# Patient Record
Sex: FEMALE | Race: WHITE | Employment: OTHER | RURAL
[De-identification: names, ages, dates, MRNs, and addresses within clinical notes are randomized per-mention and may not be internally consistent; named-entity substitution may affect disease eponyms.]

---

## 2017-07-19 ENCOUNTER — HOSPITAL ENCOUNTER (OUTPATIENT)
Dept: GENERAL RADIOLOGY | Age: 62
Discharge: HOME | End: 2017-07-19
Attending: NURSE PRACTITIONER | Admitting: NURSE PRACTITIONER

## 2017-07-19 DIAGNOSIS — R91.1 COIN LESION: ICD-10-CM

## 2017-07-19 DIAGNOSIS — R91.1 SOLITARY PULMONARY NODULE: ICD-10-CM

## 2017-07-19 DIAGNOSIS — Z12.31 ENCOUNTER FOR SCREENING MAMMOGRAM FOR BREAST CANCER: ICD-10-CM

## 2017-08-01 ENCOUNTER — HOSPITAL ENCOUNTER (OUTPATIENT)
Dept: GENERAL RADIOLOGY | Age: 62
Discharge: HOME | End: 2017-08-01
Attending: NURSE PRACTITIONER | Admitting: NURSE PRACTITIONER

## 2017-08-01 ENCOUNTER — HOSPITAL ENCOUNTER (OUTPATIENT)
Dept: GENERAL RADIOLOGY | Age: 62
Discharge: HOME | End: 2017-08-01
Admitting: NURSE PRACTITIONER

## 2017-08-01 DIAGNOSIS — R92.8 ABNORMAL MAMMOGRAM: ICD-10-CM

## 2017-08-01 DIAGNOSIS — R92.8 OTHER ABNORMAL AND INCONCLUSIVE FINDINGS ON DIAGNOSTIC IMAGING OF BREAST: ICD-10-CM

## 2018-08-13 ENCOUNTER — HOSPITAL ENCOUNTER (EMERGENCY)
Facility: HOSPITAL | Age: 63
Discharge: HOME | End: 2018-08-13
Attending: EMERGENCY MEDICINE | Admitting: EMERGENCY MEDICINE

## 2018-08-13 VITALS
BODY MASS INDEX: 23.37 KG/M2 | WEIGHT: 127 LBS | DIASTOLIC BLOOD PRESSURE: 77 MMHG | SYSTOLIC BLOOD PRESSURE: 154 MMHG | OXYGEN SATURATION: 94 % | HEART RATE: 94 BPM | RESPIRATION RATE: 16 BRPM | TEMPERATURE: 98.3 F | HEIGHT: 62 IN

## 2018-08-13 DIAGNOSIS — R11.2 NON-INTRACTABLE VOMITING WITH NAUSEA, UNSPECIFIED VOMITING TYPE: ICD-10-CM

## 2018-08-13 DIAGNOSIS — B02.9 HERPES ZOSTER WITHOUT COMPLICATION: Primary | ICD-10-CM

## 2018-08-13 LAB
ALBUMIN SERPL-MCNC: 4 G/DL (ref 3.5–5)
ALBUMIN/GLOB SERPL: 1.3 G/DL (ref 1–2)
ALP SERPL-CCNC: 75 U/L (ref 38–126)
ALT SERPL W P-5'-P-CCNC: 18 U/L (ref 13–69)
ANION GAP SERPL CALCULATED.3IONS-SCNC: 8.9 MMOL/L (ref 10–20)
AST SERPL-CCNC: 20 U/L (ref 15–46)
BACTERIA UR QL AUTO: ABNORMAL /HPF
BASOPHILS # BLD AUTO: 0.03 10*3/MM3 (ref 0–0.2)
BASOPHILS NFR BLD AUTO: 0.4 % (ref 0–2.5)
BILIRUB SERPL-MCNC: 0.2 MG/DL (ref 0.2–1.3)
BILIRUB UR QL STRIP: NEGATIVE
BUN BLD-MCNC: 10 MG/DL (ref 7–20)
BUN/CREAT SERPL: 14.3 (ref 7.1–23.5)
CALCIUM SPEC-SCNC: 9.2 MG/DL (ref 8.4–10.2)
CHLORIDE SERPL-SCNC: 104 MMOL/L (ref 98–107)
CLARITY UR: ABNORMAL
CO2 SERPL-SCNC: 30 MMOL/L (ref 26–30)
COLOR UR: YELLOW
CREAT BLD-MCNC: 0.7 MG/DL (ref 0.6–1.3)
DEPRECATED RDW RBC AUTO: 46.7 FL (ref 37–54)
EOSINOPHIL # BLD AUTO: 0.07 10*3/MM3 (ref 0–0.7)
EOSINOPHIL NFR BLD AUTO: 1 % (ref 0–7)
ERYTHROCYTE [DISTWIDTH] IN BLOOD BY AUTOMATED COUNT: 13.7 % (ref 11.5–14.5)
GFR SERPL CREATININE-BSD FRML MDRD: 85 ML/MIN/1.73
GLOBULIN UR ELPH-MCNC: 3 GM/DL
GLUCOSE BLD-MCNC: 102 MG/DL (ref 74–98)
GLUCOSE UR STRIP-MCNC: NEGATIVE MG/DL
HCT VFR BLD AUTO: 32.3 % (ref 37–47)
HGB BLD-MCNC: 10.6 G/DL (ref 12–16)
HGB UR QL STRIP.AUTO: ABNORMAL
HYALINE CASTS UR QL AUTO: ABNORMAL /LPF
IMM GRANULOCYTES # BLD: 0.02 10*3/MM3 (ref 0–0.06)
IMM GRANULOCYTES NFR BLD: 0.3 % (ref 0–0.6)
KETONES UR QL STRIP: NEGATIVE
LEUKOCYTE ESTERASE UR QL STRIP.AUTO: NEGATIVE
LYMPHOCYTES # BLD AUTO: 0.79 10*3/MM3 (ref 0.6–3.4)
LYMPHOCYTES NFR BLD AUTO: 11.8 % (ref 10–50)
MCH RBC QN AUTO: 30.5 PG (ref 27–31)
MCHC RBC AUTO-ENTMCNC: 32.8 G/DL (ref 30–37)
MCV RBC AUTO: 92.8 FL (ref 81–99)
MONOCYTES # BLD AUTO: 0.45 10*3/MM3 (ref 0–0.9)
MONOCYTES NFR BLD AUTO: 6.7 % (ref 0–12)
MUCOUS THREADS URNS QL MICRO: ABNORMAL /HPF
NEUTROPHILS # BLD AUTO: 5.35 10*3/MM3 (ref 2–6.9)
NEUTROPHILS NFR BLD AUTO: 79.8 % (ref 37–80)
NITRITE UR QL STRIP: NEGATIVE
NRBC BLD MANUAL-RTO: 0 /100 WBC (ref 0–0)
PH UR STRIP.AUTO: 7 [PH] (ref 5–8)
PLATELET # BLD AUTO: 178 10*3/MM3 (ref 130–400)
PMV BLD AUTO: 9.6 FL (ref 6–12)
POTASSIUM BLD-SCNC: 3.9 MMOL/L (ref 3.5–5.1)
PROT SERPL-MCNC: 7 G/DL (ref 6.3–8.2)
PROT UR QL STRIP: NEGATIVE
RBC # BLD AUTO: 3.48 10*6/MM3 (ref 4.2–5.4)
RBC # UR: ABNORMAL /HPF
REF LAB TEST METHOD: ABNORMAL
SODIUM BLD-SCNC: 139 MMOL/L (ref 137–145)
SP GR UR STRIP: 1.02 (ref 1–1.03)
SQUAMOUS #/AREA URNS HPF: ABNORMAL /HPF
UROBILINOGEN UR QL STRIP: ABNORMAL
WBC NRBC COR # BLD: 6.71 10*3/MM3 (ref 4.8–10.8)
WBC UR QL AUTO: ABNORMAL /HPF

## 2018-08-13 PROCEDURE — 81001 URINALYSIS AUTO W/SCOPE: CPT | Performed by: NURSE PRACTITIONER

## 2018-08-13 PROCEDURE — 96375 TX/PRO/DX INJ NEW DRUG ADDON: CPT

## 2018-08-13 PROCEDURE — 99283 EMERGENCY DEPT VISIT LOW MDM: CPT

## 2018-08-13 PROCEDURE — 25010000002 MORPHINE SULFATE (PF) 2 MG/ML SOLUTION: Performed by: NURSE PRACTITIONER

## 2018-08-13 PROCEDURE — 96374 THER/PROPH/DIAG INJ IV PUSH: CPT

## 2018-08-13 PROCEDURE — 85025 COMPLETE CBC W/AUTO DIFF WBC: CPT | Performed by: NURSE PRACTITIONER

## 2018-08-13 PROCEDURE — 80053 COMPREHEN METABOLIC PANEL: CPT | Performed by: NURSE PRACTITIONER

## 2018-08-13 PROCEDURE — 25010000002 ONDANSETRON PER 1 MG: Performed by: NURSE PRACTITIONER

## 2018-08-13 RX ORDER — ONDANSETRON 2 MG/ML
4 INJECTION INTRAMUSCULAR; INTRAVENOUS ONCE
Status: COMPLETED | OUTPATIENT
Start: 2018-08-13 | End: 2018-08-13

## 2018-08-13 RX ORDER — ACYCLOVIR 800 MG/1
800 TABLET ORAL 4 TIMES DAILY
Qty: 28 TABLET | Refills: 0 | Status: SHIPPED | OUTPATIENT
Start: 2018-08-13 | End: 2020-05-19

## 2018-08-13 RX ORDER — MORPHINE SULFATE 2 MG/ML
2 INJECTION, SOLUTION INTRAMUSCULAR; INTRAVENOUS ONCE
Status: COMPLETED | OUTPATIENT
Start: 2018-08-13 | End: 2018-08-13

## 2018-08-13 RX ORDER — SODIUM CHLORIDE 0.9 % (FLUSH) 0.9 %
10 SYRINGE (ML) INJECTION AS NEEDED
Status: DISCONTINUED | OUTPATIENT
Start: 2018-08-13 | End: 2018-08-13 | Stop reason: HOSPADM

## 2018-08-13 RX ORDER — HYDROCODONE BITARTRATE AND ACETAMINOPHEN 5; 325 MG/1; MG/1
1 TABLET ORAL EVERY 4 HOURS PRN
Qty: 18 TABLET | Refills: 0 | Status: SHIPPED | OUTPATIENT
Start: 2018-08-13 | End: 2020-05-19

## 2018-08-13 RX ADMIN — SODIUM CHLORIDE 1000 ML: 9 INJECTION, SOLUTION INTRAVENOUS at 17:59

## 2018-08-13 RX ADMIN — ONDANSETRON 4 MG: 2 INJECTION INTRAMUSCULAR; INTRAVENOUS at 17:59

## 2018-08-13 RX ADMIN — MORPHINE SULFATE 2 MG: 2 INJECTION, SOLUTION INTRAMUSCULAR; INTRAVENOUS at 17:59

## 2018-08-13 NOTE — ED PROVIDER NOTES
Subjective   History of Present Illness  This is a 62 year old female who comes in today complaining of right side low back pain that started about 1 week ago, then yesterday she noted a rash on her lower back and she developed nausea and vomiting. Since then the rash has gotten worse going down the front of her leg. She is also unable to hold down any fluids or eat today. She denies any fever.   Review of Systems   Constitutional: Negative.    HENT: Negative.    Eyes: Negative.    Respiratory: Negative.    Cardiovascular: Negative.    Gastrointestinal: Positive for nausea and vomiting.   Endocrine: Negative.    Genitourinary: Negative.    Musculoskeletal: Negative.    Skin: Positive for rash.   Allergic/Immunologic: Negative.    Neurological: Negative.    Hematological: Negative.    Psychiatric/Behavioral: Negative.    All other systems reviewed and are negative.      Past Medical History:   Diagnosis Date   • Arthritis        Allergies   Allergen Reactions   • Penicillins Rash       Past Surgical History:   Procedure Laterality Date   • HYSTERECTOMY         History reviewed. No pertinent family history.    Social History     Social History   • Marital status:      Social History Main Topics   • Smoking status: Current Every Day Smoker     Packs/day: 1.00     Types: Cigarettes   • Alcohol use No   • Drug use: No     Other Topics Concern   • Not on file           Objective   Physical Exam   Constitutional: She appears well-developed and well-nourished.   Nursing note and vitals reviewed.  GEN: No acute distress  Head: Normocephalic, atraumatic  Eyes: Pupils equal round reactive to light  ENT: Posterior pharynx normal in appearance, oral mucosa is moist  Chest: Nontender to palpation  Cardiovascular: Regular rate  Lungs: Clear to auscultation bilaterally  Abdomen: Soft, nontender, nondistended, no peritoneal signs  Extremities: No edema, normal appearance  Neuro: GCS 15  Psych: Mood and affect are  appropriate  Skin: vesicular rash starting on the right lower back and trails around to anterior aspect of right leg following L2 nerve pathway.     Procedures           ED Course  ED Course as of Aug 13 1900   Mon Aug 13, 2018   1832 Feeling some better, after medication.   [TW]      ED Course User Index  [TW] Margaret Black, ANNEMARIE                  MDM  Number of Diagnoses or Management Options  Diagnosis management comments: This appears to be shingles covering the L2 nerve pathway. Since she is having nausea and vomiting we will give her some IV fluids, give her pain medication and some antiemetics.        Amount and/or Complexity of Data Reviewed  Clinical lab tests: ordered and reviewed  Review and summarize past medical records: yes  Discuss the patient with other providers: yes    Risk of Complications, Morbidity, and/or Mortality  Presenting problems: moderate  Diagnostic procedures: low  Management options: moderate          Final diagnoses:   Herpes zoster without complication   Non-intractable vomiting with nausea, unspecified vomiting type            Margaret Black, ANNEMARIE  08/13/18 1900

## 2018-09-28 ENCOUNTER — HOSPITAL ENCOUNTER (OUTPATIENT)
Dept: ULTRASOUND IMAGING | Facility: HOSPITAL | Age: 63
Discharge: HOME | End: 2018-09-28
Payer: COMMERCIAL

## 2018-09-28 DIAGNOSIS — E04.9 ENLARGEMENT OF THYROID: ICD-10-CM

## 2018-09-28 PROCEDURE — 76536 US EXAM OF HEAD AND NECK: CPT

## 2019-07-03 ENCOUNTER — TRANSCRIBE ORDERS (OUTPATIENT)
Dept: ADMINISTRATIVE | Facility: HOSPITAL | Age: 64
End: 2019-07-03

## 2019-07-03 DIAGNOSIS — F17.200 NICOTINE DEPENDENCE, UNCOMPLICATED, UNSPECIFIED NICOTINE PRODUCT TYPE: Primary | ICD-10-CM

## 2019-07-03 DIAGNOSIS — R59.9 ENLARGEMENT OF LYMPH NODE: ICD-10-CM

## 2019-07-03 DIAGNOSIS — R13.10 DYSPHAGIA, UNSPECIFIED TYPE: ICD-10-CM

## 2019-07-16 ENCOUNTER — HOSPITAL ENCOUNTER (OUTPATIENT)
Dept: ULTRASOUND IMAGING | Facility: HOSPITAL | Age: 64
Discharge: HOME | End: 2019-07-16

## 2019-07-16 ENCOUNTER — HOSPITAL ENCOUNTER (OUTPATIENT)
Dept: CT IMAGING | Facility: HOSPITAL | Age: 64
Discharge: HOME | End: 2019-07-16
Admitting: NURSE PRACTITIONER

## 2019-07-16 DIAGNOSIS — F17.200 NICOTINE DEPENDENCE, UNCOMPLICATED, UNSPECIFIED NICOTINE PRODUCT TYPE: ICD-10-CM

## 2019-07-16 DIAGNOSIS — R13.10 DYSPHAGIA, UNSPECIFIED TYPE: ICD-10-CM

## 2019-07-16 DIAGNOSIS — R59.9 ENLARGEMENT OF LYMPH NODE: ICD-10-CM

## 2019-07-16 PROCEDURE — G0297 LDCT FOR LUNG CA SCREEN: HCPCS

## 2019-07-16 PROCEDURE — 76882 US LMTD JT/FCL EVL NVASC XTR: CPT

## 2019-07-16 PROCEDURE — 76536 US EXAM OF HEAD AND NECK: CPT

## 2020-01-14 ENCOUNTER — TRANSCRIBE ORDERS (OUTPATIENT)
Dept: ADMINISTRATIVE | Facility: HOSPITAL | Age: 65
End: 2020-01-14

## 2020-01-14 DIAGNOSIS — E04.1 CYST OF THYROID: Primary | ICD-10-CM

## 2020-02-06 ENCOUNTER — HOSPITAL ENCOUNTER (OUTPATIENT)
Dept: ULTRASOUND IMAGING | Facility: HOSPITAL | Age: 65
Discharge: HOME | End: 2020-02-06
Admitting: NURSE PRACTITIONER

## 2020-02-06 DIAGNOSIS — E04.1 CYST OF THYROID: ICD-10-CM

## 2020-02-06 PROCEDURE — 76536 US EXAM OF HEAD AND NECK: CPT

## 2020-03-20 ENCOUNTER — HOSPITAL ENCOUNTER (OUTPATIENT)
Facility: HOSPITAL | Age: 65
Discharge: HOME | End: 2020-03-20
Payer: COMMERCIAL

## 2020-03-20 ENCOUNTER — HOSPITAL ENCOUNTER (OUTPATIENT)
Dept: ULTRASOUND IMAGING | Facility: HOSPITAL | Age: 65
Discharge: HOME | End: 2020-03-20
Payer: COMMERCIAL

## 2020-03-20 PROCEDURE — 76700 US EXAM ABDOM COMPLETE: CPT

## 2020-03-20 PROCEDURE — 36415 COLL VENOUS BLD VENIPUNCTURE: CPT

## 2020-05-19 ENCOUNTER — RESULTS ENCOUNTER (OUTPATIENT)
Dept: GASTROENTEROLOGY | Facility: CLINIC | Age: 65
End: 2020-05-19

## 2020-05-19 ENCOUNTER — OFFICE VISIT (OUTPATIENT)
Dept: GASTROENTEROLOGY | Facility: CLINIC | Age: 65
End: 2020-05-19

## 2020-05-19 ENCOUNTER — TELEPHONE (OUTPATIENT)
Dept: GASTROENTEROLOGY | Facility: CLINIC | Age: 65
End: 2020-05-19

## 2020-05-19 VITALS
RESPIRATION RATE: 18 BRPM | WEIGHT: 138.2 LBS | HEIGHT: 62 IN | HEART RATE: 90 BPM | BODY MASS INDEX: 25.43 KG/M2 | SYSTOLIC BLOOD PRESSURE: 138 MMHG | OXYGEN SATURATION: 99 % | DIASTOLIC BLOOD PRESSURE: 82 MMHG | TEMPERATURE: 98.2 F

## 2020-05-19 DIAGNOSIS — K59.04 CHRONIC IDIOPATHIC CONSTIPATION: ICD-10-CM

## 2020-05-19 DIAGNOSIS — R22.2 NODULE OF CHEST WALL: ICD-10-CM

## 2020-05-19 DIAGNOSIS — R13.19 ESOPHAGEAL DYSPHAGIA: ICD-10-CM

## 2020-05-19 DIAGNOSIS — K21.9 GASTROESOPHAGEAL REFLUX DISEASE WITHOUT ESOPHAGITIS: Primary | ICD-10-CM

## 2020-05-19 DIAGNOSIS — Z86.010 PERSONAL HISTORY OF COLONIC POLYPS: ICD-10-CM

## 2020-05-19 DIAGNOSIS — Z80.0 FAMILY HX OF COLON CANCER: ICD-10-CM

## 2020-05-19 DIAGNOSIS — Z01.818 PREOP TESTING: ICD-10-CM

## 2020-05-19 DIAGNOSIS — Z01.818 PREOP TESTING: Primary | ICD-10-CM

## 2020-05-19 DIAGNOSIS — R10.13 EPIGASTRIC PAIN: ICD-10-CM

## 2020-05-19 PROCEDURE — 99204 OFFICE O/P NEW MOD 45 MIN: CPT | Performed by: INTERNAL MEDICINE

## 2020-05-19 RX ORDER — POLYETHYLENE GLYCOL 3350 17 G/17G
17 POWDER, FOR SOLUTION ORAL DAILY
Qty: 510 G | Refills: 2 | Status: SHIPPED | OUTPATIENT
Start: 2020-05-19 | End: 2022-12-28

## 2020-05-19 RX ORDER — ROPINIROLE 2 MG/1
2 TABLET, FILM COATED ORAL NIGHTLY
COMMUNITY
Start: 2020-05-11 | End: 2021-12-02

## 2020-05-19 RX ORDER — DOCUSATE SODIUM 100 MG/1
100 CAPSULE, LIQUID FILLED ORAL 2 TIMES DAILY
Qty: 60 CAPSULE | Refills: 2 | Status: SHIPPED | OUTPATIENT
Start: 2020-05-19 | End: 2021-06-15

## 2020-05-19 RX ORDER — SODIUM CHLORIDE 9 MG/ML
70 INJECTION, SOLUTION INTRAVENOUS CONTINUOUS PRN
Status: CANCELLED | OUTPATIENT
Start: 2020-06-01

## 2020-05-19 RX ORDER — PANTOPRAZOLE SODIUM 40 MG/1
40 TABLET, DELAYED RELEASE ORAL DAILY
Qty: 30 TABLET | Refills: 2 | Status: SHIPPED | OUTPATIENT
Start: 2020-05-19 | End: 2020-06-18

## 2020-05-19 RX ORDER — DULOXETIN HYDROCHLORIDE 60 MG/1
60 CAPSULE, DELAYED RELEASE ORAL DAILY
COMMUNITY
End: 2022-08-08

## 2020-05-19 RX ORDER — CYCLOBENZAPRINE HCL 10 MG
10 TABLET ORAL 3 TIMES DAILY PRN
COMMUNITY
End: 2021-11-23

## 2020-05-19 RX ORDER — DICLOFENAC SODIUM AND MISOPROSTOL 75; 200 MG/1; UG/1
1 TABLET, FILM COATED ORAL 2 TIMES DAILY
COMMUNITY
Start: 2020-04-14 | End: 2021-05-04

## 2020-05-19 NOTE — PROGRESS NOTES
New Patient Consult      Date: 2020   Patient Name: Stephanie Jean  MRN: [id number]  : 1955     Referring Physician: Margaret Mooney, *    Chief Complaint   Patient presents with   • Heartburn       History of Present Illness: Stephanie Jean is a 64 y.o. female who is here today to establish care with Gastroenterology for evaluation of heartburn.    History of upper abdominal pain mainly in the epigastric region since about 2-3 months. Pain started gradually, intermittent, aching pain lasting for about few minutes and occasionally longer.  She was given dexilant and sucralfate which did not help initially and later on changed to pantoprazole.  She is on pantoprazole now with reasonable control of pain and reflux symptoms with once in few days but for the last 2 weeks she did not take any PPI. Associated significant heart burn.   She was on PPI before after she was noted to have gastritis on EGD about 10 years ago. Stopped herself year ago. Occasional problem with swallowing to solid food. She also has sjogrens syndrome.   She was mostly constipated now. Bowel movements every once in 2-3 days and occasionally once in 3-5 days. No lower abdominal pain.     There is no history of  hematochezia or melena. There is no history of anemia. Prior history of EGD about 10 yrs ago. Last colonoscopy in 2020 about 4 polyps removed and advised to repeat in 3 years time. Brother had UC and uncle from mother's side had colon Ca. No history of any abdominal surgery except hysterectomy. Denies alcohol abuse or cigarette smoking.   She is on arthrotec tablets    Subjective      Past Medical History:   Past Medical History:   Diagnosis Date   • Arthritis    • Sjogren's disease (CMS/HCC)        Past Surgical History:   Past Surgical History:   Procedure Laterality Date   • APPENDECTOMY     • HYSTERECTOMY         Family History:   Family History   Problem Relation Age of Onset   • Hypertension Mother    • Aneurysm  Mother    • Heart disease Mother    • Emphysema Father        Social History:   Social History     Socioeconomic History   • Marital status:      Spouse name: Not on file   • Number of children: Not on file   • Years of education: Not on file   • Highest education level: Not on file   Tobacco Use   • Smoking status: Former Smoker     Packs/day: 1.00     Types: Cigarettes   • Smokeless tobacco: Never Used   Substance and Sexual Activity   • Alcohol use: No   • Drug use: Yes     Types: Marijuana     Comment: uses daily for pain   • Sexual activity: Defer         Current Outpatient Medications:   •  ARTHROTEC 75-0.2 MG EC tablet, Take 1 tablet by mouth 2 (Two) Times a Day., Disp: , Rfl:   •  cyclobenzaprine (FLEXERIL) 10 MG tablet, Take 10 mg by mouth 3 (Three) Times a Day As Needed for Muscle Spasms., Disp: , Rfl:   •  DULoxetine (Cymbalta) 60 MG capsule, Take 60 mg by mouth Daily., Disp: , Rfl:   •  rOPINIRole (REQUIP) 2 MG tablet, Take 2 mg by mouth Every Night., Disp: , Rfl:   •  docusate sodium (COLACE) 100 MG capsule, Take 1 capsule by mouth 2 (Two) Times a Day., Disp: 60 capsule, Rfl: 2  •  pantoprazole (Protonix) 40 MG EC tablet, Take 1 tablet by mouth Daily for 30 days., Disp: 30 tablet, Rfl: 2  •  polyethylene glycol (MiraLax) 17 GM/SCOOP powder, Take 17 g by mouth Daily., Disp: 510 g, Rfl: 2    Allergies   Allergen Reactions   • Penicillins Rash       Review of Systems:   Review of Systems   Constitutional: Positive for unexpected weight loss. Negative for appetite change, fatigue and fever.   HENT: Positive for trouble swallowing.    Respiratory: Negative for cough, shortness of breath and wheezing.    Cardiovascular: Negative for chest pain, palpitations and leg swelling.   Gastrointestinal: Positive for abdominal pain, constipation, diarrhea and nausea. Negative for abdominal distention, anal bleeding, blood in stool, rectal pain, vomiting, GERD and indigestion.   Genitourinary: Negative for  "dysuria, frequency and hematuria.   Musculoskeletal: Negative for back pain and joint swelling.   Skin: Negative for color change, rash and skin lesions.   Neurological: Negative for dizziness, syncope, speech difficulty, weakness, headache and memory problem.   Hematological: Negative for adenopathy. Does not bruise/bleed easily.   Psychiatric/Behavioral: Negative for agitation, behavioral problems, suicidal ideas and depressed mood.       The following portions of the patient's history were reviewed and updated as appropriate: allergies, current medications, past family history, past medical history, past social history, past surgical history and problem list.    Objective     Physical Exam:  Vital Signs:   Vitals:    05/19/20 1457   BP: 138/82   Pulse: 90   Resp: 18   Temp: 98.2 °F (36.8 °C)   TempSrc: Temporal   SpO2: 99%   Weight: 62.7 kg (138 lb 3.2 oz)   Height: 156.2 cm (61.5\")       Physical Exam   Constitutional: She is oriented to person, place, and time. She appears well-developed and well-nourished.   HENT:   Head: Normocephalic and atraumatic.   Right Ear: External ear normal.   Left Ear: External ear normal.   Mouth/Throat: Oropharynx is clear and moist.   Eyes: Pupils are equal, round, and reactive to light. Conjunctivae and EOM are normal.   Neck: Normal range of motion. No tracheal deviation present. No thyromegaly present.   Cardiovascular: Normal rate and regular rhythm.   No murmur heard.  Pulmonary/Chest: Effort normal and breath sounds normal. No respiratory distress.   This is soft to firm subcu nodule noted on the left upper chest just above the axillary cleft at the tail of the left breast.  This could be a lipoma versus lymph node   Abdominal: Soft. Bowel sounds are normal. She exhibits no distension and no mass. There is tenderness (Mild epigastric tenderness noted). There is no guarding. No hernia.   Musculoskeletal: Normal range of motion. She exhibits no edema.   Neurological: She is " alert and oriented to person, place, and time. No cranial nerve deficit or sensory deficit.   Skin: Skin is warm and dry.   Psychiatric: She has a normal mood and affect. Her behavior is normal. Judgment and thought content normal.   Nursing note and vitals reviewed.      Results Review:   I have reviewed the patient's new clinical and imaging results and agree with the interpretation.     No visits with results within 90 Day(s) from this visit.   Latest known visit with results is:   Admission on 08/13/2018, Discharged on 08/13/2018   Component Date Value Ref Range Status   • Glucose 08/13/2018 102* 74 - 98 mg/dL Final   • BUN 08/13/2018 10  7 - 20 mg/dL Final   • Creatinine 08/13/2018 0.70  0.60 - 1.30 mg/dL Final   • Sodium 08/13/2018 139  137 - 145 mmol/L Final   • Potassium 08/13/2018 3.9  3.5 - 5.1 mmol/L Final   • Chloride 08/13/2018 104  98 - 107 mmol/L Final   • CO2 08/13/2018 30.0  26.0 - 30.0 mmol/L Final   • Calcium 08/13/2018 9.2  8.4 - 10.2 mg/dL Final   • Total Protein 08/13/2018 7.0  6.3 - 8.2 g/dL Final   • Albumin 08/13/2018 4.00  3.50 - 5.00 g/dL Final   • ALT (SGPT) 08/13/2018 18  13 - 69 U/L Final   • AST (SGOT) 08/13/2018 20  15 - 46 U/L Final   • Alkaline Phosphatase 08/13/2018 75  38 - 126 U/L Final   • Total Bilirubin 08/13/2018 0.2  0.2 - 1.3 mg/dL Final   • eGFR Non African Amer 08/13/2018 85  >60 mL/min/1.73 Final   • Globulin 08/13/2018 3.0  gm/dL Final   • A/G Ratio 08/13/2018 1.3  1.0 - 2.0 g/dL Final   • BUN/Creatinine Ratio 08/13/2018 14.3  7.1 - 23.5 Final   • Anion Gap 08/13/2018 8.9* 10.0 - 20.0 mmol/L Final   • WBC 08/13/2018 6.71  4.80 - 10.80 10*3/mm3 Final   • RBC 08/13/2018 3.48* 4.20 - 5.40 10*6/mm3 Final   • Hemoglobin 08/13/2018 10.6* 12.0 - 16.0 g/dL Final   • Hematocrit 08/13/2018 32.3* 37.0 - 47.0 % Final   • MCV 08/13/2018 92.8  81.0 - 99.0 fL Final   • MCH 08/13/2018 30.5  27.0 - 31.0 pg Final   • MCHC 08/13/2018 32.8  30.0 - 37.0 g/dL Final   • RDW 08/13/2018 13.7   11.5 - 14.5 % Final   • RDW-SD 08/13/2018 46.7  37.0 - 54.0 fl Final   • MPV 08/13/2018 9.6  6.0 - 12.0 fL Final   • Platelets 08/13/2018 178  130 - 400 10*3/mm3 Final   • Neutrophil % 08/13/2018 79.8  37.0 - 80.0 % Final   • Lymphocyte % 08/13/2018 11.8  10.0 - 50.0 % Final   • Monocyte % 08/13/2018 6.7  0.0 - 12.0 % Final   • Eosinophil % 08/13/2018 1.0  0.0 - 7.0 % Final   • Basophil % 08/13/2018 0.4  0.0 - 2.5 % Final   • Immature Grans % 08/13/2018 0.3  0.0 - 0.6 % Final   • Neutrophils, Absolute 08/13/2018 5.35  2.00 - 6.90 10*3/mm3 Final   • Lymphocytes, Absolute 08/13/2018 0.79  0.60 - 3.40 10*3/mm3 Final   • Monocytes, Absolute 08/13/2018 0.45  0.00 - 0.90 10*3/mm3 Final   • Eosinophils, Absolute 08/13/2018 0.07  0.00 - 0.70 10*3/mm3 Final   • Basophils, Absolute 08/13/2018 0.03  0.00 - 0.20 10*3/mm3 Final   • Immature Grans, Absolute 08/13/2018 0.02  0.00 - 0.06 10*3/mm3 Final   • nRBC 08/13/2018 0.0  0.0 - 0.0 /100 WBC Final   • Color, UA 08/13/2018 Yellow  Yellow, Straw Final   • Appearance, UA 08/13/2018 Slightly Cloudy* Clear Final   • pH, UA 08/13/2018 7.0  5.0 - 8.0 Final   • Specific Gravity, UA 08/13/2018 1.020  1.005 - 1.030 Final   • Glucose, UA 08/13/2018 Negative  Negative Final   • Ketones, UA 08/13/2018 Negative  Negative Final   • Bilirubin, UA 08/13/2018 Negative  Negative Final   • Blood, UA 08/13/2018 Trace* Negative Final   • Protein, UA 08/13/2018 Negative  Negative Final   • Leuk Esterase, UA 08/13/2018 Negative  Negative Final   • Nitrite, UA 08/13/2018 Negative  Negative Final   • Urobilinogen, UA 08/13/2018 0.2 E.U./dL  0.2 - 1.0 E.U./dL Final   • RBC, UA 08/13/2018 3-5* None Seen /HPF Final   • WBC, UA 08/13/2018 0-2* None Seen /HPF Final   • Bacteria, UA 08/13/2018 Trace* None Seen /HPF Final   • Squamous Epithelial Cells, UA 08/13/2018 7-12* None Seen, 0-2 /HPF Final   • Hyaline Casts, UA 08/13/2018 None Seen  None Seen /LPF Final   • Mucus, UA 08/13/2018 Trace  None Seen, Trace  /HPF Final   • Methodology 08/13/2018 Manual Light Microscopy   Final      No radiology results for the last 90 days.    Assessment / Plan      Assessment & Plan:  1. Epigastric pain  She had a significant upper abdominal pain with the worsening reflux symptoms 2 months ago.  Not better with the PPI.  However patient was not taking the PPI for the last 2 to 3 weeks .   Again developed epigastric pain.  I suspect she likely has some gastric erosions.  She is also on Arthrotec p.o. Daily.  Her last EGD was about 10 years ago and revealed gastritis per patient.  I am going to restart her on Protonix 40 mg p.o. daily  Given her persistent symptoms we will also schedule her for an EGD    The indications, technique, alternatives and potential risk and complications were discussed with the patient including but not limited to bleeding, bowel perforations, missing lesions and anesthetic complications. The patient understands and wishes to proceed with the procedure and has given their verbal consent. Written patient education information was given to the patient.   The patient will call if they have further questions before procedure.     2. Gastroesophageal reflux disease without esophagitis  Her reflux symptoms were pretty much under control  while she was on a PPI  Symptoms got worse while she is off PPI now  Started back on a PPI  Dietary advise given    3. Esophageal dysphagia  She has a junction syndrome with a significant dryness of the oral mucosa and esophageal mucosa  I suspect most of her dysphagia is related to her sjogrens syndrome  She has been booked for EGD for further evaluation      4. Chronic idiopathic constipation  She always had some element of constipation recently got worse.   Bowel movement once in 2 to 5 days  High-fiber diet advised.  Started on stool softener Colace daily along with MiraLAX daily  We will reevaluate in 8 weeks time    5. Personal history of colonic polyps  Colonoscopy in January  2028 [facility].  Per patient for benign polyps removed less than 1 cm in size  Due for colonoscopy in 3 years time in 2023    6. Family hx of colon cancer  Uncle had a colon cancer    7. Nodule of chest wall  About 2cm size soft firm nodule palpable at the left upper to upper chest medial to the the anterior axillary cleft  This could be a lipoma, however lymph node cannot be ruled out  Advised ultrasound further evaluation      Follow Up:   Return in about 8 weeks (around 7/14/2020).    Chaparro Marrero MD  [facility] [place]  5/19/2020  16:27    Please note that portions of this note may have been completed with a voice recognition program. Efforts were made to edit the dictations, but occasionally words are mistranscribed.

## 2020-05-19 NOTE — TELEPHONE ENCOUNTER
Patient has been scheduled for procedure. Pre-op covid-19 testing has been ordered. Self quarantine instructions given.

## 2020-05-20 PROBLEM — K21.9 GASTROESOPHAGEAL REFLUX DISEASE WITHOUT ESOPHAGITIS: Status: ACTIVE | Noted: 2020-05-20

## 2020-05-20 PROBLEM — R13.19 ESOPHAGEAL DYSPHAGIA: Status: ACTIVE | Noted: 2020-05-20

## 2020-05-22 ENCOUNTER — HOSPITAL ENCOUNTER (OUTPATIENT)
Dept: ULTRASOUND IMAGING | Facility: HOSPITAL | Age: 65
Discharge: HOME | End: 2020-05-22
Admitting: INTERNAL MEDICINE

## 2020-05-22 DIAGNOSIS — K59.04 CHRONIC IDIOPATHIC CONSTIPATION: ICD-10-CM

## 2020-05-22 PROCEDURE — 76604 US EXAM CHEST: CPT

## 2020-05-28 ENCOUNTER — APPOINTMENT (OUTPATIENT)
Dept: LAB | Facility: HOSPITAL | Age: 65
End: 2020-05-28

## 2020-05-28 LAB
REF LAB TEST METHOD: NORMAL
SARS-COV-2 RNA RESP QL NAA+PROBE: NOT DETECTED

## 2020-05-28 PROCEDURE — U0002 COVID-19 LAB TEST NON-CDC: HCPCS | Performed by: INTERNAL MEDICINE

## 2020-05-28 PROCEDURE — U0004 COV-19 TEST NON-CDC HGH THRU: HCPCS | Performed by: INTERNAL MEDICINE

## 2020-06-01 ENCOUNTER — ANESTHESIA (OUTPATIENT)
Dept: GASTROENTEROLOGY | Facility: HOSPITAL | Age: 65
End: 2020-06-01

## 2020-06-01 ENCOUNTER — HOSPITAL ENCOUNTER (OUTPATIENT)
Facility: HOSPITAL | Age: 65
Setting detail: HOSPITAL OUTPATIENT SURGERY
Discharge: HOME | End: 2020-06-01
Attending: INTERNAL MEDICINE | Admitting: INTERNAL MEDICINE

## 2020-06-01 ENCOUNTER — ANESTHESIA EVENT (OUTPATIENT)
Dept: GASTROENTEROLOGY | Facility: HOSPITAL | Age: 65
End: 2020-06-01

## 2020-06-01 VITALS
OXYGEN SATURATION: 94 % | HEART RATE: 78 BPM | TEMPERATURE: 97.3 F | WEIGHT: 135 LBS | SYSTOLIC BLOOD PRESSURE: 123 MMHG | DIASTOLIC BLOOD PRESSURE: 65 MMHG | RESPIRATION RATE: 18 BRPM | BODY MASS INDEX: 23.92 KG/M2 | HEIGHT: 63 IN

## 2020-06-01 DIAGNOSIS — K21.9 GASTROESOPHAGEAL REFLUX DISEASE WITHOUT ESOPHAGITIS: ICD-10-CM

## 2020-06-01 DIAGNOSIS — R13.19 ESOPHAGEAL DYSPHAGIA: ICD-10-CM

## 2020-06-01 PROCEDURE — 25010000002 PROPOFOL 200 MG/20ML EMULSION: Performed by: NURSE ANESTHETIST, CERTIFIED REGISTERED

## 2020-06-01 PROCEDURE — 25010000002 FENTANYL CITRATE (PF) 100 MCG/2ML SOLUTION: Performed by: NURSE ANESTHETIST, CERTIFIED REGISTERED

## 2020-06-01 RX ORDER — FENTANYL CITRATE 50 UG/ML
INJECTION, SOLUTION INTRAMUSCULAR; INTRAVENOUS AS NEEDED
Status: DISCONTINUED | OUTPATIENT
Start: 2020-06-01 | End: 2020-06-01 | Stop reason: SURG

## 2020-06-01 RX ORDER — SODIUM CHLORIDE 9 MG/ML
70 INJECTION, SOLUTION INTRAVENOUS CONTINUOUS PRN
Status: DISCONTINUED | OUTPATIENT
Start: 2020-06-01 | End: 2020-06-01 | Stop reason: HOSPADM

## 2020-06-01 RX ORDER — LIDOCAINE HYDROCHLORIDE 20 MG/ML
INJECTION, SOLUTION INTRAVENOUS AS NEEDED
Status: DISCONTINUED | OUTPATIENT
Start: 2020-06-01 | End: 2020-06-01 | Stop reason: SURG

## 2020-06-01 RX ORDER — SODIUM CHLORIDE 0.9 % (FLUSH) 0.9 %
10 SYRINGE (ML) INJECTION AS NEEDED
Status: DISCONTINUED | OUTPATIENT
Start: 2020-06-01 | End: 2020-06-01 | Stop reason: HOSPADM

## 2020-06-01 RX ORDER — PROPOFOL 10 MG/ML
INJECTION, EMULSION INTRAVENOUS AS NEEDED
Status: DISCONTINUED | OUTPATIENT
Start: 2020-06-01 | End: 2020-06-01 | Stop reason: SURG

## 2020-06-01 RX ADMIN — FENTANYL CITRATE 100 MCG: 50 INJECTION, SOLUTION INTRAMUSCULAR; INTRAVENOUS at 08:04

## 2020-06-01 RX ADMIN — PROPOFOL 50 MG: 10 INJECTION, EMULSION INTRAVENOUS at 08:04

## 2020-06-01 RX ADMIN — SODIUM CHLORIDE 70 ML/HR: 9 INJECTION, SOLUTION INTRAVENOUS at 06:51

## 2020-06-01 RX ADMIN — PROPOFOL 50 MG: 10 INJECTION, EMULSION INTRAVENOUS at 08:11

## 2020-06-01 RX ADMIN — LIDOCAINE HYDROCHLORIDE 100 MG: 20 INJECTION, SOLUTION INTRAVENOUS at 08:04

## 2020-06-01 NOTE — DISCHARGE INSTRUCTIONS
Hold arthrotec for 72 hours   Continue PPI for now  F/u office 2-4 week    No pushing, pulling, tugging, heavy lifting, or strenuous activity.  No major decision making, driving, or drinking alcoholic beverages for 24 hours. (due to the medications you have received)  Always use good hand hygiene/washing techniques.  NO driving while taking pain medications.     To assist you in voiding:  Drink plenty of fluids  Listen to running water while attempting to void.    If you are unable to urinate and you have an uncomfortable urge to void or it has been   6 hours since you were discharged, return to the Emergency Room

## 2020-06-01 NOTE — ANESTHESIA POSTPROCEDURE EVALUATION
Patient: Stephanie Jean    Procedure Summary     Date:  06/01/20 Room / Location:  [facility] ENDOSCOPY 2 / [facility] ENDOSCOPY    Anesthesia Start:  0805 Anesthesia Stop:      Procedure:  ESOPHAGOGASTRODUODENOSCOPY (N/A Esophagus) Diagnosis:       Gastroesophageal reflux disease without esophagitis      Esophageal dysphagia      (Gastroesophageal reflux disease without esophagitis [K21.9])      (Esophageal dysphagia [R13.10])    Surgeon:  Chaparro Marrero MD Provider:  Moises Prado CRNA    Anesthesia Type:  MAC ASA Status:  2          Anesthesia Type: MAC    Vitals  Vitals Value Taken Time   /60 6/1/2020  8:25 AM   Temp 97.3 °F (36.3 °C) 6/1/2020  8:25 AM   Pulse 81 6/1/2020  8:25 AM   Resp 16 6/1/2020  8:25 AM   SpO2 93 % 6/1/2020  8:25 AM           Post Anesthesia Care and Evaluation    Patient location during evaluation: PHASE II  Level of consciousness: awake and alert

## 2020-06-01 NOTE — ANESTHESIA PREPROCEDURE EVALUATION
Anesthesia Evaluation     NPO Solid Status: > 8 hours  NPO Liquid Status: > 8 hours           Airway   Mallampati: II  TM distance: >3 FB  Neck ROM: full  No difficulty expected  Dental      Pulmonary - normal exam   Cardiovascular - normal exam        Neuro/Psych  GI/Hepatic/Renal/Endo    (+)  GERD well controlled,      Musculoskeletal     Abdominal  - normal exam   Substance History      OB/GYN          Other                        Anesthesia Plan    ASA 2     MAC     intravenous induction     Anesthetic plan, all risks, benefits, and alternatives have been provided, discussed and informed consent has been obtained with: patient.    Plan discussed with CRNA.

## 2020-06-04 LAB
LAB AP CASE REPORT: NORMAL
PATH REPORT.FINAL DX SPEC: NORMAL

## 2020-07-13 NOTE — PROGRESS NOTES
Follow Up Note     Date: 2020   Patient Name: Stephanie Jean  MRN: [id number]  : 1955     Referring Physician: No ref. provider found    Chief Complaint:    Chief Complaint   Patient presents with   • Follow-up   • Heartburn   • Difficulty Swallowing       Interval History:   2020  Stephanie Jean is a 64 y.o. female who is here today for follow up for her heartburn and the dysphagia.  She had an EGD done on 2020.  She she is here for follow-up of her symptoms.  And to discuss the pathology report.. She states that her reflux symptoms have significantly improved there is no more epigastric pain.  Her bowel movements also improved with the laxatives and stool softeners.    2020  Stephanie Jean is a 64 y.o. female who is here today to establish care with Gastroenterology for evaluation of heartburn.     History of upper abdominal pain mainly in the epigastric region since about 2-3 months. Pain started gradually, intermittent, aching pain lasting for about few minutes and occasionally longer.  She was given dexilant and sucralfate which did not help initially and later on changed to pantoprazole.  She is on pantoprazole now with reasonable control of pain and reflux symptoms with once in few days but for the last 2 weeks she did not take any PPI. Associated significant heart burn.   She was on PPI before after she was noted to have gastritis on EGD about 10 years ago. Stopped herself year ago. Occasional problem with swallowing to solid food. She also has sjogrens syndrome.   She was mostly constipated now. Bowel movements every once in 2-3 days and occasionally once in 3-5 days. No lower abdominal pain.      There is no history of  hematochezia or melena. There is no history of anemia. Prior history of EGD about 10 yrs ago. Last colonoscopy in 2020 about 4 polyps removed and advised to repeat in 3 years time. Brother had UC and uncle from mother's side had colon Ca. No history of any  abdominal surgery except hysterectomy. Denies alcohol abuse or cigarette smoking.   She is on arthrotec tablets    Subjective      Past Medical History:   Past Medical History:   Diagnosis Date   • Arthritis    • Dysphagia    • Family history of colon cancer    • Hoarseness    • Lupus (CMS/HCC)    • Marijuana use     Daily    • Pulmonary nodule    • Sjogren's disease (CMS/HCC)      Past Surgical History:   Past Surgical History:   Procedure Laterality Date   • APPENDECTOMY     • COLONOSCOPY     • ENDOSCOPY     • ENDOSCOPY N/A 2020    Procedure: ESOPHAGOGASTRODUODENOSCOPY;  Surgeon: Chaparro Marrero MD;  Location[facility] ENDOSCOPY;  Service: Gastroenterology;  Laterality: N/A;   • HYSTERECTOMY         Family History:   Family History   Problem Relation Age of Onset   • Hypertension Mother    • Aneurysm Mother    • Heart disease Mother    • Emphysema Father    • Ulcerative colitis Brother    • Colon cancer Maternal Uncle    • Cirrhosis Neg Hx    • Liver disease Neg Hx    • Liver cancer Neg Hx    • Crohn's disease Neg Hx        Social History:   Social History     Socioeconomic History   • Marital status:      Spouse name: Not on file   • Number of children: Not on file   • Years of education: Not on file   • Highest education level: Not on file   Tobacco Use   • Smoking status: Former Smoker     Packs/day: 1.00     Years: 40.00     Pack years: 40.00     Types: Cigarettes     Last attempt to quit: 2018     Years since quittin.5   • Smokeless tobacco: Never Used   Substance and Sexual Activity   • Alcohol use: No   • Drug use: Yes     Frequency: 7.0 times per week     Types: Marijuana     Comment: Daily    • Sexual activity: Defer       Medications:     Current Outpatient Medications:   •  amitriptyline (ELAVIL) 25 MG tablet, Take 75 mg by mouth Every Night., Disp: , Rfl:   •  cyclobenzaprine (FLEXERIL) 10 MG tablet, Take 10 mg by mouth 3 (Three) Times a Day As Needed for Muscle Spasms., Disp: ,  "Rfl:   •  docusate sodium (COLACE) 100 MG capsule, Take 1 capsule by mouth 2 (Two) Times a Day., Disp: 60 capsule, Rfl: 2  •  DULoxetine (Cymbalta) 60 MG capsule, Take 60 mg by mouth Daily., Disp: , Rfl:   •  polyethylene glycol (MiraLax) 17 GM/SCOOP powder, Take 17 g by mouth Daily., Disp: 510 g, Rfl: 2  •  rOPINIRole (REQUIP) 2 MG tablet, Take 2 mg by mouth Every Night., Disp: , Rfl:   •  ARTHROTEC 75-0.2 MG EC tablet, Take 1 tablet by mouth 2 (Two) Times a Day., Disp: , Rfl:     Allergies:   Allergies   Allergen Reactions   • Penicillins Rash       Review of Systems:   Review of Systems   Constitutional: Negative for appetite change, fatigue and unexpected weight loss.   Gastrointestinal: Negative for abdominal distention, abdominal pain, anal bleeding, blood in stool, constipation, diarrhea, nausea, rectal pain, vomiting, GERD and indigestion.       The following portions of the patient's history were reviewed and updated as appropriate: allergies, current medications, past family history, past medical history, past social history, past surgical history and problem list.    Objective     Physical Exam:  Vital Signs:   Vitals:    07/14/20 0845   BP: 114/64   Pulse: 74   Resp: 16   Temp: 98.4 °F (36.9 °C)   Weight: 62.6 kg (138 lb)   Height: 158.8 cm (62.5\")       Physical Exam   Constitutional: She is oriented to person, place, and time. She appears well-developed and well-nourished.   HENT:   Mouth/Throat: Oropharynx is clear and moist.   Eyes: Conjunctivae and EOM are normal.   Neck: Neck supple.   Cardiovascular: Normal rate and regular rhythm.   No murmur heard.  Pulmonary/Chest: Effort normal and breath sounds normal. No respiratory distress.   Abdominal: Soft. Bowel sounds are normal. She exhibits no distension and no mass. There is no tenderness. No hernia.   Lymphadenopathy:     She has no cervical adenopathy.   Neurological: She is alert and oriented to person, place, and time. No sensory deficit. "   Skin: Skin is warm and dry.   Nursing note and vitals reviewed.      Results Review:   I reviewed the patient's new clinical results.    Admission on 06/01/2020, Discharged on 06/01/2020   Component Date Value Ref Range Status   • Case Report 06/01/2020    Final                    Value:Surgical Pathology Report                         Case: [id number]                                  Authorizing Provider:  Chaparro Marrero MD  Collected:           06/01/2020 08:13 AM          Ordering Location:     [facility]    Received:            06/01/2020 08:57 AM                                 [facility]                                                                    Pathologist:           Finn Yusuf MD                                                          Specimens:   1) - Small Intestine                                                                                2) - Gastric, Antrum, for H. Pylori                                                                 3) - Esophagus, bx rule out EOE                                                           • Final Diagnosis 06/01/2020    Final                    Value:This result contains rich text formatting which cannot be displayed here.   Results Encounter on 05/19/2020   Component Date Value Ref Range Status   • Reference Lab Report 05/28/2020 See attachment   Final   • COVID19 05/28/2020 Not Detected  Not Detected - Ref. Range Final      Us Chest    Result Date: 5/23/2020  Unremarkable exam.  This report was finalized on 5/23/2020 9:24 AM by Andry Paige DO.      Assessment / Plan      1.  Reactive gastropathy  Her epigastric pain has resolved now  She had an EGD done on 6/1/2020 which revealed a mild erythematous gastric mucosa,  biopsies were suggestive for reactive gastropathy without any signs of H. pylori infection.   There was a single polyp identified in the duodenal bulb which was resected and pathology is more consistent with a  chronic peptic duodenitis with a venogram hyperplasia, without any dysplasia or metaplasia.  Continue protonix for one more month and reduc to 20mg po daily for longtem as her symptoms recurred while off from PPI in the past. Also she is been diagnosed wioth lupus recently.   Follow-up 1 year time    2. Gastroesophageal reflux disease without esophagitis  Her symptoms under control now.  Her recent EGD done did not reveal any erosive esophagitis.  We will continue low-dose PPI for long-term    3. Esophageal dysphagia  EGD done on 6/1/2020 did not reveal any esophageal narrowing.  esophageal biopsies were negative for EOE  She does seem to have some esophageal dysmotility associated with a lupus and sjogrens syndrom     4. Chronic idiopathic constipation  BM better now but still BM once in 2-3 days. But stool soft.   We will continue Colace and the MiraLAX daily  Fiber supplement advised     Prior history  5. Personal history of colonic polyps  Colonoscopy in January 2028 [facility].  Per patient for benign polyps removed less than 1 cm in size  Due for colonoscopy in 3 years time in 2023     6. Family hx of colon cancer  Uncle had a colon cancer     7. Nodule of chest wall  About 2cm size soft firm nodule palpable at the left upper to upper chest medial to the the anterior axillary cleft  This could be a lipoma, recent ultrasound did not reveal any significant abnormality       follow Up:   Return in about 1 year (around 7/14/2021).    Chaparro Marrero MD  [facility] [place]  7/14/2020  09:21     Please note that portions of this note may have been completed with a voice recognition program. Efforts were made to edit the dictations, but occasionally words are mistranscribed.

## 2020-07-14 ENCOUNTER — OFFICE VISIT (OUTPATIENT)
Dept: GASTROENTEROLOGY | Facility: CLINIC | Age: 65
End: 2020-07-14

## 2020-07-14 VITALS
TEMPERATURE: 98.4 F | DIASTOLIC BLOOD PRESSURE: 64 MMHG | BODY MASS INDEX: 24.45 KG/M2 | HEIGHT: 63 IN | HEART RATE: 74 BPM | RESPIRATION RATE: 16 BRPM | WEIGHT: 138 LBS | SYSTOLIC BLOOD PRESSURE: 114 MMHG

## 2020-07-14 DIAGNOSIS — K21.9 GASTROESOPHAGEAL REFLUX DISEASE WITHOUT ESOPHAGITIS: ICD-10-CM

## 2020-07-14 DIAGNOSIS — K31.9 GASTROPATHY: Primary | ICD-10-CM

## 2020-07-14 DIAGNOSIS — R13.19 ESOPHAGEAL DYSPHAGIA: ICD-10-CM

## 2020-07-14 PROCEDURE — 99213 OFFICE O/P EST LOW 20 MIN: CPT | Performed by: INTERNAL MEDICINE

## 2020-07-14 RX ORDER — AMITRIPTYLINE HYDROCHLORIDE 25 MG/1
75 TABLET, FILM COATED ORAL NIGHTLY
COMMUNITY
End: 2021-06-09

## 2020-08-04 ENCOUNTER — HOSPITAL ENCOUNTER (OUTPATIENT)
Dept: CT IMAGING | Facility: HOSPITAL | Age: 65
Discharge: HOME | End: 2020-08-04
Payer: COMMERCIAL

## 2020-08-04 PROCEDURE — G0297 LDCT FOR LUNG CA SCREEN: HCPCS

## 2020-11-07 ENCOUNTER — OFFICE VISIT (OUTPATIENT)
Dept: PRIMARY CARE CLINIC | Age: 65
End: 2020-11-07
Payer: COMMERCIAL

## 2020-11-07 VITALS — HEART RATE: 103 BPM | OXYGEN SATURATION: 97 % | TEMPERATURE: 98.3 F

## 2020-11-07 PROCEDURE — 99211 OFF/OP EST MAY X REQ PHY/QHP: CPT | Performed by: NURSE PRACTITIONER

## 2020-11-07 RX ORDER — ROPINIROLE 2 MG/1
2 TABLET, FILM COATED ORAL NIGHTLY
COMMUNITY
Start: 2020-05-11

## 2020-11-07 ASSESSMENT — PATIENT HEALTH QUESTIONNAIRE - PHQ9: DEPRESSION UNABLE TO ASSESS: URGENT/EMERGENT SITUATION

## 2020-11-07 NOTE — PROGRESS NOTES
2020    Will Min (:  1955) is a 63 y.o. female, here requesting COVID-19 testing    History of Present Illness  Sore throat, fatigue,  Close contact with a lab confirmed COVID-19 patient within 14 days of symptom onset     Vitals:    20 1147   Pulse: 103   Temp: 98.3 °F (36.8 °C)   TempSrc: Temporal   SpO2: 97%       ASSESSMENT  Screening for COVID-19/ Viral disease    PLAN  POCT influenza testing Not Tested  COVID-19 sample collected and submitted  Patient given detailed CDC instructions contained within After Visit Summary       An  electronic signature was used to authenticate this note.     --Ana Paula Kyle on 2020 at 11:49 AM

## 2021-01-01 ENCOUNTER — HOSPITAL ENCOUNTER (OUTPATIENT)
Facility: HOSPITAL | Age: 66
Discharge: HOME | End: 2021-01-01
Payer: COMMERCIAL

## 2021-01-01 LAB — SEDIMENTATION RATE, ERYTHROCYTE: 31 MM/HR (ref 0–30)

## 2021-01-01 PROCEDURE — 85652 RBC SED RATE AUTOMATED: CPT

## 2021-01-01 PROCEDURE — 36415 COLL VENOUS BLD VENIPUNCTURE: CPT

## 2021-01-01 PROCEDURE — 86140 C-REACTIVE PROTEIN: CPT

## 2021-01-02 LAB — C-REACTIVE PROTEIN: 1.1 MG/L (ref 0–5.1)

## 2021-01-22 ENCOUNTER — HOSPITAL ENCOUNTER (OUTPATIENT)
Dept: MRI IMAGING | Facility: HOSPITAL | Age: 66
Discharge: HOME | End: 2021-01-22
Payer: COMMERCIAL

## 2021-01-22 DIAGNOSIS — R22.2 LOCALIZED SWELLING OF CHEST WALL: ICD-10-CM

## 2021-02-04 ENCOUNTER — HOSPITAL ENCOUNTER (OUTPATIENT)
Dept: CT IMAGING | Facility: HOSPITAL | Age: 66
Discharge: HOME | End: 2021-02-04
Payer: COMMERCIAL

## 2021-02-04 DIAGNOSIS — R22.2 SWELLING IN CHEST: ICD-10-CM

## 2021-02-04 LAB
BUN BLDV-MCNC: 14 MG/DL (ref 6–20)
CREAT SERPL-MCNC: 0.8 MG/DL (ref 0.4–1.2)
GFR AFRICAN AMERICAN: >59
GFR NON-AFRICAN AMERICAN: >60

## 2021-02-04 PROCEDURE — 84520 ASSAY OF UREA NITROGEN: CPT

## 2021-02-04 PROCEDURE — 6360000004 HC RX CONTRAST MEDICATION: Performed by: NURSE PRACTITIONER

## 2021-02-04 PROCEDURE — 36415 COLL VENOUS BLD VENIPUNCTURE: CPT

## 2021-02-04 PROCEDURE — 71260 CT THORAX DX C+: CPT

## 2021-02-04 PROCEDURE — 82565 ASSAY OF CREATININE: CPT

## 2021-02-04 RX ADMIN — IOPAMIDOL 100 ML: 755 INJECTION, SOLUTION INTRAVENOUS at 15:47

## 2021-05-04 RX ORDER — FOLIC ACID 1 MG/1
1 TABLET ORAL DAILY
COMMUNITY
End: 2021-12-02

## 2021-05-05 ENCOUNTER — HOSPITAL ENCOUNTER (OUTPATIENT)
Facility: HOSPITAL | Age: 66
Discharge: HOME | End: 2021-05-05
Payer: COMMERCIAL

## 2021-05-05 ENCOUNTER — OFFICE VISIT (OUTPATIENT)
Dept: CARDIOLOGY | Facility: CLINIC | Age: 66
End: 2021-05-05

## 2021-05-05 VITALS
HEIGHT: 62 IN | BODY MASS INDEX: 26.68 KG/M2 | DIASTOLIC BLOOD PRESSURE: 72 MMHG | SYSTOLIC BLOOD PRESSURE: 112 MMHG | HEART RATE: 100 BPM | WEIGHT: 145 LBS | OXYGEN SATURATION: 98 %

## 2021-05-05 DIAGNOSIS — R94.31 ABNORMAL ECG: ICD-10-CM

## 2021-05-05 DIAGNOSIS — R07.9 CHEST PAIN, UNSPECIFIED TYPE: Primary | ICD-10-CM

## 2021-05-05 PROCEDURE — 93000 ELECTROCARDIOGRAM COMPLETE: CPT | Performed by: INTERNAL MEDICINE

## 2021-05-05 PROCEDURE — 99243 OFF/OP CNSLTJ NEW/EST LOW 30: CPT | Performed by: INTERNAL MEDICINE

## 2021-05-05 PROCEDURE — 93005 ELECTROCARDIOGRAM TRACING: CPT

## 2021-05-05 RX ORDER — PANTOPRAZOLE SODIUM 40 MG/1
TABLET, DELAYED RELEASE ORAL
COMMUNITY
Start: 2021-04-14 | End: 2021-06-15

## 2021-05-05 RX ORDER — PRAVASTATIN SODIUM 20 MG
TABLET ORAL
COMMUNITY
Start: 2021-04-14 | End: 2021-11-23

## 2021-05-05 NOTE — PROGRESS NOTES
Subjective:     Encounter Date:05/05/2021      Patient ID: Stephanie Jean is a 65 y.o. female.    Chief Complaint: Chest pain  HPI  This is a 65-year-old female patient with no prior history of coronary artery disease who reports a 6-month history of intermittent chest discomfort.  She indicates that the discomfort has gotten worse over the last few weeks.  She describes a midsternal squeezing type discomfort which radiates into her jaw bilaterally.  It is associated with shortness of breath but no nausea vomiting diaphoresis or shortness of breath.  The discomfort typically has a 6-8/10 intensity and will generally last approximately 5 minutes per episode.  It typically occurs at rest.  There is no pleuritic component.  She has had no orthopnea PND or lower extremity edema.  There is no dizziness palpitations or syncope.  The patient reports that her diastolic blood pressures are commonly elevated but she is not on antihypertensive therapy.  She is a reformed smoker having stopped smoking cigarettes 3 years ago.  She has no personal history of hypertension or dyslipidemia.  The following portions of the patient's history were reviewed and updated as appropriate: allergies, current medications, past family history, past medical history, past social history, past surgical history and problem  Review of Systems   Constitutional: Negative for chills, diaphoresis, fever, malaise/fatigue, weight gain and weight loss.   HENT: Negative for ear discharge, hearing loss, hoarse voice and nosebleeds.    Eyes: Negative for discharge, double vision, pain and photophobia.   Cardiovascular: Positive for chest pain. Negative for claudication, cyanosis, dyspnea on exertion, irregular heartbeat, leg swelling, near-syncope, orthopnea, palpitations, paroxysmal nocturnal dyspnea and syncope.   Respiratory: Negative for cough, hemoptysis, shortness of breath, sputum production and wheezing.    Endocrine: Negative for cold intolerance,  heat intolerance, polydipsia, polyphagia and polyuria.   Hematologic/Lymphatic: Negative for adenopathy and bleeding problem. Does not bruise/bleed easily.   Skin: Negative for color change, flushing, itching and rash.   Musculoskeletal: Negative for muscle cramps, muscle weakness, myalgias and stiffness.   Gastrointestinal: Negative for abdominal pain, diarrhea, hematemesis, hematochezia, nausea and vomiting.   Genitourinary: Negative for dysuria, frequency and nocturia.   Neurological: Negative for focal weakness, loss of balance, numbness, paresthesias and seizures.   Psychiatric/Behavioral: Negative for altered mental status, hallucinations and suicidal ideas.   Allergic/Immunologic: Negative for HIV exposure, hives and persistent infections.           Current Outpatient Medications:   •  amitriptyline (ELAVIL) 25 MG tablet, Take 75 mg by mouth Every Night., Disp: , Rfl:   •  Ascorbic Acid (VITAMIN C PO), Take  by mouth., Disp: , Rfl:   •  BIOTIN PO, Take  by mouth., Disp: , Rfl:   •  Calcium Citrate-Vitamin D (JENNIFER-CITRATE PLUS VITAMIN D PO), Take  by mouth., Disp: , Rfl:   •  cyclobenzaprine (FLEXERIL) 10 MG tablet, Take 10 mg by mouth 3 (Three) Times a Day As Needed for Muscle Spasms., Disp: , Rfl:   •  docusate sodium (COLACE) 100 MG capsule, Take 1 capsule by mouth 2 (Two) Times a Day., Disp: 60 capsule, Rfl: 2  •  DULoxetine (Cymbalta) 60 MG capsule, Take 60 mg by mouth Daily., Disp: , Rfl:   •  folic acid (FOLVITE) 1 MG tablet, Take 1 mg by mouth Daily., Disp: , Rfl:   •  methotrexate 2.5 MG tablet, Take  by mouth 1 (One) Time Per Week. TAKE 8 TABLETS WEEKLY, Disp: , Rfl:   •  pantoprazole (PROTONIX) 40 MG EC tablet, , Disp: , Rfl:   •  polyethylene glycol (MiraLax) 17 GM/SCOOP powder, Take 17 g by mouth Daily., Disp: 510 g, Rfl: 2  •  pravastatin (PRAVACHOL) 20 MG tablet, , Disp: , Rfl:   •  rOPINIRole (REQUIP) 2 MG tablet, Take 2 mg by mouth Every Night., Disp: , Rfl:   •  VITAMIN E PO, Take  by mouth.,  "Disp: , Rfl:     Objective:   Vitals and nursing note reviewed.   Constitutional:       Appearance: Healthy appearance. Not in distress.   Neck:      Vascular: No JVR. JVD normal.   Pulmonary:      Effort: Pulmonary effort is normal.      Breath sounds: Normal breath sounds. No wheezing. No rhonchi. No rales.   Chest:      Chest wall: Not tender to palpatation.   Cardiovascular:      PMI at left midclavicular line. Normal rate. Regular rhythm. Normal S1. Normal S2.      Murmurs: There is no murmur.      No gallop. No click. No rub.   Pulses:     Intact distal pulses.   Edema:     Peripheral edema absent.   Abdominal:      General: Bowel sounds are normal.      Palpations: Abdomen is soft.      Tenderness: There is no abdominal tenderness.   Musculoskeletal: Normal range of motion.         General: No tenderness. Skin:     General: Skin is warm and dry.   Neurological:      General: No focal deficit present.      Mental Status: Alert and oriented to person, place and time.       Blood pressure 112/72, pulse 100, height 157.5 cm (62\"), weight 65.8 kg (145 lb), SpO2 98 %.   Lab Review:     Assessment:       1. Chest pain, unspecified type  Chest pain.  Multiple risk factors for coronary artery disease.  No ischemic evaluation in the last 20 years.  - Stress Test With Myocardial Perfusion Two Day; Future  - Adult Transthoracic Echo Complete W/ Cont if Necessary Per Protocol; Future      ECG 12 Lead    Date/Time: 5/5/2021 3:50 PM  Performed by: Toribio Jacobs MD  Authorized by: Toribio Jacobs MD   Previous ECG: no previous ECG available  Rhythm: sinus rhythm  Rate: normal  QRS axis: normal  Other findings: non-specific ST-T wave changes    Clinical impression: abnormal EKG            Plan:     I have recommended a vasodilator nuclear stress test utilizing a 2-day imaging protocol.  I recommend an echocardiogram.  No changes to her medications have been made at today's visit.  Further recommendations be predicated " on the results of her outpatient testing.

## 2021-06-01 ENCOUNTER — HOSPITAL ENCOUNTER (EMERGENCY)
Facility: HOSPITAL | Age: 66
Discharge: HOME | End: 2021-06-01
Attending: EMERGENCY MEDICINE | Admitting: EMERGENCY MEDICINE

## 2021-06-01 ENCOUNTER — APPOINTMENT (OUTPATIENT)
Dept: GENERAL RADIOLOGY | Facility: HOSPITAL | Age: 66
End: 2021-06-01

## 2021-06-01 VITALS
HEIGHT: 62 IN | WEIGHT: 142.2 LBS | OXYGEN SATURATION: 96 % | RESPIRATION RATE: 24 BRPM | DIASTOLIC BLOOD PRESSURE: 71 MMHG | BODY MASS INDEX: 26.17 KG/M2 | HEART RATE: 89 BPM | SYSTOLIC BLOOD PRESSURE: 126 MMHG | TEMPERATURE: 98.1 F

## 2021-06-01 DIAGNOSIS — R07.9 CHEST PAIN, UNSPECIFIED TYPE: Primary | ICD-10-CM

## 2021-06-01 LAB
ALBUMIN SERPL-MCNC: 4.4 G/DL (ref 3.5–5.2)
ALBUMIN/GLOB SERPL: 1.5 G/DL
ALP SERPL-CCNC: 78 U/L (ref 39–117)
ALT SERPL W P-5'-P-CCNC: 35 U/L (ref 1–33)
ANION GAP SERPL CALCULATED.3IONS-SCNC: 10.2 MMOL/L (ref 5–15)
AST SERPL-CCNC: 40 U/L (ref 1–32)
BASOPHILS # BLD AUTO: 0.05 10*3/MM3 (ref 0–0.2)
BASOPHILS NFR BLD AUTO: 1 % (ref 0–1.5)
BILIRUB SERPL-MCNC: 0.2 MG/DL (ref 0–1.2)
BUN SERPL-MCNC: 16 MG/DL (ref 8–23)
BUN/CREAT SERPL: 18.2 (ref 7–25)
CALCIUM SPEC-SCNC: 9.9 MG/DL (ref 8.6–10.5)
CHLORIDE SERPL-SCNC: 103 MMOL/L (ref 98–107)
CO2 SERPL-SCNC: 27.8 MMOL/L (ref 22–29)
CREAT SERPL-MCNC: 0.88 MG/DL (ref 0.57–1)
DEPRECATED RDW RBC AUTO: 54.3 FL (ref 37–54)
EOSINOPHIL # BLD AUTO: 0.1 10*3/MM3 (ref 0–0.4)
EOSINOPHIL NFR BLD AUTO: 1.9 % (ref 0.3–6.2)
ERYTHROCYTE [DISTWIDTH] IN BLOOD BY AUTOMATED COUNT: 15.4 % (ref 12.3–15.4)
GFR SERPL CREATININE-BSD FRML MDRD: 65 ML/MIN/1.73
GLOBULIN UR ELPH-MCNC: 2.9 GM/DL
GLUCOSE SERPL-MCNC: 122 MG/DL (ref 65–99)
HCT VFR BLD AUTO: 38 % (ref 34–46.6)
HGB BLD-MCNC: 12.6 G/DL (ref 12–15.9)
HOLD SPECIMEN: NORMAL
IMM GRANULOCYTES # BLD AUTO: 0.02 10*3/MM3 (ref 0–0.05)
IMM GRANULOCYTES NFR BLD AUTO: 0.4 % (ref 0–0.5)
LYMPHOCYTES # BLD AUTO: 1.91 10*3/MM3 (ref 0.7–3.1)
LYMPHOCYTES NFR BLD AUTO: 36.3 % (ref 19.6–45.3)
MCH RBC QN AUTO: 31.7 PG (ref 26.6–33)
MCHC RBC AUTO-ENTMCNC: 33.2 G/DL (ref 31.5–35.7)
MCV RBC AUTO: 95.5 FL (ref 79–97)
MONOCYTES # BLD AUTO: 0.32 10*3/MM3 (ref 0.1–0.9)
MONOCYTES NFR BLD AUTO: 6.1 % (ref 5–12)
NEUTROPHILS NFR BLD AUTO: 2.86 10*3/MM3 (ref 1.7–7)
NEUTROPHILS NFR BLD AUTO: 54.3 % (ref 42.7–76)
NRBC BLD AUTO-RTO: 0 /100 WBC (ref 0–0.2)
NT-PROBNP SERPL-MCNC: 34.8 PG/ML (ref 0–900)
PLATELET # BLD AUTO: 228 10*3/MM3 (ref 140–450)
PMV BLD AUTO: 10 FL (ref 6–12)
POTASSIUM SERPL-SCNC: 3.8 MMOL/L (ref 3.5–5.2)
PROT SERPL-MCNC: 7.3 G/DL (ref 6–8.5)
RBC # BLD AUTO: 3.98 10*6/MM3 (ref 3.77–5.28)
SODIUM SERPL-SCNC: 141 MMOL/L (ref 136–145)
TROPONIN T SERPL-MCNC: <0.01 NG/ML (ref 0–0.03)
TROPONIN T SERPL-MCNC: <0.01 NG/ML (ref 0–0.03)
WBC # BLD AUTO: 5.26 10*3/MM3 (ref 3.4–10.8)
WHOLE BLOOD HOLD SPECIMEN: NORMAL
WHOLE BLOOD HOLD SPECIMEN: NORMAL

## 2021-06-01 PROCEDURE — 93005 ELECTROCARDIOGRAM TRACING: CPT | Performed by: EMERGENCY MEDICINE

## 2021-06-01 PROCEDURE — 83880 ASSAY OF NATRIURETIC PEPTIDE: CPT | Performed by: EMERGENCY MEDICINE

## 2021-06-01 PROCEDURE — 99283 EMERGENCY DEPT VISIT LOW MDM: CPT

## 2021-06-01 PROCEDURE — 71045 X-RAY EXAM CHEST 1 VIEW: CPT

## 2021-06-01 PROCEDURE — 80053 COMPREHEN METABOLIC PANEL: CPT | Performed by: EMERGENCY MEDICINE

## 2021-06-01 PROCEDURE — 84484 ASSAY OF TROPONIN QUANT: CPT | Performed by: EMERGENCY MEDICINE

## 2021-06-01 PROCEDURE — 85025 COMPLETE CBC W/AUTO DIFF WBC: CPT | Performed by: EMERGENCY MEDICINE

## 2021-06-01 RX ORDER — SODIUM CHLORIDE 0.9 % (FLUSH) 0.9 %
10 SYRINGE (ML) INJECTION AS NEEDED
Status: DISCONTINUED | OUTPATIENT
Start: 2021-06-01 | End: 2021-06-01 | Stop reason: HOSPADM

## 2021-06-01 NOTE — ED PROVIDER NOTES
TRIAGE CHIEF COMPLAINT:     Nursing and triage notes reviewed    Chief Complaint   Patient presents with   • Chest Pain      HPI: Stephanie Jean is a 65 y.o. female who presents to the emergency department complaining of chest pain.  Patient describes a squeezing pain in the left side of her chest that radiates into her neck.  Patient states the pain woke her up from sleep.  She tells me that it only last a few minutes and then went away on its own.  She is left with a feeling like she cannot catch her breath.  She states she has been having these symptoms intermittently over the past several months.  She saw a cardiologist approximately a month ago and plan is to obtain a stress test and further evaluation as patient does have some risk factors for coronary disease including hyperlipidemia, high blood pressure.  Patient states she currently does not have any pain.  She denies any cough, fever, chills.  She did go on vacation recently and has noticed swelling in her lower extremities but denies any asymmetric swelling or redness or pain.    REVIEW OF SYSTEMS: All other systems reviewed and are negative     PAST MEDICAL HISTORY:   Past Medical History:   Diagnosis Date   • Arthritis    • Colon polyps    • Dysphagia    • Family history of colon cancer    • Fibromyalgia    • GERD (gastroesophageal reflux disease)    • Heart murmur    • Hoarseness    • Hyperlipidemia    • Lupus (CMS/HCC)    • Marijuana use     Daily    • Pulmonary nodule    • Sjogren's disease (CMS/HCC)         FAMILY HISTORY:   Family History   Problem Relation Age of Onset   • Hypertension Mother    • Aneurysm Mother    • Heart disease Mother    • Emphysema Father    • Ulcerative colitis Brother    • Colon cancer Maternal Uncle    • Cirrhosis Neg Hx    • Liver disease Neg Hx    • Liver cancer Neg Hx    • Crohn's disease Neg Hx         SOCIAL HISTORY:   Social History     Socioeconomic History   • Marital status:      Spouse name: Not on file   •  Number of children: Not on file   • Years of education: Not on file   • Highest education level: Not on file   Tobacco Use   • Smoking status: Former Smoker     Packs/day: 1.00     Years: 40.00     Pack years: 40.00     Types: Cigarettes     Quit date: 2018     Years since quitting: 3.4   • Smokeless tobacco: Never Used   Substance and Sexual Activity   • Alcohol use: No   • Drug use: Yes     Frequency: 7.0 times per week     Types: Marijuana     Comment: Daily    • Sexual activity: Defer        SURGICAL HISTORY:   Past Surgical History:   Procedure Laterality Date   • APPENDECTOMY     • COLONOSCOPY     • ENDOSCOPY     • ENDOSCOPY N/A 6/1/2020    Procedure: ESOPHAGOGASTRODUODENOSCOPY;  Surgeon: Chaparro Marrero MD;  Location: [facility] ENDOSCOPY;  Service: Gastroenterology;  Laterality: N/A;   • HYSTERECTOMY          CURRENT MEDICATIONS:      Medication List      ASK your doctor about these medications    amitriptyline 25 MG tablet  Commonly known as: ELAVIL     BIOTIN PO     JENNIFER-CITRATE PLUS VITAMIN D PO     cyclobenzaprine 10 MG tablet  Commonly known as: FLEXERIL     Cymbalta 60 MG capsule  Generic drug: DULoxetine     docusate sodium 100 MG capsule  Commonly known as: COLACE  Take 1 capsule by mouth 2 (Two) Times a Day.     folic acid 1 MG tablet  Commonly known as: FOLVITE     methotrexate 2.5 MG tablet     pantoprazole 40 MG EC tablet  Commonly known as: PROTONIX     polyethylene glycol 17 GM/SCOOP powder  Commonly known as: MiraLax  Take 17 g by mouth Daily.     pravastatin 20 MG tablet  Commonly known as: PRAVACHOL     rOPINIRole 2 MG tablet  Commonly known as: REQUIP     VITAMIN C PO     VITAMIN E PO             ALLERGIES: Penicillins     PHYSICAL EXAM:   VITAL SIGNS:   Vitals:    06/01/21 0700   BP: 137/97   Pulse: (!) 121   Resp: 24   Temp: 98.1 °F (36.7 °C)   SpO2: 99%      CONSTITUTIONAL: Awake, oriented, appears non-toxic   HENT: Atraumatic, normocephalic, oral mucosa pink and moist, airway patent.  Nares patent without drainage. External ears normal.   EYES: Conjunctiva clear  NECK: Trachea midline, non-tender, supple   CARDIOVASCULAR: Tachycardic with a regular rhythm, No murmurs, rubs, gallops   PULMONARY/CHEST: Clear to auscultation, no rhonchi, wheezes, or rales. Symmetrical breath sounds  ABDOMINAL: Non-distended, soft, non-tender - no rebound or guarding.  NEUROLOGIC: Non-focal, moving all four extremities, no gross sensory or motor deficits.   EXTREMITIES: No clubbing, cyanosis, or edema   SKIN: Warm, Dry, No erythema, No rash     ED COURSE / MEDICAL DECISION MAKING:   Stephanie Jean is a 65 y.o. female who presents to the emergency department for evaluation of chest pain.  Patient is nondistressed on arrival in the emergency department.  Vital signs are stable aside from elevation in heart rate.  Physical exam is otherwise largely unremarkable.    EKG on arrival interpreted by me reveals sinus tachycardia with rate of 110 bpm.  There are nonspecific ST and T wave changes throughout the lateral and inferior leads, these are largely similar to her previous EKG.  This is an abnormal appearing EKG.    Chest x-ray per radiology interpretation reveals chronic findings but no acute abnormality.    Initial set of cardiac enzymes within normal limit.  Other labs largely unremarkable.    I spoke with patient and she states that her stress test and echocardiogram are scheduled for next week.    I spoke with Dr. Jacobs, the patient's cardiologist.  He indicated that if patient remained pain-free with several sets of negative cardiac enzymes she should be safe for discharged home with outpatient stress testing as planned.    Repeat cardiac enzymes within normal limit.    Repeat EKG obtained and interpreted by me.  This reveals sinus rhythm with rate of 89 bpm.  There are some nonspecific ST-T wave changes.  These appear improved with patient's normalized heart rate.    Calculated heart score of 3.  This does place  patient in the low risk category.    Patient still remains pain-free.  We will plan on discharge with outpatient stress testing and echocardiogram as scheduled.  Return precautions were discussed.    DECISION TO DISCHARGE/ADMIT: see ED care timeline     FINAL IMPRESSION:   1 --chest pain  2 --   3 --     Electronically signed by: Jess Francisco MD, 6/1/2021 07:10 EDT       Jess Francisco MD  06/01/21 0972

## 2021-06-02 ENCOUNTER — TELEPHONE (OUTPATIENT)
Dept: CARDIOLOGY | Facility: CLINIC | Age: 66
End: 2021-06-02

## 2021-06-02 NOTE — TELEPHONE ENCOUNTER
Patient called office in regards to her FMLA for her current employment. States that even though the paperwork was submitted there were no specific dates noted on the paperwork. Due to the excessive amounts of time needed off for appointments and testing because of patients symptoms of intermittent chest pain, SOA, and fatigue that makes it difficult for her to do her job with out having to rest several times, can these dates of the FMLA range from the first appointment when the symptoms began until the testing/procedures are complete?    Patients first visit was 5/5/21 and as of right now her follow up after testing is [id number].    Please advise,  Thank you!

## 2021-06-03 ENCOUNTER — TELEPHONE (OUTPATIENT)
Dept: CARDIOLOGY | Facility: CLINIC | Age: 66
End: 2021-06-03

## 2021-06-03 NOTE — TELEPHONE ENCOUNTER
Spoke with patient, information was given and understood.     Phone number for FMLA company for patients work is     [facility], [place]     [phone]

## 2021-06-03 NOTE — TELEPHONE ENCOUNTER
Spoke with a representative from [facility] at  for patients' FMLA claim to update the dates for 5/5/21-7/7/21. Also spoke with patient to let her know this has been updated.

## 2021-06-04 ENCOUNTER — TELEPHONE (OUTPATIENT)
Dept: CARDIOLOGY | Facility: CLINIC | Age: 66
End: 2021-06-04

## 2021-06-04 NOTE — TELEPHONE ENCOUNTER
Patient called and requested paperwork be updated and faxed to [facility] for FMLA. Paper work was printed from chart and updated and faxed to  and sent to ATTN [id number] per patient.

## 2021-06-07 ENCOUNTER — HOSPITAL ENCOUNTER (OUTPATIENT)
Dept: GENERAL RADIOLOGY | Facility: HOSPITAL | Age: 66
Discharge: HOME | End: 2021-06-07
Payer: COMMERCIAL

## 2021-06-07 PROCEDURE — 93306 TTE W/DOPPLER COMPLETE: CPT

## 2021-06-08 ENCOUNTER — TELEPHONE (OUTPATIENT)
Dept: CARDIOLOGY | Facility: CLINIC | Age: 66
End: 2021-06-08

## 2021-06-08 NOTE — TELEPHONE ENCOUNTER
Patient called office stating she needed supporting documentation sent to [employer] for her FMLA case. Faxed office notes and ER notes to  to ATTN [phone]

## 2021-06-09 ENCOUNTER — OFFICE VISIT (OUTPATIENT)
Dept: CARDIOLOGY | Facility: CLINIC | Age: 66
End: 2021-06-09

## 2021-06-09 VITALS
HEART RATE: 78 BPM | DIASTOLIC BLOOD PRESSURE: 68 MMHG | WEIGHT: 142 LBS | OXYGEN SATURATION: 99 % | HEIGHT: 62 IN | BODY MASS INDEX: 26.13 KG/M2 | SYSTOLIC BLOOD PRESSURE: 110 MMHG

## 2021-06-09 DIAGNOSIS — R07.9 CHEST PAIN, UNSPECIFIED TYPE: Primary | ICD-10-CM

## 2021-06-09 PROCEDURE — 99213 OFFICE O/P EST LOW 20 MIN: CPT | Performed by: INTERNAL MEDICINE

## 2021-06-09 RX ORDER — METOPROLOL SUCCINATE 25 MG/1
25 TABLET, EXTENDED RELEASE ORAL DAILY
Qty: 30 TABLET | Refills: 11 | Status: SHIPPED | OUTPATIENT
Start: 2021-06-09 | End: 2022-01-03 | Stop reason: HOSPADM

## 2021-06-09 RX ORDER — METOPROLOL SUCCINATE 25 MG/1
TABLET, EXTENDED RELEASE ORAL
COMMUNITY
Start: 2021-05-14 | End: 2021-06-09 | Stop reason: SDUPTHER

## 2021-06-09 RX ORDER — NITROGLYCERIN 0.4 MG/1
0.4 TABLET SUBLINGUAL
COMMUNITY
Start: 2021-06-02

## 2021-06-09 NOTE — PROGRESS NOTES
Subjective:     Encounter Date:06/09/2021      Patient ID: Stephanie Jean is a 65 y.o. female.    Chief Complaint: Chest pain  HPI  This is a 65-year-old female patient who was initially evaluated for chest discomfort.  The patient was ordered to have an echocardiogram and a outpatient vasodilator nuclear stress test.  The patient did not have any of her cardiac testing performed.  The patient indicates that her primary care provider gave her a prescription for sublingual nitroglycerin.  She is only had to use it on one occasion over the last 6 weeks.  This was for a resting chest discomfort which did not radiate.  The patient has a history of Sjogren's syndrome as well as known esophageal dysmotility related to that diagnosis.  She stopped smoking in 2018.  The following portions of the patient's history were reviewed and updated as appropriate: allergies, current medications, past family history, past medical history, past social history, past surgical history and problem  Review of Systems   Constitutional: Negative for chills, diaphoresis, fever, malaise/fatigue, weight gain and weight loss.   HENT: Negative for ear discharge, hearing loss, hoarse voice and nosebleeds.    Eyes: Negative for discharge, double vision, pain and photophobia.   Cardiovascular: Positive for chest pain. Negative for claudication, cyanosis, dyspnea on exertion, irregular heartbeat, leg swelling, near-syncope, orthopnea, palpitations, paroxysmal nocturnal dyspnea and syncope.   Respiratory: Negative for cough, hemoptysis, shortness of breath, sputum production and wheezing.    Endocrine: Negative for cold intolerance, heat intolerance, polydipsia, polyphagia and polyuria.   Hematologic/Lymphatic: Negative for adenopathy and bleeding problem. Does not bruise/bleed easily.   Skin: Negative for color change, flushing, itching and rash.   Musculoskeletal: Negative for muscle cramps, muscle weakness, myalgias and stiffness.    Gastrointestinal: Negative for abdominal pain, diarrhea, hematemesis, hematochezia, nausea and vomiting.   Genitourinary: Negative for dysuria, frequency and nocturia.   Neurological: Negative for focal weakness, loss of balance, numbness, paresthesias and seizures.   Psychiatric/Behavioral: Negative for altered mental status, hallucinations and suicidal ideas.   Allergic/Immunologic: Negative for HIV exposure, hives and persistent infections.           Current Outpatient Medications:   •  Ascorbic Acid (VITAMIN C PO), Take  by mouth., Disp: , Rfl:   •  BIOTIN PO, Take  by mouth., Disp: , Rfl:   •  Calcium Citrate-Vitamin D (JENNIFER-CITRATE PLUS VITAMIN D PO), Take  by mouth., Disp: , Rfl:   •  cyclobenzaprine (FLEXERIL) 10 MG tablet, Take 10 mg by mouth 3 (Three) Times a Day As Needed for Muscle Spasms., Disp: , Rfl:   •  docusate sodium (COLACE) 100 MG capsule, Take 1 capsule by mouth 2 (Two) Times a Day., Disp: 60 capsule, Rfl: 2  •  DULoxetine (Cymbalta) 60 MG capsule, Take 60 mg by mouth Daily., Disp: , Rfl:   •  folic acid (FOLVITE) 1 MG tablet, Take 1 mg by mouth Daily., Disp: , Rfl:   •  methotrexate 2.5 MG tablet, Take  by mouth 1 (One) Time Per Week. TAKE 8 TABLETS WEEKLY, Disp: , Rfl:   •  metoprolol succinate XL (TOPROL-XL) 25 MG 24 hr tablet, , Disp: , Rfl:   •  nitroglycerin (NITROSTAT) 0.4 MG SL tablet, , Disp: , Rfl:   •  pantoprazole (PROTONIX) 40 MG EC tablet, , Disp: , Rfl:   •  polyethylene glycol (MiraLax) 17 GM/SCOOP powder, Take 17 g by mouth Daily., Disp: 510 g, Rfl: 2  •  pravastatin (PRAVACHOL) 20 MG tablet, , Disp: , Rfl:   •  rOPINIRole (REQUIP) 2 MG tablet, Take 2 mg by mouth Every Night., Disp: , Rfl:   •  VITAMIN E PO, Take  by mouth., Disp: , Rfl:     Objective:   Vitals and nursing note reviewed.   Constitutional:       Appearance: Healthy appearance. Not in distress.   Neck:      Vascular: No JVR. JVD normal.   Pulmonary:      Effort: Pulmonary effort is normal.      Breath sounds:  "Normal breath sounds. No wheezing. No rhonchi. No rales.   Chest:      Chest wall: Not tender to palpatation.   Cardiovascular:      PMI at left midclavicular line. Normal rate. Regular rhythm. Normal S1. Normal S2.      Murmurs: There is no murmur.      No gallop. No click. No rub.   Pulses:     Intact distal pulses.   Edema:     Peripheral edema absent.   Abdominal:      General: Bowel sounds are normal.      Palpations: Abdomen is soft.      Tenderness: There is no abdominal tenderness.   Musculoskeletal: Normal range of motion.         General: No tenderness. Skin:     General: Skin is warm and dry.   Neurological:      General: No focal deficit present.      Mental Status: Alert and oriented to person, place and time.       Blood pressure 110/68, pulse 78, height 157.5 cm (62\"), weight 64.4 kg (142 lb), SpO2 99 %.   Lab Review:     Assessment:       1. Chest pain, unspecified type  Suspected esophageal spasm.  The patient has had resting and nocturnal chest discomfort relieved with sublingual nitroglycerin.  She has a history of Sjogren's syndrome with known esophageal dysmotility.  The patient chose not to follow through recommended outpatient cardiovascular testing.    Procedures    Plan:     The patient does not wish to reschedule outpatient cardiac testing at this time.      "

## 2021-06-10 ENCOUNTER — HOSPITAL ENCOUNTER (OUTPATIENT)
Dept: NON INVASIVE DIAGNOSTICS | Facility: HOSPITAL | Age: 66
Discharge: HOME | End: 2021-06-10
Payer: COMMERCIAL

## 2021-06-10 LAB
LV EF: 64 %
LVEF MODALITY: NORMAL

## 2021-06-10 PROCEDURE — 3430000000 HC RX DIAGNOSTIC RADIOPHARMACEUTICAL: Performed by: INTERNAL MEDICINE

## 2021-06-10 PROCEDURE — A9500 TC99M SESTAMIBI: HCPCS | Performed by: INTERNAL MEDICINE

## 2021-06-10 RX ADMIN — TETRAKIS(2-METHOXYISOBUTYLISOCYANIDE)COPPER(I) TETRAFLUOROBORATE 31.1 MILLICURIE: 1 INJECTION, POWDER, LYOPHILIZED, FOR SOLUTION INTRAVENOUS at 09:35

## 2021-06-11 ENCOUNTER — TELEPHONE (OUTPATIENT)
Dept: CARDIOLOGY | Facility: CLINIC | Age: 66
End: 2021-06-11

## 2021-06-11 NOTE — TELEPHONE ENCOUNTER
"Patient called office stating she had another \"episode\" of chest pain and SOA. States she had to take a nitroglycerin tablet and rest and this helped alleviate the symptoms.  "

## 2021-06-15 RX ORDER — DOCUSATE SODIUM 100 MG
CAPSULE ORAL
Qty: 60 CAPSULE | Refills: 2 | Status: SHIPPED | OUTPATIENT
Start: 2021-06-15 | End: 2021-11-01

## 2021-06-15 RX ORDER — PANTOPRAZOLE SODIUM 40 MG/1
TABLET, DELAYED RELEASE ORAL
Qty: 90 TABLET | Refills: 0 | Status: SHIPPED | OUTPATIENT
Start: 2021-06-15 | End: 2021-09-03

## 2021-06-16 ENCOUNTER — HOSPITAL ENCOUNTER (OUTPATIENT)
Dept: NON INVASIVE DIAGNOSTICS | Facility: HOSPITAL | Age: 66
Discharge: HOME | End: 2021-06-16
Payer: COMMERCIAL

## 2021-06-16 PROCEDURE — 6360000002 HC RX W HCPCS: Performed by: INTERNAL MEDICINE

## 2021-06-16 PROCEDURE — A9500 TC99M SESTAMIBI: HCPCS | Performed by: INTERNAL MEDICINE

## 2021-06-16 PROCEDURE — 93017 CV STRESS TEST TRACING ONLY: CPT

## 2021-06-16 PROCEDURE — 3430000000 HC RX DIAGNOSTIC RADIOPHARMACEUTICAL: Performed by: INTERNAL MEDICINE

## 2021-06-16 PROCEDURE — 96374 THER/PROPH/DIAG INJ IV PUSH: CPT

## 2021-06-16 PROCEDURE — 78452 HT MUSCLE IMAGE SPECT MULT: CPT

## 2021-06-16 RX ADMIN — REGADENOSON 0.4 MG: 0.08 INJECTION, SOLUTION INTRAVENOUS at 11:01

## 2021-06-16 RX ADMIN — TETRAKIS(2-METHOXYISOBUTYLISOCYANIDE)COPPER(I) TETRAFLUOROBORATE 32.5 MILLICURIE: 1 INJECTION, POWDER, LYOPHILIZED, FOR SOLUTION INTRAVENOUS at 10:56

## 2021-06-17 ENCOUNTER — OUTSIDE FACILITY SERVICE (OUTPATIENT)
Dept: CARDIOLOGY | Facility: CLINIC | Age: 66
End: 2021-06-17

## 2021-06-17 DIAGNOSIS — R07.9 CHEST PAIN, UNSPECIFIED TYPE: ICD-10-CM

## 2021-06-17 LAB
LV EF: 60 %
LVEF MODALITY: NORMAL

## 2021-06-17 PROCEDURE — 78452 HT MUSCLE IMAGE SPECT MULT: CPT | Performed by: INTERNAL MEDICINE

## 2021-06-17 PROCEDURE — 93018 CV STRESS TEST I&R ONLY: CPT | Performed by: INTERNAL MEDICINE

## 2021-06-17 PROCEDURE — 93308 TTE F-UP OR LMTD: CPT | Performed by: INTERNAL MEDICINE

## 2021-07-13 ENCOUNTER — OFFICE VISIT (OUTPATIENT)
Dept: GASTROENTEROLOGY | Facility: CLINIC | Age: 66
End: 2021-07-13

## 2021-07-13 VITALS
TEMPERATURE: 97.3 F | WEIGHT: 140.6 LBS | BODY MASS INDEX: 25.88 KG/M2 | SYSTOLIC BLOOD PRESSURE: 122 MMHG | HEIGHT: 62 IN | DIASTOLIC BLOOD PRESSURE: 80 MMHG

## 2021-07-13 DIAGNOSIS — R10.30 LOWER ABDOMINAL PAIN: Primary | ICD-10-CM

## 2021-07-13 DIAGNOSIS — K58.1 IRRITABLE BOWEL SYNDROME WITH CONSTIPATION: ICD-10-CM

## 2021-07-13 DIAGNOSIS — K21.9 GASTROESOPHAGEAL REFLUX DISEASE WITHOUT ESOPHAGITIS: ICD-10-CM

## 2021-07-13 DIAGNOSIS — R07.89 ATYPICAL CHEST PAIN: ICD-10-CM

## 2021-07-13 PROCEDURE — 99214 OFFICE O/P EST MOD 30 MIN: CPT | Performed by: INTERNAL MEDICINE

## 2021-07-13 RX ORDER — DICYCLOMINE HYDROCHLORIDE 10 MG/1
10 CAPSULE ORAL 3 TIMES DAILY PRN
Qty: 30 CAPSULE | Refills: 1 | Status: SHIPPED | OUTPATIENT
Start: 2021-07-13 | End: 2021-12-02

## 2021-07-13 NOTE — PATIENT INSTRUCTIONS
Low-FODMAP Eating Plan    FODMAPs (fermentable oligosaccharides, disaccharides, monosaccharides, and polyols) are sugars that are hard for some people to digest. A low-FODMAP eating plan may help some people who have bowel (intestinal) diseases to manage their symptoms.  This meal plan can be complicated to follow. Work with a diet and nutrition specialist (dietitian) to make a low-FODMAP eating plan that is right for you. A dietitian can make sure that you get enough nutrition from this diet.  What are tips for following this plan?  Reading food labels  · Check labels for hidden FODMAPs such as:  ? High-fructose syrup.  ? Honey.  ? Agave.  ? Natural fruit flavors.  ? Onion or garlic powder.  · Choose low-FODMAP foods that contain 3-4 grams of fiber per serving.  · Check food labels for serving sizes. Eat only one serving at a time to make sure FODMAP levels stay low.  Meal planning  · Follow a low-FODMAP eating plan for up to 6 weeks, or as told by your health care provider or dietitian.  · To follow the eating plan:  1. Eliminate high-FODMAP foods from your diet completely.  2. Gradually reintroduce high-FODMAP foods into your diet one at a time. Most people should wait a few days after introducing one high-FODMAP food before they introduce the next high-FODMAP food. Your dietitian can recommend how quickly you may reintroduce foods.  3. Keep a daily record of what you eat and drink, and make note of any symptoms that you have after eating.  4. Review your daily record with a dietitian regularly. Your dietitian can help you identify which foods you can eat and which foods you should avoid.  General tips  · Drink enough fluid each day to keep your urine pale yellow.  · Avoid processed foods. These often have added sugar and may be high in FODMAPs.  · Avoid most dairy products, whole grains, and sweeteners.  · Work with a dietitian to make sure you get enough fiber in your diet.  Recommended  "foods  Grains  · Gluten-free grains, such as rice, oats, buckwheat, quinoa, corn, polenta, and millet. Gluten-free pasta, bread, or cereal. Rice noodles. Corn tortillas.  Vegetables  · Eggplant, zucchini, cucumber, peppers, green beans, [place] sprouts, bean sprouts, lettuce, arugula, kale, Swiss chard, spinach, elizabeth greens, bok edelmira, summer squash, potato, and tomato. Limited amounts of corn, carrot, and sweet potato. Green parts of scallions.  Fruits  · Bananas, oranges, darinel, limes, blueberries, raspberries, strawberries, grapes, cantaloupe, honeydew melon, kiwi, papaya, passion fruit, and pineapple. Limited amounts of dried cranberries, banana chips, and shredded coconut.  Dairy  · Lactose-free milk, yogurt, and kefir. Lactose-free cottage cheese and ice cream. Non-dairy milks, such as almond, coconut, hemp, and rice milk. Yogurts made of non-dairy milks. Limited amounts of goat cheese, brie, mozzarella, parmesan, swiss, and other hard cheeses.  Meats and other protein foods  · Unseasoned beef, pork, poultry, or fish. Eggs. Allen. Tofu (firm) and tempeh. Limited amounts of nuts and seeds, such as almonds, walnuts, brazil nuts, pecans, peanuts, pumpkin seeds, regla seeds, and sunflower seeds.  Fats and oils  · Butter-free spreads. Vegetable oils, such as olive, canola, and sunflower oil.  Seasoning and other foods  · Artificial sweeteners with names that do not end in \"ol\" such as aspartame, saccharine, and stevia. Maple syrup, white table sugar, raw sugar, brown sugar, and molasses. Fresh basil, coriander, parsley, rosemary, and thyme.  Beverages  · Water and mineral water. Sugar-sweetened soft drinks. Small amounts of orange juice or cranberry juice. Black and green tea. Most dry graciela. Coffee.  This may not be a complete list of low-FODMAP foods. Talk with your dietitian for more information.  Foods to avoid  Grains  · Wheat, including kamut, durum, and semolina. Barley and bulgur. Couscous. Wheat-based " cereals. Wheat noodles, bread, crackers, and pastries.  Vegetables  · Chicory root, artichoke, asparagus, cabbage, snow peas, sugar snap peas, mushrooms, and cauliflower. Onions, garlic, leeks, and the white part of scallions.  Fruits  · Fresh, dried, and juiced forms of apple, pear, watermelon, peach, plum, cherries, apricots, blackberries, boysenberries, figs, nectarines, and tomas. Avocado.  Dairy  · Milk, yogurt, ice cream, and soft cheese. Cream and sour cream. Milk-based sauces. Custard.  Meats and other protein foods  · Fried or fatty meat. Sausage. Cashews and pistachios. Soybeans, baked beans, black beans, chickpeas, kidney beans, manolo beans, navy beans, lentils, and split peas.  Seasoning and other foods  · Any sugar-free gum or candy. Foods that contain artificial sweeteners such as sorbitol, mannitol, isomalt, or xylitol. Foods that contain honey, high-fructose corn syrup, or agave. Bouillon, vegetable stock, beef stock, and chicken stock. Garlic and onion powder. Condiments made with onion, such as hummus, chutney, pickles, relish, salad dressing, and salsa. Tomato paste.  Beverages  · Chicory-based drinks. Coffee substitutes. Chamomile tea. Fennel tea. Sweet or fortified graciela such as port or alexey. Diet soft drinks made with isomalt, mannitol, maltitol, sorbitol, or xylitol. Apple, pear, and tomas juice. Juices with high-fructose corn syrup.  This may not be a complete list of high-FODMAP foods. Talk with your dietitian to discuss what dietary choices are best for you.   Summary  · A low-FODMAP eating plan is a short-term diet that eliminates FODMAPs from your diet to help ease symptoms of certain bowel diseases.  · The eating plan usually lasts up to 6 weeks. After that, high-FODMAP foods are restarted gradually, one at a time, so you can find out which may be causing symptoms.  · A low-FODMAP eating plan can be complicated. It is best to work with a dietitian who has experience with this type of  plan.  This information is not intended to replace advice given to you by your health care provider. Make sure you discuss any questions you have with your health care provider.  Document Revised: 11/30/2018 Document Reviewed: 08/14/2018  [employer] Patient Education © 2021 [employer]

## 2021-07-13 NOTE — PROGRESS NOTES
Follow Up Note     Date: 2021   Patient Name: Stephanie Jean  MRN: [id number]  : 1955     Referring Physician: Margaret Mooney APRN    Chief Complaint:    Chief Complaint   Patient presents with   • Follow-up   • Heartburn       Interval History:   2021  Stephanie Jean is a 65 y.o. female who is here today for follow up for for her abdominal pain mostly at lower belly. Intermittent cramps with associated soft bowel movement but she had to strain a lot. This happened about  2 week sago. She took mag cirate and enema to empty her bowel. She also has lot of bloating and belching. Stool was smelly.  She is not taking fiber. She was taking colace and miralax and sen daily.  She also had some crampy pain left chest later on radiated to her mid chest. Her brother was diagnosed with NET stomach recently.     2020  Stephanie Jean is a 64 y.o. female who is here today for follow up for her heartburn and the dysphagia.  She had an EGD done on 2020.  She she is here for follow-up of her symptoms.  And to discuss the pathology report.. She states that her reflux symptoms have significantly improved there is no more epigastric pain.  Her bowel movements also improved with the laxatives and stool softeners.     2020  Stephanie Jean is a 64 y.o. female who is here today to establish care with Gastroenterology for evaluation of heartburn.     History of upper abdominal pain mainly in the epigastric region since about 2-3 months. Pain started gradually, intermittent, aching pain lasting for about few minutes and occasionally longer.  She was given dexilant and sucralfate which did not help initially and later on changed to pantoprazole.  She is on pantoprazole now with reasonable control of pain and reflux symptoms with once in few days but for the last 2 weeks she did not take any PPI. Associated significant heart burn.   She was on PPI before after she was noted to have gastritis on EGD about 10  years ago. Stopped herself year ago. Occasional problem with swallowing to solid food. She also has sjogrens syndrome.   She was mostly constipated now. Bowel movements every once in 2-3 days and occasionally once in 3-5 days. No lower abdominal pain.      There is no history of  hematochezia or melena. There is no history of anemia. Prior history of EGD about 10 yrs ago. Last colonoscopy in Jan 2020 about 4 polyps removed and advised to repeat in 3 years time. Brother had UC and uncle from mother's side had colon Ca. No history of any abdominal surgery except hysterectomy. Denies alcohol abuse or cigarette smoking.   She is on arthrotec tablets    Subjective      Past Medical History:   Past Medical History:   Diagnosis Date   • Arthritis    • Colon polyps    • Dysphagia    • Family history of colon cancer    • Fibromyalgia    • GERD (gastroesophageal reflux disease)    • Heart murmur    • Hoarseness    • Hyperlipidemia    • Lupus (CMS/HCC)    • Marijuana use     Daily    • Pulmonary nodule    • Sjogren's disease (CMS/HCC)      Past Surgical History:   Past Surgical History:   Procedure Laterality Date   • APPENDECTOMY     • COLONOSCOPY     • ENDOSCOPY     • ENDOSCOPY N/A 6/1/2020    Procedure: ESOPHAGOGASTRODUODENOSCOPY;  Surgeon: Chaparro Marrero MD;  Location: [facility];  Service: Gastroenterology;  Laterality: N/A;   • HYSTERECTOMY         Family History:   Family History   Problem Relation Age of Onset   • Hypertension Mother    • Aneurysm Mother    • Heart disease Mother    • Emphysema Father    • Ulcerative colitis Brother    • Colon cancer Maternal Uncle    • Cirrhosis Neg Hx    • Liver disease Neg Hx    • Liver cancer Neg Hx    • Crohn's disease Neg Hx        Social History:   Social History     Socioeconomic History   • Marital status:      Spouse name: Not on file   • Number of children: Not on file   • Years of education: Not on file   • Highest education level: Not on file   Tobacco  Use   • Smoking status: Former Smoker     Packs/day: 1.00     Years: 40.00     Pack years: 40.00     Types: Cigarettes     Quit date: 2018     Years since quitting: 3.5   • Smokeless tobacco: Never Used   Substance and Sexual Activity   • Alcohol use: No   • Drug use: Yes     Frequency: 7.0 times per week     Types: Marijuana     Comment: Daily    • Sexual activity: Defer       Medications:     Current Outpatient Medications:   •  Ascorbic Acid (VITAMIN C PO), Take  by mouth., Disp: , Rfl:   •  BIOTIN PO, Take  by mouth., Disp: , Rfl:   •  Calcium Citrate-Vitamin D (JENNIFER-CITRATE PLUS VITAMIN D PO), Take  by mouth., Disp: , Rfl:   •  cyclobenzaprine (FLEXERIL) 10 MG tablet, Take 10 mg by mouth 3 (Three) Times a Day As Needed for Muscle Spasms., Disp: , Rfl:   •  DULoxetine (Cymbalta) 60 MG capsule, Take 60 mg by mouth Daily., Disp: , Rfl:   •  folic acid (FOLVITE) 1 MG tablet, Take 1 mg by mouth Daily., Disp: , Rfl:   •  methotrexate 2.5 MG tablet, Take  by mouth 1 (One) Time Per Week. TAKE 8 TABLETS WEEKLY, Disp: , Rfl:   •  metoprolol succinate XL (TOPROL-XL) 25 MG 24 hr tablet, Take 1 tablet by mouth Daily., Disp: 30 tablet, Rfl: 11  •  nitroglycerin (NITROSTAT) 0.4 MG SL tablet, , Disp: , Rfl:   •  pantoprazole (PROTONIX) 40 MG EC tablet, TAKE 1 TABLET BY MOUTH EVERY DAY, Disp: 90 tablet, Rfl: 0  •  polyethylene glycol (MiraLax) 17 GM/SCOOP powder, Take 17 g by mouth Daily., Disp: 510 g, Rfl: 2  •  pravastatin (PRAVACHOL) 20 MG tablet, , Disp: , Rfl:   •  rOPINIRole (REQUIP) 2 MG tablet, Take 2 mg by mouth Every Night., Disp: , Rfl:   •  Stool Softener 100 MG capsule, TAKE 1 CAPSULE BY MOUTH TWICE A DAY, Disp: 60 capsule, Rfl: 2  •  VITAMIN E PO, Take  by mouth., Disp: , Rfl:     Allergies:   Allergies   Allergen Reactions   • Penicillins Rash       Review of Systems:   Review of Systems   Constitutional: Negative for appetite change, fatigue and unexpected weight loss.   Gastrointestinal: Positive for abdominal  "pain and constipation. Negative for abdominal distention, anal bleeding, blood in stool, diarrhea, nausea, rectal pain, vomiting, GERD and indigestion.       The following portions of the patient's history were reviewed and updated as appropriate: allergies, current medications, past family history, past medical history, past social history, past surgical history and problem list.    Objective     Physical Exam:  Vital Signs:   Vitals:    07/13/21 1308   BP: 122/80   Temp: 97.3 °F (36.3 °C)   TempSrc: Temporal   Weight: 63.8 kg (140 lb 9.6 oz)   Height: 157.5 cm (62\")       Physical Exam  Vitals and nursing note reviewed.   Constitutional:       Appearance: She is well-developed.   Eyes:      Conjunctiva/sclera: Conjunctivae normal.   Cardiovascular:      Rate and Rhythm: Normal rate and regular rhythm.      Heart sounds: No murmur heard.     Pulmonary:      Effort: Pulmonary effort is normal. No respiratory distress.      Breath sounds: Normal breath sounds.   Abdominal:      General: Bowel sounds are normal. There is no distension.      Palpations: Abdomen is soft. There is no mass.      Tenderness: There is abdominal tenderness (Mild discomfort in the lower abdomen on deep palpation).      Hernia: No hernia is present.   Musculoskeletal:      Cervical back: Normal range of motion and neck supple.   Lymphadenopathy:      Cervical: No cervical adenopathy.   Skin:     General: Skin is warm and dry.   Neurological:      General: No focal deficit present.      Mental Status: She is alert and oriented to person, place, and time.      Sensory: No sensory deficit.         Results Review:   I reviewed the patient's new clinical results.    Admission on 06/01/2021, Discharged on 06/01/2021   Component Date Value Ref Range Status   • Glucose 06/01/2021 122* 65 - 99 mg/dL Final   • BUN 06/01/2021 16  8 - 23 mg/dL Final   • Creatinine 06/01/2021 0.88  0.57 - 1.00 mg/dL Final   • Sodium 06/01/2021 141  136 - 145 mmol/L Final "   • Potassium 06/01/2021 3.8  3.5 - 5.2 mmol/L Final   • Chloride 06/01/2021 103  98 - 107 mmol/L Final   • CO2 06/01/2021 27.8  22.0 - 29.0 mmol/L Final   • Calcium 06/01/2021 9.9  8.6 - 10.5 mg/dL Final   • Total Protein 06/01/2021 7.3  6.0 - 8.5 g/dL Final   • Albumin 06/01/2021 4.40  3.50 - 5.20 g/dL Final   • ALT (SGPT) 06/01/2021 35* 1 - 33 U/L Final   • AST (SGOT) 06/01/2021 40* 1 - 32 U/L Final   • Alkaline Phosphatase 06/01/2021 78  39 - 117 U/L Final   • Total Bilirubin 06/01/2021 0.2  0.0 - 1.2 mg/dL Final   • eGFR Non  Amer 06/01/2021 65  >60 mL/min/1.73 Final   • Globulin 06/01/2021 2.9  gm/dL Final   • A/G Ratio 06/01/2021 1.5  g/dL Final   • BUN/Creatinine Ratio 06/01/2021 18.2  7.0 - 25.0 Final   • Anion Gap 06/01/2021 10.2  5.0 - 15.0 mmol/L Final   • Troponin T 06/01/2021 <0.010  0.000 - 0.030 ng/mL Final   • Extra Tube 06/01/2021 hold for add-on   Final    Auto resulted   • Extra Tube 06/01/2021 Hold for add-ons.   Final    Auto resulted.   • Extra Tube 06/01/2021 hold for add-on   Final    Auto resulted   • Extra Tube 06/01/2021 Hold for add-ons.   Final    Auto resulted.   • Extra Tube 06/01/2021 Hold for add-ons.   Final    Auto resulted.   • WBC 06/01/2021 5.26  3.40 - 10.80 10*3/mm3 Final   • RBC 06/01/2021 3.98  3.77 - 5.28 10*6/mm3 Final   • Hemoglobin 06/01/2021 12.6  12.0 - 15.9 g/dL Final   • Hematocrit 06/01/2021 38.0  34.0 - 46.6 % Final   • MCV 06/01/2021 95.5  79.0 - 97.0 fL Final   • MCH 06/01/2021 31.7  26.6 - 33.0 pg Final   • MCHC 06/01/2021 33.2  31.5 - 35.7 g/dL Final   • RDW 06/01/2021 15.4  12.3 - 15.4 % Final   • RDW-SD 06/01/2021 54.3* 37.0 - 54.0 fl Final   • MPV 06/01/2021 10.0  6.0 - 12.0 fL Final   • Platelets 06/01/2021 228  140 - 450 10*3/mm3 Final   • Neutrophil % 06/01/2021 54.3  42.7 - 76.0 % Final   • Lymphocyte % 06/01/2021 36.3  19.6 - 45.3 % Final   • Monocyte % 06/01/2021 6.1  5.0 - 12.0 % Final   • Eosinophil % 06/01/2021 1.9  0.3 - 6.2 % Final   •  Basophil % 06/01/2021 1.0  0.0 - 1.5 % Final   • Immature Grans % 06/01/2021 0.4  0.0 - 0.5 % Final   • Neutrophils, Absolute 06/01/2021 2.86  1.70 - 7.00 10*3/mm3 Final   • Lymphocytes, Absolute 06/01/2021 1.91  0.70 - 3.10 10*3/mm3 Final   • Monocytes, Absolute 06/01/2021 0.32  0.10 - 0.90 10*3/mm3 Final   • Eosinophils, Absolute 06/01/2021 0.10  0.00 - 0.40 10*3/mm3 Final   • Basophils, Absolute 06/01/2021 0.05  0.00 - 0.20 10*3/mm3 Final   • Immature Grans, Absolute 06/01/2021 0.02  0.00 - 0.05 10*3/mm3 Final   • nRBC 06/01/2021 0.0  0.0 - 0.2 /100 WBC Final   • proBNP 06/01/2021 34.8  0.0 - 900.0 pg/mL Final   • Troponin T 06/01/2021 <0.010  0.000 - 0.030 ng/mL Final      XR Chest 1 View    Result Date: 6/1/2021  No acute findings.Underlying emphysema.    This report was finalized on 6/1/2021 8:12 AM by Joey Nam MD.      Assessment / Plan        1. chronic idiopathic constipation versus IBS with constipation   Lower abdominal pain  7/13/2021  Her current history is more in favor of IBS with constipation.  I have discussed the low gas-forming diet with the patient today.  Despite on the MiraLAX, senna, Colace she still feels that she has not emptying her bowel well.  Recently developed significant lower abdominal cramps.  She started passing a smelly soft stool after multiple attempts of straining.    We will start her on a Linzess 145 mcg p.o. daily.  Advised to adjust the dose may be once in every other day if she has a significant watery diarrhea.  I have also started her on a low-dose dicyclomine 10 mg p.o. 3 times daily as needed for abdominal cramps.  This may cause some constipation discussed with the patient. Advised to hold off the senna and MiraLAX for now and can be added to the above regimen if required after few days  Metamucil fiber pills 1 p.o. twice daily    We will consider CT scan abdomen pelvis next visit if her symptoms persist.    2. Gastroesophageal reflux disease without  esophagitis  7/13/2021  Even with ppi she still gets occasional reflux symptoms.  She also had a what appears to be atypical chest pain on the left side and later on moved into her mid chest few episodes and that has improved now.  She was seen by cardiology and initial work-up were unremarkable.  There is no pain during swallowing or food intake.    We will monitor for now  Continue Protonix PPI 40 mg p.o. daily    7/14/2020  Her symptoms under control now.  Her recent EGD done did not reveal any erosive esophagitis.  Her epigastric pain has resolved now  She had an EGD done on 6/1/2020 which revealed a mild erythematous gastric mucosa,  biopsies were suggestive for reactive gastropathy without any signs of H. pylori infection. There was a single polyp identified in the duodenal bulb which was resected and pathology is more consistent with a chronic peptic duodenitis with a venogram hyperplasia, without any dysplasia or metaplasia. Continue protonix for one more month and reduc to 20mg po daily for longtem as her symptoms recurred while off from PPI in the past. Also she is been diagnosed wioth lupus recently.   Follow-up 1 year time     Prior history  3. Esophageal dysphagia  EGD done on 6/1/2020 did not reveal any esophageal narrowing.  esophageal biopsies were negative for EOE  She does seem to have some esophageal dysmotility associated with a lupus and sjogrens syndrom     4. Personal history of colonic polyps  Colonoscopy in January 2018 [facility].  Per patient for benign polyps removed less than 1 cm in size  Due for colonoscopy in 3 years time in 2023     5. Family hx of colon cancer  Uncle had a colon cancer     6. Nodule of chest wall  About 2cm size soft firm nodule palpable at the left upper to upper chest medial to the the anterior axillary cleft  This could be a lipoma, recent ultrasound did not reveal any significant abnormality        Follow Up:   No follow-ups on file.    Chaparro JUNG  MD Elida  [facility] [place]  7/13/2021  13:09 EDT     Please note that portions of this note may have been completed with a voice recognition program.

## 2021-08-03 ENCOUNTER — PATIENT MESSAGE (OUTPATIENT)
Dept: GASTROENTEROLOGY | Facility: CLINIC | Age: 66
End: 2021-08-03

## 2021-08-03 DIAGNOSIS — K58.1 IRRITABLE BOWEL SYNDROME WITH CONSTIPATION: Primary | ICD-10-CM

## 2021-08-06 ENCOUNTER — HOSPITAL ENCOUNTER (OUTPATIENT)
Dept: MRI IMAGING | Facility: HOSPITAL | Age: 66
Discharge: HOME | End: 2021-08-06
Payer: COMMERCIAL

## 2021-08-06 DIAGNOSIS — M54.40 CHRONIC BILATERAL LOW BACK PAIN WITH SCIATICA, SCIATICA LATERALITY UNSPECIFIED: ICD-10-CM

## 2021-08-06 DIAGNOSIS — G89.29 CHRONIC BILATERAL LOW BACK PAIN WITH SCIATICA, SCIATICA LATERALITY UNSPECIFIED: ICD-10-CM

## 2021-08-06 PROCEDURE — 72148 MRI LUMBAR SPINE W/O DYE: CPT

## 2021-09-03 RX ORDER — PANTOPRAZOLE SODIUM 40 MG/1
TABLET, DELAYED RELEASE ORAL
Qty: 90 TABLET | Refills: 0 | Status: SHIPPED | OUTPATIENT
Start: 2021-09-03 | End: 2021-12-02

## 2021-09-03 RX ORDER — LINACLOTIDE 145 UG/1
CAPSULE, GELATIN COATED ORAL
Qty: 30 CAPSULE | Refills: 1 | OUTPATIENT
Start: 2021-09-03

## 2021-09-13 ENCOUNTER — TRANSCRIBE ORDERS (OUTPATIENT)
Dept: ADMINISTRATIVE | Facility: HOSPITAL | Age: 66
End: 2021-09-13

## 2021-09-13 DIAGNOSIS — E04.1 THYROID CYST: Primary | ICD-10-CM

## 2021-10-06 ENCOUNTER — HOSPITAL ENCOUNTER (OUTPATIENT)
Dept: ULTRASOUND IMAGING | Facility: HOSPITAL | Age: 66
Discharge: HOME | End: 2021-10-06
Admitting: NURSE PRACTITIONER

## 2021-10-06 DIAGNOSIS — E04.1 THYROID CYST: ICD-10-CM

## 2021-10-06 PROCEDURE — 76536 US EXAM OF HEAD AND NECK: CPT

## 2021-11-01 RX ORDER — DOCUSATE SODIUM 100 MG/1
CAPSULE, LIQUID FILLED ORAL
Qty: 60 CAPSULE | Refills: 2 | Status: SHIPPED | OUTPATIENT
Start: 2021-11-01 | End: 2022-07-20

## 2021-11-22 ENCOUNTER — APPOINTMENT (OUTPATIENT)
Dept: CT IMAGING | Facility: HOSPITAL | Age: 66
End: 2021-11-22

## 2021-11-22 ENCOUNTER — APPOINTMENT (OUTPATIENT)
Dept: GENERAL RADIOLOGY | Facility: HOSPITAL | Age: 66
End: 2021-11-22

## 2021-11-22 ENCOUNTER — HOSPITAL ENCOUNTER (OUTPATIENT)
Facility: HOSPITAL | Age: 66
Setting detail: OBSERVATION
Discharge: HOME | End: 2021-11-23
Attending: EMERGENCY MEDICINE | Admitting: STUDENT IN AN ORGANIZED HEALTH CARE EDUCATION/TRAINING PROGRAM

## 2021-11-22 DIAGNOSIS — R47.81 SLURRED SPEECH: ICD-10-CM

## 2021-11-22 DIAGNOSIS — R42 DIZZINESS: Primary | ICD-10-CM

## 2021-11-22 DIAGNOSIS — R53.1 WEAKNESS: ICD-10-CM

## 2021-11-22 LAB
ALBUMIN SERPL-MCNC: 4.2 G/DL (ref 3.5–5.2)
ALBUMIN/GLOB SERPL: 1.6 G/DL
ALP SERPL-CCNC: 69 U/L (ref 39–117)
ALT SERPL W P-5'-P-CCNC: 13 U/L (ref 1–33)
ANION GAP SERPL CALCULATED.3IONS-SCNC: 8.2 MMOL/L (ref 5–15)
AST SERPL-CCNC: 11 U/L (ref 1–32)
BASOPHILS # BLD AUTO: 0.04 10*3/MM3 (ref 0–0.2)
BASOPHILS NFR BLD AUTO: 0.7 % (ref 0–1.5)
BILIRUB SERPL-MCNC: <0.2 MG/DL (ref 0–1.2)
BILIRUB UR QL STRIP: NEGATIVE
BUN SERPL-MCNC: 17 MG/DL (ref 8–23)
BUN/CREAT SERPL: 22.4 (ref 7–25)
CALCIUM SPEC-SCNC: 9.1 MG/DL (ref 8.6–10.5)
CHLORIDE SERPL-SCNC: 107 MMOL/L (ref 98–107)
CLARITY UR: CLEAR
CO2 SERPL-SCNC: 27.8 MMOL/L (ref 22–29)
COLOR UR: YELLOW
CREAT SERPL-MCNC: 0.76 MG/DL (ref 0.57–1)
DEPRECATED RDW RBC AUTO: 41.6 FL (ref 37–54)
EOSINOPHIL # BLD AUTO: 0.01 10*3/MM3 (ref 0–0.4)
EOSINOPHIL NFR BLD AUTO: 0.2 % (ref 0.3–6.2)
ERYTHROCYTE [DISTWIDTH] IN BLOOD BY AUTOMATED COUNT: 12.3 % (ref 12.3–15.4)
GFR SERPL CREATININE-BSD FRML MDRD: 77 ML/MIN/1.73
GLOBULIN UR ELPH-MCNC: 2.7 GM/DL
GLUCOSE BLDC GLUCOMTR-MCNC: 122 MG/DL (ref 70–130)
GLUCOSE SERPL-MCNC: 124 MG/DL (ref 65–99)
GLUCOSE UR STRIP-MCNC: NEGATIVE MG/DL
HCT VFR BLD AUTO: 35.7 % (ref 34–46.6)
HGB BLD-MCNC: 11.8 G/DL (ref 12–15.9)
HGB UR QL STRIP.AUTO: NEGATIVE
HOLD SPECIMEN: NORMAL
HOLD SPECIMEN: NORMAL
IMM GRANULOCYTES # BLD AUTO: 0.03 10*3/MM3 (ref 0–0.05)
IMM GRANULOCYTES NFR BLD AUTO: 0.5 % (ref 0–0.5)
KETONES UR QL STRIP: NEGATIVE
LEUKOCYTE ESTERASE UR QL STRIP.AUTO: NEGATIVE
LYMPHOCYTES # BLD AUTO: 1.86 10*3/MM3 (ref 0.7–3.1)
LYMPHOCYTES NFR BLD AUTO: 30.7 % (ref 19.6–45.3)
MAGNESIUM SERPL-MCNC: 2.3 MG/DL (ref 1.6–2.4)
MCH RBC QN AUTO: 30.7 PG (ref 26.6–33)
MCHC RBC AUTO-ENTMCNC: 33.1 G/DL (ref 31.5–35.7)
MCV RBC AUTO: 93 FL (ref 79–97)
MONOCYTES # BLD AUTO: 0.56 10*3/MM3 (ref 0.1–0.9)
MONOCYTES NFR BLD AUTO: 9.2 % (ref 5–12)
NEUTROPHILS NFR BLD AUTO: 3.56 10*3/MM3 (ref 1.7–7)
NEUTROPHILS NFR BLD AUTO: 58.7 % (ref 42.7–76)
NITRITE UR QL STRIP: NEGATIVE
NRBC BLD AUTO-RTO: 0 /100 WBC (ref 0–0.2)
PH UR STRIP.AUTO: 7 [PH] (ref 5–8)
PLATELET # BLD AUTO: 170 10*3/MM3 (ref 140–450)
PMV BLD AUTO: 10.7 FL (ref 6–12)
POTASSIUM SERPL-SCNC: 3.7 MMOL/L (ref 3.5–5.2)
PROT SERPL-MCNC: 6.9 G/DL (ref 6–8.5)
PROT UR QL STRIP: NEGATIVE
RBC # BLD AUTO: 3.84 10*6/MM3 (ref 3.77–5.28)
SODIUM SERPL-SCNC: 143 MMOL/L (ref 136–145)
SP GR UR STRIP: 1.01 (ref 1–1.03)
TROPONIN T SERPL-MCNC: <0.01 NG/ML (ref 0–0.03)
UROBILINOGEN UR QL STRIP: NORMAL
WBC NRBC COR # BLD: 6.06 10*3/MM3 (ref 3.4–10.8)
WHOLE BLOOD HOLD SPECIMEN: NORMAL
WHOLE BLOOD HOLD SPECIMEN: NORMAL

## 2021-11-22 PROCEDURE — 93005 ELECTROCARDIOGRAM TRACING: CPT | Performed by: PHYSICIAN ASSISTANT

## 2021-11-22 PROCEDURE — 85025 COMPLETE CBC W/AUTO DIFF WBC: CPT | Performed by: EMERGENCY MEDICINE

## 2021-11-22 PROCEDURE — 99284 EMERGENCY DEPT VISIT MOD MDM: CPT

## 2021-11-22 PROCEDURE — 81003 URINALYSIS AUTO W/O SCOPE: CPT | Performed by: EMERGENCY MEDICINE

## 2021-11-22 PROCEDURE — 83735 ASSAY OF MAGNESIUM: CPT | Performed by: EMERGENCY MEDICINE

## 2021-11-22 PROCEDURE — 80053 COMPREHEN METABOLIC PANEL: CPT | Performed by: EMERGENCY MEDICINE

## 2021-11-22 PROCEDURE — 25010000002 IOPAMIDOL 61 % SOLUTION: Performed by: EMERGENCY MEDICINE

## 2021-11-22 PROCEDURE — 84443 ASSAY THYROID STIM HORMONE: CPT | Performed by: EMERGENCY MEDICINE

## 2021-11-22 PROCEDURE — 70496 CT ANGIOGRAPHY HEAD: CPT

## 2021-11-22 PROCEDURE — 85651 RBC SED RATE NONAUTOMATED: CPT | Performed by: PHYSICIAN ASSISTANT

## 2021-11-22 PROCEDURE — 93005 ELECTROCARDIOGRAM TRACING: CPT | Performed by: EMERGENCY MEDICINE

## 2021-11-22 PROCEDURE — 71045 X-RAY EXAM CHEST 1 VIEW: CPT

## 2021-11-22 PROCEDURE — 70498 CT ANGIOGRAPHY NECK: CPT

## 2021-11-22 PROCEDURE — 84484 ASSAY OF TROPONIN QUANT: CPT | Performed by: EMERGENCY MEDICINE

## 2021-11-22 PROCEDURE — 80061 LIPID PANEL: CPT | Performed by: EMERGENCY MEDICINE

## 2021-11-22 PROCEDURE — 96360 HYDRATION IV INFUSION INIT: CPT

## 2021-11-22 PROCEDURE — 82962 GLUCOSE BLOOD TEST: CPT

## 2021-11-22 PROCEDURE — 83036 HEMOGLOBIN GLYCOSYLATED A1C: CPT | Performed by: EMERGENCY MEDICINE

## 2021-11-22 PROCEDURE — 70450 CT HEAD/BRAIN W/O DYE: CPT

## 2021-11-22 RX ORDER — MECLIZINE HYDROCHLORIDE 25 MG/1
25 TABLET ORAL ONCE
Status: COMPLETED | OUTPATIENT
Start: 2021-11-22 | End: 2021-11-22

## 2021-11-22 RX ORDER — SODIUM CHLORIDE 0.9 % (FLUSH) 0.9 %
10 SYRINGE (ML) INJECTION AS NEEDED
Status: DISCONTINUED | OUTPATIENT
Start: 2021-11-22 | End: 2021-11-23 | Stop reason: HOSPADM

## 2021-11-22 RX ADMIN — SODIUM CHLORIDE 1000 ML: 9 INJECTION, SOLUTION INTRAVENOUS at 22:41

## 2021-11-22 RX ADMIN — IOPAMIDOL 100 ML: 612 INJECTION, SOLUTION INTRAVENOUS at 22:57

## 2021-11-22 RX ADMIN — MECLIZINE HYDROCHLORIDE 25 MG: 25 TABLET ORAL at 22:41

## 2021-11-23 ENCOUNTER — APPOINTMENT (OUTPATIENT)
Dept: MRI IMAGING | Facility: HOSPITAL | Age: 66
End: 2021-11-23

## 2021-11-23 ENCOUNTER — APPOINTMENT (OUTPATIENT)
Dept: CARDIOLOGY | Facility: HOSPITAL | Age: 66
End: 2021-11-23

## 2021-11-23 VITALS
TEMPERATURE: 97.5 F | HEART RATE: 57 BPM | OXYGEN SATURATION: 98 % | SYSTOLIC BLOOD PRESSURE: 149 MMHG | WEIGHT: 144 LBS | DIASTOLIC BLOOD PRESSURE: 71 MMHG | RESPIRATION RATE: 16 BRPM | HEIGHT: 62 IN | BODY MASS INDEX: 26.5 KG/M2

## 2021-11-23 PROBLEM — G45.9 TRANSIENT ISCHEMIC ATTACK (TIA): Status: ACTIVE | Noted: 2021-11-23

## 2021-11-23 PROBLEM — R42 DIZZINESS: Status: ACTIVE | Noted: 2021-11-23

## 2021-11-23 LAB
BH CV ECHO MEAS - % IVS THICK: 53.5 %
BH CV ECHO MEAS - % LVPW THICK: 36 %
BH CV ECHO MEAS - AO MAX PG (FULL): 7.7 MMHG
BH CV ECHO MEAS - AO MAX PG: 10 MMHG
BH CV ECHO MEAS - AO MEAN PG (FULL): 4 MMHG
BH CV ECHO MEAS - AO MEAN PG: 5 MMHG
BH CV ECHO MEAS - AO ROOT AREA (BSA CORRECTED): 1.6
BH CV ECHO MEAS - AO ROOT AREA: 5.4 CM^2
BH CV ECHO MEAS - AO ROOT DIAM: 2.6 CM
BH CV ECHO MEAS - AO V2 MAX: 162 CM/SEC
BH CV ECHO MEAS - AO V2 MEAN: 101 CM/SEC
BH CV ECHO MEAS - AO V2 VTI: 36.4 CM
BH CV ECHO MEAS - AVA(I,A): 1.8 CM^2
BH CV ECHO MEAS - AVA(I,D): 1.8 CM^2
BH CV ECHO MEAS - AVA(V,A): 1.5 CM^2
BH CV ECHO MEAS - AVA(V,D): 1.5 CM^2
BH CV ECHO MEAS - BSA(HAYCOCK): 1.7 M^2
BH CV ECHO MEAS - BSA: 1.7 M^2
BH CV ECHO MEAS - BZI_BMI: 26.3 KILOGRAMS/M^2
BH CV ECHO MEAS - BZI_METRIC_HEIGHT: 157.5 CM
BH CV ECHO MEAS - BZI_METRIC_WEIGHT: 65.3 KG
BH CV ECHO MEAS - EDV(CUBED): 75.7 ML
BH CV ECHO MEAS - EDV(MOD-SP2): 102 ML
BH CV ECHO MEAS - EDV(MOD-SP4): 109 ML
BH CV ECHO MEAS - EDV(TEICH): 79.9 ML
BH CV ECHO MEAS - EF(CUBED): 71 %
BH CV ECHO MEAS - EF(MOD-BP): 60.7 %
BH CV ECHO MEAS - EF(MOD-SP2): 63.5 %
BH CV ECHO MEAS - EF(MOD-SP4): 58.3 %
BH CV ECHO MEAS - EF(TEICH): 63 %
BH CV ECHO MEAS - ESV(CUBED): 22 ML
BH CV ECHO MEAS - ESV(MOD-SP2): 37.2 ML
BH CV ECHO MEAS - ESV(MOD-SP4): 45.5 ML
BH CV ECHO MEAS - ESV(TEICH): 29.6 ML
BH CV ECHO MEAS - FS: 33.8 %
BH CV ECHO MEAS - IVS/LVPW: 0.99
BH CV ECHO MEAS - IVSD: 0.99 CM
BH CV ECHO MEAS - IVSS: 1.5 CM
BH CV ECHO MEAS - LA DIMENSION: 2.9 CM
BH CV ECHO MEAS - LA/AO: 1.1
BH CV ECHO MEAS - LAD MAJOR: 4.7 CM
BH CV ECHO MEAS - LAT PEAK E' VEL: 8.8 CM/SEC
BH CV ECHO MEAS - LATERAL E/E' RATIO: 10.6
BH CV ECHO MEAS - LV DIASTOLIC VOL/BSA (35-75): 65.6 ML/M^2
BH CV ECHO MEAS - LV MASS(C)D: 137.8 GRAMS
BH CV ECHO MEAS - LV MASS(C)DI: 82.9 GRAMS/M^2
BH CV ECHO MEAS - LV MASS(C)S: 134.8 GRAMS
BH CV ECHO MEAS - LV MASS(C)SI: 81.1 GRAMS/M^2
BH CV ECHO MEAS - LV MAX PG: 2.3 MMHG
BH CV ECHO MEAS - LV MEAN PG: 1 MMHG
BH CV ECHO MEAS - LV SYSTOLIC VOL/BSA (12-30): 27.4 ML/M^2
BH CV ECHO MEAS - LV V1 MAX: 75.2 CM/SEC
BH CV ECHO MEAS - LV V1 MEAN: 51.4 CM/SEC
BH CV ECHO MEAS - LV V1 VTI: 19.8 CM
BH CV ECHO MEAS - LVIDD: 4.2 CM
BH CV ECHO MEAS - LVIDS: 2.8 CM
BH CV ECHO MEAS - LVLD AP2: 7.9 CM
BH CV ECHO MEAS - LVLD AP4: 7.6 CM
BH CV ECHO MEAS - LVLS AP2: 6.1 CM
BH CV ECHO MEAS - LVLS AP4: 5.8 CM
BH CV ECHO MEAS - LVOT AREA (M): 3.3 CM^2
BH CV ECHO MEAS - LVOT AREA: 3.3 CM^2
BH CV ECHO MEAS - LVOT DIAM: 2 CM
BH CV ECHO MEAS - LVPWD: 1 CM
BH CV ECHO MEAS - LVPWS: 1.4 CM
BH CV ECHO MEAS - MED PEAK E' VEL: 8.9 CM/SEC
BH CV ECHO MEAS - MEDIAL E/E' RATIO: 10.5
BH CV ECHO MEAS - MV A MAX VEL: 101 CM/SEC
BH CV ECHO MEAS - MV DEC TIME: 0.24 SEC
BH CV ECHO MEAS - MV E MAX VEL: 93.4 CM/SEC
BH CV ECHO MEAS - MV E/A: 0.92
BH CV ECHO MEAS - MV MAX PG: 3.6 MMHG
BH CV ECHO MEAS - MV MEAN PG: 1 MMHG
BH CV ECHO MEAS - MV V2 MAX: 94.7 CM/SEC
BH CV ECHO MEAS - MV V2 MEAN: 53.7 CM/SEC
BH CV ECHO MEAS - MV V2 VTI: 37.4 CM
BH CV ECHO MEAS - MVA(VTI): 1.7 CM^2
BH CV ECHO MEAS - PA ACC TIME: 0.18 SEC
BH CV ECHO MEAS - PA MAX PG (FULL): 0.75 MMHG
BH CV ECHO MEAS - PA MAX PG: 2 MMHG
BH CV ECHO MEAS - PA MEAN PG (FULL): 0 MMHG
BH CV ECHO MEAS - PA MEAN PG: 1 MMHG
BH CV ECHO MEAS - PA PR(ACCEL): -0.2 MMHG
BH CV ECHO MEAS - PA V2 MAX: 70.3 CM/SEC
BH CV ECHO MEAS - PA V2 MEAN: 45.6 CM/SEC
BH CV ECHO MEAS - PA V2 VTI: 16.6 CM
BH CV ECHO MEAS - PULM A REVS DUR: 0.13 SEC
BH CV ECHO MEAS - PULM A REVS VEL: 29.1 CM/SEC
BH CV ECHO MEAS - PULM DIAS VEL: 30.2 CM/SEC
BH CV ECHO MEAS - PULM S/D: 1.8
BH CV ECHO MEAS - PULM SYS VEL: 54.2 CM/SEC
BH CV ECHO MEAS - RAP SYSTOLE: 3 MMHG
BH CV ECHO MEAS - RV MAX PG: 1.2 MMHG
BH CV ECHO MEAS - RV MEAN PG: 1 MMHG
BH CV ECHO MEAS - RV V1 MAX: 55.4 CM/SEC
BH CV ECHO MEAS - RV V1 MEAN: 40.3 CM/SEC
BH CV ECHO MEAS - RV V1 VTI: 16 CM
BH CV ECHO MEAS - RVSP: 14 MMHG
BH CV ECHO MEAS - SI(AO): 117.1 ML/M^2
BH CV ECHO MEAS - SI(CUBED): 32.3 ML/M^2
BH CV ECHO MEAS - SI(LVOT): 38.9 ML/M^2
BH CV ECHO MEAS - SI(MOD-SP2): 39 ML/M^2
BH CV ECHO MEAS - SI(MOD-SP4): 38.2 ML/M^2
BH CV ECHO MEAS - SI(TEICH): 30.3 ML/M^2
BH CV ECHO MEAS - SV(AO): 194.7 ML
BH CV ECHO MEAS - SV(CUBED): 53.7 ML
BH CV ECHO MEAS - SV(LVOT): 64.7 ML
BH CV ECHO MEAS - SV(MOD-SP2): 64.8 ML
BH CV ECHO MEAS - SV(MOD-SP4): 63.5 ML
BH CV ECHO MEAS - SV(TEICH): 50.4 ML
BH CV ECHO MEAS - TR MAX PG: 11 MMHG
BH CV ECHO MEAS - TR MAX VEL: 163 CM/SEC
BH CV ECHO MEASUREMENTS AVERAGE E/E' RATIO: 10.55
BH CV XLRA - RV BASE: 3.5 CM
BH CV XLRA - RV LENGTH: 5.8 CM
BH CV XLRA - RV MID: 2.7 CM
BH CV XLRA - TDI S': 10 CM/SEC
CHOLEST SERPL-MCNC: 204 MG/DL (ref 0–200)
ERYTHROCYTE [SEDIMENTATION RATE] IN BLOOD: 11 MM/HR (ref 0–20)
GLUCOSE BLDC GLUCOMTR-MCNC: 81 MG/DL (ref 70–130)
HBA1C MFR BLD: 5.2 % (ref 4.8–5.6)
HDLC SERPL-MCNC: 67 MG/DL (ref 40–60)
LDLC SERPL CALC-MCNC: 119 MG/DL (ref 0–100)
LDLC/HDLC SERPL: 1.73 {RATIO}
LEFT ATRIUM VOLUME INDEX: 33.3 ML/M^2
LEFT ATRIUM VOLUME: 55.3 ML
LV EF 2D ECHO EST: 57 %
MAXIMAL PREDICTED HEART RATE: 156 BPM
SARS-COV-2 RNA PNL SPEC NAA+PROBE: NOT DETECTED
STRESS TARGET HR: 133 BPM
TRIGL SERPL-MCNC: 104 MG/DL (ref 0–150)
TSH SERPL DL<=0.05 MIU/L-ACNC: 0.39 UIU/ML (ref 0.27–4.2)
VLDLC SERPL-MCNC: 18 MG/DL (ref 5–40)

## 2021-11-23 PROCEDURE — 93306 TTE W/DOPPLER COMPLETE: CPT

## 2021-11-23 PROCEDURE — 25010000002 ENOXAPARIN PER 10 MG: Performed by: EMERGENCY MEDICINE

## 2021-11-23 PROCEDURE — 87635 SARS-COV-2 COVID-19 AMP PRB: CPT | Performed by: EMERGENCY MEDICINE

## 2021-11-23 PROCEDURE — G0378 HOSPITAL OBSERVATION PER HR: HCPCS

## 2021-11-23 PROCEDURE — 96372 THER/PROPH/DIAG INJ SC/IM: CPT

## 2021-11-23 PROCEDURE — 82962 GLUCOSE BLOOD TEST: CPT

## 2021-11-23 PROCEDURE — 99204 OFFICE O/P NEW MOD 45 MIN: CPT | Performed by: PSYCHIATRY & NEUROLOGY

## 2021-11-23 PROCEDURE — 70551 MRI BRAIN STEM W/O DYE: CPT

## 2021-11-23 PROCEDURE — 93306 TTE W/DOPPLER COMPLETE: CPT | Performed by: INTERNAL MEDICINE

## 2021-11-23 PROCEDURE — 99235 HOSP IP/OBS SAME DATE MOD 70: CPT | Performed by: STUDENT IN AN ORGANIZED HEALTH CARE EDUCATION/TRAINING PROGRAM

## 2021-11-23 RX ORDER — GABAPENTIN 300 MG/1
600 CAPSULE ORAL 3 TIMES DAILY
COMMUNITY
End: 2022-08-17

## 2021-11-23 RX ORDER — SODIUM CHLORIDE 0.9 % (FLUSH) 0.9 %
10 SYRINGE (ML) INJECTION EVERY 12 HOURS SCHEDULED
Status: DISCONTINUED | OUTPATIENT
Start: 2021-11-23 | End: 2021-11-23 | Stop reason: HOSPADM

## 2021-11-23 RX ORDER — ASPIRIN 81 MG/1
81 TABLET ORAL DAILY
Status: DISCONTINUED | OUTPATIENT
Start: 2021-11-23 | End: 2021-11-23 | Stop reason: HOSPADM

## 2021-11-23 RX ORDER — ASPIRIN 81 MG/1
81 TABLET ORAL DAILY
Qty: 30 TABLET | Refills: 0 | Status: SHIPPED | OUTPATIENT
Start: 2021-11-24 | End: 2022-08-17

## 2021-11-23 RX ORDER — BACLOFEN 10 MG/1
10 TABLET ORAL EVERY 8 HOURS PRN
Status: DISCONTINUED | OUTPATIENT
Start: 2021-11-23 | End: 2021-11-23 | Stop reason: HOSPADM

## 2021-11-23 RX ORDER — ACETAMINOPHEN 650 MG/1
650 SUPPOSITORY RECTAL EVERY 4 HOURS PRN
Status: DISCONTINUED | OUTPATIENT
Start: 2021-11-23 | End: 2021-11-23 | Stop reason: HOSPADM

## 2021-11-23 RX ORDER — METOPROLOL SUCCINATE 25 MG/1
25 TABLET, EXTENDED RELEASE ORAL DAILY
Status: DISCONTINUED | OUTPATIENT
Start: 2021-11-23 | End: 2021-11-23

## 2021-11-23 RX ORDER — SODIUM CHLORIDE 0.9 % (FLUSH) 0.9 %
10 SYRINGE (ML) INJECTION AS NEEDED
Status: DISCONTINUED | OUTPATIENT
Start: 2021-11-23 | End: 2021-11-23 | Stop reason: HOSPADM

## 2021-11-23 RX ORDER — BACLOFEN 10 MG/1
10 TABLET ORAL 3 TIMES DAILY
COMMUNITY
End: 2022-08-17

## 2021-11-23 RX ORDER — ACETAMINOPHEN 325 MG/1
650 TABLET ORAL EVERY 4 HOURS PRN
Status: DISCONTINUED | OUTPATIENT
Start: 2021-11-23 | End: 2021-11-23 | Stop reason: HOSPADM

## 2021-11-23 RX ORDER — ATORVASTATIN CALCIUM 80 MG/1
80 TABLET, FILM COATED ORAL NIGHTLY
Qty: 30 TABLET | Refills: 0 | Status: SHIPPED | OUTPATIENT
Start: 2021-11-23 | End: 2022-08-17

## 2021-11-23 RX ORDER — ACETAMINOPHEN 160 MG/5ML
650 SOLUTION ORAL EVERY 4 HOURS PRN
Status: DISCONTINUED | OUTPATIENT
Start: 2021-11-23 | End: 2021-11-23 | Stop reason: HOSPADM

## 2021-11-23 RX ORDER — MECLIZINE HCL 12.5 MG/1
12.5 TABLET ORAL 3 TIMES DAILY PRN
Qty: 30 TABLET | Refills: 0 | Status: SHIPPED | OUTPATIENT
Start: 2021-11-23

## 2021-11-23 RX ORDER — GABAPENTIN 100 MG/1
200 CAPSULE ORAL 3 TIMES DAILY
Status: DISCONTINUED | OUTPATIENT
Start: 2021-11-23 | End: 2021-11-23 | Stop reason: HOSPADM

## 2021-11-23 RX ORDER — ONDANSETRON 2 MG/ML
4 INJECTION INTRAMUSCULAR; INTRAVENOUS EVERY 6 HOURS PRN
Status: DISCONTINUED | OUTPATIENT
Start: 2021-11-23 | End: 2021-11-23 | Stop reason: HOSPADM

## 2021-11-23 RX ORDER — TRAMADOL HYDROCHLORIDE 50 MG/1
50 TABLET ORAL EVERY 12 HOURS PRN
Status: DISCONTINUED | OUTPATIENT
Start: 2021-11-23 | End: 2021-11-23 | Stop reason: HOSPADM

## 2021-11-23 RX ORDER — LEFLUNOMIDE 10 MG/1
10 TABLET ORAL DAILY
COMMUNITY
End: 2022-01-03 | Stop reason: HOSPADM

## 2021-11-23 RX ORDER — ATORVASTATIN CALCIUM 40 MG/1
80 TABLET, FILM COATED ORAL NIGHTLY
Status: DISCONTINUED | OUTPATIENT
Start: 2021-11-23 | End: 2021-11-23 | Stop reason: HOSPADM

## 2021-11-23 RX ORDER — TRAMADOL HYDROCHLORIDE 50 MG/1
50 TABLET ORAL 2 TIMES DAILY
COMMUNITY
End: 2022-09-21

## 2021-11-23 RX ORDER — ASPIRIN 81 MG/1
324 TABLET, CHEWABLE ORAL ONCE
Status: COMPLETED | OUTPATIENT
Start: 2021-11-23 | End: 2021-11-23

## 2021-11-23 RX ADMIN — ENOXAPARIN SODIUM 40 MG: 40 INJECTION SUBCUTANEOUS at 03:03

## 2021-11-23 RX ADMIN — GABAPENTIN 200 MG: 100 CAPSULE ORAL at 10:54

## 2021-11-23 RX ADMIN — ASPIRIN 81 MG: 81 TABLET, COATED ORAL at 08:34

## 2021-11-23 RX ADMIN — Medication 10 ML: at 08:36

## 2021-11-23 RX ADMIN — SODIUM CHLORIDE, PRESERVATIVE FREE 10 ML: 5 INJECTION INTRAVENOUS at 08:36

## 2021-11-23 RX ADMIN — ASPIRIN 81 MG CHEWABLE TABLET 324 MG: 81 TABLET CHEWABLE at 03:02

## 2021-11-23 RX ADMIN — SODIUM CHLORIDE, PRESERVATIVE FREE 10 ML: 5 INJECTION INTRAVENOUS at 02:54

## 2021-11-23 RX ADMIN — ATORVASTATIN CALCIUM 80 MG: 40 TABLET, FILM COATED ORAL at 03:02

## 2021-11-23 NOTE — ED PROVIDER NOTES
Subjective   66-year-old female presents with dizziness, nausea, and shortness of breath since earlier today.  Patient states she feels lightheaded.  Sometimes are worse with any movement.      History provided by:  Patient   used: No        Review of Systems   Neurological: Positive for dizziness.   All other systems reviewed and are negative.      Past Medical History:   Diagnosis Date   • Arthritis    • Colon polyps    • Dysphagia    • Elevated cholesterol    • Family history of colon cancer    • Fibromyalgia    • GERD (gastroesophageal reflux disease)    • Heart murmur    • Hoarseness    • Hyperlipidemia    • Lupus (HCC)    • Marijuana use     Daily    • Pulmonary nodule    • Sjogren's disease (HCC)        Allergies   Allergen Reactions   • Penicillins Rash       Past Surgical History:   Procedure Laterality Date   • APPENDECTOMY     • CATARACT EXTRACTION W/ INTRAOCULAR LENS IMPLANT Left 12/20/2021    Procedure: CATARACT PHACO EXTRACTION WITH INTRAOCULAR LENS IMPLANT LEFT COMPLICATED WITH MALYUGIN RING;  Surgeon: Zachary Landers MD;  Location: [facility] OR;  Service: Ophthalmology;  Laterality: Left;   • COLONOSCOPY     • ENDOSCOPY     • ENDOSCOPY N/A 6/1/2020    Procedure: ESOPHAGOGASTRODUODENOSCOPY;  Surgeon: Chaparro Marrero MD;  Location: [facility] ENDOSCOPY;  Service: Gastroenterology;  Laterality: N/A;   • HYSTERECTOMY         Family History   Problem Relation Age of Onset   • Hypertension Mother    • Aneurysm Mother    • Heart disease Mother    • Emphysema Father    • Ulcerative colitis Brother    • Colon cancer Maternal Uncle    • Cirrhosis Neg Hx    • Liver disease Neg Hx    • Liver cancer Neg Hx    • Crohn's disease Neg Hx        Social History     Socioeconomic History   • Marital status:    Tobacco Use   • Smoking status: Former Smoker     Packs/day: 1.00     Years: 40.00     Pack years: 40.00     Types: Cigarettes     Quit date: 2018     Years since quitting: 3.9   •  Smokeless tobacco: Never Used   Substance and Sexual Activity   • Alcohol use: No   • Drug use: Yes     Frequency: 7.0 times per week     Types: Marijuana     Comment: OCCASIONALLY   • Sexual activity: Defer           Objective   Physical Exam  Vitals and nursing note reviewed.   Constitutional:       Appearance: She is well-developed.   HENT:      Head: Normocephalic and atraumatic.   Cardiovascular:      Rate and Rhythm: Normal rate and regular rhythm.   Pulmonary:      Effort: Pulmonary effort is normal.      Breath sounds: Normal breath sounds.   Musculoskeletal:         General: Normal range of motion.      Cervical back: Normal range of motion and neck supple.   Skin:     General: Skin is warm and dry.   Neurological:      Mental Status: She is alert and oriented to person, place, and time.      Deep Tendon Reflexes: Reflexes are normal and symmetric.         Procedures           ED Course  ED Course as of 12/30/21 0753   Mon Nov 22, 2021   2157 EKG interpreted by me reveals sinus rhythm with rate of 47 bpm.  There are no acute ST segment or T wave abnormalities.  This is a normal-appearing EKG. [TB]   2218 Discussed with Dr. Higgins neurology, he recommended a CT head, CTA head and neck and admit for observation [CS]   Tue Nov 23, 2021   0000 Discussed with Dr. Nassar he accepted the patient for admission [CS]      ED Course User Index  [CS] Satish Silva Jr., FCO  [TB] Jess Francisco MD                                           MDM  Number of Diagnoses or Management Options  Dizziness: new and requires workup  Slurred speech: new and requires workup  Weakness: new and requires workup     Amount and/or Complexity of Data Reviewed  Clinical lab tests: reviewed  Tests in the radiology section of CPT®: reviewed  Discuss the patient with other providers: yes    Risk of Complications, Morbidity, and/or Mortality  Presenting problems: low  Diagnostic procedures: minimal  Management options:  low    Patient Progress  Patient progress: stable      Final diagnoses:   Dizziness   Slurred speech   Weakness       ED Disposition  ED Disposition     ED Disposition Condition Comment    Discharge  Physician of Record for Attribution - Please select from Treatment Team: SAPNA LANZA [[id number]]   Review needed by CMO to determine Physician of Record: Margaret Tapia, APRN  [address] [facility] [address] [phone]    In 1 week           Medication List      New Prescriptions    aspirin 81 MG EC tablet  Take 1 tablet by mouth Daily.     atorvastatin 80 MG tablet  Commonly known as: LIPITOR  Take 1 tablet by mouth Every Night.     meclizine 12.5 MG tablet  Commonly known as: ANTIVERT  Take 1 tablet by mouth 3 (Three) Times a Day As Needed for Dizziness.        Stop    cyclobenzaprine 10 MG tablet  Commonly known as: FLEXERIL     pravastatin 20 MG tablet  Commonly known as: PRAVACHOL           Where to Get Your Medications      These medications were sent to [id number] - [place], [place] - [address] [place] [address] AT [facility] & [address] [facility] [phone]  - [phone] [address] [address]    Phone: [phone]   · aspirin 81 MG EC tablet  · atorvastatin 80 MG tablet  · meclizine 12.5 MG tablet          Satish Silva Jr., PA-C  11/23/21 1259       Satish Silva Jr., PA-C  11/30/21 1309       Satish Silva Jr., PA-C  12/07/21 6228       Satish Silva Jr., PA-C  12/30/21 9329

## 2021-11-23 NOTE — CONSULTS
Stroke Consult Note    Patient Name: Stephanie Jean   MRN: [id number]  Age: 66 y.o.  Sex: female  : 1955    Primary Care Physician: Margaret Mooney APRN  Referring Physician:  No ref. provider found        Handedness: Right  Race: White    Chief Complaint/Reason for Consultation: Dizziness    Subjective .  HPI: 66-year-old right-handed white female with known diagnosis of lupus, congenital heart disease (unsure what it is, but diagnosed last week when she was having intermittent chest pain), hyperlipidemia, who had acute onset of dizziness and nausea/vomiting, and generalized weakness.  Symptoms completely resolved.  Patient denies having any double vision, dysphagia, focal weakness/numbness/vision changes/slurring of speech.  There was some concern by the family that she had slurred speech, but patient did not appreciate that.  No history of similar symptoms.    Last Known Normal Date/Time: 5 PM EST     Review of Systems   Constitutional: Negative.    HENT: Negative.    Eyes: Negative.    Respiratory: Negative.    Gastrointestinal: Positive for nausea and vomiting.   Endocrine: Negative.    Genitourinary: Negative.    Musculoskeletal: Negative.    Skin: Negative.    Allergic/Immunologic: Negative.    Neurological: Positive for dizziness.   Psychiatric/Behavioral: Negative.       Past Medical History:   Diagnosis Date   • Arthritis    • Colon polyps    • Dysphagia    • Family history of colon cancer    • Fibromyalgia    • GERD (gastroesophageal reflux disease)    • Heart murmur    • Hoarseness    • Hyperlipidemia    • Lupus (HCC)    • Marijuana use     Daily    • Pulmonary nodule    • Sjogren's disease (HCC)      Past Surgical History:   Procedure Laterality Date   • APPENDECTOMY     • COLONOSCOPY     • ENDOSCOPY     • ENDOSCOPY N/A 2020    Procedure: ESOPHAGOGASTRODUODENOSCOPY;  Surgeon: Chaparro Marrero MD;  Location: [facility] ENDOSCOPY;  Service: Gastroenterology;  Laterality: N/A;   •  HYSTERECTOMY       Family History   Problem Relation Age of Onset   • Hypertension Mother    • Aneurysm Mother    • Heart disease Mother    • Emphysema Father    • Ulcerative colitis Brother    • Colon cancer Maternal Uncle    • Cirrhosis Neg Hx    • Liver disease Neg Hx    • Liver cancer Neg Hx    • Crohn's disease Neg Hx      Social History     Socioeconomic History   • Marital status:    Tobacco Use   • Smoking status: Former Smoker     Packs/day: 1.00     Years: 40.00     Pack years: 40.00     Types: Cigarettes     Quit date: 2018     Years since quitting: 3.8   • Smokeless tobacco: Never Used   Substance and Sexual Activity   • Alcohol use: No   • Drug use: Yes     Frequency: 7.0 times per week     Types: Marijuana     Comment: Daily    • Sexual activity: Defer     Allergies   Allergen Reactions   • Penicillins Rash     Prior to Admission medications    Medication Sig Start Date End Date Taking? Authorizing Provider   baclofen (LIORESAL) 10 MG tablet Take 10 mg by mouth 3 (Three) Times a Day.   Yes Fatmata Mas MD   docusate sodium (COLACE) 100 MG capsule TAKE 1 CAPSULE BY MOUTH TWICE DAILY 11/1/21  Yes Nicole Mirza PA-C   DULoxetine (Cymbalta) 60 MG capsule Take 60 mg by mouth Daily.   Yes Fatmata Mas MD   gabapentin (NEURONTIN) 300 MG capsule Take 300 mg by mouth 3 (Three) Times a Day.   Yes Fatmata Mas MD   leflunomide (ARAVA) 10 MG tablet Take 10 mg by mouth Daily.   Yes Fatmata Mas MD   metoprolol succinate XL (TOPROL-XL) 25 MG 24 hr tablet Take 1 tablet by mouth Daily. 6/9/21  Yes Toribio Jacobs MD   nitroglycerin (NITROSTAT) 0.4 MG SL tablet  6/2/21  Yes Fatmata Mas MD   pantoprazole (PROTONIX) 40 MG EC tablet TAKE 1 TABLET BY MOUTH EVERY DAY 9/3/21  Yes Nicole Mirza PA-C   traMADol (ULTRAM) 50 MG tablet Take 50 mg by mouth 2 (Two) Times a Day.   Yes Fatmata Mas MD   Ascorbic Acid (VITAMIN C PO) Take  by mouth.    Tg  MD Fatmata   BIOTIN PO Take  by mouth.    Fatmata Mas MD   Calcium Citrate-Vitamin D (JENNIFER-CITRATE PLUS VITAMIN D PO) Take  by mouth.    Fatmata Mas MD   cyclobenzaprine (FLEXERIL) 10 MG tablet Take 10 mg by mouth 3 (Three) Times a Day As Needed for Muscle Spasms.    Fatmata Mas MD   dicyclomine (BENTYL) 10 MG capsule Take 1 capsule by mouth 3 (Three) Times a Day As Needed (abdominal pain). 7/13/21   Chaparro Marrero MD   folic acid (FOLVITE) 1 MG tablet Take 1 mg by mouth Daily.    Fatmata Mas MD   linaclotide (Linzess) 290 MCG capsule capsule Take 1 capsule by mouth Daily. 30 minutes before meals on an empty stomach. 8/3/21   Nicole Mirza PA-C   methotrexate 2.5 MG tablet Take  by mouth 1 (One) Time Per Week. TAKE 8 TABLETS WEEKLY    Fatmata Mas MD   polyethylene glycol (MiraLax) 17 GM/SCOOP powder Take 17 g by mouth Daily. 5/19/20   Chaparro Marrero MD   pravastatin (PRAVACHOL) 20 MG tablet  4/14/21   Fatmata Mas MD   rOPINIRole (REQUIP) 2 MG tablet Take 2 mg by mouth Every Night. 5/11/20   Fatmata Mas MD   VITAMIN E PO Take  by mouth.    Fatmata Mas MD             Objective     Temp:  [97.4 °F (36.3 °C)-97.5 °F (36.4 °C)] 97.5 °F (36.4 °C)  Heart Rate:  [43-52] 45  Resp:  [16-18] 16  BP: (129-171)/(62-80) 138/63  Neurological Exam  Mental Status  Awake, alert and oriented to person, place and time. Speech is normal. Language is fluent with no aphasia. Attention and concentration are normal. Fund of knowledge is appropriate for level of education.    Cranial Nerves  CN II: Visual fields full to confrontation.  CN III, IV, VI: Extraocular movements intact bilaterally. Normal lids and orbits bilaterally. Pupils equal round and reactive to light bilaterally.  No nystagmus.  CN V: Facial sensation is normal.  CN VII: Full and symmetric facial movement.  CN VIII: Equal hearing bilaterally.  CN IX, X: Palate elevates  symmetrically  CN XI: Shoulder shrug strength is normal.  CN XII: Tongue midline without atrophy or fasciculations.    Motor  Normal muscle bulk throughout. No fasciculations present. Strength is 5/5 throughout all four extremities.    Sensory  Light touch is normal in upper and lower extremities.     Reflexes  Deep tendon reflexes are 2+ and symmetric in all four extremities with downgoing toes bilaterally.    Coordination  No dysmetria.    Gait  Normal gait      Physical Exam  Vitals and nursing note reviewed.   Constitutional:       Appearance: Normal appearance.   HENT:      Head: Normocephalic and atraumatic.      Mouth/Throat:      Mouth: Mucous membranes are moist.      Pharynx: Oropharynx is clear.   Eyes:      Conjunctiva/sclera: Conjunctivae normal.      Pupils: Pupils are equal, round, and reactive to light.   Cardiovascular:      Rate and Rhythm: Normal rate and regular rhythm.   Pulmonary:      Effort: Pulmonary effort is normal. No respiratory distress.   Musculoskeletal:      Cervical back: Normal range of motion and neck supple.   Neurological:      Mental Status: She is alert.   Psychiatric:         Mood and Affect: Mood normal.         Behavior: Behavior normal.         Thought Content: Thought content normal.         Acute Stroke Data    IV Thrombolytic (TPA/Tenecteplase) Inclusion / Exclusion Criteria -not a stroke    Time: 09:38 EST  Person Administering Scale: Anthony Hampton MD    Inclusion Criteria  [x]   18 years of age or greater   []   Onset of symptoms < 4.5 hours before beginning treatment (stroke onset = time patient was last seen well or without symptoms).   []   Diagnosis of acute ischemic stroke causing measurable disabling deficit (Complete Hemianopia, Any Aphasia, Visual or Sensory Extinction, Any weakness limiting sustained effort against gravity)   []   Any remaining deficit considered potentially disabling in view of patient and practitioner   Exclusion criteria (Do not proceed  with Alteplase if any are checked under exclusion criteria)  []   Onset unknown or GREATER than 4.5 hours   []   ICH on CT/MRI   []   CT demonstrates hypodensity representing acute or subacute infarct   []   Significant head trauma or prior stroke in the previous 3 months   []   Symptoms suggestive of subarachnoid hemorrhage   []   History of un-ruptured intracranial aneurysm GREATER than 10 mm   []   Recent intracranial or intraspinal surgery within the last 3 months   []   Arterial puncture at a non-compressible site in the previous 7 days   []   Active internal bleeding   []   Acute bleeding tendency   []   Platelet count LESS than 100,000 for known hematological diseases such as leukemia, thrombocytopenia or chronic cirrhosis   []   Current use of anticoagulant with INR GREATER than 1.7 or PT GREATER than 15 seconds, aPTT GREATER than 40 seconds   []   Heparin received within 48 hours, resulting in abnormally elevated aPTT GREATER than upper limit of normal   []   Current use of direct thrombin inhibitors or direct factor Xa inhibitors in the past 48 hours   []   Elevated blood pressure refractory to treatment (systolic GREATER than 185 mm/Hg or diastolic  GREATER than 110 mm/Hg   []   Suspected infective endocarditis and aortic arch dissection   []   Current use of therapeutic treatment dose of low-molecular-weight heparin (LMWH) within the previous 24 hours   []   Structural GI malignancy or bleed   Relative exclusion for all patients  []   Only minor non-disabling symptoms   []   Pregnancy   []   Seizure at onset with postictal residual neurological impairments   []   Major surgery or previous trauma within past 14 days   []   History of previous spontaneous ICH, intracranial neoplasm, or AV malformation   []   Postpartum (within previous 14 days)   []   Recent GI or urinary tract hemorrhage (within previous 21 days)   []   Recent acute MI (within previous 3 months)   []   History of un-ruptured intracranial  aneurysm LESS than 10 mm   []   History of ruptured intracranial aneurysm   []   Blood glucose LESS than 50 mg/dL (2.7 mmol/L)   []   Dural puncture within the last 7 days   []   Known GREATER than 10 cerebral microbleeds   Additional exclusions for patients with symptoms onset between 3 and 4.5 hours.  []   Age > 80.   []   On any anticoagulants regardless of INR  >>> Warfarin (Coumadin), Heparin, Enoxaparin (Lovenox), fondaparinux (Arixtra), bivalirudin (Angiomax), Argatroban, dabigatran (Pradaxa), rivaroxaban (Xarelto), or apixaban (Eliquis)   []   Severe stroke (NIHSS > 25).   []   History of BOTH diabetes and previous ischemic stroke.   []   The risks and benefits have been discussed with the patient or family related to the administration of IV Alteplase for stroke symptoms.   []   I have discussed and reviewed the patient's case and imaging with the attending prior to IV Thrombolytic (TPA/Tenecteplase).    Time Thrombolytic administered       Hospital Meds:  Scheduled- aspirin, 81 mg, Oral, Daily  atorvastatin, 80 mg, Oral, Nightly  enoxaparin, 40 mg, Subcutaneous, Q24H  gabapentin, 200 mg, Oral, TID  sodium chloride, 10 mL, Intravenous, Q12H  sodium chloride, 10 mL, Intravenous, Q12H      Infusions-     PRNs- •  acetaminophen **OR** acetaminophen **OR** acetaminophen  •  baclofen  •  ondansetron  •  sodium chloride  •  sodium chloride  •  sodium chloride  •  traMADol    Functional Status Prior to Current Stroke/Campbell Score: 0    NIH Stroke Scale  Time: 09:38 EST  Person Administering Scale: Anthony Hampton MD    1a  Level of consciousness: 0=alert; keenly responsive   1b. LOC questions:  0=Performs both tasks correctly   1c. LOC commands: 0=Performs both tasks correctly   2.  Best Gaze: 0=normal   3.  Visual: 0=No visual loss   4. Facial Palsy: 0=Normal symmetric movement   5a.  Motor left arm: 0=No drift, limb holds 90 (or 45) degrees for full 10 seconds   5b.  Motor right arm: 0=No drift, limb holds 90  (or 45) degrees for full 10 seconds   6a. motor left le=No drift, limb holds 90 (or 45) degrees for full 10 seconds   6b  Motor right le=No drift, limb holds 90 (or 45) degrees for full 10 seconds   7. Limb Ataxia: 0=Absent   8.  Sensory: 0=Normal; no sensory loss   9. Best Language:  0=No aphasia, normal   10. Dysarthria: 0=Normal   11. Extinction and Inattention: 0=No abnormality    Total:   0       Results Reviewed:  I have personally reviewed current lab, radiology, and data  CT head shows no acute changes, no hemorrhage  CTA of the head and neck shows no significant stenosis/occlusion.  MRI brain shows no acute changes, minimal white matter disease, no hemorrhage.  Reviewed her labs.              Assessment/Plan:      1. Vertigo.  Patient symptoms are concerning for BPPV, rather than a vascular event.  Patient CTA of the head and neck shows no significant atherosclerosis or stenosis, and MRI brain is negative for any acute finding.  No further stroke work-up at this time.  Recommend meclizine as needed for the symptoms.  2. Hyperlipidemia.  Patient can be continued on her home dose of medications.  3. Lupus.  Continue her home medications.  4. Out of bed with nursing.    Case was discussed with patient, her , nursing and will let the hospitalist know.  Patient can be discharged home with outpatient PCP follow-up.  Thank you for the consult.          Anthony Hampton MD  2021  09:38 EST    Verbal consent taken.  Patient agreeable to be seen via telemedicine.    This was an audio and video enabled telemedicine encounter.

## 2021-11-23 NOTE — ED NOTES
Patient will be assigned a bed once one is available per House Supervisor, Dayna. RN, Yanci notified.      Soledad Meza  11/23/21 0006

## 2021-11-23 NOTE — DISCHARGE SUMMARY
[facility] HOSPITALIST   DISCHARGE SUMMARY      Name:  Stephanie Jean   Age:  66 y.o.  Sex:  female  :  1955  MRN:  [id number]   Visit Number:  [id number]    Admission Date:  2021  Date of Discharge:  2021  Primary Care Physician:  Margaret Mooney APRN    Important issues to note:    - Stroke workup negative  - Continue taking Meclizine as needed for vertigo symptoms  - Continue Aspirin and Atorvastatin  - Follow up with PCP in 1 week    Discharge Diagnoses:     1.  Benign Paroxysmal Positional Vertigo  2.  Hyperlipidemia  3.  Lupus  4.  Anxiety  5.  GERD    Problem List:     Active Hospital Problems    Diagnosis  POA   • **Transient ischemic attack (TIA) [G45.9]  Yes   • Dizziness [R42]  Yes      Resolved Hospital Problems   No resolved problems to display.     Presenting Problem:    Chief Complaint   Patient presents with   • Dizziness      Consults:     Consulting Physician(s)  Chat With All Active Members    Provider Relationship Specialty    Elaina Nassar DO  Consulting Physician Hospitalist    Anthony Hampton MD  Consulting Physician Neurology          History of presenting illness/Hospital Course:    66-year-old female with past medical history of lupus, congenital heart disease, and hyperlipidemia presented to the hospital with complaints of acute onset dizziness, nausea, and vomiting, as well as generalized weakness. There was initial concern for TIA as symptoms had completely resolved at time of arrival to ED. CTA head and neck, as well as MRI brain were all negative for acute intracranial pathology. Neurology was consulted and evaluated the patient. Patient was diagnosed with BPPV and started on Meclizine. She is being discharged with high intensity statin as her lipid panel is elevated despite having been on Pravastatin. Will need to follow up with her PCP in 1 week.    Vital Signs:    Temp:  [97.4 °F (36.3 °C)-97.5 °F (36.4 °C)] 97.5 °F (36.4 °C)  Heart  Rate:  [43-52] 45  Resp:  [16-18] 16  BP: (129-171)/(62-80) 138/63    Physical Exam:    General Appearance:  Alert and cooperative. In NAD.   Head:  Atraumatic and normocephalic.   Eyes: Conjunctivae and sclerae normal, no icterus. No pallor.   Ears:  Ears with no abnormalities noted.   Throat: No oral lesions, no thrush, oral mucosa moist.   Neck: Supple, trachea midline, no thyromegaly.   Back:   No kyphoscoliosis present. No tenderness to palpation.   Lungs:   Breath sounds heard bilaterally equally.  No crackles or wheezing. No Pleural rub or bronchial breathing.   Heart:  Normal S1 and S2, no murmur, no gallop, no rub. No JVD.   Abdomen:   Normal bowel sounds, no masses, no organomegaly. Soft, nontender, nondistended, no rebound tenderness.   Extremities: Supple, no edema, no cyanosis, no clubbing.   Pulses: Pulses palpable bilaterally.   Skin: No bleeding or rash.   Neurologic: Alert and oriented x 3. No facial asymmetry. Moves all four limbs. No tremors.     Pertinent Lab Results:     Results from last 7 days   Lab Units 11/22/21 2157   SODIUM mmol/L 143   POTASSIUM mmol/L 3.7   CHLORIDE mmol/L 107   CO2 mmol/L 27.8   BUN mg/dL 17   CREATININE mg/dL 0.76   CALCIUM mg/dL 9.1   BILIRUBIN mg/dL <0.2   ALK PHOS U/L 69   ALT (SGPT) U/L 13   AST (SGOT) U/L 11   GLUCOSE mg/dL 124*     Results from last 7 days   Lab Units 11/22/21 2157   WBC 10*3/mm3 6.06   HEMOGLOBIN g/dL 11.8*   HEMATOCRIT % 35.7   PLATELETS 10*3/mm3 170         Results from last 7 days   Lab Units 11/22/21 2157   TROPONIN T ng/mL <0.010                           Pertinent Radiology Results:    Imaging Results (All)     Procedure Component Value Units Date/Time    XR Chest 1 View [[id number]] Collected: 11/23/21 0954     Updated: 11/23/21 0954    Narrative:      PROCEDURE: XR CHEST 1 VW-        HISTORY: Weak/Dizzy/AMS triage protocol     COMPARISON: June 1, 2021.     FINDINGS: The heart is normal in size. The mediastinum is unremarkable.  The  lungs are clear. There is no pneumothorax. There are no acute  osseous abnormalities.       Impression:      No acute cardiopulmonary process.        Images were reviewed, interpreted, and dictated by Dr. Evie Oneill M.D.  Transcribed by Marilu Ponce PA-C.    MRI Brain Without Contrast [[id number]] Collected: 11/23/21 0731     Updated: 11/23/21 0734    Narrative:      PROCEDURE: MRI BRAIN WO CONTRAST-     HISTORY: n/v dizziness, tia; R42-Dizziness and giddiness; R47.81-Slurred  speech; R53.1-Weakness     PROCEDURE: Multiplanar multisequence imaging of the brain was performed  without the use of intravenous contrast.     COMPARISON: None.     FINDINGS: There are a few scattered FLAIR hyperintensities in the white  matter of both cerebral hemispheres consistent with chronic small vessel  ischemic change. There is no mass, mass effect or midline shift. There  is no hydrocephalus. There are no areas of restricted diffusion.     The midbrain, paola, cerebellum and craniocervical junction are  unremarkable. The sella and pituitary gland are within normal limits.  The major intracranial vasculature demonstrates the expected flow  related signal. The paranasal sinuses are clear.       Impression:      No acute intracranial abnormality.           This report was finalized on 11/23/2021 7:32 AM by Evie Oneill M.D..    CT Angiogram Neck [[id number]] Collected: 11/22/21 2332     Updated: 11/22/21 2333    Narrative:      FINAL REPORT    TECHNIQUE:  Axial images through the neck were obtained following IV  contrast administration.    CLINICAL HISTORY:  dizzness    FINDINGS:  The aortic arch and the carotid arteries are normal as are the  bilateral and vertebral arteries.      Impression:      Unremarkable.    Authenticated by Jean Oliva M.D. on 11/22/2021 11:32:27 PM    CT Head Without Contrast [[id number]] Collected: 11/22/21 2332     Updated: 11/22/21 2333    Narrative:      FINAL REPORT    TECHNIQUE:  Routine  axial images through the head were obtained without  contrast.    CLINICAL HISTORY:  dizziness    FINDINGS:  The ventricles are normal.  There is no mass or other abnormal  hypodensity.  There is no shift of midline structures.  There is  no intracranial hemorrhage.  No acute sinus or osseous  abnormality is seen.      Impression:      Unremarkable.    Authenticated by Jean Oliva M.D. on 11/22/2021 11:32:22 PM    CT Angiogram Head [[id number]] Collected: 11/22/21 2332     Updated: 11/22/21 2333    Narrative:      FINAL REPORT    CLINICAL HISTORY:  dizziness    FINDINGS:  The earlier noncontrast CT of the head is unremarkable. The  intracranial portions of the internal carotid arteries are  unremarkable. The anterior and middle cerebral arteries are  unremarkable. The vertebral and basilar arteries are within  normal limits.      Impression:      No arterial abnormality.    Authenticated by Jean Oliva M.D. on 11/22/2021 11:32:27 PM          Echo:    Results for orders placed during the hospital encounter of 11/22/21    Adult Transthoracic Echo Complete W/ Cont if Necessary Per Protocol    Interpretation Summary  · Estimated left ventricular EF = 57% Left ventricular ejection fraction appears to be 56 - 60%. Left ventricular systolic function is normal.  · Left ventricular diastolic function was normal.  · Estimated right ventricular systolic pressure from tricuspid regurgitation is normal (<35 mmHg).    Condition on Discharge:      Stable.    Code status during the hospital stay:    Code Status and Medical Interventions:   Ordered at: 11/23/21 0051     Code Status (Patient has no pulse and is not breathing):    CPR (Attempt to Resuscitate)     Medical Interventions (Patient has pulse or is breathing):    Full Support     Discharge Disposition:    Home or Self Care    Discharge Medications:       Discharge Medications      New Medications      Instructions Start Date   aspirin 81 MG EC tablet   81 mg, Oral, Daily   Start  Date: November 24, 2021     atorvastatin 80 MG tablet  Commonly known as: LIPITOR   80 mg, Oral, Nightly      meclizine 12.5 MG tablet  Commonly known as: ANTIVERT   12.5 mg, Oral, 3 Times Daily PRN         Continue These Medications      Instructions Start Date   baclofen 10 MG tablet  Commonly known as: LIORESAL   10 mg, Oral, 3 Times Daily      BIOTIN PO   Oral      JENNIFER-CITRATE PLUS VITAMIN D PO   Oral      Cymbalta 60 MG capsule  Generic drug: DULoxetine   60 mg, Oral, Daily      dicyclomine 10 MG capsule  Commonly known as: BENTYL   10 mg, Oral, 3 Times Daily PRN      docusate sodium 100 MG capsule  Commonly known as: COLACE   TAKE 1 CAPSULE BY MOUTH TWICE DAILY      folic acid 1 MG tablet  Commonly known as: FOLVITE   1 mg, Oral, Daily      gabapentin 300 MG capsule  Commonly known as: NEURONTIN   300 mg, Oral, 3 Times Daily      leflunomide 10 MG tablet  Commonly known as: ARAVA   10 mg, Oral, Daily      linaclotide 290 MCG capsule capsule  Commonly known as: Linzess   290 mcg, Oral, Daily, 30 minutes before meals on an empty stomach.      methotrexate 2.5 MG tablet   Oral, Weekly, TAKE 8 TABLETS WEEKLY      metoprolol succinate XL 25 MG 24 hr tablet  Commonly known as: TOPROL-XL   25 mg, Oral, Daily      nitroglycerin 0.4 MG SL tablet  Commonly known as: NITROSTAT   No dose, route, or frequency recorded.      pantoprazole 40 MG EC tablet  Commonly known as: PROTONIX   TAKE 1 TABLET BY MOUTH EVERY DAY      polyethylene glycol 17 GM/SCOOP powder  Commonly known as: MiraLax   17 g, Oral, Daily      rOPINIRole 2 MG tablet  Commonly known as: REQUIP   2 mg, Oral, Nightly      traMADol 50 MG tablet  Commonly known as: ULTRAM   50 mg, Oral, 2 Times Daily      VITAMIN C PO   Oral      VITAMIN E PO   Oral         Stop These Medications    cyclobenzaprine 10 MG tablet  Commonly known as: FLEXERIL     pravastatin 20 MG tablet  Commonly known as: PRAVACHOL          Discharge Diet:     As tolerated    Activity at  Discharge:     As tolerated     Follow-up Appointments:     Follow-up Information     Margaret Mooney, APRN .    Specialty: Family Medicine  Contact information:  [address]  [facility] [address] [phone]                       No future appointments.  Test Results Pending at Discharge:  None         Jess Martinez DO  11/23/21  12:52 EST    Time: I spent 35 minutes on this discharge activity which included: face-to-face encounter with the patient, reviewing the data in the system, coordination of the care with the nursing staff as well as consultants, documentation, and entering orders.     Dictated utilizing Dragon dictation.

## 2021-11-23 NOTE — PLAN OF CARE
Goal Outcome Evaluation:         Pt remains restful in bed without complaints. Pt states she has not noticed any S&S of acute stroke/change in LOC overnight.  No S&S of CVA/TIA noted by rn since assuming care of pt this shift.  Pt up and ambulated earlier to restroom per request without difficulty.

## 2021-11-23 NOTE — ED NOTES
Called Dr. Hampton with [facility] per [facility]. Call transferred.      Soledad Meza  11/22/21 [id number]

## 2021-11-23 NOTE — H&P
[facility]   HISTORY AND PHYSICAL      Name:  Stephanie Jean   Age:  66 y.o.  Sex:  female  :  1955  MRN:  [id number]   Visit Number:  [id number]  Admission Date:  2021  Date Of Service:  21  Primary Care Physician:  Margaret Mooney APRN    Chief Complaint: Nausea/vomiting/dizziness    History Of Presenting Illness:  66 F h/o lupus presenting to the ED with nausea/vomiting, dizziness onset about 5 PM yesterday.  Symptoms lasted about 20 minutes with 4 spontaneously resolving.  In the ED, neurology was consulted, CT head CTA head and neck were obtained which revealed no acute pathology.  Observation was recommended.  Patient was given meclizine.  She denies fever, CP, dyspnea, weakness, or other acute issues such as vision loss. She is being evaluated for cataracts.    Review Of Systems: All systems were reviewed and negative except as mentioned in history of presenting illness, assessment and plan.    Past Medical History: Patient  has a past medical history of Arthritis, Colon polyps, Dysphagia, Family history of colon cancer, Fibromyalgia, GERD (gastroesophageal reflux disease), Heart murmur, Hoarseness, Hyperlipidemia, Lupus (HCC), Marijuana use, Pulmonary nodule, and Sjogren's disease (HCC).    Past Surgical History: Patient  has a past surgical history that includes Hysterectomy; Appendectomy; Colonoscopy; Esophagogastroduodenoscopy; and Esophagogastroduodenoscopy (N/A, 2020).    Social History: Patient  reports that she quit smoking about 3 years ago. Her smoking use included cigarettes. She has a 40.00 pack-year smoking history. She has never used smokeless tobacco. She reports current drug use. Frequency: 7.00 times per week. Drug: Marijuana. She reports that she does not drink alcohol.    Family History: Patient's family history includes Aneurysm in her mother; Colon cancer in her maternal uncle; Emphysema in her father; Heart disease in her mother;  Hypertension in her mother; Ulcerative colitis in her brother.    Allergies:  Penicillins    Home Medications:  Prior to Admission Medications     Prescriptions Last Dose Informant Patient Reported? Taking?    Ascorbic Acid (VITAMIN C PO)   Yes No    Take  by mouth.    BIOTIN PO   Yes No    Take  by mouth.    Calcium Citrate-Vitamin D (JENNIFER-CITRATE PLUS VITAMIN D PO)   Yes No    Take  by mouth.    cyclobenzaprine (FLEXERIL) 10 MG tablet   Yes No    Take 10 mg by mouth 3 (Three) Times a Day As Needed for Muscle Spasms.    dicyclomine (BENTYL) 10 MG capsule   No No    Take 1 capsule by mouth 3 (Three) Times a Day As Needed (abdominal pain).    docusate sodium (COLACE) 100 MG capsule   No No    TAKE 1 CAPSULE BY MOUTH TWICE DAILY    DULoxetine (Cymbalta) 60 MG capsule   Yes No    Take 60 mg by mouth Daily.    folic acid (FOLVITE) 1 MG tablet   Yes No    Take 1 mg by mouth Daily.    linaclotide (Linzess) 290 MCG capsule capsule   No No    Take 1 capsule by mouth Daily. 30 minutes before meals on an empty stomach.    methotrexate 2.5 MG tablet   Yes No    Take  by mouth 1 (One) Time Per Week. TAKE 8 TABLETS WEEKLY    metoprolol succinate XL (TOPROL-XL) 25 MG 24 hr tablet   No No    Take 1 tablet by mouth Daily.    nitroglycerin (NITROSTAT) 0.4 MG SL tablet   Yes No    pantoprazole (PROTONIX) 40 MG EC tablet   No No    TAKE 1 TABLET BY MOUTH EVERY DAY    polyethylene glycol (MiraLax) 17 GM/SCOOP powder   No No    Take 17 g by mouth Daily.    pravastatin (PRAVACHOL) 20 MG tablet   Yes No    rOPINIRole (REQUIP) 2 MG tablet   Yes No    Take 2 mg by mouth Every Night.    VITAMIN E PO   Yes No    Take  by mouth.        ED Medications:  Medications   sodium chloride 0.9 % flush 10 mL (has no administration in time range)   sodium chloride 0.9 % bolus 1,000 mL (1,000 mL Intravenous New Bag 11/22/21 2241)   meclizine (ANTIVERT) tablet 25 mg (25 mg Oral Given 11/22/21 2241)   iopamidol (ISOVUE-300) 61 % injection 100 mL (100 mL  Intravenous Given 11/22/21 2257)     Vital Signs:  Temp:  [97.4 °F (36.3 °C)] 97.4 °F (36.3 °C)  Heart Rate:  [43-52] 49  Resp:  [16] 16  BP: (155-171)/(77-80) 155/77        11/22/21 2120   Weight: 65.3 kg (144 lb)     Body mass index is 26.34 kg/m².    Physical Exam:   General: NAD, resting in bed  HEENT: EOMI, NC/AT  Heart: regular  Lungs: nonlabored  Abdomen: Soft, nondistended, nontender  Extremities: No edema  Neurological: A&O x3, moves all extremities, strength and sensation intact, cranial nerves grossly intact  Psychological: Mood and affect appropriate, speech is coherent  Skin: warm, dry    Laboratory data: I have reviewed the labs done in the emergency room.    Results from last 7 days   Lab Units 11/22/21  2157   SODIUM mmol/L 143   POTASSIUM mmol/L 3.7   CHLORIDE mmol/L 107   CO2 mmol/L 27.8   BUN mg/dL 17   CREATININE mg/dL 0.76   CALCIUM mg/dL 9.1   BILIRUBIN mg/dL <0.2   ALK PHOS U/L 69   ALT (SGPT) U/L 13   AST (SGOT) U/L 11   GLUCOSE mg/dL 124*     Results from last 7 days   Lab Units 11/22/21 2157   WBC 10*3/mm3 6.06   HEMOGLOBIN g/dL 11.8*   HEMATOCRIT % 35.7   PLATELETS 10*3/mm3 170         Results from last 7 days   Lab Units 11/22/21 2157   TROPONIN T ng/mL <0.010                     Results from last 7 days   Lab Units 11/22/21  2253   COLOR UA  Yellow   GLUCOSE UA  Negative   KETONES UA  Negative   LEUKOCYTES UA  Negative   PH, URINE  7.0   BILIRUBIN UA  Negative   UROBILINOGEN UA  0.2 E.U./dL     EKG:  Reveals sinus bradycardia    Radiology:CT Head Without Contrast    Result Date: 11/22/2021  FINAL REPORT TECHNIQUE: Routine axial images through the head were obtained without contrast. CLINICAL HISTORY: dizziness FINDINGS: The ventricles are normal.  There is no mass or other abnormal hypodensity.  There is no shift of midline structures.  There is no intracranial hemorrhage.  No acute sinus or osseous abnormality is seen.     Unremarkable. Authenticated by Jean Oliva M.D. on 11/22/2021  11:32:22 PM    CT Angiogram Neck    Result Date: 11/22/2021  FINAL REPORT TECHNIQUE: Axial images through the neck were obtained following IV contrast administration. CLINICAL HISTORY: dizzness FINDINGS: The aortic arch and the carotid arteries are normal as are the bilateral and vertebral arteries.     Unremarkable. Authenticated by Jean Oliva M.D. on 11/22/2021 11:32:27 PM    CT Angiogram Head    Result Date: 11/22/2021  FINAL REPORT CLINICAL HISTORY: dizziness FINDINGS: The earlier noncontrast CT of the head is unremarkable. The intracranial portions of the internal carotid arteries are unremarkable. The anterior and middle cerebral arteries are unremarkable. The vertebral and basilar arteries are within normal limits.     No arterial abnormality. Authenticated by Jean Oliva M.D. on 11/22/2021 11:32:27 PM      Assessment:  CVA   Chronic/HX: Lupus, anxiety, GERD    Plan:  -ASA and statin for secondary stroke prevention.  Suspect she had a TIA, obtain an echo, brain MRI, neurology evaluation  -home med rec pending    Elaina Nassar DO  11/23/21  00:43 EST    Dictated utilizing Dragon dictation.

## 2021-11-23 NOTE — ED NOTES
PT received discharge instructions and verbalized understanding; Breathing even and non labored with no signs of distress; AOx4; GCS 15; Pt ambulated off unit with steady gait with family/ride.      Jaye Lynn, RN  11/23/21 [id number]

## 2021-11-24 ENCOUNTER — READMISSION MANAGEMENT (OUTPATIENT)
Dept: CALL CENTER | Facility: HOSPITAL | Age: 66
End: 2021-11-24

## 2021-11-24 NOTE — OUTREACH NOTE
Prep Survey      Responses   [facility] facility patient discharged from? [facility]   Is LACE score < 7 ? Yes   Emergency Room discharge w/ pulse ox? No   Eligibility Readm Mgmt   Discharge diagnosis Transient ischemic attack    Does the patient have one of the following disease processes/diagnoses(primary or secondary)? Stroke (TIA)   Does the patient have Home health ordered? No   Is there a DME ordered? No   Prep survey completed? Yes          Krystina Dodd RN

## 2021-12-02 ENCOUNTER — OFFICE VISIT (OUTPATIENT)
Dept: GASTROENTEROLOGY | Facility: CLINIC | Age: 66
End: 2021-12-02

## 2021-12-02 VITALS
HEIGHT: 62 IN | SYSTOLIC BLOOD PRESSURE: 120 MMHG | BODY MASS INDEX: 25.95 KG/M2 | WEIGHT: 141 LBS | DIASTOLIC BLOOD PRESSURE: 78 MMHG | TEMPERATURE: 98 F

## 2021-12-02 DIAGNOSIS — D64.9 NORMOCYTIC ANEMIA: ICD-10-CM

## 2021-12-02 DIAGNOSIS — K21.9 GASTROESOPHAGEAL REFLUX DISEASE WITHOUT ESOPHAGITIS: ICD-10-CM

## 2021-12-02 DIAGNOSIS — R11.0 NAUSEA: ICD-10-CM

## 2021-12-02 DIAGNOSIS — R79.89 ELEVATED LFTS: Primary | ICD-10-CM

## 2021-12-02 DIAGNOSIS — K59.03 DRUG-INDUCED CONSTIPATION: ICD-10-CM

## 2021-12-02 PROCEDURE — 99214 OFFICE O/P EST MOD 30 MIN: CPT | Performed by: INTERNAL MEDICINE

## 2021-12-02 RX ORDER — PANTOPRAZOLE SODIUM 40 MG/1
TABLET, DELAYED RELEASE ORAL
Qty: 90 TABLET | Refills: 0 | Status: SHIPPED | OUTPATIENT
Start: 2021-12-02 | End: 2021-12-02

## 2021-12-02 RX ORDER — PANTOPRAZOLE SODIUM 20 MG/1
20 TABLET, DELAYED RELEASE ORAL DAILY
Qty: 30 TABLET | Refills: 2 | Status: SHIPPED | OUTPATIENT
Start: 2021-12-02 | End: 2022-02-28

## 2021-12-02 RX ORDER — NALOXEGOL OXALATE 25 MG/1
25 TABLET, FILM COATED ORAL EVERY MORNING
Qty: 30 TABLET | Refills: 2 | Status: SHIPPED | OUTPATIENT
Start: 2021-12-02 | End: 2022-09-21

## 2021-12-02 RX ORDER — PROMETHAZINE HYDROCHLORIDE 12.5 MG/1
12.5 TABLET ORAL EVERY 8 HOURS PRN
Qty: 30 TABLET | Refills: 1 | Status: SHIPPED | OUTPATIENT
Start: 2021-12-02 | End: 2023-03-29 | Stop reason: SDUPTHER

## 2021-12-02 NOTE — PROGRESS NOTES
Follow Up Note     Date: 2021   Patient Name: Stephanie Jean  MRN: [id number]  : 1955     Referring Physician: Margaret Mooney APRN    Chief Complaint:    Chief Complaint   Patient presents with   • Follow-up   • Abdominal Pain       Interval History:   2021  Stephanie Jean is a 66 y.o. female who is here today for follow up for her ongoing constipation and abdominal pain.  She states that after she stopped methotrexate her constipation improved for a while however for the past couple of weeks she developed again constipation now having a bowel movement once in 4 to 5 days.  Denies any significant abdominal pain now.    2021  Stephanie Jean is a 65 y.o. female who is here today for follow up for for her abdominal pain mostly at lower belly. Intermittent cramps with associated soft bowel movement but she had to strain a lot. This happened about  2 week sago. She took mag cirate and enema to empty her bowel. She also has lot of bloating and belching. Stool was smelly.  She is not taking fiber. She was taking colace and miralax and sen daily.  She also had some crampy pain left chest later on radiated to her mid chest. Her brother was diagnosed with NET stomach recently.      2020  Stephanie Jean is a 64 y.o. female who is here today for follow up for her heartburn and the dysphagia.  She had an EGD done on 2020.  She she is here for follow-up of her symptoms.  And to discuss the pathology report.. She states that her reflux symptoms have significantly improved there is no more epigastric pain.  Her bowel movements also improved with the laxatives and stool softeners.     2020  Stephanie Jean is a 64 y.o. female who is here today to establish care with Gastroenterology for evaluation of heartburn.     History of upper abdominal pain mainly in the epigastric region since about 2-3 months. Pain started gradually, intermittent, aching pain lasting for about few minutes and  occasionally longer.  She was given dexilant and sucralfate which did not help initially and later on changed to pantoprazole.  She is on pantoprazole now with reasonable control of pain and reflux symptoms with once in few days but for the last 2 weeks she did not take any PPI. Associated significant heart burn.   She was on PPI before after she was noted to have gastritis on EGD about 10 years ago. Stopped herself year ago. Occasional problem with swallowing to solid food. She also has sjogrens syndrome.   She was mostly constipated now. Bowel movements every once in 2-3 days and occasionally once in 3-5 days. No lower abdominal pain.      There is no history of  hematochezia or melena. There is no history of anemia. Prior history of EGD about 10 yrs ago. Last colonoscopy in Jan 2020 about 4 polyps removed and advised to repeat in 3 years time. Brother had UC and uncle from mother's side had colon Ca. No history of any abdominal surgery except hysterectomy. Denies alcohol abuse or cigarette smoking.   She is on arthrotec tablets  Subjective      Past Medical History:   Past Medical History:   Diagnosis Date   • Arthritis    • Colon polyps    • Dysphagia    • Family history of colon cancer    • Fibromyalgia    • GERD (gastroesophageal reflux disease)    • Heart murmur    • Hoarseness    • Hyperlipidemia    • Lupus (HCC)    • Marijuana use     Daily    • Pulmonary nodule    • Sjogren's disease (HCC)      Past Surgical History:   Past Surgical History:   Procedure Laterality Date   • APPENDECTOMY     • COLONOSCOPY     • ENDOSCOPY     • ENDOSCOPY N/A 6/1/2020    Procedure: ESOPHAGOGASTRODUODENOSCOPY;  Surgeon: Chaparro Marrero MD;  Location: [facility] ENDOSCOPY;  Service: Gastroenterology;  Laterality: N/A;   • HYSTERECTOMY         Family History:   Family History   Problem Relation Age of Onset   • Hypertension Mother    • Aneurysm Mother    • Heart disease Mother    • Emphysema Father    • Ulcerative colitis  Brother    • Colon cancer Maternal Uncle    • Cirrhosis Neg Hx    • Liver disease Neg Hx    • Liver cancer Neg Hx    • Crohn's disease Neg Hx        Social History:   Social History     Socioeconomic History   • Marital status:    Tobacco Use   • Smoking status: Former Smoker     Packs/day: 1.00     Years: 40.00     Pack years: 40.00     Types: Cigarettes     Quit date: 2018     Years since quitting: 3.9   • Smokeless tobacco: Never Used   Substance and Sexual Activity   • Alcohol use: No   • Drug use: Yes     Frequency: 7.0 times per week     Types: Marijuana     Comment: Daily    • Sexual activity: Defer       Medications:     Current Outpatient Medications:   •  Ascorbic Acid (VITAMIN C PO), Take  by mouth., Disp: , Rfl:   •  aspirin 81 MG EC tablet, Take 1 tablet by mouth Daily., Disp: 30 tablet, Rfl: 0  •  atorvastatin (LIPITOR) 80 MG tablet, Take 1 tablet by mouth Every Night., Disp: 30 tablet, Rfl: 0  •  baclofen (LIORESAL) 10 MG tablet, Take 10 mg by mouth 3 (Three) Times a Day., Disp: , Rfl:   •  BIOTIN PO, Take  by mouth., Disp: , Rfl:   •  Calcium Citrate-Vitamin D (JENNIFER-CITRATE PLUS VITAMIN D PO), Take  by mouth., Disp: , Rfl:   •  docusate sodium (COLACE) 100 MG capsule, TAKE 1 CAPSULE BY MOUTH TWICE DAILY, Disp: 60 capsule, Rfl: 2  •  DULoxetine (Cymbalta) 60 MG capsule, Take 60 mg by mouth Daily., Disp: , Rfl:   •  gabapentin (NEURONTIN) 300 MG capsule, Take 300 mg by mouth 3 (Three) Times a Day., Disp: , Rfl:   •  leflunomide (ARAVA) 10 MG tablet, Take 10 mg by mouth Daily., Disp: , Rfl:   •  meclizine (ANTIVERT) 12.5 MG tablet, Take 1 tablet by mouth 3 (Three) Times a Day As Needed for Dizziness., Disp: 30 tablet, Rfl: 0  •  metoprolol succinate XL (TOPROL-XL) 25 MG 24 hr tablet, Take 1 tablet by mouth Daily., Disp: 30 tablet, Rfl: 11  •  nitroglycerin (NITROSTAT) 0.4 MG SL tablet, , Disp: , Rfl:   •  pantoprazole (PROTONIX) 40 MG EC tablet, TAKE 1 TABLET BY MOUTH EVERY DAY, Disp: 90 tablet,  "Rfl: 0  •  polyethylene glycol (MiraLax) 17 GM/SCOOP powder, Take 17 g by mouth Daily., Disp: 510 g, Rfl: 2  •  traMADol (ULTRAM) 50 MG tablet, Take 50 mg by mouth 2 (Two) Times a Day., Disp: , Rfl:   •  VITAMIN E PO, Take  by mouth., Disp: , Rfl:     Allergies:   Allergies   Allergen Reactions   • Penicillins Rash       Review of Systems:   Review of Systems   Constitutional: Negative for appetite change, fatigue, fever and unexpected weight loss.   HENT: Negative for trouble swallowing.    Gastrointestinal: Positive for constipation. Negative for abdominal distention, abdominal pain, anal bleeding, blood in stool, diarrhea, nausea, rectal pain, vomiting, GERD and indigestion.       The following portions of the patient's history were reviewed and updated as appropriate: allergies, current medications, past family history, past medical history, past social history, past surgical history and problem list.    Objective     Physical Exam:  Vital Signs:   Vitals:    12/02/21 1325   BP: 120/78   Temp: 98 °F (36.7 °C)   TempSrc: Infrared   Weight: 64 kg (141 lb)   Height: 157.5 cm (62\")       Physical Exam  Constitutional:       Appearance: Normal appearance.   HENT:      Head: Normocephalic and atraumatic.   Eyes:      Conjunctiva/sclera: Conjunctivae normal.   Abdominal:      General: Abdomen is flat. There is no distension.      Palpations: There is no mass.      Tenderness: There is no abdominal tenderness. There is no guarding or rebound.      Hernia: No hernia is present.   Musculoskeletal:      Cervical back: Normal range of motion and neck supple.   Neurological:      Mental Status: She is alert.         Results Review:   I reviewed the patient's new clinical results.    Admission on 11/22/2021, Discharged on 11/23/2021   Component Date Value Ref Range Status   • Glucose 11/22/2021 124* 65 - 99 mg/dL Final   • BUN 11/22/2021 17  8 - 23 mg/dL Final   • Creatinine 11/22/2021 0.76  0.57 - 1.00 mg/dL Final   • Sodium " 11/22/2021 143  136 - 145 mmol/L Final   • Potassium 11/22/2021 3.7  3.5 - 5.2 mmol/L Final   • Chloride 11/22/2021 107  98 - 107 mmol/L Final   • CO2 11/22/2021 27.8  22.0 - 29.0 mmol/L Final   • Calcium 11/22/2021 9.1  8.6 - 10.5 mg/dL Final   • Total Protein 11/22/2021 6.9  6.0 - 8.5 g/dL Final   • Albumin 11/22/2021 4.20  3.50 - 5.20 g/dL Final   • ALT (SGPT) 11/22/2021 13  1 - 33 U/L Final   • AST (SGOT) 11/22/2021 11  1 - 32 U/L Final   • Alkaline Phosphatase 11/22/2021 69  39 - 117 U/L Final   • Total Bilirubin 11/22/2021 <0.2  0.0 - 1.2 mg/dL Final   • eGFR Non  Amer 11/22/2021 77  >60 mL/min/1.73 Final   • Globulin 11/22/2021 2.7  gm/dL Final   • A/G Ratio 11/22/2021 1.6  g/dL Final   • BUN/Creatinine Ratio 11/22/2021 22.4  7.0 - 25.0 Final   • Anion Gap 11/22/2021 8.2  5.0 - 15.0 mmol/L Final   • Troponin T 11/22/2021 <0.010  0.000 - 0.030 ng/mL Final   • Magnesium 11/22/2021 2.3  1.6 - 2.4 mg/dL Final   • Color, UA 11/22/2021 Yellow  Yellow, Straw Final   • Appearance, UA 11/22/2021 Clear  Clear Final   • pH, UA 11/22/2021 7.0  5.0 - 8.0 Final   • Specific Gravity, UA 11/22/2021 1.007  1.005 - 1.030 Final   • Glucose, UA 11/22/2021 Negative  Negative Final   • Ketones, UA 11/22/2021 Negative  Negative Final   • Bilirubin, UA 11/22/2021 Negative  Negative Final   • Blood, UA 11/22/2021 Negative  Negative Final   • Protein, UA 11/22/2021 Negative  Negative Final   • Leuk Esterase, UA 11/22/2021 Negative  Negative Final   • Nitrite, UA 11/22/2021 Negative  Negative Final   • Urobilinogen, UA 11/22/2021 0.2 E.U./dL  0.2 - 1.0 E.U./dL Final   • Extra Tube 11/22/2021 Hold for add-ons.   Final    Auto resulted.   • Extra Tube 11/22/2021 hold for add-on   Final    Auto resulted   • Extra Tube 11/22/2021 Hold for add-ons.   Final    Auto resulted.   • Extra Tube 11/22/2021 hold for add-on   Final    Auto resulted   • WBC 11/22/2021 6.06  3.40 - 10.80 10*3/mm3 Final   • RBC 11/22/2021 3.84  3.77 - 5.28  10*6/mm3 Final   • Hemoglobin 11/22/2021 11.8* 12.0 - 15.9 g/dL Final   • Hematocrit 11/22/2021 35.7  34.0 - 46.6 % Final   • MCV 11/22/2021 93.0  79.0 - 97.0 fL Final   • MCH 11/22/2021 30.7  26.6 - 33.0 pg Final   • MCHC 11/22/2021 33.1  31.5 - 35.7 g/dL Final   • RDW 11/22/2021 12.3  12.3 - 15.4 % Final   • RDW-SD 11/22/2021 41.6  37.0 - 54.0 fl Final   • MPV 11/22/2021 10.7  6.0 - 12.0 fL Final   • Platelets 11/22/2021 170  140 - 450 10*3/mm3 Final   • Neutrophil % 11/22/2021 58.7  42.7 - 76.0 % Final   • Lymphocyte % 11/22/2021 30.7  19.6 - 45.3 % Final   • Monocyte % 11/22/2021 9.2  5.0 - 12.0 % Final   • Eosinophil % 11/22/2021 0.2* 0.3 - 6.2 % Final   • Basophil % 11/22/2021 0.7  0.0 - 1.5 % Final   • Immature Grans % 11/22/2021 0.5  0.0 - 0.5 % Final   • Neutrophils, Absolute 11/22/2021 3.56  1.70 - 7.00 10*3/mm3 Final   • Lymphocytes, Absolute 11/22/2021 1.86  0.70 - 3.10 10*3/mm3 Final   • Monocytes, Absolute 11/22/2021 0.56  0.10 - 0.90 10*3/mm3 Final   • Eosinophils, Absolute 11/22/2021 0.01  0.00 - 0.40 10*3/mm3 Final   • Basophils, Absolute 11/22/2021 0.04  0.00 - 0.20 10*3/mm3 Final   • Immature Grans, Absolute 11/22/2021 0.03  0.00 - 0.05 10*3/mm3 Final   • nRBC 11/22/2021 0.0  0.0 - 0.2 /100 WBC Final   • Glucose 11/22/2021 122  70 - 130 mg/dL Final    Serial Number: [id number]:  [id number]   • Sed Rate 11/22/2021 11  0 - 20 mm/hr Final   • Hemoglobin A1C 11/22/2021 5.20  4.80 - 5.60 % Final   • TSH 11/22/2021 0.393  0.270 - 4.200 uIU/mL Final   • BSA 11/23/2021 1.7  m^2 Final   • IVSd 11/23/2021 0.99  cm Final   • IVSs 11/23/2021 1.5  cm Final   • LVIDd 11/23/2021 4.2  cm Final   • LVIDs 11/23/2021 2.8  cm Final   • LVPWd 11/23/2021 1.0  cm Final   • BH CV ECHO NURYS - LVPWS 11/23/2021 1.4  cm Final   • IVS/LVPW 11/23/2021 0.99   Final   • FS 11/23/2021 33.8  % Final   • EDV(Teich) 11/23/2021 79.9  ml Final   • ESV(Teich) 11/23/2021 29.6  ml Final   • EF(Teich) 11/23/2021 63.0  % Final   •  EDV(cubed) 11/23/2021 75.7  ml Final   • ESV(cubed) 11/23/2021 22.0  ml Final   • EF(cubed) 11/23/2021 71.0  % Final   • % IVS thick 11/23/2021 53.5  % Final   • % LVPW thick 11/23/2021 36.0  % Final   • LV mass(C)d 11/23/2021 137.8  grams Final   • LV mass(C)dI 11/23/2021 82.9  grams/m^2 Final   • LV mass(C)s 11/23/2021 134.8  grams Final   • LV mass(C)sI 11/23/2021 81.1  grams/m^2 Final   • SV(Teich) 11/23/2021 50.4  ml Final   • SI(Teich) 11/23/2021 30.3  ml/m^2 Final   • SV(cubed) 11/23/2021 53.7  ml Final   • SI(cubed) 11/23/2021 32.3  ml/m^2 Final   • Ao root diam 11/23/2021 2.6  cm Final   • Ao root area 11/23/2021 5.4  cm^2 Final   • LA dimension 11/23/2021 2.9  cm Final   • LA/Ao 11/23/2021 1.1   Final   • LVOT diam 11/23/2021 2.0  cm Final   • LVOT area 11/23/2021 3.3  cm^2 Final   • LVOT area(traced) 11/23/2021 3.3  cm^2 Final   • LAd major 11/23/2021 4.7  cm Final   • LVLd ap4 11/23/2021 7.6  cm Final   • EDV(MOD-sp4) 11/23/2021 109.0  ml Final   • LVLs ap4 11/23/2021 5.8  cm Final   • ESV(MOD-sp4) 11/23/2021 45.5  ml Final   • EF(MOD-sp4) 11/23/2021 58.3  % Final   • LVLd ap2 11/23/2021 7.9  cm Final   • EDV(MOD-sp2) 11/23/2021 102.0  ml Final   • LVLs ap2 11/23/2021 6.1  cm Final   • ESV(MOD-sp2) 11/23/2021 37.2  ml Final   • EF(MOD-sp2) 11/23/2021 63.5  % Final   • LA volume 11/23/2021 55.3  ml Final   • EF(MOD-bp) 11/23/2021 60.7  % Final   • SV(MOD-sp4) 11/23/2021 63.5  ml Final   • SI(MOD-sp4) 11/23/2021 38.2  ml/m^2 Final   • SV(MOD-sp2) 11/23/2021 64.8  ml Final   • SI(MOD-sp2) 11/23/2021 39.0  ml/m^2 Final   • Ao root area (BSA corrected) 11/23/2021 1.6   Final   • LV Rodriguez Vol (BSA corrected) 11/23/2021 65.6  ml/m^2 Final   • LV Sys Vol (BSA corrected) 11/23/2021 27.4  ml/m^2 Final   • LA Volume Index 11/23/2021 33.3  ml/m^2 Final   • MV E max emily 11/23/2021 93.4  cm/sec Final   • MV A max emily 11/23/2021 101.0  cm/sec Final   • MV E/A 11/23/2021 0.92   Final   • MV V2 max 11/23/2021 94.7  cm/sec  Final   • MV max PG 11/23/2021 3.6  mmHg Final   • MV V2 mean 11/23/2021 53.7  cm/sec Final   • MV mean PG 11/23/2021 1.0  mmHg Final   • MV V2 VTI 11/23/2021 37.4  cm Final   • MVA(VTI) 11/23/2021 1.7  cm^2 Final   • MV dec time 11/23/2021 0.24  sec Final   • Ao pk ubmerto 11/23/2021 162.0  cm/sec Final   • Ao max PG 11/23/2021 10.0  mmHg Final   • Ao max PG (full) 11/23/2021 7.7  mmHg Final   • Ao V2 mean 11/23/2021 101.0  cm/sec Final   • Ao mean PG 11/23/2021 5.0  mmHg Final   • Ao mean PG (full) 11/23/2021 4.0  mmHg Final   • Ao V2 VTI 11/23/2021 36.4  cm Final   • MARCELLUS(I,A) 11/23/2021 1.8  cm^2 Final   • MARCELLUS(I,D) 11/23/2021 1.8  cm^2 Final   • MARCELLUS(V,A) 11/23/2021 1.5  cm^2 Final   • MARCELLUS(V,D) 11/23/2021 1.5  cm^2 Final   • LV V1 max PG 11/23/2021 2.3  mmHg Final   • LV V1 mean PG 11/23/2021 1.0  mmHg Final   • LV V1 max 11/23/2021 75.2  cm/sec Final   • LV V1 mean 11/23/2021 51.4  cm/sec Final   • LV V1 VTI 11/23/2021 19.8  cm Final   • SV(Ao) 11/23/2021 194.7  ml Final   • SI(Ao) 11/23/2021 117.1  ml/m^2 Final   • SV(LVOT) 11/23/2021 64.7  ml Final   • SI(LVOT) 11/23/2021 38.9  ml/m^2 Final   • PA V2 max 11/23/2021 70.3  cm/sec Final   • PA max PG 11/23/2021 2.0  mmHg Final   • PA max PG (full) 11/23/2021 0.75  mmHg Final   • PA V2 mean 11/23/2021 45.6  cm/sec Final   • PA mean PG 11/23/2021 1.0  mmHg Final   • PA mean PG (full) 11/23/2021 0  mmHg Final   • PA V2 VTI 11/23/2021 16.6  cm Final   • PA acc time 11/23/2021 0.18  sec Final   • RV V1 max PG 11/23/2021 1.2  mmHg Final   • RV V1 mean PG 11/23/2021 1.0  mmHg Final   • RV V1 max 11/23/2021 55.4  cm/sec Final   • RV V1 mean 11/23/2021 40.3  cm/sec Final   • RV V1 VTI 11/23/2021 16.0  cm Final   • TR max umberto 11/23/2021 163.0  cm/sec Final   • TR max PG 11/23/2021 11.0  mmHg Final   • RVSP(TR) 11/23/2021 14.0  mmHg Final   • RAP systole 11/23/2021 3.0  mmHg Final   • PA pr(Accel) 11/23/2021 -0.2  mmHg Final   • Pulm Sys Umberto 11/23/2021 54.2  cm/sec Final   •  Pulm Rodriguez Umberto 11/23/2021 30.2  cm/sec Final   • Pulm S/D 11/23/2021 1.8   Final   • Pulm A Revs Dur 11/23/2021 0.13  sec Final   • Pulm A Revs Umberto 11/23/2021 29.1  cm/sec Final   • RV Base 11/23/2021 3.5  cm Final   • RV Length 11/23/2021 5.8  cm Final   • RV Mid 11/23/2021 2.7  cm Final   • RV S' 11/23/2021 10.0  cm/sec Final   • Lat E/e'  11/23/2021 10.6   Final   • Med E/e' 11/23/2021 10.5   Final   • Lat Peak E' Umberto 11/23/2021 8.8  cm/sec Final   • Med Peak E' Umberto 11/23/2021 8.9  cm/sec Final   • BH CV ECHO NURYS - BZI_BMI 11/23/2021 26.3  kilograms/m^2 Final   • BH CV ECHO NURYS - BSA([place]) 11/23/2021 1.7  m^2 Final   • BH CV ECHO NURYS - BZI_METRIC_WEIGHT 11/23/2021 65.3  kg Final   • BH CV ECHO NURYS - BZI_METRIC_HEIGHT 11/23/2021 157.5  cm Final   • Avg E/e' ratio 11/23/2021 10.55   Final   • Target HR (85%) 11/23/2021 133  bpm Final   • Max. Pred. HR (100%) 11/23/2021 156  bpm Final   • Echo EF Estimated 11/23/2021 57  % Final   • Total Cholesterol 11/22/2021 204* 0 - 200 mg/dL Final   • Triglycerides 11/22/2021 104  0 - 150 mg/dL Final   • HDL Cholesterol 11/22/2021 67* 40 - 60 mg/dL Final   • LDL Cholesterol  11/22/2021 119* 0 - 100 mg/dL Final   • VLDL Cholesterol 11/22/2021 18  5 - 40 mg/dL Final   • LDL/HDL Ratio 11/22/2021 1.73   Final   • COVID19 11/23/2021 Not Detected  Not Detected - Ref. Range Final   • Glucose 11/23/2021 81  70 - 130 mg/dL Final    Serial Number: [id number]  [id number]      CT Head Without Contrast    Result Date: 11/22/2021  Unremarkable. Authenticated by Jean Oliva M.D. on 11/22/2021 11:32:22 PM    CT Angiogram Neck    Result Date: 11/22/2021  Unremarkable. Authenticated by Jean Oliva M.D. on 11/22/2021 11:32:27 PM    MRI Brain Without Contrast    Result Date: 11/23/2021  No acute intracranial abnormality.    This report was finalized on 11/23/2021 7:32 AM by Evie Oneill M.D..    US Head Neck Soft Tissue    Result Date: 10/6/2021  7 mm nodule in the left lobe of the  thyroid gland unchanged compared to the prior exam.   This report was finalized on 10/6/2021 11:35 AM by Evie Oneill M.D..    XR Chest 1 View    Result Date: 11/23/2021  No acute cardiopulmonary process.   Images were reviewed, interpreted, and dictated by Dr. Evie Oneill M.D. Transcribed by Marilu Ponce PA-C.  This report was finalized on 11/23/2021 12:54 PM by Evie Oneill M.D..    CT Angiogram Head    Result Date: 11/22/2021  No arterial abnormality. Authenticated by Jean Oliva M.D. on 11/22/2021 11:32:27 PM      Assessment / Plan        1.   Chronic constipation   DDx; chronic idiopathic versus drug-induced versus possible IBS with constipation  12/2/2021  Her constipation temporarily improved after she stopped her methotrexate however she developed significant constipation again now possibly after starting on tramadol or at least her tramadol is contributing.  Linzess did not improve her constipation as per patient and she stopped it.  We will start her on a Movantik 25 mg p.o. daily and advised to take MiraLAX 1 scoop p.o. daily if Movantik alone does not improve her symptoms.    7/13/2021  Her current history is more in favor of IBS with constipation.  I have discussed the low gas-forming diet with the patient today.  Despite on the MiraLAX, senna, Colace she still feels that she has not emptying her bowel well.  Recently developed significant lower abdominal cramps.  She started passing a smelly soft stool after multiple attempts of straining.     We will start her on a Linzess 145 mcg p.o. daily.  Advised to adjust the dose may be once in every other day if she has a significant watery diarrhea.  I have also started her on a low-dose dicyclomine 10 mg p.o. 3 times daily as needed for abdominal cramps.  This may cause some constipation discussed with the patient. Advised to hold off the senna and MiraLAX for now and can be added to the above regimen if required after few  days  Metamucil fiber pills 1 p.o. twice daily     2. Gastroesophageal reflux disease without esophagitis  3.  Nausea  12/2/2021  No significant reflux symptoms now however she still has intermittent nausea.  No obvious abdominal pain now.  We will reduce her Protonix dose to 20 m p.o. daily and will get an ultrasound abdomen to rule out gallstones.  No further episodes of chest pain.  7/13/2021  Even with ppi she still gets occasional reflux symptoms.  She also had a what appears to be atypical chest pain on the left side and later on moved into her mid chest few episodes and that has improved now.  She was seen by cardiology and initial work-up were unremarkable.  There is no pain during swallowing or food intake.    She had an EGD done on 6/1/2020 which revealed a mild erythematous gastric mucosa,  biopsies were suggestive for reactive gastropathy without any signs of H. pylori infection. There was a single polyp identified in the duodenal bulb which was resected and pathology is more consistent with a chronic peptic duodenitis with a venogram hyperplasia, without any dysplasia or metaplasia. Continue protonix for one more month and reduc to 20mg po daily for longtem as her symptoms recurred while off from PPI in the past. Also she is been diagnosed wioth lupus recently.   Follow-up 1 year time     4.  Normocytic anemia  5.  Thrombocytopenia  This is most likely associated with rheumatoid arthritis and methotrexate use .  Recently methotrexate was discontinued and she is on Arava now . Recent lab work reviewed which reveals a platelets of 128,000, iron studies done shows iron of 40 iron sat 16 percent ferritin 110 in July 2021.  She has a chronic anemia since last few years last hemoglobin was 11.8 g/dL in nov 2021.  Folic acid and vitamin B12 level were normal in July 2020 no signs of any GI bleed.  She also had a borderline elevated liver enzymes once earlier this year, will get an ultrasound abdomen for  further evaluation.    We will get a CT abdomen pelvis if her anemia persists.    Prior history  EGD done on 6/1/2020 did not reveal any esophageal narrowing.  esophageal biopsies were negative for EOE  She does seem to have some esophageal dysmotility associated with a lupus and sjogrens syndrom     6. Personal history of colonic polyps  7. Family hx of colon cancer  Colonoscopy in January 2018 [facility].  Per patient for benign polyps removed less than 1 cm in size  Due for colonoscopy in 3 years time in 2023  Uncle had a colon cancer     8. Nodule of chest wall  About 2cm size soft firm nodule palpable at the left upper to upper chest medial to the the anterior axillary cleft  This could be a lipoma, recent ultrasound did not reveal any significant abnormality          Follow Up:   No follow-ups on file.    Chaparro Marrero MD  [facility] [place]  12/2/2021  13:29 EST     Please note that portions of this note may have been completed with a voice recognition program.

## 2021-12-08 ENCOUNTER — READMISSION MANAGEMENT (OUTPATIENT)
Dept: CALL CENTER | Facility: HOSPITAL | Age: 66
End: 2021-12-08

## 2021-12-08 ENCOUNTER — PREP FOR SURGERY (OUTPATIENT)
Dept: OTHER | Facility: HOSPITAL | Age: 66
End: 2021-12-08

## 2021-12-08 DIAGNOSIS — H25.12 NUCLEAR SCLEROTIC CATARACT OF LEFT EYE: Primary | ICD-10-CM

## 2021-12-08 RX ORDER — CYCLOPENT/TROPIC/PHEN/KETR/WAT 1%-1%-2.5%
1 DROPS (EA) OPHTHALMIC (EYE)
Status: CANCELLED | OUTPATIENT
Start: 2021-12-08 | End: 2021-12-08

## 2021-12-08 RX ORDER — SODIUM CHLORIDE 0.9 % (FLUSH) 0.9 %
1-10 SYRINGE (ML) INJECTION AS NEEDED
Status: CANCELLED | OUTPATIENT
Start: 2021-12-08

## 2021-12-08 RX ORDER — SODIUM CHLORIDE 0.9 % (FLUSH) 0.9 %
10 SYRINGE (ML) INJECTION EVERY 12 HOURS SCHEDULED
Status: CANCELLED | OUTPATIENT
Start: 2021-12-08

## 2021-12-08 RX ORDER — PREDNISOLONE ACETATE 10 MG/ML
1 SUSPENSION/ DROPS OPHTHALMIC SEE ADMIN INSTRUCTIONS
Status: CANCELLED | OUTPATIENT
Start: 2021-12-08

## 2021-12-08 RX ORDER — TETRACAINE HYDROCHLORIDE 5 MG/ML
1 SOLUTION OPHTHALMIC SEE ADMIN INSTRUCTIONS
Status: CANCELLED | OUTPATIENT
Start: 2021-12-08

## 2021-12-08 NOTE — OUTREACH NOTE
Stroke Week 3 Survey      Responses   [facility] patient discharged from? [facility]   Does the patient have one of the following disease processes/diagnoses(primary or secondary)? Stroke (TIA)   Week 3 attempt successful? No   Unsuccessful attempts Attempt 1          Lorrie Aaron RN

## 2021-12-10 PROBLEM — H25.12 NUCLEAR SCLEROTIC CATARACT OF LEFT EYE: Status: ACTIVE | Noted: 2021-12-10

## 2021-12-13 ENCOUNTER — READMISSION MANAGEMENT (OUTPATIENT)
Dept: CALL CENTER | Facility: HOSPITAL | Age: 66
End: 2021-12-13

## 2021-12-13 NOTE — OUTREACH NOTE
Stroke Week 3 Survey      Responses   [facility] patient discharged from? [facility]   Does the patient have one of the following disease processes/diagnoses(primary or secondary)? Stroke (TIA)   Week 3 attempt successful? No   Unsuccessful attempts Attempt 2  [called both numbers]          Gail Lopes RN

## 2021-12-14 ENCOUNTER — PREP FOR SURGERY (OUTPATIENT)
Dept: OTHER | Facility: HOSPITAL | Age: 66
End: 2021-12-14

## 2021-12-14 DIAGNOSIS — H25.11 NUCLEAR SCLEROTIC CATARACT OF RIGHT EYE: Primary | ICD-10-CM

## 2021-12-14 RX ORDER — PREDNISOLONE ACETATE 10 MG/ML
1 SUSPENSION/ DROPS OPHTHALMIC SEE ADMIN INSTRUCTIONS
Status: CANCELLED | OUTPATIENT
Start: 2021-12-14

## 2021-12-14 RX ORDER — TETRACAINE HYDROCHLORIDE 5 MG/ML
1 SOLUTION OPHTHALMIC SEE ADMIN INSTRUCTIONS
Status: CANCELLED | OUTPATIENT
Start: 2021-12-14

## 2021-12-14 RX ORDER — SODIUM CHLORIDE 0.9 % (FLUSH) 0.9 %
1-10 SYRINGE (ML) INJECTION AS NEEDED
Status: CANCELLED | OUTPATIENT
Start: 2021-12-14

## 2021-12-14 RX ORDER — CYCLOPENT/TROPIC/PHEN/KETR/WAT 1%-1%-2.5%
1 DROPS (EA) OPHTHALMIC (EYE)
Status: CANCELLED | OUTPATIENT
Start: 2021-12-14 | End: 2021-12-14

## 2021-12-14 RX ORDER — SODIUM CHLORIDE 0.9 % (FLUSH) 0.9 %
10 SYRINGE (ML) INJECTION EVERY 12 HOURS SCHEDULED
Status: CANCELLED | OUTPATIENT
Start: 2021-12-14

## 2021-12-20 ENCOUNTER — HOSPITAL ENCOUNTER (OUTPATIENT)
Facility: HOSPITAL | Age: 66
Setting detail: HOSPITAL OUTPATIENT SURGERY
Discharge: HOME | End: 2021-12-20
Attending: OPHTHALMOLOGY | Admitting: OPHTHALMOLOGY

## 2021-12-20 ENCOUNTER — ANESTHESIA (OUTPATIENT)
Dept: PERIOP | Facility: HOSPITAL | Age: 66
End: 2021-12-20

## 2021-12-20 ENCOUNTER — ANESTHESIA EVENT (OUTPATIENT)
Dept: PERIOP | Facility: HOSPITAL | Age: 66
End: 2021-12-20

## 2021-12-20 VITALS
DIASTOLIC BLOOD PRESSURE: 66 MMHG | OXYGEN SATURATION: 99 % | TEMPERATURE: 98 F | SYSTOLIC BLOOD PRESSURE: 141 MMHG | HEART RATE: 73 BPM | RESPIRATION RATE: 16 BRPM

## 2021-12-20 DIAGNOSIS — H25.12 NUCLEAR SCLEROTIC CATARACT OF LEFT EYE: ICD-10-CM

## 2021-12-20 PROCEDURE — 25010000002 PROPOFOL 10 MG/ML EMULSION: Performed by: NURSE ANESTHETIST, CERTIFIED REGISTERED

## 2021-12-20 PROCEDURE — V2632 POST CHMBR INTRAOCULAR LENS: HCPCS | Performed by: OPHTHALMOLOGY

## 2021-12-20 DEVICE — LENS ACRYSOF IQ SA60WF W/ULTRASERT 6X13MM ACU0T0 19.5: Type: IMPLANTABLE DEVICE | Site: POSTERIOR CHAMBER | Status: FUNCTIONAL

## 2021-12-20 RX ORDER — BALANCED SALT SOLUTION 6.4; .75; .48; .3; 3.9; 1.7 MG/ML; MG/ML; MG/ML; MG/ML; MG/ML; MG/ML
SOLUTION OPHTHALMIC AS NEEDED
Status: DISCONTINUED | OUTPATIENT
Start: 2021-12-20 | End: 2021-12-20 | Stop reason: HOSPADM

## 2021-12-20 RX ORDER — KETAMINE HYDROCHLORIDE 50 MG/ML
INJECTION, SOLUTION, CONCENTRATE INTRAMUSCULAR; INTRAVENOUS AS NEEDED
Status: DISCONTINUED | OUTPATIENT
Start: 2021-12-20 | End: 2021-12-20 | Stop reason: SURG

## 2021-12-20 RX ORDER — LIDOCAINE HYDROCHLORIDE 20 MG/ML
INJECTION, SOLUTION INTRAVENOUS AS NEEDED
Status: DISCONTINUED | OUTPATIENT
Start: 2021-12-20 | End: 2021-12-20 | Stop reason: SURG

## 2021-12-20 RX ORDER — SODIUM CHLORIDE 0.9 % (FLUSH) 0.9 %
1-10 SYRINGE (ML) INJECTION AS NEEDED
Status: DISCONTINUED | OUTPATIENT
Start: 2021-12-20 | End: 2021-12-20 | Stop reason: HOSPADM

## 2021-12-20 RX ORDER — CYCLOPENT/TROPIC/PHEN/KETR/WAT 1%-1%-2.5%
1 DROPS (EA) OPHTHALMIC (EYE)
Status: COMPLETED | OUTPATIENT
Start: 2021-12-20 | End: 2021-12-20

## 2021-12-20 RX ORDER — SODIUM CHLORIDE, SODIUM LACTATE, POTASSIUM CHLORIDE, CALCIUM CHLORIDE 600; 310; 30; 20 MG/100ML; MG/100ML; MG/100ML; MG/100ML
1000 INJECTION, SOLUTION INTRAVENOUS CONTINUOUS
Status: DISCONTINUED | OUTPATIENT
Start: 2021-12-20 | End: 2021-12-20 | Stop reason: HOSPADM

## 2021-12-20 RX ORDER — PREDNISOLONE ACETATE 10 MG/ML
1 SUSPENSION/ DROPS OPHTHALMIC SEE ADMIN INSTRUCTIONS
Status: DISCONTINUED | OUTPATIENT
Start: 2021-12-20 | End: 2021-12-20 | Stop reason: HOSPADM

## 2021-12-20 RX ORDER — SODIUM CHLORIDE 0.9 % (FLUSH) 0.9 %
10 SYRINGE (ML) INJECTION EVERY 12 HOURS SCHEDULED
Status: DISCONTINUED | OUTPATIENT
Start: 2021-12-20 | End: 2021-12-20 | Stop reason: HOSPADM

## 2021-12-20 RX ORDER — NEOMYCIN SULFATE, POLYMYXIN B SULFATE, AND DEXAMETHASONE 3.5; 10000; 1 MG/G; [USP'U]/G; MG/G
OINTMENT OPHTHALMIC AS NEEDED
Status: DISCONTINUED | OUTPATIENT
Start: 2021-12-20 | End: 2021-12-20 | Stop reason: HOSPADM

## 2021-12-20 RX ORDER — TETRACAINE HYDROCHLORIDE 5 MG/ML
SOLUTION OPHTHALMIC AS NEEDED
Status: DISCONTINUED | OUTPATIENT
Start: 2021-12-20 | End: 2021-12-20 | Stop reason: HOSPADM

## 2021-12-20 RX ORDER — ACETAZOLAMIDE 500 MG/1
500 CAPSULE, EXTENDED RELEASE ORAL ONCE
Status: COMPLETED | OUTPATIENT
Start: 2021-12-20 | End: 2021-12-20

## 2021-12-20 RX ORDER — PROPOFOL 10 MG/ML
VIAL (ML) INTRAVENOUS AS NEEDED
Status: DISCONTINUED | OUTPATIENT
Start: 2021-12-20 | End: 2021-12-20 | Stop reason: SURG

## 2021-12-20 RX ORDER — PREDNISOLONE ACETATE 10 MG/ML
SUSPENSION/ DROPS OPHTHALMIC AS NEEDED
Status: DISCONTINUED | OUTPATIENT
Start: 2021-12-20 | End: 2021-12-20 | Stop reason: HOSPADM

## 2021-12-20 RX ORDER — LIDOCAINE HYDROCHLORIDE 40 MG/ML
INJECTION, SOLUTION RETROBULBAR; TOPICAL AS NEEDED
Status: DISCONTINUED | OUTPATIENT
Start: 2021-12-20 | End: 2021-12-20 | Stop reason: HOSPADM

## 2021-12-20 RX ORDER — TETRACAINE HYDROCHLORIDE 5 MG/ML
1 SOLUTION OPHTHALMIC SEE ADMIN INSTRUCTIONS
Status: COMPLETED | OUTPATIENT
Start: 2021-12-20 | End: 2021-12-20

## 2021-12-20 RX ADMIN — TETRACAINE HYDROCHLORIDE 1 DROP: 5 SOLUTION OPHTHALMIC at 10:43

## 2021-12-20 RX ADMIN — ACETAZOLAMIDE 500 MG: 500 CAPSULE, EXTENDED RELEASE ORAL at 12:14

## 2021-12-20 RX ADMIN — Medication 1 DROP: at 10:50

## 2021-12-20 RX ADMIN — TETRACAINE HYDROCHLORIDE 1 DROP: 5 SOLUTION OPHTHALMIC at 10:44

## 2021-12-20 RX ADMIN — LIDOCAINE HYDROCHLORIDE 60 MG: 20 INJECTION, SOLUTION INTRAVENOUS at 11:40

## 2021-12-20 RX ADMIN — KETAMINE HYDROCHLORIDE 15 MG: 50 INJECTION, SOLUTION INTRAMUSCULAR; INTRAVENOUS at 11:51

## 2021-12-20 RX ADMIN — PROPOFOL 100 MG: 10 INJECTION, EMULSION INTRAVENOUS at 11:40

## 2021-12-20 RX ADMIN — KETAMINE HYDROCHLORIDE 10 MG: 50 INJECTION, SOLUTION INTRAMUSCULAR; INTRAVENOUS at 11:40

## 2021-12-20 RX ADMIN — Medication 1 DROP: at 10:55

## 2021-12-20 RX ADMIN — PROPOFOL 100 MG: 10 INJECTION, EMULSION INTRAVENOUS at 11:47

## 2021-12-20 RX ADMIN — Medication 1 DROP: at 10:45

## 2021-12-20 RX ADMIN — SODIUM CHLORIDE, POTASSIUM CHLORIDE, SODIUM LACTATE AND CALCIUM CHLORIDE 1000 ML: 600; 310; 30; 20 INJECTION, SOLUTION INTRAVENOUS at 10:55

## 2021-12-20 NOTE — H&P
[facility]         History and Physical    Patient Name: Stephanie Jean  MRN: [id number]  : 1955  Gender: female     HPI: Patient complaint of PPLOV Left eye diagnosed with cataract. Patient requests PHACO PCIOL for Increase of VA/ADL.    History:    Past Medical History:   Diagnosis Date   • Arthritis    • Colon polyps    • Dysphagia    • Family history of colon cancer    • Fibromyalgia    • GERD (gastroesophageal reflux disease)    • Heart murmur    • Hoarseness    • Hyperlipidemia    • Lupus (HCC)    • Marijuana use     Daily    • Pulmonary nodule    • Sjogren's disease (HCC)        Past Surgical History:   Procedure Laterality Date   • APPENDECTOMY     • COLONOSCOPY     • ENDOSCOPY     • ENDOSCOPY N/A 2020    Procedure: ESOPHAGOGASTRODUODENOSCOPY;  Surgeon: Chaparro Marrero MD;  Location: [facility] ENDOSCOPY;  Service: Gastroenterology;  Laterality: N/A;   • HYSTERECTOMY         Social History     Socioeconomic History   • Marital status:    Tobacco Use   • Smoking status: Former Smoker     Packs/day: 1.00     Years: 40.00     Pack years: 40.00     Types: Cigarettes     Quit date:      Years since quitting: 3.9   • Smokeless tobacco: Never Used   Substance and Sexual Activity   • Alcohol use: No   • Drug use: Yes     Frequency: 7.0 times per week     Types: Marijuana     Comment: Daily    • Sexual activity: Defer       Family History   Problem Relation Age of Onset   • Hypertension Mother    • Aneurysm Mother    • Heart disease Mother    • Emphysema Father    • Ulcerative colitis Brother    • Colon cancer Maternal Uncle    • Cirrhosis Neg Hx    • Liver disease Neg Hx    • Liver cancer Neg Hx    • Crohn's disease Neg Hx        Prior to Admission Medications:  Medications Prior to Admission   Medication Sig Dispense Refill Last Dose   • Ascorbic Acid (VITAMIN C PO) Take  by mouth.   Past Month at Unknown time   • aspirin 81 MG EC tablet Take 1 tablet by mouth  Daily. 30 tablet 0 12/13/2021 at 0800   • atorvastatin (LIPITOR) 80 MG tablet Take 1 tablet by mouth Every Night. 30 tablet 0 12/19/2021 at 2200   • baclofen (LIORESAL) 10 MG tablet Take 10 mg by mouth 3 (Three) Times a Day.   Past Week at Unknown time   • BIOTIN PO Take  by mouth.   Past Month at Unknown time   • Calcium Citrate-Vitamin D (JENNIFER-CITRATE PLUS VITAMIN D PO) Take  by mouth.   12/19/2021 at 2200   • docusate sodium (COLACE) 100 MG capsule TAKE 1 CAPSULE BY MOUTH TWICE DAILY 60 capsule 2 Past Month at Unknown time   • gabapentin (NEURONTIN) 300 MG capsule Take 300 mg by mouth 3 (Three) Times a Day.   12/19/2021 at 0800   • leflunomide (ARAVA) 10 MG tablet Take 10 mg by mouth Daily.   12/19/2021 at 0800   • meclizine (ANTIVERT) 12.5 MG tablet Take 1 tablet by mouth 3 (Three) Times a Day As Needed for Dizziness. 30 tablet 0 Past Month at Unknown time   • metoprolol succinate XL (TOPROL-XL) 25 MG 24 hr tablet Take 1 tablet by mouth Daily. 30 tablet 11 Past Week at Unknown time   • Naloxegol Oxalate (Movantik) 25 MG tablet Take 1 tablet by mouth Every Morning. 30 tablet 2 12/19/2021 at 0800   • pantoprazole (Protonix) 20 MG EC tablet Take 1 tablet by mouth Daily for 30 days. 30 tablet 2 12/19/2021 at 0800   • polyethylene glycol (MiraLax) 17 GM/SCOOP powder Take 17 g by mouth Daily. 510 g 2 Past Month at Unknown time   • promethazine (PHENERGAN) 12.5 MG tablet Take 1 tablet by mouth Every 8 (Eight) Hours As Needed for Nausea or Vomiting. 30 tablet 1 12/19/2021 at 0800   • traMADol (ULTRAM) 50 MG tablet Take 50 mg by mouth 2 (Two) Times a Day.   12/19/2021 at 0800   • VITAMIN E PO Take  by mouth.   Past Month at Unknown time   • DULoxetine (Cymbalta) 60 MG capsule Take 60 mg by mouth Daily.      • nitroglycerin (NITROSTAT) 0.4 MG SL tablet    Unknown at Unknown time       Allergies:  Allergies   Allergen Reactions   • Penicillins Rash        Vitals: Temp:  [97.3 °F (36.3 °C)] 97.3 °F (36.3 °C)  Heart Rate:   [99] 99  Resp:  [16] 16  BP: (140)/(77) 140/77    Review of Systems:   Within Normal Limits Abnormal   HEENT [x]    []     Cardiovascular [x]   []     Gastrointestinal [x]   []     Genitourinary [x]   []     Neurologic [x]   []     Pulmonary [x]   []       Physical Exam:   Within Normal Limits Abnormal   HEENT [x]    []     Heart [x]   []     Lungs [x]   []     Abdomen [x]   []     Extremities [x]   []       Impression: Left nuclear sclerotic cataract.     Plan: CATARACT PHACO EXTRACTION WITH INTRAOCULAR LENS IMPLANT LEFT (Left)     Zachary Landers MD  12/20/2021

## 2021-12-20 NOTE — ANESTHESIA PREPROCEDURE EVALUATION
Anesthesia Evaluation     Patient summary reviewed and Nursing notes reviewed   no history of anesthetic complications:  NPO Solid Status: > 8 hours  NPO Liquid Status: > 8 hours           Airway   Mallampati: II  TM distance: >3 FB  Neck ROM: full  No difficulty expected  Dental      Pulmonary - normal exam   Cardiovascular - normal exam    PT is on anticoagulation therapy    (+) valvular problems/murmurs murmur, angina, hyperlipidemia,       Neuro/Psych  (+) TIA, dizziness/light headedness,     GI/Hepatic/Renal/Endo    (+) obesity,  GERD well controlled,      Musculoskeletal     (+) arthralgias, back pain, chronic pain, myalgias,   Abdominal   (+) obese,    Substance History      OB/GYN          Other   arthritis,                        Anesthesia Plan    ASA 3     MAC   (Risks and benefits discussed including risk of aspiration, recall and dental damage. All patient questions answered.    Will continue with plan of care.)  intravenous induction     Anesthetic plan, all risks, benefits, and alternatives have been provided, discussed and informed consent has been obtained with: patient.    Plan discussed with CRNA.

## 2021-12-20 NOTE — OP NOTE
OPERATIVE NOTE    Date of Procedure: 12/20/2021  Patient Name: Stephanie Jean  Patient MRN: [id number]  YOB: 1955     Preoperative Diagnosis: Left nuclear sclerotic cataract.     Postoperative Diagnosis: Left nuclear sclerotic cataract.     Procedure Performed: Phacoemulsification with implantation of a  foldable posterior chamber intraocular lens, Left eye.     Surgeon: Zachary Landers MD     Anesthesia:  Monitored Anesthesia Care (MAC)      Brief History and Indication: The patient presents with a history of past progressive loss of vision.  Patient was diagnosed with cataract and requests removal for increased ability to read and see.     Operation Description: The patient was taken to the OR and prepped and draped in the usual sterile ophthalmic fashion. A lid speculum was placed in the eye.  A #75 blade was then used to make a stab incision two o’clock hours from the intended temporal clear cornea groove. The anterior chamber was then inflated with a Viscoelastic. A metal microkeratome blade was then used to enter the anterior chamber at the temporal clear cornea site. A three level tunnel incision was made. A curvilinear capsulorrhexis was then performed with a bent cystotome needle and capsulorrhexis forceps.  BSS on a 30 gauge bent cannula was used to hydro-dissect, and hydro-delineate the lens. Good fluid waves and hydro-delineation were noted. Phacoemulsification was then used to remove nuclear material without complications. The residual cortical and lenticular material was then removed with irrigation and aspiration. Viscoelastics were then used to inflate the bag in a soft shell technique. A PCIOL was injected into the bag. Post-implantation, there were no rents or tears in the bag and the lens was noted to be stable and centered. The residual Viscoelastic was then removed with irrigation and aspiration.  The wound was checked and found to be without leaks. Therefore a Polydex ointment  and one drop of a Prednisilone eye drop was placed in the eye.     Implant Information:   Implant Name Type Inv. Item Serial No.  Lot No. LRB No. Used Action   LENS ACRYSOF IQ SA60WF W/ULTRASERT 6X13MM ACU0T0 19.5 - [id number] Implant LENS ACRYSOF IQ SA60WF W/ULTRASERT 6X13MM ACU0T0 19.5  PRANAY [id number] Left 1 Implanted       Complications: None     [x]   Changed to complex procedure due to: []  hypermature, white cataract. Blue dye was used. [x]   small iris. A Malyugin Ring was used.    Discharge and Condition  The patient was transported to same day surgery in excellent condition and scheduled for follow-up tomorrow morning. The patient was given instructions on use of eye drops for the operative eye and was specifically instructed to call Dr. Landers at his office or home for any nausea, vomiting, headache, decreased visual acuity, or pain, or if the patient had any general concerns.    Zachary Landers MD  12/20/2021

## 2021-12-20 NOTE — ANESTHESIA POSTPROCEDURE EVALUATION
Patient: Stephanie Jean    Procedure Summary     Date: 12/20/21 Room / Location: [facility] OR 1 /  [facility] OR    Anesthesia Start: 1140 Anesthesia Stop:     Procedure: CATARACT PHACO EXTRACTION WITH INTRAOCULAR LENS IMPLANT LEFT COMPLICATED WITH MALYUGIN RING (Left Eye) Diagnosis:       Nuclear sclerotic cataract of left eye      (Nuclear sclerotic cataract of left eye [H25.12])    Surgeons: Zachary Landers MD Provider: Tevin Carrizales CRNA    Anesthesia Type: MAC ASA Status: 3          Anesthesia Type: MAC    Vitals  Vitals Value Taken Time   /66 12/20/21 1229   Temp 98 °F (36.7 °C) 12/20/21 1208   Pulse 73 12/20/21 1229   Resp 16 12/20/21 1229   SpO2 99 % 12/20/21 1229           Post Anesthesia Care and Evaluation    Patient location during evaluation: bedside  Patient participation: complete - patient participated  Level of consciousness: awake  Pain score: 0  Pain management: adequate  Airway patency: patent  Anesthetic complications: No anesthetic complications  PONV Status: controlled  Cardiovascular status: acceptable and stable  Respiratory status: acceptable and room air  Hydration status: acceptable

## 2021-12-20 NOTE — DISCHARGE INSTRUCTIONS
Post Operative Cataract Instructions     Left Eye  POST OPERATIVE INSTRUCTIONS  • You have received anesthesia today. DO NOT drive, drink alcohol, sign legal documents.   • After surgery, your eye will not hurt. It may feel scratchy, sticky or uncomfortable. Your eye will be sensitive to light.  • Most people see better 1-3 days after the procedure, but it could take 3 weeks to get the full benefits and reach your visual potential. If your vision is blurry for a few days it is normal, and means you may have swelling outside or inside the eye. For some patients, a bubble is placed and there will be blurriness.   • You should receive a post-op kit with tape and an eye shield. Wear the shield for the first 3 nights after surgery to keep you from rubbing the eye.  • You may wear glasses or sunglasses during the day.  You must keep eye protected at all times.  • Most people are able to return to their normal routine 1-3 days after surgery, however, due to the brain adjusting to your new vision you may have trouble judging distances and want to be careful when driving and going up and downstairs.   • You can shower and wash your hair the day after surgery. Keep water, shampoo, hair spray and shaving lotion out of the eye, especially for the first week.   • If eyes become stuck together after surgery you may soak with warm cloth.  • During the first week, you should AVOID:   1. Rubbing or putting pressure on your eye.  2. Eye make-up, face cream or lotion, hair coloring or perms  3. Strenuous activities, such as running or lifting weights, as to avoid sweat from getting in the eye. Avoid swimming, hot tubs, fumes or pollo conditions.   4. Sleeping on operative side.  5. Excessive sneezing or coughing.  6. Hanging the head down for periods of time. Keep your head above your heart.    Some discomfort and blurred vision after surgery are normal, but if you have any unusual pain, swelling, bleeding or sudden decrease in  vision, contact our office immediately.     Emergency assistance is available at any time by calling:    Dr.Mark Leesa Landers  [phone]    If unable to reach call [facility] @ [phone]    You have been prescribed an eye drop to use after surgery, please follow these instructions and bring eye drops and instructions with you to post op appointment:    Prednisolone Acetate: Shake well before use    USE 1 DROP 4 (FOUR) TIMES A DAY FOR 1 WEEK THEN STARTING WEEK 2, ONLY USE 2 (TWO) TIMES A DAY UNTIL YOU RUN OUT OF DROPS.     PLACE A BLANCA IN THE DAY COLUMN EACH TIME YOU USE TO KEEP ON SCHEDULE    WEEK 1-USE 4  (FOUR) TIMES A DAY DAY 1  []   []    []   []     DAY 2  []   []    []   []  DAY 3  []   []    []   []  DAY 4  []   []    []   []  DAY 5  []   []    []   []  DAY 6  []   []    []   []  DAY 7  []   []    []   []      WEEK 2-USE 2 (TWO)  TIMES A DAY DAY 1  []   []  DAY 2  []   []  DAY 3  []   []  DAY 4  []   []  DAY 5  []   []  DAY 6  []   []  DAY 7  []   []      WEEK 3-USE 2 (TWO) TIMES A DAY DAY 1  []   []  DAY 2  []   []  DAY 3  []   []  DAY 4  []   []  DAY 5  []   []  DAY 6  []   []  DAY 7  []   []      WEEK 4-USE 2 (TWO)TIMES A DAY DAY 1  []   []  DAY 2  []   []  DAY 3  []   []  DAY 4  []   []  DAY 5  []   []  DAY 6  []   []  DAY 7  []   []        Please follow all post op instructions and follow up appointment time from your physician's office included in your discharge packet.  .   No pushing, pulling, tugging,  heavy lifting, or strenuous activity.  No major decision making, driving, or drinking alcoholic beverages for 24 hours. ( due to the medications you have  received)  Always use good hand hygiene/washing techniques.  NO driving while taking pain medications.    * if you have an incision:  Check your incision area every day for signs of infection.   Check for:  * more redness, swelling, or pain  *more fluid or  blood  *warmth  *pus or bad smell  To assist you in voiding:  Drink plenty of fluids  Listen to running water while attempting to void.    If you are unable to urinate and you have an uncomfortable urge to void or it has been   6 hours since you were discharged, return to the Emergency Room

## 2021-12-22 NOTE — ANESTHESIA POSTPROCEDURE EVALUATION
Patient: Stephanie Jean    Procedure Summary     Date: 12/20/21 Room / Location: [facility] OR 1 /  [facility] OR    Anesthesia Start: 1140 Anesthesia Stop: 1205    Procedure: CATARACT PHACO EXTRACTION WITH INTRAOCULAR LENS IMPLANT LEFT COMPLICATED WITH MALYUGIN RING (Left Eye) Diagnosis:       Nuclear sclerotic cataract of left eye      (Nuclear sclerotic cataract of left eye [H25.12])    Surgeons: Zachary Landers MD Provider: Tevin Carrizales CRNA    Anesthesia Type: MAC ASA Status: 3          Anesthesia Type: MAC    Vitals  Vitals Value Taken Time   /66 12/20/21 1229   Temp 98 °F (36.7 °C) 12/20/21 1208   Pulse 73 12/20/21 1229   Resp 16 12/20/21 1229   SpO2 99 % 12/20/21 1229           Post Anesthesia Care and Evaluation    Patient location during evaluation: bedside  Patient participation: complete - patient participated  Level of consciousness: awake  Pain score: 0  Pain management: adequate  Airway patency: patent  Anesthetic complications: No anesthetic complications  PONV Status: controlled  Cardiovascular status: acceptable and stable  Respiratory status: acceptable and room air  Hydration status: acceptable

## 2021-12-29 ENCOUNTER — APPOINTMENT (OUTPATIENT)
Dept: ULTRASOUND IMAGING | Facility: HOSPITAL | Age: 66
End: 2021-12-29

## 2022-01-03 ENCOUNTER — HOSPITAL ENCOUNTER (OUTPATIENT)
Facility: HOSPITAL | Age: 67
Setting detail: HOSPITAL OUTPATIENT SURGERY
Discharge: HOME | End: 2022-01-03
Attending: OPHTHALMOLOGY | Admitting: OPHTHALMOLOGY

## 2022-01-03 ENCOUNTER — ANESTHESIA EVENT (OUTPATIENT)
Dept: PERIOP | Facility: HOSPITAL | Age: 67
End: 2022-01-03

## 2022-01-03 ENCOUNTER — ANESTHESIA (OUTPATIENT)
Dept: PERIOP | Facility: HOSPITAL | Age: 67
End: 2022-01-03

## 2022-01-03 VITALS
DIASTOLIC BLOOD PRESSURE: 64 MMHG | SYSTOLIC BLOOD PRESSURE: 126 MMHG | RESPIRATION RATE: 18 BRPM | HEIGHT: 62 IN | BODY MASS INDEX: 25.76 KG/M2 | TEMPERATURE: 97.6 F | OXYGEN SATURATION: 97 % | WEIGHT: 140 LBS | HEART RATE: 64 BPM

## 2022-01-03 DIAGNOSIS — H25.11 NUCLEAR SCLEROTIC CATARACT OF RIGHT EYE: ICD-10-CM

## 2022-01-03 PROCEDURE — 25010000002 PROPOFOL 10 MG/ML EMULSION: Performed by: NURSE ANESTHETIST, CERTIFIED REGISTERED

## 2022-01-03 PROCEDURE — V2632 POST CHMBR INTRAOCULAR LENS: HCPCS | Performed by: OPHTHALMOLOGY

## 2022-01-03 DEVICE — LENS ACRYSOF IQ SA60WF W/ULTRASERT 6X13MM ACU0T0 20.5: Type: IMPLANTABLE DEVICE | Site: POSTERIOR CHAMBER | Status: FUNCTIONAL

## 2022-01-03 RX ORDER — PROPOFOL 10 MG/ML
VIAL (ML) INTRAVENOUS AS NEEDED
Status: DISCONTINUED | OUTPATIENT
Start: 2022-01-03 | End: 2022-01-03 | Stop reason: SURG

## 2022-01-03 RX ORDER — TETRACAINE HYDROCHLORIDE 5 MG/ML
1 SOLUTION OPHTHALMIC SEE ADMIN INSTRUCTIONS
Status: COMPLETED | OUTPATIENT
Start: 2022-01-03 | End: 2022-01-03

## 2022-01-03 RX ORDER — TETRACAINE HYDROCHLORIDE 5 MG/ML
SOLUTION OPHTHALMIC AS NEEDED
Status: DISCONTINUED | OUTPATIENT
Start: 2022-01-03 | End: 2022-01-03 | Stop reason: HOSPADM

## 2022-01-03 RX ORDER — ACETAZOLAMIDE 500 MG/1
500 CAPSULE, EXTENDED RELEASE ORAL ONCE
Status: COMPLETED | OUTPATIENT
Start: 2022-01-03 | End: 2022-01-03

## 2022-01-03 RX ORDER — PREDNISOLONE ACETATE 10 MG/ML
SUSPENSION/ DROPS OPHTHALMIC AS NEEDED
Status: DISCONTINUED | OUTPATIENT
Start: 2022-01-03 | End: 2022-01-03 | Stop reason: HOSPADM

## 2022-01-03 RX ORDER — BALANCED SALT SOLUTION 6.4; .75; .48; .3; 3.9; 1.7 MG/ML; MG/ML; MG/ML; MG/ML; MG/ML; MG/ML
SOLUTION OPHTHALMIC AS NEEDED
Status: DISCONTINUED | OUTPATIENT
Start: 2022-01-03 | End: 2022-01-03 | Stop reason: HOSPADM

## 2022-01-03 RX ORDER — SODIUM CHLORIDE, SODIUM LACTATE, POTASSIUM CHLORIDE, CALCIUM CHLORIDE 600; 310; 30; 20 MG/100ML; MG/100ML; MG/100ML; MG/100ML
1000 INJECTION, SOLUTION INTRAVENOUS CONTINUOUS
Status: DISCONTINUED | OUTPATIENT
Start: 2022-01-03 | End: 2022-01-03 | Stop reason: HOSPADM

## 2022-01-03 RX ORDER — PREDNISOLONE ACETATE 10 MG/ML
SUSPENSION/ DROPS OPHTHALMIC
Qty: 2 ML | Refills: 0
Start: 2022-01-03 | End: 2022-09-21

## 2022-01-03 RX ORDER — LIDOCAINE HYDROCHLORIDE 20 MG/ML
INJECTION, SOLUTION INTRAVENOUS AS NEEDED
Status: DISCONTINUED | OUTPATIENT
Start: 2022-01-03 | End: 2022-01-03 | Stop reason: SURG

## 2022-01-03 RX ORDER — LIDOCAINE HYDROCHLORIDE 40 MG/ML
INJECTION, SOLUTION RETROBULBAR; TOPICAL AS NEEDED
Status: DISCONTINUED | OUTPATIENT
Start: 2022-01-03 | End: 2022-01-03 | Stop reason: HOSPADM

## 2022-01-03 RX ORDER — KETAMINE HYDROCHLORIDE 50 MG/ML
INJECTION, SOLUTION, CONCENTRATE INTRAMUSCULAR; INTRAVENOUS AS NEEDED
Status: DISCONTINUED | OUTPATIENT
Start: 2022-01-03 | End: 2022-01-03 | Stop reason: SURG

## 2022-01-03 RX ORDER — SODIUM CHLORIDE 0.9 % (FLUSH) 0.9 %
10 SYRINGE (ML) INJECTION EVERY 12 HOURS SCHEDULED
Status: DISCONTINUED | OUTPATIENT
Start: 2022-01-03 | End: 2022-01-03 | Stop reason: HOSPADM

## 2022-01-03 RX ORDER — SODIUM CHLORIDE 0.9 % (FLUSH) 0.9 %
1-10 SYRINGE (ML) INJECTION AS NEEDED
Status: DISCONTINUED | OUTPATIENT
Start: 2022-01-03 | End: 2022-01-03 | Stop reason: HOSPADM

## 2022-01-03 RX ORDER — NEOMYCIN SULFATE, POLYMYXIN B SULFATE, AND DEXAMETHASONE 3.5; 10000; 1 MG/G; [USP'U]/G; MG/G
OINTMENT OPHTHALMIC AS NEEDED
Status: DISCONTINUED | OUTPATIENT
Start: 2022-01-03 | End: 2022-01-03 | Stop reason: HOSPADM

## 2022-01-03 RX ORDER — PREDNISOLONE ACETATE 10 MG/ML
1 SUSPENSION/ DROPS OPHTHALMIC SEE ADMIN INSTRUCTIONS
Status: DISCONTINUED | OUTPATIENT
Start: 2022-01-03 | End: 2022-01-03 | Stop reason: HOSPADM

## 2022-01-03 RX ORDER — CYCLOPENT/TROPIC/PHEN/KETR/WAT 1%-1%-2.5%
1 DROPS (EA) OPHTHALMIC (EYE)
Status: COMPLETED | OUTPATIENT
Start: 2022-01-03 | End: 2022-01-03

## 2022-01-03 RX ADMIN — Medication 1 DROP: at 11:27

## 2022-01-03 RX ADMIN — LIDOCAINE HYDROCHLORIDE 50 MG: 20 INJECTION, SOLUTION INTRAVENOUS at 12:04

## 2022-01-03 RX ADMIN — PROPOFOL 50 MG: 10 INJECTION, EMULSION INTRAVENOUS at 12:08

## 2022-01-03 RX ADMIN — PROPOFOL 50 MG: 10 INJECTION, EMULSION INTRAVENOUS at 12:04

## 2022-01-03 RX ADMIN — TETRACAINE HYDROCHLORIDE 1 DROP: 5 SOLUTION OPHTHALMIC at 11:21

## 2022-01-03 RX ADMIN — PROPOFOL 200 MG: 10 INJECTION, EMULSION INTRAVENOUS at 12:18

## 2022-01-03 RX ADMIN — SODIUM CHLORIDE, POTASSIUM CHLORIDE, SODIUM LACTATE AND CALCIUM CHLORIDE 1000 ML: 600; 310; 30; 20 INJECTION, SOLUTION INTRAVENOUS at 11:31

## 2022-01-03 RX ADMIN — Medication 1 DROP: at 11:32

## 2022-01-03 RX ADMIN — ACETAZOLAMIDE 500 MG: 500 CAPSULE, EXTENDED RELEASE ORAL at 12:28

## 2022-01-03 RX ADMIN — Medication 1 DROP: at 11:22

## 2022-01-03 RX ADMIN — TETRACAINE HYDROCHLORIDE 1 DROP: 5 SOLUTION OPHTHALMIC at 11:20

## 2022-01-03 RX ADMIN — KETAMINE HYDROCHLORIDE 20 MG: 50 INJECTION, SOLUTION INTRAMUSCULAR; INTRAVENOUS at 12:04

## 2022-01-03 NOTE — OP NOTE
OPERATIVE NOTE    Date of Procedure: 1/3/2022  Patient Name: Stephanie Jean  Patient MRN: [id number]  YOB: 1955     Preoperative Diagnosis: Right nuclear sclerotic cataract.     Postoperative Diagnosis: Right nuclear sclerotic cataract.     Procedure Performed: Phacoemulsification with implantation of a  foldable posterior chamber intraocular lens, Right eye.     Surgeon: Zachary Landers MD     Anesthesia:  Monitored Anesthesia Care (MAC)      Brief History and Indication: The patient presents with a history of past progressive loss of vision.  Patient was diagnosed with cataract and requests removal for increased ability to read and see.     Operation Description: The patient was taken to the OR and prepped and draped in the usual sterile ophthalmic fashion. A lid speculum was placed in the eye.  A #75 blade was then used to make a stab incision two o’clock hours from the intended temporal clear cornea groove. The anterior chamber was then inflated with a Viscoelastic. A metal microkeratome blade was then used to enter the anterior chamber at the temporal clear cornea site. A three level tunnel incision was made. A curvilinear capsulorrhexis was then performed with a bent cystotome needle and capsulorrhexis forceps.  BSS on a 30 gauge bent cannula was used to hydro-dissect, and hydro-delineate the lens. Good fluid waves and hydro-delineation were noted. Phacoemulsification was then used to remove nuclear material without complications. The residual cortical and lenticular material was then removed with irrigation and aspiration. Viscoelastics were then used to inflate the bag in a soft shell technique. A PCIOL was injected into the bag. Post-implantation, there were no rents or tears in the bag and the lens was noted to be stable and centered. The residual Viscoelastic was then removed with irrigation and aspiration.  The wound was checked and found to be without leaks. Therefore a Polydex ointment  and one drop of a Prednisilone eye drop was placed in the eye.     Implant Information:   Implant Name Type Inv. Item Serial No.  Lot No. LRB No. Used Action   LENS ACRYSOF IQ SA60WF W/ULTRASERT 6X13MM ACU0T0 20.5 - [id number] - [id number] Implant LENS ACRYSOF IQ SA60WF W/ULTRASERT 6X13MM ACU0T0 20.5 [id number] PRANAY  Right 1 Implanted       Complications: None     []   Changed to complex procedure due to: []  hypermature, white cataract. Blue dye was used. []   small iris. A Malyugin Ring was used.    Discharge and Condition  The patient was transported to same day surgery in excellent condition and scheduled for follow-up tomorrow morning. The patient was given instructions on use of eye drops for the operative eye and was specifically instructed to call Dr. Landers at his office or home for any nausea, vomiting, headache, decreased visual acuity, or pain, or if the patient had any general concerns.    Zachary Landers MD  1/3/2022

## 2022-01-03 NOTE — H&P
[facility]         History and Physical    Patient Name: Stephanie Jean  MRN: [id number]  : 1955  Gender: female     HPI: Patient complaint of PPLOV Right eye diagnosed with cataract. Patient requests PHACO PCIOL for Increase of VA/ADL.    History:    Past Medical History:   Diagnosis Date   • Arthritis    • Colon polyps    • Dysphagia    • Elevated cholesterol    • Family history of colon cancer    • Fibromyalgia    • GERD (gastroesophageal reflux disease)    • Heart murmur    • Hoarseness    • Hyperlipidemia    • Lupus (HCC)    • Marijuana use     Daily    • Pulmonary nodule    • Sjogren's disease (HCC)        Past Surgical History:   Procedure Laterality Date   • APPENDECTOMY     • CATARACT EXTRACTION W/ INTRAOCULAR LENS IMPLANT Left 2021    Procedure: CATARACT PHACO EXTRACTION WITH INTRAOCULAR LENS IMPLANT LEFT COMPLICATED WITH MALYUGIN RING;  Surgeon: Zachary Landers MD;  Location: [facility] OR;  Service: Ophthalmology;  Laterality: Left;   • COLONOSCOPY     • ENDOSCOPY     • ENDOSCOPY N/A 2020    Procedure: ESOPHAGOGASTRODUODENOSCOPY;  Surgeon: Chaparro Marrero MD;  Location: [facility] ENDOSCOPY;  Service: Gastroenterology;  Laterality: N/A;   • HYSTERECTOMY         Social History     Socioeconomic History   • Marital status:    Tobacco Use   • Smoking status: Former Smoker     Packs/day: 1.00     Years: 40.00     Pack years: 40.00     Types: Cigarettes     Quit date:      Years since quittin.0   • Smokeless tobacco: Never Used   Substance and Sexual Activity   • Alcohol use: No   • Drug use: Yes     Frequency: 7.0 times per week     Types: Marijuana     Comment: OCCASIONALLY   • Sexual activity: Defer       Family History   Problem Relation Age of Onset   • Hypertension Mother    • Aneurysm Mother    • Heart disease Mother    • Emphysema Father    • Ulcerative colitis Brother    • Colon cancer Maternal Uncle    • Cirrhosis Neg Hx    • Liver  disease Neg Hx    • Liver cancer Neg Hx    • Crohn's disease Neg Hx        Prior to Admission Medications:  Medications Prior to Admission   Medication Sig Dispense Refill Last Dose   • Ascorbic Acid (VITAMIN C PO) Take  by mouth.   Past Week at Unknown time   • aspirin 81 MG EC tablet Take 1 tablet by mouth Daily. 30 tablet 0 1/2/2022 at 0800   • atorvastatin (LIPITOR) 80 MG tablet Take 1 tablet by mouth Every Night. 30 tablet 0 1/2/2022 at 2100   • Calcium Citrate-Vitamin D (JENNIFER-CITRATE PLUS VITAMIN D PO) Take  by mouth.   1/3/2022 at 0800   • Naloxegol Oxalate (Movantik) 25 MG tablet Take 1 tablet by mouth Every Morning. 30 tablet 2 1/3/2022 at 0800   • polyethylene glycol (MiraLax) 17 GM/SCOOP powder Take 17 g by mouth Daily. 510 g 2 Past Month at Unknown time   • traMADol (ULTRAM) 50 MG tablet Take 50 mg by mouth 2 (Two) Times a Day.   Past Week at Unknown time   • baclofen (LIORESAL) 10 MG tablet Take 10 mg by mouth 3 (Three) Times a Day.   More than a month at Unknown time   • BIOTIN PO Take  by mouth.   More than a month at Unknown time   • docusate sodium (COLACE) 100 MG capsule TAKE 1 CAPSULE BY MOUTH TWICE DAILY 60 capsule 2 More than a month at Unknown time   • DULoxetine (Cymbalta) 60 MG capsule Take 60 mg by mouth Daily.      • gabapentin (NEURONTIN) 300 MG capsule Take 300 mg by mouth 3 (Three) Times a Day.   More than a month at Unknown time   • leflunomide (ARAVA) 10 MG tablet Take 10 mg by mouth Daily.      • meclizine (ANTIVERT) 12.5 MG tablet Take 1 tablet by mouth 3 (Three) Times a Day As Needed for Dizziness. 30 tablet 0 Unknown at Unknown time   • metoprolol succinate XL (TOPROL-XL) 25 MG 24 hr tablet Take 1 tablet by mouth Daily. 30 tablet 11    • nitroglycerin (NITROSTAT) 0.4 MG SL tablet    Unknown at Unknown time   • promethazine (PHENERGAN) 12.5 MG tablet Take 1 tablet by mouth Every 8 (Eight) Hours As Needed for Nausea or Vomiting. 30 tablet 1 More than a month at Unknown time   •  VITAMIN E PO Take  by mouth.   More than a month at Unknown time       Allergies:  Allergies   Allergen Reactions   • Penicillins Rash        Vitals: Temp:  [97.6 °F (36.4 °C)] 97.6 °F (36.4 °C)  Heart Rate:  [76] 76  Resp:  [18] 18  BP: (121)/(71) 121/71    Review of Systems:   Within Normal Limits Abnormal   HEENT [x]    []     Cardiovascular [x]   []     Gastrointestinal [x]   []     Genitourinary [x]   []     Neurologic [x]   []     Pulmonary [x]   []       Physical Exam:   Within Normal Limits Abnormal   HEENT [x]    []     Heart [x]   []     Lungs [x]   []     Abdomen [x]   []     Extremities [x]   []       Impression: Right nuclear sclerotic cataract.     Plan: CATARACT PHACO EXTRACTION WITH INTRAOCULAR LENS IMPLANT RIGHT (Right)     Zacahry Landers MD  1/3/2022

## 2022-01-03 NOTE — DISCHARGE INSTRUCTIONS
Post Operative Cataract Instructions     Right Eye  POST OPERATIVE INSTRUCTIONS  • You have received anesthesia today. DO NOT drive, drink alcohol, sign legal documents.   • After surgery, your eye will not hurt. It may feel scratchy, sticky or uncomfortable. Your eye will be sensitive to light.  • Most people see better 1-3 days after the procedure, but it could take 3 weeks to get the full benefits and reach your visual potential. If your vision is blurry for a few days it is normal, and means you may have swelling outside or inside the eye. For some patients, a bubble is placed and there will be blurriness.   • You should receive a post-op kit with tape and an eye shield. Wear the shield for the first 3 nights after surgery to keep you from rubbing the eye.  • You may wear glasses or sunglasses during the day.  You must keep eye protected at all times.  • Most people are able to return to their normal routine 1-3 days after surgery, however, due to the brain adjusting to your new vision you may have trouble judging distances and want to be careful when driving and going up and downstairs.   • You can shower and wash your hair the day after surgery. Keep water, shampoo, hair spray and shaving lotion out of the eye, especially for the first week.   • If eyes become stuck together after surgery you may soak with warm cloth.  • During the first week, you should AVOID:   1. Rubbing or putting pressure on your eye.  2. Eye make-up, face cream or lotion, hair coloring or perms  3. Strenuous activities, such as running or lifting weights, as to avoid sweat from getting in the eye. Avoid swimming, hot tubs, fumes or pollo conditions.   4. Sleeping on operative side.  5. Excessive sneezing or coughing.  6. Hanging the head down for periods of time. Keep your head above your heart.    Some discomfort and blurred vision after surgery are normal, but if you have any unusual pain, swelling, bleeding or sudden decrease in  vision, contact our office immediately.     Emergency assistance is available at any time by calling:    Dr.Mark Leesa Landers  [phone]    If unable to reach call [facility] @ [phone]    You have been prescribed an eye drop to use after surgery, please follow these instructions and bring eye drops and instructions with you to post op appointment:    Prednisolone Acetate: Shake well before use    USE 1 DROP 4 (FOUR) TIMES A DAY FOR 1 WEEK THEN STARTING WEEK 2, ONLY USE 2 (TWO) TIMES A DAY UNTIL YOU RUN OUT OF DROPS.     PLACE A BLANCA IN THE DAY COLUMN EACH TIME YOU USE TO KEEP ON SCHEDULE    WEEK 1-USE 4  (FOUR) TIMES A DAY DAY 1  []   []    []   []     DAY 2  []   []    []   []  DAY 3  []   []    []   []  DAY 4  []   []    []   []  DAY 5  []   []    []   []  DAY 6  []   []    []   []  DAY 7  []   []    []   []      WEEK 2-USE 2 (TWO)  TIMES A DAY DAY 1  []   []  DAY 2  []   []  DAY 3  []   []  DAY 4  []   []  DAY 5  []   []  DAY 6  []   []  DAY 7  []   []      WEEK 3-USE 2 (TWO) TIMES A DAY DAY 1  []   []  DAY 2  []   []  DAY 3  []   []  DAY 4  []   []  DAY 5  []   []  DAY 6  []   []  DAY 7  []   []      WEEK 4-USE 2 (TWO)TIMES A DAY DAY 1  []   []  DAY 2  []   []  DAY 3  []   []  DAY 4  []   []  DAY 5  []   []  DAY 6  []   []  DAY 7  []   []      Please follow all post op instructions and follow up appointment time from your physician's office included in your discharge packet.  .   No pushing, pulling, tugging,  heavy lifting, or strenuous activity.  No major decision making, driving, or drinking alcoholic beverages for 24 hours. ( due to the medications you have  received)  Always use good hand hygiene/washing techniques.  NO driving while taking pain medications.    * if you have an incision:  Check your incision area every day for signs of infection.   Check for:  * more redness, swelling, or pain  *more fluid or  blood  *warmth  *pus or bad smell  To assist you in voiding:  Drink plenty of fluids  Listen to running water while attempting to void.    If you are unable to urinate and you have an uncomfortable urge to void or it has been   6 hours since you were discharged, return to the Emergency Room

## 2022-01-03 NOTE — ANESTHESIA PREPROCEDURE EVALUATION
Anesthesia Evaluation     Patient summary reviewed and Nursing notes reviewed   no history of anesthetic complications:  NPO Solid Status: > 8 hours  NPO Liquid Status: > 8 hours           Airway   Mallampati: II  TM distance: >3 FB  Neck ROM: full  No difficulty expected  Dental - normal exam     Pulmonary - normal exam   (+) a smoker Former,   Cardiovascular - normal exam    PT is on anticoagulation therapy    (+) valvular problems/murmurs murmur, angina, hyperlipidemia,       Neuro/Psych  (+) TIA, dizziness/light headedness,     GI/Hepatic/Renal/Endo    (+) obesity,  GERD well controlled,      Musculoskeletal     (+) arthralgias, back pain, chronic pain, myalgias,   Abdominal   (+) obese,    Substance History   (+) drug use (marijuana use daily )     OB/GYN          Other   arthritis, autoimmune disease lupus and Sjogren syndrome,                        Anesthesia Plan    ASA 3     MAC   (Risks and benefits discussed including risk of aspiration, recall and dental damage. All patient questions answered.    Will continue with plan of care.)  intravenous induction     Anesthetic plan, all risks, benefits, and alternatives have been provided, discussed and informed consent has been obtained with: patient.    Plan discussed with CRNA.

## 2022-01-03 NOTE — ANESTHESIA POSTPROCEDURE EVALUATION
Patient: Stephanie Jean    Procedure Summary     Date: 01/03/22 Room / Location: [facility] OR 1 /  [facility]    Anesthesia Start: 1202 Anesthesia Stop: 1220    Procedure: CATARACT PHACO EXTRACTION WITH INTRAOCULAR LENS IMPLANT RIGHT (Right Eye) Diagnosis:       Nuclear sclerotic cataract of right eye      (Nuclear sclerotic cataract of right eye [H25.11])    Surgeons: Zachary Landers MD Provider: Judith Ward CRNA    Anesthesia Type: MAC ASA Status: 3          Anesthesia Type: MAC    Vitals  Vitals Value Taken Time   /64 01/03/22 1242   Temp     Pulse 64 01/03/22 1242   Resp 18 01/03/22 1242   SpO2 97 % 01/03/22 1242           Post Anesthesia Care and Evaluation    Patient location during evaluation: PHASE II  Patient participation: complete - patient participated  Level of consciousness: awake and alert  Pain score: 0  Pain management: satisfactory to patient  Airway patency: patent  Anesthetic complications: No anesthetic complications  PONV Status: none  Cardiovascular status: acceptable and stable  Respiratory status: acceptable  Hydration status: acceptable

## 2022-02-28 RX ORDER — PANTOPRAZOLE SODIUM 20 MG/1
TABLET, DELAYED RELEASE ORAL
Qty: 30 TABLET | Refills: 2 | Status: SHIPPED | OUTPATIENT
Start: 2022-02-28 | End: 2022-08-17

## 2022-07-20 RX ORDER — DOCUSATE SODIUM 100 MG/1
CAPSULE, LIQUID FILLED ORAL
Qty: 60 CAPSULE | Refills: 2 | Status: SHIPPED | OUTPATIENT
Start: 2022-07-20 | End: 2022-10-27 | Stop reason: SDUPTHER

## 2022-08-07 ENCOUNTER — HOSPITAL ENCOUNTER (EMERGENCY)
Facility: HOSPITAL | Age: 67
Discharge: HOME | End: 2022-08-07
Attending: HOSPITALIST
Payer: COMMERCIAL

## 2022-08-07 VITALS
TEMPERATURE: 97.6 F | WEIGHT: 140 LBS | OXYGEN SATURATION: 96 % | HEIGHT: 66 IN | HEART RATE: 88 BPM | RESPIRATION RATE: 10 BRPM | SYSTOLIC BLOOD PRESSURE: 155 MMHG | BODY MASS INDEX: 22.5 KG/M2 | DIASTOLIC BLOOD PRESSURE: 87 MMHG

## 2022-08-07 DIAGNOSIS — T67.9XXA HEAT EXPOSURE, INITIAL ENCOUNTER: Primary | ICD-10-CM

## 2022-08-07 DIAGNOSIS — R79.89 ELEVATED SERUM CREATININE: ICD-10-CM

## 2022-08-07 DIAGNOSIS — E87.6 HYPOKALEMIA: ICD-10-CM

## 2022-08-07 LAB
A/G RATIO: 1.5 (ref 0.8–2)
ALBUMIN SERPL-MCNC: 4.9 G/DL (ref 3.4–4.8)
ALP BLD-CCNC: 199 U/L (ref 25–100)
ALT SERPL-CCNC: 19 U/L (ref 4–36)
ANION GAP SERPL CALCULATED.3IONS-SCNC: 21 MMOL/L (ref 3–16)
AST SERPL-CCNC: 30 U/L (ref 8–33)
BACTERIA: ABNORMAL /HPF
BASOPHILS ABSOLUTE: 0.1 K/UL (ref 0–0.1)
BASOPHILS RELATIVE PERCENT: 0.6 %
BILIRUB SERPL-MCNC: 0.6 MG/DL (ref 0.3–1.2)
BILIRUBIN URINE: ABNORMAL
BLOOD, URINE: NEGATIVE
BUN BLDV-MCNC: 20 MG/DL (ref 6–20)
CALCIUM SERPL-MCNC: 11 MG/DL (ref 8.5–10.5)
CHLORIDE BLD-SCNC: 99 MMOL/L (ref 98–107)
CLARITY: ABNORMAL
CO2: 22 MMOL/L (ref 20–30)
COLOR: ABNORMAL
CREAT SERPL-MCNC: 1.6 MG/DL (ref 0.4–1.2)
EOSINOPHILS ABSOLUTE: 0.1 K/UL (ref 0–0.4)
EOSINOPHILS RELATIVE PERCENT: 1 %
EPITHELIAL CELLS, UA: ABNORMAL /HPF (ref 0–5)
GFR AFRICAN AMERICAN: 39
GFR NON-AFRICAN AMERICAN: 32
GLOBULIN: 3.3 G/DL
GLUCOSE BLD-MCNC: 165 MG/DL (ref 74–106)
GLUCOSE URINE: NEGATIVE MG/DL
GRANULAR CASTS: ABNORMAL /LPF
HCT VFR BLD CALC: 42.1 % (ref 37–47)
HEMOGLOBIN: 13.9 G/DL (ref 11.5–16.5)
HYALINE CASTS: ABNORMAL /LPF
IMMATURE GRANULOCYTES #: 0 K/UL
IMMATURE GRANULOCYTES %: 0.4 % (ref 0–5)
KETONES, URINE: 15 MG/DL
LEUKOCYTE ESTERASE, URINE: NEGATIVE
LYMPHOCYTES ABSOLUTE: 4.2 K/UL (ref 1.5–4)
LYMPHOCYTES RELATIVE PERCENT: 41.3 %
MAGNESIUM: 2.2 MG/DL (ref 1.7–2.4)
MCH RBC QN AUTO: 30.2 PG (ref 27–32)
MCHC RBC AUTO-ENTMCNC: 33 G/DL (ref 31–35)
MCV RBC AUTO: 91.5 FL (ref 80–100)
MICROSCOPIC EXAMINATION: YES
MONOCYTES ABSOLUTE: 0.4 K/UL (ref 0.2–0.8)
MONOCYTES RELATIVE PERCENT: 3.6 %
MUCUS: ABNORMAL /LPF
NEUTROPHILS ABSOLUTE: 5.5 K/UL (ref 2–7.5)
NEUTROPHILS RELATIVE PERCENT: 53.1 %
NITRITE, URINE: NEGATIVE
PDW BLD-RTO: 13 % (ref 11–16)
PH UA: 6 (ref 5–8)
PLATELET # BLD: 259 K/UL (ref 150–400)
PMV BLD AUTO: 11 FL (ref 6–10)
POTASSIUM REFLEX MAGNESIUM: 3.2 MMOL/L (ref 3.4–5.1)
PROTEIN UA: 100 MG/DL
RBC # BLD: 4.6 M/UL (ref 3.8–5.8)
RBC UA: ABNORMAL /HPF (ref 0–4)
SARS-COV-2, NAAT: NOT DETECTED
SODIUM BLD-SCNC: 142 MMOL/L (ref 136–145)
SPECIFIC GRAVITY UA: 1.02 (ref 1–1.03)
TOTAL CK: 209 U/L (ref 26–174)
TOTAL PROTEIN: 8.2 G/DL (ref 6.4–8.3)
TROPONIN: <0.3 NG/ML
URINE REFLEX TO CULTURE: ABNORMAL
URINE TYPE: ABNORMAL
UROBILINOGEN, URINE: 0.2 E.U./DL
WBC # BLD: 10.3 K/UL (ref 4–11)
WBC UA: ABNORMAL /HPF (ref 0–5)

## 2022-08-07 PROCEDURE — 84484 ASSAY OF TROPONIN QUANT: CPT

## 2022-08-07 PROCEDURE — 82550 ASSAY OF CK (CPK): CPT

## 2022-08-07 PROCEDURE — 93005 ELECTROCARDIOGRAM TRACING: CPT

## 2022-08-07 PROCEDURE — 6360000002 HC RX W HCPCS

## 2022-08-07 PROCEDURE — 36415 COLL VENOUS BLD VENIPUNCTURE: CPT

## 2022-08-07 PROCEDURE — 85025 COMPLETE CBC W/AUTO DIFF WBC: CPT

## 2022-08-07 PROCEDURE — 87635 SARS-COV-2 COVID-19 AMP PRB: CPT

## 2022-08-07 PROCEDURE — 99284 EMERGENCY DEPT VISIT MOD MDM: CPT

## 2022-08-07 PROCEDURE — 83735 ASSAY OF MAGNESIUM: CPT

## 2022-08-07 PROCEDURE — 81001 URINALYSIS AUTO W/SCOPE: CPT

## 2022-08-07 PROCEDURE — 80053 COMPREHEN METABOLIC PANEL: CPT

## 2022-08-07 PROCEDURE — 96374 THER/PROPH/DIAG INJ IV PUSH: CPT

## 2022-08-07 PROCEDURE — 2580000003 HC RX 258: Performed by: HOSPITALIST

## 2022-08-07 RX ORDER — ONDANSETRON 2 MG/ML
4 INJECTION INTRAMUSCULAR; INTRAVENOUS ONCE
Status: COMPLETED | OUTPATIENT
Start: 2022-08-07 | End: 2022-08-07

## 2022-08-07 RX ORDER — ONDANSETRON 4 MG/1
4 TABLET, ORALLY DISINTEGRATING ORAL EVERY 4 HOURS PRN
Qty: 10 TABLET | Refills: 0 | Status: SHIPPED | OUTPATIENT
Start: 2022-08-07

## 2022-08-07 RX ORDER — ONDANSETRON 2 MG/ML
INJECTION INTRAMUSCULAR; INTRAVENOUS
Status: COMPLETED
Start: 2022-08-07 | End: 2022-08-07

## 2022-08-07 RX ORDER — 0.9 % SODIUM CHLORIDE 0.9 %
1000 INTRAVENOUS SOLUTION INTRAVENOUS ONCE
Status: COMPLETED | OUTPATIENT
Start: 2022-08-07 | End: 2022-08-07

## 2022-08-07 RX ORDER — POTASSIUM CHLORIDE 20 MEQ/1
20 TABLET, EXTENDED RELEASE ORAL DAILY
Qty: 3 TABLET | Refills: 0 | Status: ON HOLD | OUTPATIENT
Start: 2022-08-07 | End: 2022-10-17

## 2022-08-07 RX ADMIN — ONDANSETRON HYDROCHLORIDE 4 MG: 2 INJECTION, SOLUTION INTRAMUSCULAR; INTRAVENOUS at 11:53

## 2022-08-07 RX ADMIN — ONDANSETRON 4 MG: 2 INJECTION INTRAMUSCULAR; INTRAVENOUS at 11:53

## 2022-08-07 RX ADMIN — SODIUM CHLORIDE 1000 ML: 9 INJECTION, SOLUTION INTRAVENOUS at 11:50

## 2022-08-07 RX ADMIN — SODIUM CHLORIDE 1000 ML: 9 INJECTION, SOLUTION INTRAVENOUS at 13:23

## 2022-08-07 ASSESSMENT — PAIN DESCRIPTION - PAIN TYPE: TYPE: ACUTE PAIN

## 2022-08-07 ASSESSMENT — PAIN SCALES - GENERAL: PAINLEVEL_OUTOF10: 6

## 2022-08-07 ASSESSMENT — PAIN DESCRIPTION - LOCATION: LOCATION: ABDOMEN

## 2022-08-07 ASSESSMENT — PAIN DESCRIPTION - DESCRIPTORS: DESCRIPTORS: CRAMPING

## 2022-08-07 ASSESSMENT — PAIN DESCRIPTION - FREQUENCY: FREQUENCY: CONTINUOUS

## 2022-08-07 NOTE — ED PROVIDER NOTES
[facility] ENCOUNTER      Pt Name: Lucas Reed  MRN: [id number]  [place]: 1955  Date of evaluation: 8/7/2022  Provider: Cyndee Patel, [address] [place] [address]       Chief Complaint   Patient presents with    Nausea    Diarrhea    Abdominal Cramping         HISTORY OF PRESENT ILLNESS  (Location/Symptom, Timing/Onset, Context/Setting, Quality, Duration, Modifying Factors, Severity.)   Lucas Reed is a 61 y.o. female who presents to the emergency department for nausea vomiting diarrhea and some mild abdominal cramping. 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 She states this happened approximate around 1030 or an hour and a half ago. Patient states that she got sick to her stomach felt like she was nauseated and felt like she needed to have a bowel movement. Patient states that she went into the house sit on the toilet she was straining trying to force herself to have a bowel movement when she got lightheaded and dizzy on the toilet. She states she also felt like she had some numbness and tingling around her lips and then also at her fingertips and toes during this time. She denies any actual loss of consciousness but she states she felt like she was about to pass out but never actually did. Patient states that she does not actually have abdominal pain she states he just feels like it is cramping at the lower half like she needs to have a bowel movement. Patient states she does have a history of hypertension she takes lisinopril for this she states she also takes meloxicam for joint pain/arthritis. Nursing notes were reviewed.     REVIEW OFSYSTEMS    (2-9 systems for level 4, 10 or more for level 5)   ROS:  General:  No fevers, no chills, + weakness  Cardiovascular:  No chest pain, no palpitations  Respiratory:  No shortness of breath, no cough, no wheezing  Gastrointestinal:  No pain, +nausea, + vomiting, + diarrhea  Musculoskeletal:  No muscle pain, no joint pain  Skin:  No rash, no easy bruising  Neurologic:  No speech problems, no headache, no extremity weakness  Psychiatric:  No anxiety  Genitourinary:  No dysuria, no hematuria    Except as noted above the remainder of the review of systems was reviewed and negative. PAST MEDICAL HISTORY     Past Medical History:   Diagnosis Date    Sjogren's disease ([facility] [address]          SURGICAL HISTORY       Past Surgical History:   Procedure Laterality Date    HYSTERECTOMY, TOTAL ABDOMINAL           CURRENT MEDICATIONS       Previous Medications    ASPIRIN 81 MG TABLET    Take 81 mg by mouth daily    CYCLOBENZAPRINE (FLEXERIL) 10 MG TABLET    Take 10 mg by mouth 3 times daily    DULOXETINE (CYMBALTA) 60 MG EXTENDED RELEASE CAPSULE    Take 60 mg by mouth daily    PRAVASTATIN (PRAVACHOL) 20 MG TABLET    Take 20 mg by mouth daily    ROPINIROLE (REQUIP) 2 MG TABLET    Take 2 mg by mouth nightly       ALLERGIES     Pcn [penicillins]    FAMILY HISTORY     No family history on file. SOCIAL HISTORY       Social History     Socioeconomic History    Marital status:    Tobacco Use    Smoking status: Every Day     Packs/day: 1.00     Years: 30.00     Pack years: 30.00     Types: Cigarettes   Substance and Sexual Activity    Alcohol use: No    Drug use: No         PHYSICAL EXAM    (up to 7 for level 4, 8 or more for level 5)     ED Triage Vitals   BP Temp Temp src Heart Rate Resp SpO2 Height Weight   08/07/22 1141 -- -- 08/07/22 1141 08/07/22 1141 08/07/22 1141 08/07/22 1140 08/07/22 1140   96/73   92 24 100 % 5' 6\" (1.676 m) 140 lb (63.5 kg)       Physical Exam  General :Patient is awake, alert, oriented, in no acute distress, diaphoretic  HEENT: Pupils are equally round and reactive to light, EOMI, conjunctivae clear. Oral mucosa is moist, no exudate.  Uvula is midline  Cardiac: Heart regular rate, rhythm, no murmurs, rubs, or gallops  Lungs: Lungs are clear to auscultation, there is no wheezing, rhonchi, or rales. There is no use of accessory muscles. Abdomen: Abdomen is soft, nontender, nondistended. There is no firm or pulsatile masses, no rebound rigidity or guarding. Musculoskeletal: No focal muscle deficits are appreciated  Neuro: Motor intact, sensory intact, level of consciousness is normal  Dermatology: Skin is warm and dry  Psych: Mentation is grossly normal, cognition is grossly normal. Affect is appropriate. DIAGNOSTIC RESULTS     EKG: All EKG's are interpreted by the Emergency Department Physician who either signs or Co-signs this chart in the [address] a cardiologist.    The EKG interpreted by me shows sinus rhythm. Borderline right axis deviation. Heart rate is 94 bpm, CT interval is 153, QRS duration is 90, QT is 379 and QTc is 475 ms. No acute T wave inversions concerning for acute myocardial ischemia. No ST elevations concerning for acute myocardial infarction.     RADIOLOGY:   Non-plain film images such as CT, Ultrasound and MRI are read by the radiologist. Plain radiographic images are visualized and preliminarily interpreted by the emergency physician with the below findings:      [] Radiologist's Report Reviewed:  No orders to display         ED BEDSIDE ULTRASOUND:   Performed by ED Physician - none    LABS:    I have reviewed and interpreted all of the currently available lab results from this visit (ifapplicable):  Results for orders placed or performed during the hospital encounter of 08/07/22   COVID-19, Rapid    Specimen: Nasopharyngeal Swab   Result Value Ref Range    SARS-CoV-2, NAAT Not Detected Not Detected   CBC with Auto Differential   Result Value Ref Range    WBC 10.3 4.0 - 11.0 K/uL    RBC 4.60 3.80 - 5.80 M/uL    Hemoglobin 13.9 11.5 - 16.5 g/dL    Hematocrit 42.1 37.0 - 47.0 %    MCV 91.5 80.0 - 100.0 fL    MCH 30.2 27.0 - 32.0 pg    MCHC 33.0 31.0 - 35.0 g/dL    RDW 13.0 11.0 - 16.0 %    Platelets 680 813 - 238 K/uL    MPV 11.0 (H) 6.0 - 10.0 fL    Neutrophils % 53.1 %    Immature Granulocytes % 0.4 0.0 - 5.0 %    Lymphocytes % 41.3 %    Monocytes % 3.6 %    Eosinophils % 1.0 %    Basophils % 0.6 %    Neutrophils Absolute 5.5 2.0 - 7.5 K/uL    Immature Granulocytes # 0.0 K/uL    Lymphocytes Absolute 4.2 (H) 1.5 - 4.0 K/uL    Monocytes Absolute 0.4 0.2 - 0.8 K/uL    Eosinophils Absolute 0.1 0.0 - 0.4 K/uL    Basophils Absolute 0.1 0.0 - 0.1 K/uL   Comprehensive Metabolic Panel w/ Reflex to MG   Result Value Ref Range    Sodium 142 136 - 145 mmol/L    Potassium reflex Magnesium 3.2 (L) 3.4 - 5.1 mmol/L    Chloride 99 98 - 107 mmol/L    CO2 22 20 - 30 mmol/L    Anion Gap 21 (H) 3 - 16    Glucose 165 (H) 74 - 106 mg/dL    BUN 20 6 - 20 mg/dL    Creatinine 1.6 (H) 0.4 - 1.2 mg/dL    GFR Non- 32 (L) >59    GFR  39 (L) >59    Calcium 11.0 (H) 8.5 - 10.5 mg/dL    Total Protein 8.2 6.4 - 8.3 g/dL    Albumin 4.9 (H) 3.4 - 4.8 g/dL    Albumin/Globulin Ratio 1.5 0.8 - 2.0    Total Bilirubin 0.6 0.3 - 1.2 mg/dL    Alkaline Phosphatase 199 (H) 25 - 100 U/L    ALT 19 4 - 36 U/L    AST 30 8 - 33 U/L    Globulin 3.3 Not Established g/dL   Troponin   Result Value Ref Range    Troponin <0.30 <0.30 ng/mL   Urinalysis with Reflex to Culture    Specimen: Urine   Result Value Ref Range    Color, UA Dark yellow Straw/Yellow    Clarity, UA SLCLOUDY Clear    Glucose, Ur Negative Negative mg/dL    Bilirubin Urine MODERATE (A) Negative    Ketones, Urine 15 (A) Negative mg/dL    Specific Gravity, UA 1.025 1.005 - 1.030    Blood, Urine Negative Negative    pH, UA 6.0 5.0 - 8.0    Protein,  (A) Negative mg/dL    Urobilinogen, Urine 0.2 <2.0 E.U./dL    Nitrite, Urine Negative Negative    Leukocyte Esterase, Urine Negative Negative    Microscopic Examination YES     Urine Type Catheter     Urine Reflex to Culture Not Indicated    CK   Result Value Ref Range    Total  (H) 26 - 174 U/L   Magnesium   Result Value Ref Range Magnesium 2.2 1.7 - 2.4 mg/dL   Microscopic Urinalysis   Result Value Ref Range    Mucus, UA 1+ (A) None Seen /LPF    WBC, UA 0-2 0 - 5 /HPF    RBC, UA None seen 0 - 4 /HPF    Epithelial Cells, UA 0-1 0 - 5 /HPF    Bacteria, UA 1+ (A) None Seen /HPF    Hyaline Casts, UA seen (A) None Seen /LPF    Granular Casts, UA seen (A) None Seen /LPF        All other labs were within normal range or not returned as of this dictation. EMERGENCY DEPARTMENT COURSE and DIFFERENTIAL DIAGNOSIS/MDM:   Vitals:    Vitals:    08/07/22 1140 08/07/22 1141   BP:  96/73   Pulse:  92   Resp:  24   SpO2:  100%   Weight: 140 lb (63.5 kg)    Height: 5' 6\" (1.676 m)        MEDICATIONS ADMINISTERED IN ED:  Medications   0.9 % sodium chloride bolus (1,000 mLs IntraVENous New Bag 8/7/22 1323)   ondansetron (ZOFRAN) injection 4 mg (4 mg IntraVENous Given 8/7/22 1153)   0.9 % sodium chloride bolus (0 mLs IntraVENous Stopped 8/7/22 1322)       After initial evaluation examination I did have conversation with the patient about the upcoming plan, treatment possible disposition. Patient advised that we will go ahead check her for COVID since she does have nausea vomiting diarrhea however symptoms do seem to be more vasovagal type presentation. 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 Patient had some nausea vomiting and diarrhea prior to arrival here. Patient even states when asked what brought her in she states that she got too hot. Patient will have CBC, CMP, troponin, UA and a CK performed. We will give her fluid bolus normal saline 1 L if not more. Patient on examination does not actually have any abdominal discomfort to palpation but she does complain some lower cramping with the sensation like she needs to have a bowel movement.   Final disposition will determine once her diagnostic studies been performed reviewed. Rapid COVID screen was negative    Blood work showed white count 10,300, hemoglobin is 13.9, hematocrit 42.1, platelet counts 783. Comprehensive metabolic panel was benign except for potassium 3.2 which was slightly low, glucose of 165, creatinine 1.6 and a BUN of 20. Alkaline phosphatase mildly elevated 199. Troponin was nondetectable less than 0.30. Total CK was mildly elevated at 209 with upper limit being 174. Magnesium normal at 2.2. UA was moderate bilirubin. No culture was indicated. Microscopy showed +1 mucus, 0-2 white cells, no red cells, 0-1 epithelial, +1 bacteria. Patient's diagnostic studies were discussed with her she does state her understanding. Advised of her findings here really the only thing that was truly abnormal was her potassium being slightly low at 3.2 and her creatinine being 1.6 previous creatinine was right at 1. So she does have some mild renal insufficiency. She was given a liter of fluids we will give her a second liter of fluids here. It was discussed with the patient she states her understanding. Advised that we would write her for some nausea medicine and also for some potassium 20 mill equivalents a day for a couple of days. Otherwise patient also discussed with fluid resuscitation with Pedialyte versus Gatorade or Powerade. She states her understanding. Patient states she feels much better at this time.  is present in room during this conversation. Otherwise patient be discharged home in stable condition after her second bolus of fluids has run in. The patient advised that she does need to follow-up with a regular family physician within the next 1 to 2 days for evaluation. She was also given instructions if her symptoms worsens or new symptoms arise she should return back to emergency department for further evaluation work-up.     Is this patient to be included in the SEP-1 Core Measure due to

## 2022-08-07 NOTE — ED NOTES
Pt informed of d/c instructions. Pt verbalizes understanding.       Zander Contreras RN  08/07/22 [id number]

## 2022-08-07 NOTE — ED TRIAGE NOTES
Pt arrives to ED via wheelchair with c/o of nausea, vomiting, and diarrhea. Pt says symptoms began this morning Pt denies sick contact.

## 2022-08-08 ENCOUNTER — APPOINTMENT (OUTPATIENT)
Dept: CT IMAGING | Facility: HOSPITAL | Age: 67
End: 2022-08-08

## 2022-08-08 ENCOUNTER — HOSPITAL ENCOUNTER (INPATIENT)
Facility: HOSPITAL | Age: 67
LOS: 2 days | Discharge: HOME | End: 2022-08-10
Attending: EMERGENCY MEDICINE | Admitting: INTERNAL MEDICINE

## 2022-08-08 DIAGNOSIS — R71.0 DROP IN HEMATOCRIT: ICD-10-CM

## 2022-08-08 DIAGNOSIS — K52.9 COLITIS: ICD-10-CM

## 2022-08-08 DIAGNOSIS — K92.1 BLOODY STOOL: Primary | ICD-10-CM

## 2022-08-08 PROBLEM — D62 ACUTE BLOOD LOSS ANEMIA: Status: ACTIVE | Noted: 2022-08-08

## 2022-08-08 PROBLEM — A09 INFECTIVE COLITIS: Status: ACTIVE | Noted: 2022-08-08

## 2022-08-08 LAB
ABO GROUP BLD: NORMAL
ABO GROUP BLD: NORMAL
ALBUMIN SERPL-MCNC: 3.5 G/DL (ref 3.5–5.2)
ALBUMIN/GLOB SERPL: 1.4 G/DL
ALP SERPL-CCNC: 124 U/L (ref 39–117)
ALT SERPL W P-5'-P-CCNC: 16 U/L (ref 1–33)
ANION GAP SERPL CALCULATED.3IONS-SCNC: 8.6 MMOL/L (ref 5–15)
AST SERPL-CCNC: 20 U/L (ref 1–32)
BASOPHILS # BLD AUTO: 0.03 10*3/MM3 (ref 0–0.2)
BASOPHILS NFR BLD AUTO: 0.3 % (ref 0–1.5)
BILIRUB SERPL-MCNC: 0.5 MG/DL (ref 0–1.2)
BILIRUB UR QL STRIP: NEGATIVE
BLD GP AB SCN SERPL QL: NEGATIVE
BUN SERPL-MCNC: 13 MG/DL (ref 8–23)
BUN/CREAT SERPL: 15.5 (ref 7–25)
CALCIUM SPEC-SCNC: 8.1 MG/DL (ref 8.6–10.5)
CHLORIDE SERPL-SCNC: 105 MMOL/L (ref 98–107)
CLARITY UR: CLEAR
CO2 SERPL-SCNC: 23.4 MMOL/L (ref 22–29)
COLOR UR: YELLOW
CREAT SERPL-MCNC: 0.84 MG/DL (ref 0.57–1)
D-LACTATE SERPL-SCNC: 0.7 MMOL/L (ref 0.5–2)
DEPRECATED RDW RBC AUTO: 43.9 FL (ref 37–54)
EGFRCR SERPLBLD CKD-EPI 2021: 77.7 ML/MIN/1.73
EOSINOPHIL # BLD AUTO: 0.01 10*3/MM3 (ref 0–0.4)
EOSINOPHIL NFR BLD AUTO: 0.1 % (ref 0.3–6.2)
ERYTHROCYTE [DISTWIDTH] IN BLOOD BY AUTOMATED COUNT: 13.2 % (ref 12.3–15.4)
GLOBULIN UR ELPH-MCNC: 2.5 GM/DL
GLUCOSE SERPL-MCNC: 106 MG/DL (ref 65–99)
GLUCOSE UR STRIP-MCNC: NEGATIVE MG/DL
HCT VFR BLD AUTO: 28 % (ref 34–46.6)
HCT VFR BLD AUTO: 31.9 % (ref 34–46.6)
HGB BLD-MCNC: 10.9 G/DL (ref 12–15.9)
HGB BLD-MCNC: 9.6 G/DL (ref 12–15.9)
HGB UR QL STRIP.AUTO: NEGATIVE
IMM GRANULOCYTES # BLD AUTO: 0.02 10*3/MM3 (ref 0–0.05)
IMM GRANULOCYTES NFR BLD AUTO: 0.2 % (ref 0–0.5)
KETONES UR QL STRIP: NEGATIVE
LEUKOCYTE ESTERASE UR QL STRIP.AUTO: NEGATIVE
LIPASE SERPL-CCNC: 24 U/L (ref 13–60)
LYMPHOCYTES # BLD AUTO: 1.48 10*3/MM3 (ref 0.7–3.1)
LYMPHOCYTES NFR BLD AUTO: 15.1 % (ref 19.6–45.3)
MCH RBC QN AUTO: 31.1 PG (ref 26.6–33)
MCHC RBC AUTO-ENTMCNC: 34.2 G/DL (ref 31.5–35.7)
MCV RBC AUTO: 90.9 FL (ref 79–97)
MONOCYTES # BLD AUTO: 1.02 10*3/MM3 (ref 0.1–0.9)
MONOCYTES NFR BLD AUTO: 10.4 % (ref 5–12)
NEUTROPHILS NFR BLD AUTO: 7.25 10*3/MM3 (ref 1.7–7)
NEUTROPHILS NFR BLD AUTO: 73.9 % (ref 42.7–76)
NITRITE UR QL STRIP: NEGATIVE
NRBC BLD AUTO-RTO: 0 /100 WBC (ref 0–0.2)
PH UR STRIP.AUTO: 6 [PH] (ref 5–8)
PLATELET # BLD AUTO: 172 10*3/MM3 (ref 140–450)
PMV BLD AUTO: 10.3 FL (ref 6–12)
POTASSIUM SERPL-SCNC: 3.5 MMOL/L (ref 3.5–5.2)
PROT SERPL-MCNC: 6 G/DL (ref 6–8.5)
PROT UR QL STRIP: NEGATIVE
RBC # BLD AUTO: 3.51 10*6/MM3 (ref 3.77–5.28)
RH BLD: POSITIVE
RH BLD: POSITIVE
SODIUM SERPL-SCNC: 137 MMOL/L (ref 136–145)
SP GR UR STRIP: 1.03 (ref 1–1.03)
T&S EXPIRATION DATE: NORMAL
UROBILINOGEN UR QL STRIP: NORMAL
WBC NRBC COR # BLD: 9.81 10*3/MM3 (ref 3.4–10.8)

## 2022-08-08 PROCEDURE — 25010000002 LEVOFLOXACIN PER 250 MG: Performed by: PHYSICIAN ASSISTANT

## 2022-08-08 PROCEDURE — 80053 COMPREHEN METABOLIC PANEL: CPT | Performed by: EMERGENCY MEDICINE

## 2022-08-08 PROCEDURE — 81003 URINALYSIS AUTO W/O SCOPE: CPT | Performed by: EMERGENCY MEDICINE

## 2022-08-08 PROCEDURE — 94761 N-INVAS EAR/PLS OXIMETRY MLT: CPT

## 2022-08-08 PROCEDURE — 86901 BLOOD TYPING SEROLOGIC RH(D): CPT | Performed by: PHYSICIAN ASSISTANT

## 2022-08-08 PROCEDURE — 93005 ELECTROCARDIOGRAM TRACING: CPT | Performed by: PHYSICIAN ASSISTANT

## 2022-08-08 PROCEDURE — 85014 HEMATOCRIT: CPT | Performed by: PHYSICIAN ASSISTANT

## 2022-08-08 PROCEDURE — 85018 HEMOGLOBIN: CPT | Performed by: PHYSICIAN ASSISTANT

## 2022-08-08 PROCEDURE — 99284 EMERGENCY DEPT VISIT MOD MDM: CPT

## 2022-08-08 PROCEDURE — 74177 CT ABD & PELVIS W/CONTRAST: CPT

## 2022-08-08 PROCEDURE — 83690 ASSAY OF LIPASE: CPT | Performed by: EMERGENCY MEDICINE

## 2022-08-08 PROCEDURE — 86901 BLOOD TYPING SEROLOGIC RH(D): CPT

## 2022-08-08 PROCEDURE — 25010000002 IOPAMIDOL 61 % SOLUTION: Performed by: EMERGENCY MEDICINE

## 2022-08-08 PROCEDURE — 25010000002 MORPHINE PER 10 MG: Performed by: EMERGENCY MEDICINE

## 2022-08-08 PROCEDURE — 85025 COMPLETE CBC W/AUTO DIFF WBC: CPT | Performed by: EMERGENCY MEDICINE

## 2022-08-08 PROCEDURE — 86900 BLOOD TYPING SEROLOGIC ABO: CPT

## 2022-08-08 PROCEDURE — 83605 ASSAY OF LACTIC ACID: CPT | Performed by: PHYSICIAN ASSISTANT

## 2022-08-08 PROCEDURE — 99223 1ST HOSP IP/OBS HIGH 75: CPT | Performed by: INTERNAL MEDICINE

## 2022-08-08 PROCEDURE — 86850 RBC ANTIBODY SCREEN: CPT | Performed by: PHYSICIAN ASSISTANT

## 2022-08-08 PROCEDURE — 25010000002 ONDANSETRON PER 1 MG: Performed by: EMERGENCY MEDICINE

## 2022-08-08 PROCEDURE — 86900 BLOOD TYPING SEROLOGIC ABO: CPT | Performed by: PHYSICIAN ASSISTANT

## 2022-08-08 RX ORDER — ACETAMINOPHEN 160 MG/5ML
650 SOLUTION ORAL EVERY 4 HOURS PRN
Status: DISCONTINUED | OUTPATIENT
Start: 2022-08-08 | End: 2022-08-10 | Stop reason: HOSPADM

## 2022-08-08 RX ORDER — MORPHINE SULFATE 4 MG/ML
3 INJECTION, SOLUTION INTRAMUSCULAR; INTRAVENOUS EVERY 4 HOURS PRN
Status: DISCONTINUED | OUTPATIENT
Start: 2022-08-08 | End: 2022-08-10 | Stop reason: HOSPADM

## 2022-08-08 RX ORDER — MECLIZINE HYDROCHLORIDE 25 MG/1
12.5 TABLET ORAL 3 TIMES DAILY PRN
Status: DISCONTINUED | OUTPATIENT
Start: 2022-08-08 | End: 2022-08-10 | Stop reason: HOSPADM

## 2022-08-08 RX ORDER — TRAMADOL HYDROCHLORIDE 50 MG/1
50 TABLET ORAL 2 TIMES DAILY
Status: DISCONTINUED | OUTPATIENT
Start: 2022-08-08 | End: 2022-08-09

## 2022-08-08 RX ORDER — LEVOFLOXACIN 5 MG/ML
750 INJECTION, SOLUTION INTRAVENOUS ONCE
Status: COMPLETED | OUTPATIENT
Start: 2022-08-08 | End: 2022-08-08

## 2022-08-08 RX ORDER — ACETAMINOPHEN 325 MG/1
650 TABLET ORAL EVERY 4 HOURS PRN
Status: DISCONTINUED | OUTPATIENT
Start: 2022-08-08 | End: 2022-08-10 | Stop reason: HOSPADM

## 2022-08-08 RX ORDER — MORPHINE SULFATE 2 MG/ML
2 INJECTION, SOLUTION INTRAMUSCULAR; INTRAVENOUS ONCE
Status: COMPLETED | OUTPATIENT
Start: 2022-08-08 | End: 2022-08-08

## 2022-08-08 RX ORDER — SODIUM CHLORIDE 9 MG/ML
100 INJECTION, SOLUTION INTRAVENOUS CONTINUOUS
Status: DISCONTINUED | OUTPATIENT
Start: 2022-08-08 | End: 2022-08-10 | Stop reason: HOSPADM

## 2022-08-08 RX ORDER — HYDROCODONE BITARTRATE AND ACETAMINOPHEN 10; 325 MG/1; MG/1
1 TABLET ORAL EVERY 8 HOURS PRN
Status: DISCONTINUED | OUTPATIENT
Start: 2022-08-08 | End: 2022-08-10 | Stop reason: HOSPADM

## 2022-08-08 RX ORDER — L.ACID,PARA/B.BIFIDUM/S.THERM 8B CELL
1 CAPSULE ORAL 2 TIMES DAILY
Status: DISCONTINUED | OUTPATIENT
Start: 2022-08-08 | End: 2022-08-10 | Stop reason: HOSPADM

## 2022-08-08 RX ORDER — METRONIDAZOLE 500 MG/100ML
500 INJECTION, SOLUTION INTRAVENOUS EVERY 8 HOURS
Status: DISCONTINUED | OUTPATIENT
Start: 2022-08-09 | End: 2022-08-10 | Stop reason: HOSPADM

## 2022-08-08 RX ORDER — MORPHINE SULFATE 4 MG/ML
4 INJECTION, SOLUTION INTRAMUSCULAR; INTRAVENOUS ONCE
Status: COMPLETED | OUTPATIENT
Start: 2022-08-08 | End: 2022-08-08

## 2022-08-08 RX ORDER — SODIUM CHLORIDE 0.9 % (FLUSH) 0.9 %
3-10 SYRINGE (ML) INJECTION AS NEEDED
Status: DISCONTINUED | OUTPATIENT
Start: 2022-08-08 | End: 2022-08-10 | Stop reason: HOSPADM

## 2022-08-08 RX ORDER — ONDANSETRON 2 MG/ML
4 INJECTION INTRAMUSCULAR; INTRAVENOUS ONCE
Status: COMPLETED | OUTPATIENT
Start: 2022-08-08 | End: 2022-08-08

## 2022-08-08 RX ORDER — ONDANSETRON 2 MG/ML
4 INJECTION INTRAMUSCULAR; INTRAVENOUS EVERY 6 HOURS PRN
Status: DISCONTINUED | OUTPATIENT
Start: 2022-08-08 | End: 2022-08-10 | Stop reason: HOSPADM

## 2022-08-08 RX ORDER — GABAPENTIN 300 MG/1
300 CAPSULE ORAL 3 TIMES DAILY
Status: DISCONTINUED | OUTPATIENT
Start: 2022-08-08 | End: 2022-08-09

## 2022-08-08 RX ORDER — SODIUM CHLORIDE 0.9 % (FLUSH) 0.9 %
3 SYRINGE (ML) INJECTION EVERY 12 HOURS SCHEDULED
Status: DISCONTINUED | OUTPATIENT
Start: 2022-08-08 | End: 2022-08-10 | Stop reason: HOSPADM

## 2022-08-08 RX ORDER — LEVOFLOXACIN 5 MG/ML
750 INJECTION, SOLUTION INTRAVENOUS EVERY 24 HOURS
Status: DISCONTINUED | OUTPATIENT
Start: 2022-08-09 | End: 2022-08-10 | Stop reason: HOSPADM

## 2022-08-08 RX ORDER — ACETAMINOPHEN 650 MG/1
650 SUPPOSITORY RECTAL EVERY 4 HOURS PRN
Status: DISCONTINUED | OUTPATIENT
Start: 2022-08-08 | End: 2022-08-10 | Stop reason: HOSPADM

## 2022-08-08 RX ORDER — PANTOPRAZOLE SODIUM 40 MG/1
40 TABLET, DELAYED RELEASE ORAL DAILY
Status: DISCONTINUED | OUTPATIENT
Start: 2022-08-09 | End: 2022-08-10 | Stop reason: HOSPADM

## 2022-08-08 RX ORDER — PROMETHAZINE HYDROCHLORIDE 12.5 MG/1
12.5 TABLET ORAL EVERY 8 HOURS PRN
Status: DISCONTINUED | OUTPATIENT
Start: 2022-08-08 | End: 2022-08-10 | Stop reason: HOSPADM

## 2022-08-08 RX ORDER — MORPHINE SULFATE 2 MG/ML
2 INJECTION, SOLUTION INTRAMUSCULAR; INTRAVENOUS EVERY 4 HOURS PRN
Status: DISCONTINUED | OUTPATIENT
Start: 2022-08-08 | End: 2022-08-08

## 2022-08-08 RX ORDER — ATORVASTATIN CALCIUM 80 MG/1
80 TABLET, FILM COATED ORAL NIGHTLY
Status: DISCONTINUED | OUTPATIENT
Start: 2022-08-08 | End: 2022-08-09

## 2022-08-08 RX ORDER — BACLOFEN 10 MG/1
10 TABLET ORAL 3 TIMES DAILY
Status: DISCONTINUED | OUTPATIENT
Start: 2022-08-08 | End: 2022-08-09

## 2022-08-08 RX ORDER — METRONIDAZOLE 500 MG/100ML
500 INJECTION, SOLUTION INTRAVENOUS ONCE
Status: COMPLETED | OUTPATIENT
Start: 2022-08-08 | End: 2022-08-08

## 2022-08-08 RX ORDER — SODIUM CHLORIDE 0.9 % (FLUSH) 0.9 %
10 SYRINGE (ML) INJECTION AS NEEDED
Status: DISCONTINUED | OUTPATIENT
Start: 2022-08-08 | End: 2022-08-10 | Stop reason: HOSPADM

## 2022-08-08 RX ADMIN — GABAPENTIN 300 MG: 300 CAPSULE ORAL at 20:20

## 2022-08-08 RX ADMIN — TRAMADOL HYDROCHLORIDE 50 MG: 50 TABLET ORAL at 20:20

## 2022-08-08 RX ADMIN — SODIUM CHLORIDE 100 ML/HR: 9 INJECTION, SOLUTION INTRAVENOUS at 21:18

## 2022-08-08 RX ADMIN — SODIUM CHLORIDE 1000 ML: 9 INJECTION, SOLUTION INTRAVENOUS at 15:08

## 2022-08-08 RX ADMIN — METRONIDAZOLE 500 MG: 500 INJECTION, SOLUTION INTRAVENOUS at 20:20

## 2022-08-08 RX ADMIN — IOPAMIDOL 100 ML: 612 INJECTION, SOLUTION INTRAVENOUS at 16:00

## 2022-08-08 RX ADMIN — Medication 3 ML: at 20:20

## 2022-08-08 RX ADMIN — SODIUM CHLORIDE 1000 ML: 9 INJECTION, SOLUTION INTRAVENOUS at 19:00

## 2022-08-08 RX ADMIN — HYDROCODONE BITARTRATE AND ACETAMINOPHEN 1 TABLET: 10; 325 TABLET ORAL at 21:44

## 2022-08-08 RX ADMIN — Medication 1 CAPSULE: at 20:20

## 2022-08-08 RX ADMIN — LEVOFLOXACIN 750 MG: 5 INJECTION, SOLUTION INTRAVENOUS at 18:06

## 2022-08-08 RX ADMIN — MORPHINE SULFATE 4 MG: 4 INJECTION, SOLUTION INTRAMUSCULAR; INTRAVENOUS at 17:13

## 2022-08-08 RX ADMIN — MORPHINE SULFATE 2 MG: 2 INJECTION, SOLUTION INTRAMUSCULAR; INTRAVENOUS at 15:07

## 2022-08-08 RX ADMIN — ONDANSETRON 4 MG: 2 INJECTION INTRAMUSCULAR; INTRAVENOUS at 15:08

## 2022-08-08 NOTE — H&P
[facility]   HISTORY AND PHYSICAL      Name:  Stephanie Jean   Age:  66 y.o.  Sex:  female  :  1955  MRN:  [id number]   Visit Number:  [id number]  Admission Date:  2022  Date Of Service:  22  Primary Care Physician:  Margaret Mooney APRN    Chief Complaint:     Abdominal pain and bright red blood per rectum.    History Of Presenting Illness:      Ms. 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  Patient in fact went to [facility] emergency room yesterday with nausea and vomiting as well as some dizziness.  He was diagnosed with heat exposure, hypokalemia and was subsequently discharged home.  Her COVID-19 test done yesterday at the emergency room was negative.  Her creatinine yesterday was 1.6 and potassium was 3.2.  She denies any outside food exposure.  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  She denies any fevers.  She was noted to have streaking blood clot in her stools today.  She was subsequently brought to the emergency room by her daughter.  Her  does not have any similar symptoms.  No recent history of antibiotics use.    In the emergency room, she was afebrile with initial heart rate of 107 and blood pressure 115/68.  Pulse oxygen saturation was 97% on room air.  Blood work done in the emergency room including CMP and CBC was unremarkable except for hemoglobin of 10.9.  Lipase was within normal limits.  Urine analysis was within normal limits.  Repeat hemoglobin after a few hours was 9.6.  CT of the abdomen and pelvis done in the emergency room however showed diffuse colonic wall thickening, submucosal edema, mucosal hyperenhancement, and pericolonic inflammation involving the splenic  flexure, descending colon, and proximal sigmoid colon. Small volume ascites. Findings are concerning for most likely infectious colitis or inflammatory colitis and less likely ischemic colitis.  Patient was given 2 L of normal saline bolus in the emergency room and was started on IV antibiotics therapy with Levaquin and metronidazole and is currently being admitted to the medical floor with telemetry.  Dr. Morel was called from the emergency room for consultation.    Review Of Systems:    All systems were reviewed and negative except as mentioned in history of presenting illness, assessment and plan.    Past Medical History: Patient  has a past medical history of Arthritis, Colon polyps, Dysphagia, Elevated cholesterol, Family history of colon cancer, Fibromyalgia, GERD (gastroesophageal reflux disease), Heart murmur, Hoarseness, Hyperlipidemia, Lupus (HCC), Marijuana use, Pulmonary nodule, and Sjogren's disease (HCC).    Past Surgical History: Patient  has a past surgical history that includes Hysterectomy; Appendectomy; Colonoscopy; Esophagogastroduodenoscopy; Esophagogastroduodenoscopy (N/A, 6/1/2020); Cataract extraction w/ intraocular lens implant (Left, 12/20/2021); and Cataract extraction w/ intraocular lens implant (Right, 1/3/2022).    Social History: Patient  reports that she quit smoking about 4 years ago. Her smoking use included cigarettes. She has a 40.00 pack-year smoking history. She has never used smokeless tobacco. She reports current drug use. Frequency: 7.00 times per week. Drug: Marijuana. She reports that she does not drink alcohol.    Family History: Patient's family history includes Aneurysm in her mother; Colon cancer in her maternal uncle; Emphysema in her father; Heart disease in her mother; Hypertension in her mother; Ulcerative colitis in her brother.    Allergies:      Penicillins    Home Medications:    Prior to Admission Medications     Prescriptions Last Dose Informant Patient  Reported? Taking?    Ascorbic Acid (VITAMIN C PO)   Yes No    Take  by mouth.    aspirin 81 MG EC tablet   No No    Take 1 tablet by mouth Daily.    atorvastatin (LIPITOR) 80 MG tablet   No No    Take 1 tablet by mouth Every Night.    baclofen (LIORESAL) 10 MG tablet  Self Yes No    Take 10 mg by mouth 3 (Three) Times a Day.    BIOTIN PO   Yes No    Take  by mouth.    Calcium Citrate-Vitamin D (JENNIFER-CITRATE PLUS VITAMIN D PO)   Yes No    Take  by mouth.    docusate sodium (COLACE) 100 MG capsule   No No    TAKE 1 CAPSULE BY MOUTH TWICE DAILY    DULoxetine (Cymbalta) 60 MG capsule   Yes No    Take 60 mg by mouth Daily.    gabapentin (NEURONTIN) 300 MG capsule   Yes No    Take 300 mg by mouth 3 (Three) Times a Day.    meclizine (ANTIVERT) 12.5 MG tablet   No No    Take 1 tablet by mouth 3 (Three) Times a Day As Needed for Dizziness.    Naloxegol Oxalate (Movantik) 25 MG tablet   No No    Take 1 tablet by mouth Every Morning.    nitroglycerin (NITROSTAT) 0.4 MG SL tablet   Yes No    pantoprazole (PROTONIX) 20 MG EC tablet   No No    TAKE 1 TABLET BY MOUTH DAILY    polyethylene glycol (MiraLax) 17 GM/SCOOP powder   No No    Take 17 g by mouth Daily.    prednisoLONE acetate (Pred Forte) 1 % ophthalmic suspension   No No    Follow instructions on the After Visit Summary.    promethazine (PHENERGAN) 12.5 MG tablet   No No    Take 1 tablet by mouth Every 8 (Eight) Hours As Needed for Nausea or Vomiting.    traMADol (ULTRAM) 50 MG tablet   Yes No    Take 50 mg by mouth 2 (Two) Times a Day.    VITAMIN E PO   Yes No    Take  by mouth.        ED Medications:    Medications   sodium chloride 0.9 % flush 10 mL (has no administration in time range)   metroNIDAZOLE (FLAGYL) IVPB 500 mg (has no administration in time range)   sodium chloride 0.9 % bolus 1,000 mL (has no administration in time range)   sodium chloride 0.9 % bolus 1,000 mL (0 mL Intravenous Stopped 8/8/22 4681)   ondansetron (ZOFRAN) injection 4 mg (4 mg Intravenous  "Given 8/8/22 1508)   Morphine sulfate (PF) injection 2 mg (2 mg Intravenous Given 8/8/22 1507)   iopamidol (ISOVUE-300) 61 % injection 100 mL (100 mL Intravenous Given 8/8/22 1600)   Morphine sulfate (PF) injection 4 mg (4 mg Intravenous Given 8/8/22 1713)   levoFLOXacin (LEVAQUIN) 750 mg/150 mL D5W (premix) (LEVAQUIN) 750 mg (750 mg Intravenous New Bag 8/8/22 1806)     Vital Signs:  Temp:  [98 °F (36.7 °C)] 98 °F (36.7 °C)  Heart Rate:  [105-107] 105  Resp:  [16-20] 16  BP: (109-115)/(68-74) 109/74        08/08/22  1427   Weight: 63 kg (139 lb)     Body mass index is 25.42 kg/m².    Physical Exam:     Most recent vital Signs: /74   Pulse 105   Temp 98 °F (36.7 °C)   Resp 16   Ht 157.5 cm (62\")   Wt 63 kg (139 lb)   LMP  (LMP Unknown)   SpO2 95%   BMI 25.42 kg/m²     Physical Exam  Constitutional:       General: She is not in acute distress.     Appearance: Normal appearance. She is normal weight. She is not ill-appearing.   HENT:      Head: Normocephalic and atraumatic.      Right Ear: External ear normal.      Left Ear: External ear normal.      Nose: Nose normal.      Mouth/Throat:      Mouth: Mucous membranes are moist.   Eyes:      Extraocular Movements: Extraocular movements intact.      Conjunctiva/sclera: Conjunctivae normal.   Cardiovascular:      Rate and Rhythm: Tachycardia present.      Pulses: Normal pulses.      Heart sounds: Normal heart sounds. No murmur heard.  Pulmonary:      Effort: Pulmonary effort is normal.      Breath sounds: Normal breath sounds. No wheezing or rales.   Abdominal:      Palpations: Abdomen is soft.      Tenderness: There is no guarding or rebound.      Comments: Hyperactive bowel sounds noted.  Diffuse minimal tenderness noted.   Musculoskeletal:         General: Normal range of motion.      Cervical back: Neck supple.      Right lower leg: No edema.      Left lower leg: No edema.   Skin:     General: Skin is warm.      Findings: No erythema or rash. "   Neurological:      General: No focal deficit present.      Mental Status: She is alert and oriented to person, place, and time. Mental status is at baseline.   Psychiatric:         Mood and Affect: Mood normal.         Behavior: Behavior normal.       Laboratory data:    I have reviewed the labs done in the emergency room.    Results from last 7 days   Lab Units 08/08/22  1502   SODIUM mmol/L 137   POTASSIUM mmol/L 3.5   CHLORIDE mmol/L 105   CO2 mmol/L 23.4   BUN mg/dL 13   CREATININE mg/dL 0.84   CALCIUM mg/dL 8.1*   BILIRUBIN mg/dL 0.5   ALK PHOS U/L 124*   ALT (SGPT) U/L 16   AST (SGOT) U/L 20   GLUCOSE mg/dL 106*     Results from last 7 days   Lab Units 08/08/22  1754 08/08/22  1502 08/07/22  1140   WBC 10*3/mm3  --  9.81 10.3   HEMOGLOBIN g/dL 9.6* 10.9* 13.9   HEMATOCRIT % 28.0* 31.9* 42.1   PLATELETS 10*3/mm3  --  172 259         Results from last 7 days   Lab Units 08/07/22  1140   CK TOTAL U/L 209*   TROPONIN I ng/mL <0.30             Results from last 7 days   Lab Units 08/08/22  1502   LIPASE U/L 24         Results from last 7 days   Lab Units 08/08/22  1637   COLOR UA  Yellow   GLUCOSE UA  Negative   KETONES UA  Negative   LEUKOCYTES UA  Negative   PH, URINE  6.0   BILIRUBIN UA  Negative   UROBILINOGEN UA  0.2 E.U./dL     EKG:      EKG done in the emergency room was reviewed by me.  It shows sinus tachycardia at 102 bpm.  Normal axis.  No significant ST-T changes were noted.    Radiology:    CT Abdomen Pelvis With Contrast    Result Date: 8/8/2022  CT SCAN OF THE ABDOMEN AND PELVIS WITH CONTRAST    8/8/2022 3:54 PM  HISTORY: abdominal pain, n/v/d, blood in stoolAbdominal pain.  PROCEDURE: Axial CT images were obtained from the lung bases to the pubic symphysis following IV contrast administration. Coronal and sagittal reformatted images were generated from the axial data set and provided for interpretation. This study was performed with techniques to keep radiation doses as low as reasonably  achievable, (ALARA). Individualized dose reduction techniques using automated exposure control or adjustment of mA and/or kV according to the patient size were employed.  COMPARISON: None.  FINDINGS: LOWER CHEST: The heart is normal in size. Lung bases are clear. Calcified granuloma in the left lower lobe. Coronary artery atherosclerotic disease.  ABDOMEN/PELVIS: Liver, gallbladder and bile ducts: The liver enhances homogenously without suspicious focal hepatic lesion. Hepatic steatosis. Unremarkable gallbladder. No significant biliary ductal dilatation.  Adrenal glands: No right adrenal nodule. There is 2.0 cm left adrenal nodule, indeterminate, likely an adenoma.  Kidneys, ureters and urinary bladder: No suspicious renal lesions. No hydronephrosis. Unremarkable urinary bladder.  Spleen: The spleen is normal in size.  Pancreas: The pancreas is unremarkable.  Gastrointestinal system and mesentery: There is no evidence of bowel obstruction. The appendix is not well-visualized but there is no significant inflammation in the right lower quadrant. There is a diffuse circumferential wall thickening of the splenic flexure, descending colon, and the proximal sigmoid colon, with mural thickening, submucosal edema, and mucosal hyperenhancement, and associated pericolonic inflammatory fat stranding.  Lymph nodes: No pathologically enlarged abdominal or pelvic lymph nodes are present.  Vessels: The abdominal aorta is normal in caliber. Mild to moderate atherosclerotic disease. The celiac trunk, superior mesenteric artery, inferior mesenteric artery and their branch vessels appear grossly patent. The superior mesenteric vein, splenic vein and main portal veins are patent. The inferior vena cava and hepatic veins are unremarkable.  Peritoneum: Small volume ascites. No evidence of pneumatosis, mesenteric venous gas, or portal venous gas.  Pelvic viscera: No acute findings.  Body wall: No acute findings. No significant body wall  hernias.  Bones: No acute fracture.      Diffuse colonic wall thickening, submucosal edema, mucosal hyperenhancement, and pericolonic inflammation involving the splenic flexure, descending colon, and proximal sigmoid colon. Small volume ascites. Findings are concerning for most likely infectious colitis or inflammatory colitis and less likely ischemic colitis. Consider post treatment colonoscopic evaluation to rule out occult pathology.    Images personally reviewed, interpreted and dictated by KALPANA Posadas.      This study was performed with techniques to keep radiation doses as low as reasonably achievable (ALARA). Individualized dose reduction techniques using automated exposure control or adjustment of mA and/or kV according to the patient size were employed.      This report was signed and finalized on 8/8/2022 4:20 PM by KALPANA Posadas.    Assessment:    1. Acute infective colitis with bloody diarrhea, POA.  2. Anemia of uncertain etiology.  3. Gastroesophageal reflux disease.  4. Dyslipidemia.  5. Fibromyalgia.    Plan:    Ms. Jean is currently being admitted to the medical floor with telemetry.  I anticipate 2 days of hospital stay due to her significant colitis and hemorrhagic diarrheal illness.    Acute infective diarrhea/bloody diarrhea  - She will be continued on IV antibiotics therapy with levofloxacin and metronidazole (patient is allergic to penicillins).  - When she has the next bowel movement, we will send GI panel as well as stool C. difficile toxin assay.  - She will be placed on lactobacillus supplements.  - We will keep her on clear liquid diet for now.  - She will be placed on IV fluids.    Anemia.  - Likely secondary to bloody diarrhea.  - We will continue to monitor her H&H and transfuse if necessary.    I have discussed advanced directives with her and she wants to be full code.  I have discussed the patient's condition with her daughter (who is a nursing student) who is at  the bedside.      Risk Assessment: Moderate  DVT Prophylaxis: SCDs  Code Status: Full  Diet: Clear liquids    Advance Care Planning      ACP discussion was held with the patient during this visit. Patient does not have an advance directive, information provided.         Donta Roblero MD  08/08/22  18:11 EDT    Dictated utilizing Dragon dictation.

## 2022-08-08 NOTE — ED PROVIDER NOTES
"Subjective   Chief Complaint: Abdominal pain, bloody stool  History of Present Illness: 66-year-old female comes in for evaluation of above complaints.  She states yesterday she had some nausea and vomiting went to another ER has basic labs drawn was told she was likely dehydrated and sent home.  She states today she had a diarrheal bowel movement with \"specks of blood in it\" followed by bright red blood per rectum without stool just prior to arrival.  She complains of diffuse abdominal pain worse lower.  No urinary symptoms although it was dark earlier.  She has nausea.  She had a colonoscopy in the past and only found polyps.  No history of diverticulitis or colitis.  No rectal pain.  She was tested negative for COVID yesterday and does not want a repeat test.  She does work around sheep.    Onset: Yesterday  Timing: Intermittent  Exacerbating / Alleviating factors: None  Associated symptoms: None      Nurses Notes reviewed and agree, including vitals, allergies, social history and prior medical history.          Review of Systems   Constitutional: Negative.    HENT: Negative.    Eyes: Negative.    Respiratory: Negative.    Cardiovascular: Negative.    Gastrointestinal: Positive for abdominal pain, blood in stool and diarrhea.   Genitourinary: Negative.    Musculoskeletal: Negative.    Skin: Negative.    Neurological: Negative.    Psychiatric/Behavioral: Negative.        Past Medical History:   Diagnosis Date   • Arthritis    • Colon polyps    • Dysphagia    • Elevated cholesterol    • Family history of colon cancer    • Fibromyalgia    • GERD (gastroesophageal reflux disease)    • Heart murmur    • Hoarseness    • Hyperlipidemia    • Lupus (HCC)    • Marijuana use     Daily    • Pulmonary nodule    • Sjogren's disease (HCC)        Allergies   Allergen Reactions   • Penicillins Rash       Past Surgical History:   Procedure Laterality Date   • APPENDECTOMY     • CATARACT EXTRACTION W/ INTRAOCULAR LENS IMPLANT Left " 2021    Procedure: CATARACT PHACO EXTRACTION WITH INTRAOCULAR LENS IMPLANT LEFT COMPLICATED WITH MALYUGIN RING;  Surgeon: Zachary Landers MD;  Location: [facility] OR;  Service: Ophthalmology;  Laterality: Left;   • CATARACT EXTRACTION W/ INTRAOCULAR LENS IMPLANT Right 1/3/2022    Procedure: CATARACT PHACO EXTRACTION WITH INTRAOCULAR LENS IMPLANT RIGHT;  Surgeon: Zachary Landers MD;  Location: [facility] OR;  Service: Ophthalmology;  Laterality: Right;   • COLONOSCOPY     • ENDOSCOPY     • ENDOSCOPY N/A 2020    Procedure: ESOPHAGOGASTRODUODENOSCOPY;  Surgeon: Chaparro Marrero MD;  Location: [facility] ENDOSCOPY;  Service: Gastroenterology;  Laterality: N/A;   • HYSTERECTOMY         Family History   Problem Relation Age of Onset   • Hypertension Mother    • Aneurysm Mother    • Heart disease Mother    • Emphysema Father    • Ulcerative colitis Brother    • Colon cancer Maternal Uncle    • Cirrhosis Neg Hx    • Liver disease Neg Hx    • Liver cancer Neg Hx    • Crohn's disease Neg Hx        Social History     Socioeconomic History   • Marital status:    Tobacco Use   • Smoking status: Former Smoker     Packs/day: 1.00     Years: 40.00     Pack years: 40.00     Types: Cigarettes     Quit date:      Years since quittin.6   • Smokeless tobacco: Never Used   Substance and Sexual Activity   • Alcohol use: No   • Drug use: Yes     Frequency: 7.0 times per week     Types: Marijuana     Comment: OCCASIONALLY   • Sexual activity: Defer           Objective   Physical Exam  Vitals and nursing note reviewed. Exam conducted with a chaperone present.   Constitutional:       General: She is not in acute distress.     Appearance: Normal appearance. She is normal weight. She is not ill-appearing, toxic-appearing or diaphoretic.   HENT:      Head: Normocephalic and atraumatic.      Nose: Nose normal.   Eyes:      Extraocular Movements: Extraocular movements intact.   Cardiovascular:      Rate and Rhythm:  Normal rate and regular rhythm.   Pulmonary:      Effort: Pulmonary effort is normal.   Abdominal:      General: Abdomen is flat. Bowel sounds are normal.      Palpations: Abdomen is soft.      Tenderness: There is abdominal tenderness.       Genitourinary:     Rectum: Normal. No mass, tenderness, anal fissure, external hemorrhoid or internal hemorrhoid. Normal anal tone.   Musculoskeletal:         General: Normal range of motion.      Cervical back: Normal range of motion.   Skin:     General: Skin is warm and dry.   Neurological:      General: No focal deficit present.      Mental Status: She is alert. Mental status is at baseline.   Psychiatric:         Mood and Affect: Mood normal.         Procedures           ED Course  ED Course as of 08/08/22 1807   Mon Aug 08, 2022   1719 EKG interpreted by me.  Sinus rhythm.  Tachycardic.  Rate of 102.  No obvious ST or T wave changes.  Abnormal EKG. [CG]      ED Course User Index  [CG] Angelito Macdonald, DO                                           MDM  Patient continues to have borderline low blood pressure, 1 reading 84/63, subsequently 109/74 still mildly tachycardic at 106.  Discussed with Dr. Morel, recommends GI panel and no indication for emergent colonoscopy but is happy to consult if requested by the hospitalist.  Spoke with Dr. Caicedo, hospitalist, graciously excepts inpatient telemetry.  Patient is comfortable with the plan.  Final diagnoses:   Bloody stool   Drop in hematocrit   Colitis       ED Disposition  ED Disposition     ED Disposition   Decision to Admit    Condition   --    Comment   Level of Care: Telemetry [5]   Diagnosis: Bloody stool [193739]   Admitting Physician: NICOLE CAICEDO [[id number]]   Attending Physician: NICOLE CAICEDO [[id number]]   Isolate for COVID?: No [0]   Certification: I Certify That Inpatient Hospital Services Are Medically Necessary For Greater Than 2 Midnights               No follow-up provider specified.       Medication List      No changes  were made to your prescriptions during this visit.          Henry Sanches PA-C  08/08/22 1805       Henry Sanches PA-C  08/08/22 1805       Henry Sanches PA-C  08/08/22 1800

## 2022-08-09 LAB
ADV 40+41 DNA STL QL NAA+NON-PROBE: NOT DETECTED
ALBUMIN SERPL-MCNC: 3 G/DL (ref 3.5–5.2)
ALBUMIN/GLOB SERPL: 1.4 G/DL
ALP SERPL-CCNC: 96 U/L (ref 39–117)
ALT SERPL W P-5'-P-CCNC: 13 U/L (ref 1–33)
ANION GAP SERPL CALCULATED.3IONS-SCNC: 8.2 MMOL/L (ref 5–15)
AST SERPL-CCNC: 17 U/L (ref 1–32)
ASTRO TYP 1-8 RNA STL QL NAA+NON-PROBE: NOT DETECTED
BASOPHILS # BLD AUTO: 0.04 10*3/MM3 (ref 0–0.2)
BASOPHILS NFR BLD AUTO: 0.5 % (ref 0–1.5)
BILIRUB SERPL-MCNC: 0.3 MG/DL (ref 0–1.2)
BUN SERPL-MCNC: 9 MG/DL (ref 8–23)
BUN/CREAT SERPL: 14.8 (ref 7–25)
C CAYETANENSIS DNA STL QL NAA+NON-PROBE: NOT DETECTED
C COLI+JEJ+UPSA DNA STL QL NAA+NON-PROBE: NOT DETECTED
C DIFF GDH STL QL: NEGATIVE
CALCIUM SPEC-SCNC: 7.7 MG/DL (ref 8.6–10.5)
CHLORIDE SERPL-SCNC: 109 MMOL/L (ref 98–107)
CO2 SERPL-SCNC: 21.8 MMOL/L (ref 22–29)
CREAT SERPL-MCNC: 0.61 MG/DL (ref 0.57–1)
CRYPTOSP DNA STL QL NAA+NON-PROBE: NOT DETECTED
DEPRECATED RDW RBC AUTO: 43.9 FL (ref 37–54)
E HISTOLYT DNA STL QL NAA+NON-PROBE: NOT DETECTED
EAEC PAA PLAS AGGR+AATA ST NAA+NON-PRB: NOT DETECTED
EC STX1+STX2 GENES STL QL NAA+NON-PROBE: NOT DETECTED
EGFRCR SERPLBLD CKD-EPI 2021: 100 ML/MIN/1.73
EOSINOPHIL # BLD AUTO: 0.02 10*3/MM3 (ref 0–0.4)
EOSINOPHIL NFR BLD AUTO: 0.2 % (ref 0.3–6.2)
EPEC EAE GENE STL QL NAA+NON-PROBE: NOT DETECTED
ERYTHROCYTE [DISTWIDTH] IN BLOOD BY AUTOMATED COUNT: 13 % (ref 12.3–15.4)
ETEC LTA+ST1A+ST1B TOX ST NAA+NON-PROBE: NOT DETECTED
G LAMBLIA DNA STL QL NAA+NON-PROBE: NOT DETECTED
GLOBULIN UR ELPH-MCNC: 2.1 GM/DL
GLUCOSE SERPL-MCNC: 69 MG/DL (ref 65–99)
HCT VFR BLD AUTO: 28.5 % (ref 34–46.6)
HGB BLD-MCNC: 9.5 G/DL (ref 12–15.9)
IMM GRANULOCYTES # BLD AUTO: 0.06 10*3/MM3 (ref 0–0.05)
IMM GRANULOCYTES NFR BLD AUTO: 0.7 % (ref 0–0.5)
INR PPP: 1.16 (ref 0.9–1.1)
LYMPHOCYTES # BLD AUTO: 1.17 10*3/MM3 (ref 0.7–3.1)
LYMPHOCYTES NFR BLD AUTO: 13.4 % (ref 19.6–45.3)
MAGNESIUM SERPL-MCNC: 1.8 MG/DL (ref 1.6–2.4)
MCH RBC QN AUTO: 30.7 PG (ref 26.6–33)
MCHC RBC AUTO-ENTMCNC: 33.3 G/DL (ref 31.5–35.7)
MCV RBC AUTO: 92.2 FL (ref 79–97)
MONOCYTES # BLD AUTO: 0.75 10*3/MM3 (ref 0.1–0.9)
MONOCYTES NFR BLD AUTO: 8.6 % (ref 5–12)
NEUTROPHILS NFR BLD AUTO: 6.72 10*3/MM3 (ref 1.7–7)
NEUTROPHILS NFR BLD AUTO: 76.6 % (ref 42.7–76)
NOROVIRUS GI+II RNA STL QL NAA+NON-PROBE: NOT DETECTED
NRBC BLD AUTO-RTO: 0 /100 WBC (ref 0–0.2)
P SHIGELLOIDES DNA STL QL NAA+NON-PROBE: NOT DETECTED
PLATELET # BLD AUTO: 137 10*3/MM3 (ref 140–450)
PMV BLD AUTO: 10.2 FL (ref 6–12)
POTASSIUM SERPL-SCNC: 3.6 MMOL/L (ref 3.5–5.2)
PROT SERPL-MCNC: 5.1 G/DL (ref 6–8.5)
PROTHROMBIN TIME: 15.2 SECONDS (ref 12.5–14.5)
RBC # BLD AUTO: 3.09 10*6/MM3 (ref 3.77–5.28)
RVA RNA STL QL NAA+NON-PROBE: NOT DETECTED
S ENT+BONG DNA STL QL NAA+NON-PROBE: NOT DETECTED
SAPO I+II+IV+V RNA STL QL NAA+NON-PROBE: NOT DETECTED
SHIGELLA SP+EIEC IPAH ST NAA+NON-PROBE: NOT DETECTED
SODIUM SERPL-SCNC: 139 MMOL/L (ref 136–145)
V CHOL+PARA+VUL DNA STL QL NAA+NON-PROBE: NOT DETECTED
V CHOLERAE DNA STL QL NAA+NON-PROBE: NOT DETECTED
WBC NRBC COR # BLD: 8.76 10*3/MM3 (ref 3.4–10.8)
Y ENTEROCOL DNA STL QL NAA+NON-PROBE: NOT DETECTED

## 2022-08-09 PROCEDURE — 85025 COMPLETE CBC W/AUTO DIFF WBC: CPT | Performed by: INTERNAL MEDICINE

## 2022-08-09 PROCEDURE — 83735 ASSAY OF MAGNESIUM: CPT | Performed by: INTERNAL MEDICINE

## 2022-08-09 PROCEDURE — 80053 COMPREHEN METABOLIC PANEL: CPT | Performed by: INTERNAL MEDICINE

## 2022-08-09 PROCEDURE — 25010000002 MORPHINE PER 10 MG: Performed by: FAMILY MEDICINE

## 2022-08-09 PROCEDURE — 87324 CLOSTRIDIUM AG IA: CPT | Performed by: PHYSICIAN ASSISTANT

## 2022-08-09 PROCEDURE — 25010000002 PROMETHAZINE PER 50 MG: Performed by: STUDENT IN AN ORGANIZED HEALTH CARE EDUCATION/TRAINING PROGRAM

## 2022-08-09 PROCEDURE — 25010000002 ONDANSETRON PER 1 MG: Performed by: INTERNAL MEDICINE

## 2022-08-09 PROCEDURE — 87507 IADNA-DNA/RNA PROBE TQ 12-25: CPT | Performed by: INTERNAL MEDICINE

## 2022-08-09 PROCEDURE — 87449 NOS EACH ORGANISM AG IA: CPT | Performed by: PHYSICIAN ASSISTANT

## 2022-08-09 PROCEDURE — 85610 PROTHROMBIN TIME: CPT | Performed by: INTERNAL MEDICINE

## 2022-08-09 PROCEDURE — 99254 IP/OBS CNSLTJ NEW/EST MOD 60: CPT | Performed by: SURGERY

## 2022-08-09 PROCEDURE — 99232 SBSQ HOSP IP/OBS MODERATE 35: CPT | Performed by: STUDENT IN AN ORGANIZED HEALTH CARE EDUCATION/TRAINING PROGRAM

## 2022-08-09 PROCEDURE — 25010000002 LEVOFLOXACIN PER 250 MG: Performed by: INTERNAL MEDICINE

## 2022-08-09 RX ORDER — LEFLUNOMIDE 10 MG/1
10 TABLET ORAL DAILY
COMMUNITY
End: 2022-08-17

## 2022-08-09 RX ORDER — LISINOPRIL 10 MG/1
10 TABLET ORAL NIGHTLY
COMMUNITY

## 2022-08-09 RX ORDER — HYDROXYCHLOROQUINE SULFATE 200 MG/1
200 TABLET, FILM COATED ORAL 2 TIMES DAILY
Status: DISCONTINUED | OUTPATIENT
Start: 2022-08-09 | End: 2022-08-10 | Stop reason: HOSPADM

## 2022-08-09 RX ORDER — LEFLUNOMIDE 10 MG/1
10 TABLET ORAL DAILY
Status: DISCONTINUED | OUTPATIENT
Start: 2022-08-09 | End: 2022-08-10 | Stop reason: HOSPADM

## 2022-08-09 RX ORDER — HYDROXYCHLOROQUINE SULFATE 200 MG/1
200 TABLET, FILM COATED ORAL 2 TIMES DAILY
COMMUNITY
End: 2022-10-25 | Stop reason: SDUPTHER

## 2022-08-09 RX ORDER — GABAPENTIN 300 MG/1
600 CAPSULE ORAL 3 TIMES DAILY
Status: DISCONTINUED | OUTPATIENT
Start: 2022-08-09 | End: 2022-08-10 | Stop reason: HOSPADM

## 2022-08-09 RX ORDER — LISINOPRIL 10 MG/1
10 TABLET ORAL DAILY
Status: DISCONTINUED | OUTPATIENT
Start: 2022-08-09 | End: 2022-08-10 | Stop reason: HOSPADM

## 2022-08-09 RX ADMIN — METRONIDAZOLE 500 MG: 500 INJECTION, SOLUTION INTRAVENOUS at 03:07

## 2022-08-09 RX ADMIN — SODIUM CHLORIDE 100 ML/HR: 9 INJECTION, SOLUTION INTRAVENOUS at 06:29

## 2022-08-09 RX ADMIN — PROMETHAZINE HYDROCHLORIDE 12.5 MG: 25 INJECTION INTRAMUSCULAR; INTRAVENOUS at 20:08

## 2022-08-09 RX ADMIN — SODIUM CHLORIDE 100 ML/HR: 9 INJECTION, SOLUTION INTRAVENOUS at 18:09

## 2022-08-09 RX ADMIN — ONDANSETRON 4 MG: 2 INJECTION INTRAMUSCULAR; INTRAVENOUS at 07:27

## 2022-08-09 RX ADMIN — MECLIZINE HYDROCHLORIDE 12.5 MG: 25 TABLET ORAL at 10:15

## 2022-08-09 RX ADMIN — METRONIDAZOLE 500 MG: 500 INJECTION, SOLUTION INTRAVENOUS at 20:32

## 2022-08-09 RX ADMIN — ONDANSETRON 4 MG: 2 INJECTION INTRAMUSCULAR; INTRAVENOUS at 01:30

## 2022-08-09 RX ADMIN — HYDROCODONE BITARTRATE AND ACETAMINOPHEN 1 TABLET: 10; 325 TABLET ORAL at 10:15

## 2022-08-09 RX ADMIN — PANTOPRAZOLE SODIUM 40 MG: 40 TABLET, DELAYED RELEASE ORAL at 10:15

## 2022-08-09 RX ADMIN — PROMETHAZINE HYDROCHLORIDE 12.5 MG: 25 INJECTION INTRAMUSCULAR; INTRAVENOUS at 13:29

## 2022-08-09 RX ADMIN — LEVOFLOXACIN 750 MG: 5 INJECTION, SOLUTION INTRAVENOUS at 18:08

## 2022-08-09 RX ADMIN — MORPHINE SULFATE 3 MG: 4 INJECTION, SOLUTION INTRAMUSCULAR; INTRAVENOUS at 16:49

## 2022-08-09 RX ADMIN — METRONIDAZOLE 500 MG: 500 INJECTION, SOLUTION INTRAVENOUS at 12:49

## 2022-08-09 RX ADMIN — Medication 3 ML: at 20:11

## 2022-08-09 NOTE — PROGRESS NOTES
[facility]    Name:  Stephanie Jean   Age:  66 y.o.  Sex:  female  :  1955  MRN:  [id number]   Visit Number:  [id number]  Admission Date:  2022  Date Of Service:  22  Primary Care Physician:  Margaret Mooney APRN     LOS: 1 day :    Chief Complaint:      Abdominal pain and bright red blood per rectum upon presentation.    Subjective:    Still feeling nauseated, abdominal pain, still having some blood with diarrhea.  Able to drink liquids.    Hospital Course:    Ms. 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  Patient in fact went to [facility] emergency room yesterday with nausea and vomiting as well as some dizziness.  He was diagnosed with heat exposure, hypokalemia and was subsequently discharged home.  Her COVID-19 test done  at the emergency room was negative.  Her creatinine yesterday was 1.6 and potassium was 3.2.  She denies any outside food exposure.  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  She denied any fevers.  She was noted to have streaking blood clot in her stools today.  She was subsequently brought to the emergency room by her daughter.  Her  does not have any similar symptoms.  No recent history of antibiotics use.     In the emergency room, she was afebrile with initial heart rate of 107 and blood pressure 115/68.  Pulse oxygen saturation was 97% on room air.  Blood work done in the emergency room including CMP and CBC was unremarkable except for hemoglobin of 10.9.  Lipase was within normal limits.  Urine analysis was within normal limits.  Repeat hemoglobin after a few hours was 9.6.  CT of the abdomen and pelvis done in the emergency room however showed  diffuse colonic wall thickening, submucosal edema, mucosal hyperenhancement, and pericolonic inflammation involving the splenic flexure, descending colon, and proximal sigmoid colon. Small volume ascites. Findings are concerning for most likely infectious colitis or inflammatory colitis and less likely ischemic colitis.  Patient was given 2 L of normal saline bolus in the emergency room and was started on IV antibiotics therapy with Levaquin and metronidazole and admitted to the medical floor with telemetry.  Dr. Morel was called from the emergency room for consultation, no concern for ischemia, no surgical indication.    Review of Systems:     All systems were reviewed and negative except as mentioned in subjective, assessment and plan.    Vital Signs:    Temp:  [98.4 °F (36.9 °C)-99.2 °F (37.3 °C)] 98.6 °F (37 °C)  Heart Rate:  [101-107] 107  Resp:  [16-20] 20  BP: (109-147)/(52-76) 118/52    Intake and output:    I/O last 3 completed shifts:  In: 1000 [IV Piggyback:1000]  Out: 300 [Urine:300]  I/O this shift:  In: 480 [P.O.:480]  Out: -     Physical Examination:    General Appearance:  Alert and cooperative.    Head:  Atraumatic and normocephalic.   Eyes: Conjunctivae and sclerae normal, no icterus. No pallor.   Throat: No oral lesions, no thrush, oral mucosa moist.   Neck: Supple, trachea midline, no thyromegaly.   Lungs:   Breath sounds heard bilaterally equally.  No wheezing or crackles. No Pleural rub or bronchial breathing.   Heart:  Normal S1 and S2, no murmur, no gallop, no rub. No JVD.   Abdomen:   Normal bowel sounds, no masses, no organomegaly. Soft, diffuse mild tenderness, nondistended, no rebound tenderness.   Extremities: Supple, no edema, no cyanosis, no clubbing.   Skin: No bleeding or rash.   Neurologic: Alert and oriented x 3. No facial asymmetry. Moves all four limbs. No tremors.      Laboratory results:    Results from last 7 days   Lab Units 08/09/22  0646 08/08/22  1502   SODIUM mmol/L 139  137   POTASSIUM mmol/L 3.6 3.5   CHLORIDE mmol/L 109* 105   CO2 mmol/L 21.8* 23.4   BUN mg/dL 9 13   CREATININE mg/dL 0.61 0.84   CALCIUM mg/dL 7.7* 8.1*   BILIRUBIN mg/dL 0.3 0.5   ALK PHOS U/L 96 124*   ALT (SGPT) U/L 13 16   AST (SGOT) U/L 17 20   GLUCOSE mg/dL 69 106*     Results from last 7 days   Lab Units 08/09/22  0646 08/08/22  1754 08/08/22  1502 08/07/22  1140   WBC 10*3/mm3 8.76  --  9.81 10.3   HEMOGLOBIN g/dL 9.5* 9.6* 10.9* 13.9   HEMATOCRIT % 28.5* 28.0* 31.9* 42.1   PLATELETS 10*3/mm3 137*  --  172 259     Results from last 7 days   Lab Units 08/09/22  0646   INR  1.16*     Results from last 7 days   Lab Units 08/07/22  1140   CK TOTAL U/L 209*   TROPONIN I ng/mL <0.30             I have reviewed the patient's laboratory results.    Radiology results:    CT Abdomen Pelvis With Contrast    Result Date: 8/8/2022  CT SCAN OF THE ABDOMEN AND PELVIS WITH CONTRAST    8/8/2022 3:54 PM  HISTORY: abdominal pain, n/v/d, blood in stoolAbdominal pain.  PROCEDURE: Axial CT images were obtained from the lung bases to the pubic symphysis following IV contrast administration. Coronal and sagittal reformatted images were generated from the axial data set and provided for interpretation. This study was performed with techniques to keep radiation doses as low as reasonably achievable, (ALARA). Individualized dose reduction techniques using automated exposure control or adjustment of mA and/or kV according to the patient size were employed.  COMPARISON: None.  FINDINGS: LOWER CHEST: The heart is normal in size. Lung bases are clear. Calcified granuloma in the left lower lobe. Coronary artery atherosclerotic disease.  ABDOMEN/PELVIS: Liver, gallbladder and bile ducts: The liver enhances homogenously without suspicious focal hepatic lesion. Hepatic steatosis. Unremarkable gallbladder. No significant biliary ductal dilatation.  Adrenal glands: No right adrenal nodule. There is 2.0 cm left adrenal nodule, indeterminate,  likely an adenoma.  Kidneys, ureters and urinary bladder: No suspicious renal lesions. No hydronephrosis. Unremarkable urinary bladder.  Spleen: The spleen is normal in size.  Pancreas: The pancreas is unremarkable.  Gastrointestinal system and mesentery: There is no evidence of bowel obstruction. The appendix is not well-visualized but there is no significant inflammation in the right lower quadrant. There is a diffuse circumferential wall thickening of the splenic flexure, descending colon, and the proximal sigmoid colon, with mural thickening, submucosal edema, and mucosal hyperenhancement, and associated pericolonic inflammatory fat stranding.  Lymph nodes: No pathologically enlarged abdominal or pelvic lymph nodes are present.  Vessels: The abdominal aorta is normal in caliber. Mild to moderate atherosclerotic disease. The celiac trunk, superior mesenteric artery, inferior mesenteric artery and their branch vessels appear grossly patent. The superior mesenteric vein, splenic vein and main portal veins are patent. The inferior vena cava and hepatic veins are unremarkable.  Peritoneum: Small volume ascites. No evidence of pneumatosis, mesenteric venous gas, or portal venous gas.  Pelvic viscera: No acute findings.  Body wall: No acute findings. No significant body wall hernias.  Bones: No acute fracture.      Impression: Diffuse colonic wall thickening, submucosal edema, mucosal hyperenhancement, and pericolonic inflammation involving the splenic flexure, descending colon, and proximal sigmoid colon. Small volume ascites. Findings are concerning for most likely infectious colitis or inflammatory colitis and less likely ischemic colitis. Consider post treatment colonoscopic evaluation to rule out occult pathology.    Images personally reviewed, interpreted and dictated by KALPANA Posadas.      This study was performed with techniques to keep radiation doses as low as reasonably achievable (ALARA).  Individualized dose reduction techniques using automated exposure control or adjustment of mA and/or kV according to the patient size were employed.      This report was signed and finalized on 8/8/2022 4:20 PM by KALPANA Posadas.    I have reviewed the patient's radiology reports.    Medication Review:     I have reviewed the patient's active and prn medications.     Problem List:      Infective colitis    Gastroesophageal reflux disease without esophagitis    Bloody stool    Acute blood loss anemia      Assessment:    Infectious colitis  Acute blood loss anemia      Plan:    Infectious colitis  Continue Levaquin and metronidazole, which was started on 8/8.  Blood cultures pending.  C. difficile negative 8/9.    Acute blood loss anemia  Monitoring.  Likely secondary to bloody stool with infectious colitis.    DVT Prophylaxis: Held due to blood loss anemia   code Status: Full code  Diet: Liquids, advance as tolerated  Discharge Plan: Home, 1 to 2 days    Brian Joseph Kerley, DO  08/09/22  15:58 EDT    Dictated utilizing Dragon dictation.

## 2022-08-09 NOTE — CONSULTS
Stephanie Nowaky    1955    Primary Care Provider: Margaret Mooney APRN    Chief Complaint   Patient presents with   • Black or Bloody Stool          SUBJECTIVE:    History of present illness: I was asked to see this patient today in consultation for evaluation and treatment of a history significant for gradually increasing abdominal discomfort over the past 48 hours.  Apparently she was felt to have crampy lower quadrant abdominal discomfort on Sunday and then subsequently went to the emergency room in [facility] yesterday and sent home.  She represented to the [facility] emergency room with crampy sharp lower quadrant abdominal discomfort, associated with bloody diarrhea, associated with bloody diarrhea, nonradiating, movement makes it worse, nothing seems to make it better.    Nobody else is sick at home, she is never felt like this in the past.  She does not have a personal history of inflammatory bowel disease.    Review of Systems:  Constitutional:  Negative for chills, fever, and unexpected weight change.  HENT: Negative for trouble swallowing and voice change.  Eyes:  Negative for visual disturbance.  Respiratory:  Negative for apnea, cough, chest tightness, shortness of breath, and wheezing.  Cardiovascular:  Negative for chest pain, palpitations, and leg swelling.  Gastrointestinal: Positive history of diarrhea and blood per rectum   musculoskeletal:  Negative for back pain, gait problem, and joint swelling.  Skin:  Negative for color change, rash, and wound  Neurological:  Negative for dizziness, syncope, speech difficulty, weakness, numbness, and headaches.  Hematological:  Negative for adenopathy.  Does not bruise/bleed easily.  Psychiatric/Behavioral:  Negative for confusion.  The patient is not nervous/anxious.        History:    Past Medical History:   Diagnosis Date   • Arthritis    • Colon polyps    • Dysphagia    • Elevated cholesterol    • Family history of colon cancer     • Fibromyalgia    • GERD (gastroesophageal reflux disease)    • Heart murmur    • Hoarseness    • Hyperlipidemia    • Lupus (HCC)    • Marijuana use     Daily    • Pulmonary nodule    • Sjogren's disease (HCC)        Past Surgical History:   Procedure Laterality Date   • APPENDECTOMY     • CATARACT EXTRACTION W/ INTRAOCULAR LENS IMPLANT Left 2021    Procedure: CATARACT PHACO EXTRACTION WITH INTRAOCULAR LENS IMPLANT LEFT COMPLICATED WITH MALYUGIN RING;  Surgeon: Zachary Landers MD;  Location: [facility] OR;  Service: Ophthalmology;  Laterality: Left;   • CATARACT EXTRACTION W/ INTRAOCULAR LENS IMPLANT Right 1/3/2022    Procedure: CATARACT PHACO EXTRACTION WITH INTRAOCULAR LENS IMPLANT RIGHT;  Surgeon: Zachary Landers MD;  Location: [facility] OR;  Service: Ophthalmology;  Laterality: Right;   • COLONOSCOPY     • ENDOSCOPY     • ENDOSCOPY N/A 2020    Procedure: ESOPHAGOGASTRODUODENOSCOPY;  Surgeon: Chaparro Marrero MD;  Location: [facility] ENDOSCOPY;  Service: Gastroenterology;  Laterality: N/A;   • HYSTERECTOMY         Family History   Problem Relation Age of Onset   • Hypertension Mother    • Aneurysm Mother    • Heart disease Mother    • Emphysema Father    • Ulcerative colitis Brother    • Colon cancer Maternal Uncle    • Cirrhosis Neg Hx    • Liver disease Neg Hx    • Liver cancer Neg Hx    • Crohn's disease Neg Hx        Social History     Socioeconomic History   • Marital status:    Tobacco Use   • Smoking status: Former Smoker     Packs/day: 1.00     Years: 40.00     Pack years: 40.00     Types: Cigarettes     Quit date:      Years since quittin.6   • Smokeless tobacco: Never Used   Substance and Sexual Activity   • Alcohol use: No   • Drug use: Yes     Frequency: 7.0 times per week     Types: Marijuana     Comment: OCCASIONALLY   • Sexual activity: Defer       Allergies:  Allergies   Allergen Reactions   • Penicillins Rash       Medications:    Current Facility-Administered  Medications:   •  acetaminophen (TYLENOL) tablet 650 mg, 650 mg, Oral, Q4H PRN **OR** acetaminophen (TYLENOL) 160 MG/5ML solution 650 mg, 650 mg, Oral, Q4H PRN **OR** acetaminophen (TYLENOL) suppository 650 mg, 650 mg, Rectal, Q4H PRN, Donta Roblero MD  •  atorvastatin (LIPITOR) tablet 80 mg, 80 mg, Oral, Nightly, Donta Roblero MD  •  baclofen (LIORESAL) tablet 10 mg, 10 mg, Oral, TID, Donta Roblero MD  •  gabapentin (NEURONTIN) capsule 300 mg, 300 mg, Oral, TID, Donta Roblero MD, 300 mg at 08/08/22 2020  •  HYDROcodone-acetaminophen (NORCO)  MG per tablet 1 tablet, 1 tablet, Oral, Q8H PRN, Anisha Marcos DO, 1 tablet at 08/08/22 2144  •  lactobacillus acidophilus (RISAQUAD) capsule 1 capsule, 1 capsule, Oral, BID, Donta Roblero MD, 1 capsule at 08/08/22 2020  •  levoFLOXacin (LEVAQUIN) 750 mg/150 mL D5W (premix) (LEVAQUIN) 750 mg, 750 mg, Intravenous, Q24H, Donta Roblero MD  •  meclizine (ANTIVERT) tablet 12.5 mg, 12.5 mg, Oral, TID PRN, Donta Roblero MD  •  metroNIDAZOLE (FLAGYL) IVPB 500 mg, 500 mg, Intravenous, Q8H, Donta Roblero MD, 500 mg at 08/09/22 0307  •  Morphine sulfate (PF) injection 3 mg, 3 mg, Intravenous, Q4H PRN, Anisha Marcos DO  •  ondansetron (ZOFRAN) injection 4 mg, 4 mg, Intravenous, Q6H PRN, Donta Roblero MD, 4 mg at 08/09/22 0727  •  pantoprazole (PROTONIX) EC tablet 40 mg, 40 mg, Oral, Daily, Donta Roblero MD  •  promethazine (PHENERGAN) tablet 12.5 mg, 12.5 mg, Oral, Q8H PRN, Donta Roblero MD  •  [COMPLETED] Insert peripheral IV, , , Once **AND** sodium chloride 0.9 % flush 10 mL, 10 mL, Intravenous, PRN, Donta Roblero MD  •  sodium chloride 0.9 % flush 3 mL, 3 mL, Intravenous, Q12H, Donta Roblero MD, 3 mL at 08/08/22 2020  •  sodium chloride 0.9 % flush 3-10 mL, 3-10 mL, Intravenous, PRN, Donta Roblero MD  •  sodium chloride 0.9 % infusion, 100 mL/hr, Intravenous, Continuous, Donta Roblero MD, Last Rate: 100 mL/hr at 08/09/22 0629, 100 mL/hr at 08/09/22 0629  •   "traMADol (ULTRAM) tablet 50 mg, 50 mg, Oral, BID, Donta Roblero MD, 50 mg at 08/08/22 2020    OBJECTIVE:    Vital Signs:   Vitals:    08/08/22 1956 08/09/22 0008 08/09/22 0432 08/09/22 0700   BP: 118/72 123/59 123/59 147/76   BP Location: Left arm Left arm Left arm Left arm   Patient Position: Lying Lying Lying Lying   Pulse: 104 102 101 105   Resp: 18 18 18 18   Temp: 98.5 °F (36.9 °C) 99.2 °F (37.3 °C) 98.5 °F (36.9 °C) 98.6 °F (37 °C)   TempSrc: Oral Oral Oral Oral   SpO2: 96% 97% 97% 98%   Weight: 65.7 kg (144 lb 13.5 oz)      Height: 157.5 cm (62\")          Physical Exam:   General Appearance:    Alert, cooperative, in no acute distress   Head:    Normocephalic, without obvious abnormality, atraumatic   Eyes:            Lids and lashes normal, conjunctivae and sclerae normal, no   icterus, no pallor, corneas clear, PERRLA   Throat:   No oral lesions, no thrush, oral mucosa moist   Neck:   No adenopathy, supple, trachea midline, no thyromegaly, no   carotid bruit, no JVD   Lungs:     Clear to auscultation,respirations regular, even and                  unlabored    Heart:    Regular rhythm and normal rate, normal S1 and S2, no            murmur, no gallop, no rub, no click   Chest Wall:    No abnormalities observed   Abdomen:     Normal bowel sounds, no masses, no organomegaly, soft        non-tender, non-distended, no guarding, no rebound                tenderness, crampy type lower quadrant abdominal discomfort but certainly no evidence of peritoneal signs   Extremities:   Moves all extremities well, no edema, no cyanosis, no             redness   Pulses:   Pulses palpable and equal bilaterally   Skin:   No bleeding, bruising or rash   Lymph nodes:   No palpable adenopathy   Neurologic:   Cranial nerves 2 - 12 grossly intact, sensation intact, DTR       present and equal bilaterally   Results Review:    Lab Results (last 24 hours)     Procedure Component Value Units Date/Time    Gastrointestinal Panel, PCR - " Stool, Per Rectum [[id number]] Collected: 08/09/22 0746    Specimen: Stool from Per Rectum Updated: 08/09/22 0813    Clostridioides difficile Toxin - Stool, Per Rectum [[id number]] Collected: 08/09/22 0746    Specimen: Stool from Per Rectum Updated: 08/09/22 0813    Narrative:      The following orders were created for panel order Clostridioides difficile Toxin - Stool, Per Rectum.  Procedure                               Abnormality         Status                     ---------                               -----------         ------                     Clostridioides difficile...[[id number]]                      In process                   Please view results for these tests on the individual orders.    Clostridioides difficile EIA - Stool, Per Rectum [[id number]] Collected: 08/09/22 0746    Specimen: Stool from Per Rectum Updated: 08/09/22 0813    Comprehensive Metabolic Panel [[id number]]  (Abnormal) Collected: 08/09/22 0646    Specimen: Blood Updated: 08/09/22 0723     Glucose 69 mg/dL      BUN 9 mg/dL      Creatinine 0.61 mg/dL      Sodium 139 mmol/L      Potassium 3.6 mmol/L      Chloride 109 mmol/L      CO2 21.8 mmol/L      Calcium 7.7 mg/dL      Total Protein 5.1 g/dL      Albumin 3.00 g/dL      ALT (SGPT) 13 U/L      AST (SGOT) 17 U/L      Alkaline Phosphatase 96 U/L      Total Bilirubin 0.3 mg/dL      Globulin 2.1 gm/dL      A/G Ratio 1.4 g/dL      BUN/Creatinine Ratio 14.8     Anion Gap 8.2 mmol/L      eGFR 100.0 mL/min/1.73      Comment: National Kidney Foundation and American Society of Nephrology (ASN) Task Force recommended calculation based on the Chronic Kidney Disease Epidemiology Collaboration (CKD-EPI) equation refit without adjustment for race.       Narrative:      GFR Normal >60  Chronic Kidney Disease <60  Kidney Failure <15      Magnesium [[id number]]  (Normal) Collected: 08/09/22 0646    Specimen: Blood Updated: 08/09/22 0723     Magnesium 1.8 mg/dL     Protime-INR [[id number]]  (Abnormal)  Collected: 08/09/22 0646    Specimen: Blood Updated: 08/09/22 0717     Protime 15.2 Seconds      INR 1.16    Narrative:      Suggested INR therapeutic range for stable oral anticoagulant therapy:    Low Intensity therapy:   1.5-2.0  Moderate Intensity therapy:   2.0-3.0  High Intensity therapy:   2.5-4.0    CBC Auto Differential [[id number]]  (Abnormal) Collected: 08/09/22 0646    Specimen: Blood Updated: 08/09/22 0706     WBC 8.76 10*3/mm3      RBC 3.09 10*6/mm3      Hemoglobin 9.5 g/dL      Hematocrit 28.5 %      MCV 92.2 fL      MCH 30.7 pg      MCHC 33.3 g/dL      RDW 13.0 %      RDW-SD 43.9 fl      MPV 10.2 fL      Platelets 137 10*3/mm3      Neutrophil % 76.6 %      Lymphocyte % 13.4 %      Monocyte % 8.6 %      Eosinophil % 0.2 %      Basophil % 0.5 %      Immature Grans % 0.7 %      Neutrophils, Absolute 6.72 10*3/mm3      Lymphocytes, Absolute 1.17 10*3/mm3      Monocytes, Absolute 0.75 10*3/mm3      Eosinophils, Absolute 0.02 10*3/mm3      Basophils, Absolute 0.04 10*3/mm3      Immature Grans, Absolute 0.06 10*3/mm3      nRBC 0.0 /100 WBC     Hemoglobin & Hematocrit, Blood [[id number]]  (Abnormal) Collected: 08/08/22 1754    Specimen: Blood Updated: 08/08/22 1759     Hemoglobin 9.6 g/dL      Hematocrit 28.0 %     Lactic Acid, Plasma [[id number]]  (Normal) Collected: 08/08/22 1711    Specimen: Blood Updated: 08/08/22 1741     Lactate 0.7 mmol/L     Urinalysis With Microscopic If Indicated (No Culture) - Urine, Clean Catch [[id number]]  (Normal) Collected: 08/08/22 1637    Specimen: Urine, Clean Catch Updated: 08/08/22 1643     Color, UA Yellow     Appearance, UA Clear     pH, UA 6.0     Specific Gravity, UA 1.029     Glucose, UA Negative     Ketones, UA Negative     Bilirubin, UA Negative     Blood, UA Negative     Protein, UA Negative     Leuk Esterase, UA Negative     Nitrite, UA Negative     Urobilinogen, UA 0.2 E.U./dL    Narrative:      Urine microscopic not indicated.    Comprehensive Metabolic Panel  [[id number]]  (Abnormal) Collected: 08/08/22 1502    Specimen: Blood Updated: 08/08/22 1538     Glucose 106 mg/dL      BUN 13 mg/dL      Creatinine 0.84 mg/dL      Sodium 137 mmol/L      Potassium 3.5 mmol/L      Chloride 105 mmol/L      CO2 23.4 mmol/L      Calcium 8.1 mg/dL      Total Protein 6.0 g/dL      Albumin 3.50 g/dL      ALT (SGPT) 16 U/L      AST (SGOT) 20 U/L      Alkaline Phosphatase 124 U/L      Total Bilirubin 0.5 mg/dL      Globulin 2.5 gm/dL      A/G Ratio 1.4 g/dL      BUN/Creatinine Ratio 15.5     Anion Gap 8.6 mmol/L      eGFR 77.7 mL/min/1.73      Comment: National Kidney Foundation and American Society of Nephrology (ASN) Task Force recommended calculation based on the Chronic Kidney Disease Epidemiology Collaboration (CKD-EPI) equation refit without adjustment for race.       Narrative:      GFR Normal >60  Chronic Kidney Disease <60  Kidney Failure <15      Lipase [[id number]]  (Normal) Collected: 08/08/22 1502    Specimen: Blood Updated: 08/08/22 1538     Lipase 24 U/L     CBC & Differential [[id number]]  (Abnormal) Collected: 08/08/22 1502    Specimen: Blood Updated: 08/08/22 1522    Narrative:      The following orders were created for panel order CBC & Differential.  Procedure                               Abnormality         Status                     ---------                               -----------         ------                     CBC Auto Differential[[id number]]        Abnormal            Final result                 Please view results for these tests on the individual orders.    CBC Auto Differential [[id number]]  (Abnormal) Collected: 08/08/22 1502    Specimen: Blood Updated: 08/08/22 1522     WBC 9.81 10*3/mm3      RBC 3.51 10*6/mm3      Hemoglobin 10.9 g/dL      Hematocrit 31.9 %      MCV 90.9 fL      MCH 31.1 pg      MCHC 34.2 g/dL      RDW 13.2 %      RDW-SD 43.9 fl      MPV 10.3 fL      Platelets 172 10*3/mm3      Neutrophil % 73.9 %      Lymphocyte % 15.1 %      Monocyte %  10.4 %      Eosinophil % 0.1 %      Basophil % 0.3 %      Immature Grans % 0.2 %      Neutrophils, Absolute 7.25 10*3/mm3      Lymphocytes, Absolute 1.48 10*3/mm3      Monocytes, Absolute 1.02 10*3/mm3      Eosinophils, Absolute 0.01 10*3/mm3      Basophils, Absolute 0.03 10*3/mm3      Immature Grans, Absolute 0.02 10*3/mm3      nRBC 0.0 /100 WBC             I reviewed the patient's new clinical results.  I reviewed the patient's new imaging results and agree with the interpretation.  I reviewed the patient's other test results and agree with the interpretation     I did review all the patient's old records, I reviewed her ER visit, I reviewed her CT scan that showed pancolonic thickening from the splenic flexure distally    ASSESSMENT PLAN:    Patient Active Problem List   Diagnosis   • Gastroesophageal reflux disease without esophagitis   • Esophageal dysphagia   • Chest pain   • Abnormal ECG   • Dizziness   • Transient ischemic attack (TIA)   • Nuclear sclerotic cataract of left eye   • Bloody stool   • Infective colitis   • Acute blood loss anemia       Stable 66-year-old female does have colitis of unknown origin.  Cultures are certainly pending, I do not think this is ischemic in nature but rather inflammatory.  From a surgical standpoint there is nothing to do at this time but to continue with IV fluid rehydration, IV antibiotics, and I will start her on a liquid diet.    I discussed the patients findings and my recommendations with patient and consulting provider    Mj Morel MD  08/09/22  09:29 EDT

## 2022-08-09 NOTE — PLAN OF CARE
Goal Outcome Evaluation:  Plan of Care Reviewed With: patient        Progress: improving   Pt new admit from ED to room 432. Pt has rested on and off throughout the night. C/O pain and Nausea throughout the shift. PRN meds given per MAR. VSS. IV antibiotics given throughout the night. NSR on monitor. Room air.

## 2022-08-09 NOTE — CASE MANAGEMENT/SOCIAL WORK
Discharge Planning Assessment  [facility]     Patient Name: Stephanie Jean  MRN: [id number]  Today's Date: 8/9/2022    Admit Date: 8/8/2022     Discharge Needs Assessment     Row Name 08/09/22 1200       Living Environment    People in Home spouse    Name(s) of People in Home Jair Jean  spouse    Current Living Arrangements home    Primary Care Provided by self    Provides Primary Care For no one, unable/limited ability to care for self    Quality of Family Relationships involved;helpful    Able to Return to Prior Arrangements yes       Resource/Environmental Concerns    Resource/Environmental Concerns none       Transition Planning    Patient/Family Anticipates Transition to home with family    Patient/Family Anticipated Services at Transition none    Transportation Anticipated family or friend will provide       Discharge Needs Assessment    Readmission Within the Last 30 Days no previous admission in last 30 days    Equipment Currently Used at Home scales;bp cuff    Concerns to be Addressed no discharge needs identified               Discharge Plan     Row Name 08/09/22 1202       Plan    Plan Met with pt ( light sleep) and spouse at bedside. Demographic information verified. Pt has no LW or POA. Has not been an inpatient in last 30 days. DME is bp cuff and scales. Primary is Margaret Mooney and pharmacy is [facility]. Pt and spouse have been at current resident 18 yrs. They are both independent and both drive. Denies financial concerns. D/C plan is  transporting pt home at time of d/c. No needs identified.              Continued Care and Services - Admitted Since 8/8/2022    Coordination has not been started for this encounter.       Expected Discharge Date and Time     Expected Discharge Date Expected Discharge Time    Aug 10, 2022          Demographic Summary     Row Name 08/09/22 1158       General Information    Admission Type inpatient    Referral Source admission list    Reason for Consult  discharge planning    Preferred Language English       Contact Information    Permission Granted to Share Info With family/designee               Functional Status     Row Name 08/09/22 1200       Functional Status    Usual Activity Tolerance good    Current Activity Tolerance fair       Functional Status, IADL    Medications independent    Meal Preparation independent    Housekeeping independent    Laundry independent    Shopping independent       Mental Status    General Appearance WDL WDL       Mental Status Summary    Recent Changes in Mental Status/Cognitive Functioning no changes       Employment/    Employment Status retired               Psychosocial    No documentation.                Abuse/Neglect    No documentation.                Legal    No documentation.                Substance Abuse    No documentation.                Patient Forms    No documentation.                   Melodie Macdonald RN

## 2022-08-09 NOTE — PLAN OF CARE
Goal Outcome Evaluation:         VSS. Nausea unrelieved wit prn zofran, pt unable to take PO medications throughout the day. IV phenergan administered per order with relief of nausea. IV abx administered per orders. No acute events.

## 2022-08-09 NOTE — NURSING NOTE
Pt refused covid swab per our protocol. She stated that she had a covid swab done at a different facility yesterday that was negative and did not want to have another one done.

## 2022-08-10 VITALS
TEMPERATURE: 98.4 F | DIASTOLIC BLOOD PRESSURE: 67 MMHG | WEIGHT: 144.84 LBS | HEIGHT: 62 IN | SYSTOLIC BLOOD PRESSURE: 131 MMHG | HEART RATE: 99 BPM | OXYGEN SATURATION: 98 % | RESPIRATION RATE: 18 BRPM | BODY MASS INDEX: 26.65 KG/M2

## 2022-08-10 LAB
ANION GAP SERPL CALCULATED.3IONS-SCNC: 10 MMOL/L (ref 5–15)
BASOPHILS # BLD AUTO: 0.03 10*3/MM3 (ref 0–0.2)
BASOPHILS NFR BLD AUTO: 0.5 % (ref 0–1.5)
BUN SERPL-MCNC: 6 MG/DL (ref 8–23)
BUN/CREAT SERPL: 9.5 (ref 7–25)
CALCIUM SPEC-SCNC: 8.1 MG/DL (ref 8.6–10.5)
CHLORIDE SERPL-SCNC: 109 MMOL/L (ref 98–107)
CO2 SERPL-SCNC: 23 MMOL/L (ref 22–29)
CREAT SERPL-MCNC: 0.63 MG/DL (ref 0.57–1)
DEPRECATED RDW RBC AUTO: 42.6 FL (ref 37–54)
EGFRCR SERPLBLD CKD-EPI 2021: 99.2 ML/MIN/1.73
EOSINOPHIL # BLD AUTO: 0.07 10*3/MM3 (ref 0–0.4)
EOSINOPHIL NFR BLD AUTO: 1.1 % (ref 0.3–6.2)
ERYTHROCYTE [DISTWIDTH] IN BLOOD BY AUTOMATED COUNT: 12.8 % (ref 12.3–15.4)
GLUCOSE BLDC GLUCOMTR-MCNC: 195 MG/DL (ref 70–130)
GLUCOSE BLDC GLUCOMTR-MCNC: 57 MG/DL (ref 70–130)
GLUCOSE BLDC GLUCOMTR-MCNC: 61 MG/DL (ref 70–130)
GLUCOSE SERPL-MCNC: 62 MG/DL (ref 65–99)
HCT VFR BLD AUTO: 27.9 % (ref 34–46.6)
HGB BLD-MCNC: 9.6 G/DL (ref 12–15.9)
IMM GRANULOCYTES # BLD AUTO: 0.04 10*3/MM3 (ref 0–0.05)
IMM GRANULOCYTES NFR BLD AUTO: 0.6 % (ref 0–0.5)
LYMPHOCYTES # BLD AUTO: 1.12 10*3/MM3 (ref 0.7–3.1)
LYMPHOCYTES NFR BLD AUTO: 17.9 % (ref 19.6–45.3)
MCH RBC QN AUTO: 31.2 PG (ref 26.6–33)
MCHC RBC AUTO-ENTMCNC: 34.4 G/DL (ref 31.5–35.7)
MCV RBC AUTO: 90.6 FL (ref 79–97)
MONOCYTES # BLD AUTO: 0.45 10*3/MM3 (ref 0.1–0.9)
MONOCYTES NFR BLD AUTO: 7.2 % (ref 5–12)
NEUTROPHILS NFR BLD AUTO: 4.55 10*3/MM3 (ref 1.7–7)
NEUTROPHILS NFR BLD AUTO: 72.7 % (ref 42.7–76)
NRBC BLD AUTO-RTO: 0 /100 WBC (ref 0–0.2)
PLATELET # BLD AUTO: 140 10*3/MM3 (ref 140–450)
PMV BLD AUTO: 10.4 FL (ref 6–12)
POTASSIUM SERPL-SCNC: 3.5 MMOL/L (ref 3.5–5.2)
RBC # BLD AUTO: 3.08 10*6/MM3 (ref 3.77–5.28)
SODIUM SERPL-SCNC: 142 MMOL/L (ref 136–145)
WBC NRBC COR # BLD: 6.26 10*3/MM3 (ref 3.4–10.8)

## 2022-08-10 PROCEDURE — 25010000002 ONDANSETRON PER 1 MG: Performed by: INTERNAL MEDICINE

## 2022-08-10 PROCEDURE — 99232 SBSQ HOSP IP/OBS MODERATE 35: CPT | Performed by: SURGERY

## 2022-08-10 PROCEDURE — 85025 COMPLETE CBC W/AUTO DIFF WBC: CPT | Performed by: STUDENT IN AN ORGANIZED HEALTH CARE EDUCATION/TRAINING PROGRAM

## 2022-08-10 PROCEDURE — 80048 BASIC METABOLIC PNL TOTAL CA: CPT | Performed by: STUDENT IN AN ORGANIZED HEALTH CARE EDUCATION/TRAINING PROGRAM

## 2022-08-10 PROCEDURE — 82962 GLUCOSE BLOOD TEST: CPT

## 2022-08-10 PROCEDURE — 25010000002 MORPHINE PER 10 MG: Performed by: FAMILY MEDICINE

## 2022-08-10 PROCEDURE — 99238 HOSP IP/OBS DSCHRG MGMT 30/<: CPT | Performed by: STUDENT IN AN ORGANIZED HEALTH CARE EDUCATION/TRAINING PROGRAM

## 2022-08-10 RX ORDER — LEVOFLOXACIN 500 MG/1
500 TABLET, FILM COATED ORAL DAILY
Qty: 5 TABLET | Refills: 0 | Status: SHIPPED | OUTPATIENT
Start: 2022-08-10 | End: 2022-08-15

## 2022-08-10 RX ORDER — NICOTINE POLACRILEX 4 MG
15 LOZENGE BUCCAL
Status: DISCONTINUED | OUTPATIENT
Start: 2022-08-10 | End: 2022-08-10 | Stop reason: HOSPADM

## 2022-08-10 RX ORDER — METRONIDAZOLE 500 MG/1
500 TABLET ORAL 2 TIMES DAILY
Qty: 10 TABLET | Refills: 0 | Status: SHIPPED | OUTPATIENT
Start: 2022-08-10 | End: 2022-08-15

## 2022-08-10 RX ORDER — DEXTROSE MONOHYDRATE 25 G/50ML
25 INJECTION, SOLUTION INTRAVENOUS
Status: DISCONTINUED | OUTPATIENT
Start: 2022-08-10 | End: 2022-08-10 | Stop reason: HOSPADM

## 2022-08-10 RX ORDER — ONDANSETRON 4 MG/1
8 TABLET, FILM COATED ORAL EVERY 8 HOURS PRN
Qty: 30 TABLET | Refills: 0 | Status: SHIPPED | OUTPATIENT
Start: 2022-08-10 | End: 2022-10-27

## 2022-08-10 RX ADMIN — GABAPENTIN 600 MG: 300 CAPSULE ORAL at 10:24

## 2022-08-10 RX ADMIN — LISINOPRIL 10 MG: 10 TABLET ORAL at 10:25

## 2022-08-10 RX ADMIN — METRONIDAZOLE 500 MG: 500 INJECTION, SOLUTION INTRAVENOUS at 04:03

## 2022-08-10 RX ADMIN — MORPHINE SULFATE 3 MG: 4 INJECTION, SOLUTION INTRAMUSCULAR; INTRAVENOUS at 02:28

## 2022-08-10 RX ADMIN — HYDROXYCHLOROQUINE SULFATE 200 MG: 200 TABLET ORAL at 10:24

## 2022-08-10 RX ADMIN — PANTOPRAZOLE SODIUM 40 MG: 40 TABLET, DELAYED RELEASE ORAL at 10:24

## 2022-08-10 RX ADMIN — DEXTROSE MONOHYDRATE 25 G: 25 INJECTION, SOLUTION INTRAVENOUS at 07:48

## 2022-08-10 RX ADMIN — GABAPENTIN 600 MG: 300 CAPSULE ORAL at 15:29

## 2022-08-10 RX ADMIN — Medication 1 CAPSULE: at 10:24

## 2022-08-10 RX ADMIN — ONDANSETRON 4 MG: 2 INJECTION INTRAMUSCULAR; INTRAVENOUS at 08:11

## 2022-08-10 RX ADMIN — LEFLUNOMIDE 10 MG: 10 TABLET ORAL at 10:24

## 2022-08-10 RX ADMIN — Medication 3 ML: at 10:25

## 2022-08-10 RX ADMIN — METRONIDAZOLE 500 MG: 500 INJECTION, SOLUTION INTRAVENOUS at 11:54

## 2022-08-10 RX ADMIN — SODIUM CHLORIDE 100 ML/HR: 9 INJECTION, SOLUTION INTRAVENOUS at 04:56

## 2022-08-10 RX ADMIN — ONDANSETRON 4 MG: 2 INJECTION INTRAMUSCULAR; INTRAVENOUS at 02:23

## 2022-08-10 RX ADMIN — MORPHINE SULFATE 3 MG: 4 INJECTION, SOLUTION INTRAMUSCULAR; INTRAVENOUS at 08:19

## 2022-08-10 NOTE — PROGRESS NOTES
LOS: 2 days   Patient Care Team:  Margaret Mooney APRN as PCP - General (Family Medicine)      Chief Complaint: feels markedly better      Interval History:     Patient Complaints: See Above, passing flatus    History taken from: patient family RN    Vital Signs  Temp:  [97.9 °F (36.6 °C)-98.6 °F (37 °C)] 97.9 °F (36.6 °C)  Heart Rate:  [] 97  Resp:  [17-20] 17  BP: (118-142)/(52-69) 126/65    Physical Exam:     General Appearance:    Alert, cooperative, in no acute distress   Head:    Normocephalic, without obvious abnormality, atraumatic   Lungs:     Clear to auscultation,respirations regular, even and                  unlabored    Heart:    Regular rhythm and normal rate, normal S1 and S2, no            murmur, no gallop, no rub, no click   Abdomen:     Normal bowel sounds, no masses, no organomegaly, soft        non-tender, non-distended, no guarding, no rebound                tenderness   Extremities:   Moves all extremities well, no edema, no cyanosis, no             redness   Pulses:   Pulses palpable and equal bilaterally   Skin:   No bleeding, bruising or rash        Results Review:       Lab Results (last 24 hours)     Procedure Component Value Units Date/Time    POC Glucose Once [[id number]]  (Abnormal) Collected: 08/10/22 0821    Specimen: Blood Updated: 08/10/22 0823     Glucose 195 mg/dL      Comment: Serial Number: [id number]:  [id number]       POC Glucose Once [[id number]]  (Abnormal) Collected: 08/10/22 0728    Specimen: Blood Updated: 08/10/22 0730     Glucose 61 mg/dL      Comment: Serial Number: [id number]:  [id number]       POC Glucose Once [[id number]]  (Abnormal) Collected: 08/10/22 0654    Specimen: Blood Updated: 08/10/22 0656     Glucose 57 mg/dL      Comment: Serial Number: [id number]:  [id number]       Basic Metabolic Panel [[id number]]  (Abnormal) Collected: 08/10/22 0513    Specimen: Blood Updated: 08/10/22 0650     Glucose 62 mg/dL      BUN 6 mg/dL       Creatinine 0.63 mg/dL      Sodium 142 mmol/L      Potassium 3.5 mmol/L      Chloride 109 mmol/L      CO2 23.0 mmol/L      Calcium 8.1 mg/dL      BUN/Creatinine Ratio 9.5     Anion Gap 10.0 mmol/L      eGFR 99.2 mL/min/1.73      Comment: National Kidney Foundation and American Society of Nephrology (ASN) Task Force recommended calculation based on the Chronic Kidney Disease Epidemiology Collaboration (CKD-EPI) equation refit without adjustment for race.       Narrative:      GFR Normal >60  Chronic Kidney Disease <60  Kidney Failure <15      CBC & Differential [[id number]]  (Abnormal) Collected: 08/10/22 0513    Specimen: Blood Updated: 08/10/22 0637    Narrative:      The following orders were created for panel order CBC & Differential.  Procedure                               Abnormality         Status                     ---------                               -----------         ------                     CBC Auto Differential[[id number]]        Abnormal            Final result                 Please view results for these tests on the individual orders.    CBC Auto Differential [[id number]]  (Abnormal) Collected: 08/10/22 0513    Specimen: Blood Updated: 08/10/22 0637     WBC 6.26 10*3/mm3      RBC 3.08 10*6/mm3      Hemoglobin 9.6 g/dL      Hematocrit 27.9 %      MCV 90.6 fL      MCH 31.2 pg      MCHC 34.4 g/dL      RDW 12.8 %      RDW-SD 42.6 fl      MPV 10.4 fL      Platelets 140 10*3/mm3      Neutrophil % 72.7 %      Lymphocyte % 17.9 %      Monocyte % 7.2 %      Eosinophil % 1.1 %      Basophil % 0.5 %      Immature Grans % 0.6 %      Neutrophils, Absolute 4.55 10*3/mm3      Lymphocytes, Absolute 1.12 10*3/mm3      Monocytes, Absolute 0.45 10*3/mm3      Eosinophils, Absolute 0.07 10*3/mm3      Basophils, Absolute 0.03 10*3/mm3      Immature Grans, Absolute 0.04 10*3/mm3      nRBC 0.0 /100 WBC     Gastrointestinal Panel, PCR - Stool, Per Rectum [[id number]]  (Normal) Collected: 08/09/22 0746    Specimen: Stool  from Per Rectum Updated: 08/09/22 1049     Campylobacter Not Detected     Plesiomonas shigelloides Not Detected     Salmonella Not Detected     Vibrio Not Detected     Vibrio cholerae Not Detected     Yersinia enterocolitica Not Detected     Enteroaggregative E. coli (EAEC) Not Detected     Enteropathogenic E. coli (EPEC) Not Detected     Enterotoxigenic E. coli (ETEC) lt/st Not Detected     Shiga-like toxin-producing E. coli (STEC) stx1/stx2 Not Detected     Shigella/Enteroinvasive E. coli (EIEC) Not Detected     Cryptosporidium Not Detected     Cyclospora cayetanensis Not Detected     Entamoeba histolytica Not Detected     Giardia lamblia Not Detected     Adenovirus F40/41 Not Detected     Astrovirus Not Detected     Norovirus GI/GII Not Detected     Rotavirus A Not Detected     Sapovirus (I, II, IV or V) Not Detected    Clostridioides difficile Toxin - Stool, Per Rectum [[id number]]  (Normal) Collected: 08/09/22 0746    Specimen: Stool from Per Rectum Updated: 08/09/22 1008    Narrative:      The following orders were created for panel order Clostridioides difficile Toxin - Stool, Per Rectum.  Procedure                               Abnormality         Status                     ---------                               -----------         ------                     Clostridioides difficile...[[id number]]  Normal              Final result                 Please view results for these tests on the individual orders.    Clostridioides difficile EIA - Stool, Per Rectum [[id number]]  (Normal) Collected: 08/09/22 0746    Specimen: Stool from Per Rectum Updated: 08/09/22 1008     C Diff GDH / Toxin Negative              Assessment & Plan       Infective colitis    Gastroesophageal reflux disease without esophagitis    Bloody stool    Acute blood loss anemia      Stable female with colitis of unknown origin seems better clinically.  I did discuss the situation with the patient and the daughter at the bedside today, she needs  to have elective outpatient colonoscopy when she is over this episode and I will schedule this when I see her in the office next week.  She can go home today or tomorrow per the Hospitalist service on oral antibiotics and I will followup in one week.  No acute surgical needs at this time.      Mj Morel MD  08/10/22  09:49 EDT     Breath sounds clear and equal bilaterally.

## 2022-08-10 NOTE — PROGRESS NOTES
[facility]    Name:  Stephanie Jean   Age:  66 y.o.  Sex:  female  :  1955  MRN:  [id number]   Visit Number:  [id number]  Admission Date:  2022  Date Of Service:  08/10/22  Primary Care Physician:  Margaret Mooney APRN     LOS: 2 days :    Chief Complaint:      Abdominal pain and bright red blood per rectum upon presentation.    Subjective:    Still feeling nauseated, abdominal pain, denies vomiting.  Bleeding is improved per rectum.  Discussed her letting us know when she thinks she can take a pill so we can try the antibiotics p.o. or considering discharge.    Hospital Course:    Ms. 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  Patient in fact went to [facility] emergency room yesterday with nausea and vomiting as well as some dizziness.  He was diagnosed with heat exposure, hypokalemia and was subsequently discharged home.  Her COVID-19 test done  at the emergency room was negative.  Her creatinine yesterday was 1.6 and potassium was 3.2.  She denies any outside food exposure.  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  She denied any fevers.  She was noted to have streaking blood clot in her stools today.  She was subsequently brought to the emergency room by her daughter.  Her  does not have any similar symptoms.  No recent history of antibiotics use.     In the emergency room, she was afebrile with initial heart rate of 107 and blood pressure 115/68.  Pulse oxygen saturation was 97% on room air.  Blood work done in the emergency room including CMP and CBC was unremarkable except for hemoglobin of 10.9.  Lipase was within normal limits.  Urine analysis was within normal limits.   Repeat hemoglobin after a few hours was 9.6.  CT of the abdomen and pelvis done in the emergency room however showed diffuse colonic wall thickening, submucosal edema, mucosal hyperenhancement, and pericolonic inflammation involving the splenic flexure, descending colon, and proximal sigmoid colon. Small volume ascites. Findings are concerning for most likely infectious colitis or inflammatory colitis and less likely ischemic colitis.  Patient was given 2 L of normal saline bolus in the emergency room and was started on IV antibiotics therapy with Levaquin and metronidazole and admitted to the medical floor with telemetry.  Dr. Morel was called from the emergency room for consultation, no concern for ischemia, no surgical indication.    Provided IV Levaquin and metronidazole for infectious colitis and advancing diet as tolerated.  Hemoglobin stabilized at 9.6 on 8/10.      Review of Systems:     All systems were reviewed and negative except as mentioned in subjective, assessment and plan.    Vital Signs:    Temp:  [97.9 °F (36.6 °C)-98.6 °F (37 °C)] 98.4 °F (36.9 °C)  Heart Rate:  [] 99  Resp:  [17-20] 18  BP: (118-142)/(52-68) 131/67    Intake and output:    I/O last 3 completed shifts:  In: 961 [P.O.:961]  Out: 300 [Urine:300]  I/O this shift:  In: 240 [P.O.:240]  Out: -     Physical Examination:    General Appearance:  Alert and cooperative.    Head:  Atraumatic and normocephalic.   Eyes: Conjunctivae and sclerae normal, no icterus. No pallor.   Throat: No oral lesions, no thrush, oral mucosa moist.   Neck: Supple, trachea midline, no thyromegaly.   Lungs:   Breath sounds heard bilaterally equally.  No wheezing or crackles. No Pleural rub or bronchial breathing.   Heart:  Normal S1 and S2, no murmur, no gallop, no rub. No JVD.   Abdomen:   Normal bowel sounds, no masses, no organomegaly. Soft, diffuse mild tenderness, nondistended, no rebound tenderness.   Extremities: Supple, no edema, no cyanosis, no  clubbing.   Skin: No bleeding or rash.   Neurologic: Alert and oriented x 3. No facial asymmetry. Moves all four limbs. No tremors.      Laboratory results:    Results from last 7 days   Lab Units 08/10/22  0513 08/09/22  0646 08/08/22  1502   SODIUM mmol/L 142 139 137   POTASSIUM mmol/L 3.5 3.6 3.5   CHLORIDE mmol/L 109* 109* 105   CO2 mmol/L 23.0 21.8* 23.4   BUN mg/dL 6* 9 13   CREATININE mg/dL 0.63 0.61 0.84   CALCIUM mg/dL 8.1* 7.7* 8.1*   BILIRUBIN mg/dL  --  0.3 0.5   ALK PHOS U/L  --  96 124*   ALT (SGPT) U/L  --  13 16   AST (SGOT) U/L  --  17 20   GLUCOSE mg/dL 62* 69 106*     Results from last 7 days   Lab Units 08/10/22  0513 08/09/22  0646 08/08/22  1754 08/08/22  1502   WBC 10*3/mm3 6.26 8.76  --  9.81   HEMOGLOBIN g/dL 9.6* 9.5* 9.6* 10.9*   HEMATOCRIT % 27.9* 28.5* 28.0* 31.9*   PLATELETS 10*3/mm3 140 137*  --  172     Results from last 7 days   Lab Units 08/09/22  0646   INR  1.16*     Results from last 7 days   Lab Units 08/07/22  1140   CK TOTAL U/L 209*   TROPONIN I ng/mL <0.30             I have reviewed the patient's laboratory results.    Radiology results:    CT Abdomen Pelvis With Contrast    Result Date: 8/8/2022  CT SCAN OF THE ABDOMEN AND PELVIS WITH CONTRAST    8/8/2022 3:54 PM  HISTORY: abdominal pain, n/v/d, blood in stoolAbdominal pain.  PROCEDURE: Axial CT images were obtained from the lung bases to the pubic symphysis following IV contrast administration. Coronal and sagittal reformatted images were generated from the axial data set and provided for interpretation. This study was performed with techniques to keep radiation doses as low as reasonably achievable, (ALARA). Individualized dose reduction techniques using automated exposure control or adjustment of mA and/or kV according to the patient size were employed.  COMPARISON: None.  FINDINGS: LOWER CHEST: The heart is normal in size. Lung bases are clear. Calcified granuloma in the left lower lobe. Coronary artery atherosclerotic  disease.  ABDOMEN/PELVIS: Liver, gallbladder and bile ducts: The liver enhances homogenously without suspicious focal hepatic lesion. Hepatic steatosis. Unremarkable gallbladder. No significant biliary ductal dilatation.  Adrenal glands: No right adrenal nodule. There is 2.0 cm left adrenal nodule, indeterminate, likely an adenoma.  Kidneys, ureters and urinary bladder: No suspicious renal lesions. No hydronephrosis. Unremarkable urinary bladder.  Spleen: The spleen is normal in size.  Pancreas: The pancreas is unremarkable.  Gastrointestinal system and mesentery: There is no evidence of bowel obstruction. The appendix is not well-visualized but there is no significant inflammation in the right lower quadrant. There is a diffuse circumferential wall thickening of the splenic flexure, descending colon, and the proximal sigmoid colon, with mural thickening, submucosal edema, and mucosal hyperenhancement, and associated pericolonic inflammatory fat stranding.  Lymph nodes: No pathologically enlarged abdominal or pelvic lymph nodes are present.  Vessels: The abdominal aorta is normal in caliber. Mild to moderate atherosclerotic disease. The celiac trunk, superior mesenteric artery, inferior mesenteric artery and their branch vessels appear grossly patent. The superior mesenteric vein, splenic vein and main portal veins are patent. The inferior vena cava and hepatic veins are unremarkable.  Peritoneum: Small volume ascites. No evidence of pneumatosis, mesenteric venous gas, or portal venous gas.  Pelvic viscera: No acute findings.  Body wall: No acute findings. No significant body wall hernias.  Bones: No acute fracture.      Impression: Diffuse colonic wall thickening, submucosal edema, mucosal hyperenhancement, and pericolonic inflammation involving the splenic flexure, descending colon, and proximal sigmoid colon. Small volume ascites. Findings are concerning for most likely infectious colitis or inflammatory colitis  and less likely ischemic colitis. Consider post treatment colonoscopic evaluation to rule out occult pathology.    Images personally reviewed, interpreted and dictated by KALPANA Posadas.      This study was performed with techniques to keep radiation doses as low as reasonably achievable (ALARA). Individualized dose reduction techniques using automated exposure control or adjustment of mA and/or kV according to the patient size were employed.      This report was signed and finalized on 8/8/2022 4:20 PM by KALPANA Posadas.    I have reviewed the patient's radiology reports.    Medication Review:     I have reviewed the patient's active and prn medications.     Problem List:      Infective colitis    Gastroesophageal reflux disease without esophagitis    Bloody stool    Acute blood loss anemia      Assessment:    Infectious colitis  Acute blood loss anemia      Plan:    Infectious colitis  Improving as of 8/10.  Clear liquids, advance as tolerated.  Continue Levaquin and metronidazole, which was started on 8/8.  Patient said she will let us know when she can tolerate pills to trial antibiotics.  Providing IVF.  GI PCR panel negative 8/9.    Antiemetics as needed.  C. difficile negative 8/9.    Acute blood loss anemia  Monitoring.  Stabilized.  Likely secondary to bloody stool with infectious colitis.    DVT Prophylaxis: Held due to blood loss anemia   code Status: Full code  Diet: Liquids, advance as tolerated  Discharge Plan: Home, 1 to 2 days, when she can tolerate p.o. antibiotics.    Brian Joseph Kerley, DO  08/10/22  12:35 EDT    Dictated utilizing Dragon dictation.

## 2022-08-10 NOTE — PLAN OF CARE
Goal Outcome Evaluation:  Plan of Care Reviewed With: patient        Progress: improving   Pt has rested on and off throughout the night. C/O nausea and pain, prn meds given per MAR. VSS. Sinus tach to NSR on monitor. Room air. No acute changes this shift.

## 2022-08-10 NOTE — DISCHARGE SUMMARY
[facility]   DISCHARGE SUMMARY      Name:  Stephanie Jean   Age:  66 y.o.  Sex:  female  :  1955  MRN:  [id number]   Visit Number:  [id number]    Admission Date:  2022  Date of Discharge:  8/10/2022  Primary Care Physician:  Margaret Mooney APRN    Important issues to note:    To complete course of metronidazole and Levaquin for infectious colitis with bloody diarrhea.  Follow-up with PCP to check hemoglobin.    Discharge Diagnoses:     Infectious colitis  Acute blood loss anemia    Problem List:     Active Hospital Problems    Diagnosis  POA   • **Infective colitis [A09]  Yes   • Bloody stool [K92.1]  Yes   • Acute blood loss anemia [D62]  Yes   • Gastroesophageal reflux disease without esophagitis [K21.9]  Yes      Resolved Hospital Problems   No resolved problems to display.     Presenting Problem:    Chief Complaint   Patient presents with   • Black or Bloody Stool      Consults:     Consulting Physician(s)  Chat With All Active Members    Provider Relationship Specialty    Mj Morel MD  Consulting Physician General Surgery        Procedures Performed:    None    History of presenting illness/Hospital Course:    Ms. Jean is a 66-year-old female a retired ICU nurse with history of fibromyalgia, GERD, dyslipidemia was brought to the emergency room by her family with symptoms of abdominal pain, nausea, vomiting and bloody diarrhea since yesterday.  Patient in fact went to [facility] emergency room yesterday with nausea and vomiting as well as some dizziness.  He was diagnosed with heat exposure, hypokalemia and was subsequently discharged home.  Her COVID-19 test done  at the emergency room was negative.  Her creatinine yesterday was 1.6 and potassium was 3.2.  She denies any outside food exposure.  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  She denied any fevers.  She was noted to have  streaking blood clot in her stools today.  She was subsequently brought to the emergency room by her daughter.  Her  does not have any similar symptoms.  No recent history of antibiotics use.     In the emergency room, she was afebrile with initial heart rate of 107 and blood pressure 115/68.  Pulse oxygen saturation was 97% on room air.  Blood work done in the emergency room including CMP and CBC was unremarkable except for hemoglobin of 10.9.  Lipase was within normal limits.  Urine analysis was within normal limits.  Repeat hemoglobin after a few hours was 9.6.  CT of the abdomen and pelvis done in the emergency room however showed diffuse colonic wall thickening, submucosal edema, mucosal hyperenhancement, and pericolonic inflammation involving the splenic flexure, descending colon, and proximal sigmoid colon. Small volume ascites. Findings are concerning for most likely infectious colitis or inflammatory colitis and less likely ischemic colitis.  Patient was given 2 L of normal saline bolus in the emergency room and was started on IV antibiotics therapy with Levaquin and metronidazole and admitted to the medical floor with telemetry.  Dr. Morel was called from the emergency room for consultation, no concern for ischemia, no surgical indication.     Provided IV Levaquin and metronidazole for infectious colitis and advanced diet as tolerated.  Hemoglobin stabilized at 9.6 on 8/10 patient reporting no more further bloody stools.  Shared decision-making discharge on 8/10, she was able to tolerate pills that morning therefore was comfortable going home with p.o. Levaquin and metronidazole to finish antibiotic course for infectious colitis.  To follow-up with PCP in particular to check hemoglobin from blood loss anemia.    Vital Signs:    Temp:  [97.9 °F (36.6 °C)-98.6 °F (37 °C)] 98.4 °F (36.9 °C)  Heart Rate:  [] 99  Resp:  [17-20] 18  BP: (118-142)/(52-68) 131/67    Physical Exam:    General  Appearance:  Alert and cooperative.    Head:  Atraumatic and normocephalic.   Eyes: Conjunctivae and sclerae normal, no icterus. No pallor.   Ears:  Ears with no abnormalities noted.   Throat: No oral lesions, no thrush, oral mucosa moist.   Neck: Supple, trachea midline, no thyromegaly.   Back:   No kyphoscoliosis present. No tenderness to palpation.   Lungs:   Breath sounds heard bilaterally equally.  No crackles or wheezing. No Pleural rub or bronchial breathing.   Heart:  Normal S1 and S2, no murmur, no gallop, no rub. No JVD.   Abdomen:   Normal bowel sounds, no masses, no organomegaly. Soft, mild diffuse tenderness, nondistended   Extremities: Supple, no edema, no cyanosis, no clubbing.   Pulses: Pulses palpable bilaterally.   Skin: No bleeding or rash.   Neurologic: Alert and oriented x 3. No facial asymmetry. Moves all four limbs. No tremors.     Pertinent Lab Results:     Results from last 7 days   Lab Units 08/10/22  0513 08/09/22  0646 08/08/22  1502   SODIUM mmol/L 142 139 137   POTASSIUM mmol/L 3.5 3.6 3.5   CHLORIDE mmol/L 109* 109* 105   CO2 mmol/L 23.0 21.8* 23.4   BUN mg/dL 6* 9 13   CREATININE mg/dL 0.63 0.61 0.84   CALCIUM mg/dL 8.1* 7.7* 8.1*   BILIRUBIN mg/dL  --  0.3 0.5   ALK PHOS U/L  --  96 124*   ALT (SGPT) U/L  --  13 16   AST (SGOT) U/L  --  17 20   GLUCOSE mg/dL 62* 69 106*     Results from last 7 days   Lab Units 08/10/22  0513 08/09/22  0646 08/08/22  1754 08/08/22  1502   WBC 10*3/mm3 6.26 8.76  --  9.81   HEMOGLOBIN g/dL 9.6* 9.5* 9.6* 10.9*   HEMATOCRIT % 27.9* 28.5* 28.0* 31.9*   PLATELETS 10*3/mm3 140 137*  --  172     Results from last 7 days   Lab Units 08/09/22  0646   INR  1.16*     Results from last 7 days   Lab Units 08/07/22  1140   CK TOTAL U/L 209*   TROPONIN I ng/mL <0.30             Results from last 7 days   Lab Units 08/08/22  1502   LIPASE U/L 24               Pertinent Radiology Results:    Imaging Results (All)     Procedure Component Value Units Date/Time    CT  Abdomen Pelvis With Contrast [[id number]] Collected: 08/08/22 1611     Updated: 08/08/22 1622    Narrative:      CT SCAN OF THE ABDOMEN AND PELVIS WITH CONTRAST    8/8/2022 3:54 PM      HISTORY:  abdominal pain, n/v/d, blood in stoolAbdominal pain.     PROCEDURE:  Axial CT images were obtained from the lung bases to the pubic symphysis  following IV contrast administration. Coronal and sagittal reformatted  images were generated from the axial data set and provided for  interpretation. This study was performed with techniques to keep  radiation doses as low as reasonably achievable, (ALARA). Individualized  dose reduction techniques using automated exposure control or adjustment  of mA and/or kV according to the patient size were employed.     COMPARISON:  None.     FINDINGS:  LOWER CHEST:  The heart is normal in size. Lung bases are clear. Calcified granuloma  in the left lower lobe. Coronary artery atherosclerotic disease.     ABDOMEN/PELVIS:  Liver, gallbladder and bile ducts:  The liver enhances homogenously without suspicious focal hepatic lesion.  Hepatic steatosis. Unremarkable gallbladder. No significant biliary  ductal dilatation.      Adrenal glands:  No right adrenal nodule. There is 2.0 cm left adrenal nodule,  indeterminate, likely an adenoma.     Kidneys, ureters and urinary bladder:  No suspicious renal lesions. No hydronephrosis. Unremarkable urinary  bladder.     Spleen:  The spleen is normal in size.     Pancreas:  The pancreas is unremarkable.      Gastrointestinal system and mesentery:  There is no evidence of bowel obstruction. The appendix is not  well-visualized but there is no significant inflammation in the right  lower quadrant. There is a diffuse circumferential wall thickening of  the splenic flexure, descending colon, and the proximal sigmoid colon,  with mural thickening, submucosal edema, and mucosal hyperenhancement,  and associated pericolonic inflammatory fat stranding.     Lymph  nodes:  No pathologically enlarged abdominal or pelvic lymph nodes are present.     Vessels:  The abdominal aorta is normal in caliber. Mild to moderate  atherosclerotic disease. The celiac trunk, superior mesenteric artery,  inferior mesenteric artery and their branch vessels appear grossly  patent. The superior mesenteric vein, splenic vein and main portal veins  are patent. The inferior vena cava and hepatic veins are unremarkable.     Peritoneum:  Small volume ascites. No evidence of pneumatosis, mesenteric venous gas,  or portal venous gas.     Pelvic viscera:  No acute findings.     Body wall:  No acute findings. No significant body wall hernias.     Bones:  No acute fracture.       Impression:      Diffuse colonic wall thickening, submucosal edema, mucosal  hyperenhancement, and pericolonic inflammation involving the splenic  flexure, descending colon, and proximal sigmoid colon. Small volume  ascites. Findings are concerning for most likely infectious colitis or  inflammatory colitis and less likely ischemic colitis. Consider post  treatment colonoscopic evaluation to rule out occult pathology.           Images personally reviewed, interpreted and dictated by KALPANA Posadas.                 This study was performed with techniques to keep radiation doses as low  as reasonably achievable (ALARA). Individualized dose reduction  techniques using automated exposure control or adjustment of mA and/or  kV according to the patient size were employed.                  This report was signed and finalized on 8/8/2022 4:20 PM by KALPANA Posadas.          Echo:    Results for orders placed during the hospital encounter of 11/22/21    Adult Transthoracic Echo Complete W/ Cont if Necessary Per Protocol    Interpretation Summary  · Estimated left ventricular EF = 57% Left ventricular ejection fraction appears to be 56 - 60%. Left ventricular systolic function is normal.  · Left ventricular diastolic  function was normal.  · Estimated right ventricular systolic pressure from tricuspid regurgitation is normal (<35 mmHg).    Condition on Discharge:      Stable.    Code status during the hospital stay:    Code Status and Medical Interventions:   Ordered at: 08/08/22 5442     Level Of Support Discussed With:    Patient     Code Status (Patient has no pulse and is not breathing):    CPR (Attempt to Resuscitate)     Medical Interventions (Patient has pulse or is breathing):    Full Support     Discharge Disposition:    Home or Self Care    Discharge Medications:       Discharge Medications      New Medications      Instructions Start Date   levoFLOXacin 500 MG tablet  Commonly known as: Levaquin   500 mg, Oral, Daily      metroNIDAZOLE 500 MG tablet  Commonly known as: Flagyl   500 mg, Oral, 2 Times Daily         Continue These Medications      Instructions Start Date   aspirin 81 MG EC tablet   81 mg, Oral, Daily      atorvastatin 80 MG tablet  Commonly known as: LIPITOR   80 mg, Oral, Nightly      baclofen 10 MG tablet  Commonly known as: LIORESAL   10 mg, 3 Times Daily      BIOTIN PO   1 tablet, Oral, Daily      JENNIFER-CITRATE PLUS VITAMIN D PO   Take  by mouth.      docusate sodium 100 MG capsule  Commonly known as: COLACE   TAKE 1 CAPSULE BY MOUTH TWICE DAILY      gabapentin 300 MG capsule  Commonly known as: NEURONTIN   600 mg, Oral, 3 Times Daily      hydroxychloroquine 200 MG tablet  Commonly known as: PLAQUENIL   200 mg, Oral, 2 Times Daily      leflunomide 10 MG tablet  Commonly known as: ARAVA   10 mg, Oral, Daily      lisinopril 10 MG tablet  Commonly known as: PRINIVIL,ZESTRIL   10 mg, Oral, Daily      meclizine 12.5 MG tablet  Commonly known as: ANTIVERT   12.5 mg, Oral, 3 Times Daily PRN      Movantik 25 MG tablet  Generic drug: Naloxegol Oxalate   25 mg, Oral, Every Morning      nitroglycerin 0.4 MG SL tablet  Commonly known as: NITROSTAT   0.4 mg, Sublingual, Every 5 Minutes PRN      pantoprazole 20 MG EC  tablet  Commonly known as: PROTONIX   TAKE 1 TABLET BY MOUTH DAILY      polyethylene glycol 17 GM/SCOOP powder  Commonly known as: MiraLax   17 g, Oral, Daily      prednisoLONE acetate 1 % ophthalmic suspension  Commonly known as: Pred Forte   Follow instructions on the After Visit Summary.      promethazine 12.5 MG tablet  Commonly known as: PHENERGAN   12.5 mg, Oral, Every 8 Hours PRN      traMADol 50 MG tablet  Commonly known as: ULTRAM   50 mg, 2 Times Daily      VITAMIN C PO   500 mg, Oral, As Needed      VITAMIN E PO   Take  by mouth.           Discharge Diet:   Advance as tolerated    Activity at Discharge:   As tolerated    Follow-up Appointments:     Follow-up Information     Mj Morel MD Follow up in 1 week(s).    Specialty: General Surgery  Contact information:  [address]  [address] [address] [phone]             Margaret Mooney, APRN .    Specialty: Family Medicine  Contact information:  [address]  [facility] [address]  [phone]                       No future appointments.  Test Results Pending at Discharge:    None       Brian Joseph Kerley, DO  08/10/22  14:36 EDT    Time: I spent 25 minutes on this discharge activity which included: face-to-face encounter with the patient, reviewing the data in the system, coordination of the care with the nursing staff as well as consultants, documentation, and entering orders.     Dictated utilizing Dragon dictation.

## 2022-08-16 ENCOUNTER — HOSPITAL ENCOUNTER (OUTPATIENT)
Facility: HOSPITAL | Age: 67
Discharge: HOME | End: 2022-08-16
Payer: COMMERCIAL

## 2022-08-16 ENCOUNTER — HOSPITAL ENCOUNTER (OUTPATIENT)
Dept: GENERAL RADIOLOGY | Facility: HOSPITAL | Age: 67
Discharge: HOME | End: 2022-08-16
Payer: COMMERCIAL

## 2022-08-16 DIAGNOSIS — M95.2 SKULL DEFORMITY: ICD-10-CM

## 2022-08-16 PROCEDURE — 70250 X-RAY EXAM OF SKULL: CPT

## 2022-08-16 NOTE — PROGRESS NOTES
Patient: Stephanie Jean    YOB: 1955    Date: 08/17/2022    Primary Care Provider: Margaret Mooney APRN    Chief Complaint   Patient presents with   • Follow-up     Colitis        SUBJECTIVE:    History of present illness:  I saw the patient in the office today as a follow-up consultation for recent [facility] stay for colitis.  Patient had CT scan of the abdomen and pelvis performed at the time of her hospital stay which showed findings concerning for infectious colitis or inflammatory colitis and less likely ischemic colitis.    She was hospitalized for several days, none of her cultures were positive for any issues.  She has been told to start on some probiotics.  Her last colonoscopy was 3+ years ago.    The following portions of the patient's history were reviewed and updated as appropriate: allergies, current medications, past family history, past medical history, past social history, past surgical history and problem list.    Review of Systems   Constitutional: Negative for chills, fever and unexpected weight change.   HENT: Negative for hearing loss, trouble swallowing and voice change.    Eyes: Negative for visual disturbance.   Respiratory: Negative for apnea, cough, chest tightness, shortness of breath and wheezing.    Cardiovascular: Negative for chest pain, palpitations and leg swelling.   Gastrointestinal: Positive for abdominal pain and diarrhea. Negative for abdominal distention, anal bleeding, blood in stool, constipation, nausea, rectal pain and vomiting.   Endocrine: Negative for cold intolerance and heat intolerance.   Genitourinary: Negative for difficulty urinating, dysuria and flank pain.   Musculoskeletal: Negative for back pain and gait problem.   Skin: Negative for color change, rash and wound.   Neurological: Negative for dizziness, syncope, speech difficulty, weakness, light-headedness, numbness and headaches.   Hematological: Negative for  adenopathy. Does not bruise/bleed easily.   Psychiatric/Behavioral: Negative for confusion. The patient is not nervous/anxious.        History:  Past Medical History:   Diagnosis Date   • Arthritis    • Colon polyps    • Dysphagia    • Elevated cholesterol    • Family history of colon cancer    • Fibromyalgia    • GERD (gastroesophageal reflux disease)    • Heart murmur    • Hoarseness    • Hyperlipidemia    • Lupus (HCC)    • Marijuana use     Daily    • Pulmonary nodule    • Sjogren's disease (HCC)        Past Surgical History:   Procedure Laterality Date   • APPENDECTOMY     • CATARACT EXTRACTION W/ INTRAOCULAR LENS IMPLANT Left 2021    Procedure: CATARACT PHACO EXTRACTION WITH INTRAOCULAR LENS IMPLANT LEFT COMPLICATED WITH MALYUGIN RING;  Surgeon: Zachary Landers MD;  Location: [facility] OR;  Service: Ophthalmology;  Laterality: Left;   • CATARACT EXTRACTION W/ INTRAOCULAR LENS IMPLANT Right 1/3/2022    Procedure: CATARACT PHACO EXTRACTION WITH INTRAOCULAR LENS IMPLANT RIGHT;  Surgeon: Zachary Landers MD;  Location: [facility] OR;  Service: Ophthalmology;  Laterality: Right;   • COLONOSCOPY     • ENDOSCOPY     • ENDOSCOPY N/A 2020    Procedure: ESOPHAGOGASTRODUODENOSCOPY;  Surgeon: Chaparro Marrero MD;  Location: [facility] ENDOSCOPY;  Service: Gastroenterology;  Laterality: N/A;   • HYSTERECTOMY         Family History   Problem Relation Age of Onset   • Hypertension Mother    • Aneurysm Mother    • Heart disease Mother    • Emphysema Father    • Ulcerative colitis Brother    • Colon cancer Maternal Uncle    • Cirrhosis Neg Hx    • Liver disease Neg Hx    • Liver cancer Neg Hx    • Crohn's disease Neg Hx        Social History     Tobacco Use   • Smoking status: Former Smoker     Packs/day: 1.00     Years: 40.00     Pack years: 40.00     Types: Cigarettes     Quit date:      Years since quittin.6   • Smokeless tobacco: Never Used   Substance Use Topics   • Alcohol use: No   • Drug use: Yes      Frequency: 7.0 times per week     Types: Marijuana     Comment: OCCASIONALLY       Allergies:  Allergies   Allergen Reactions   • Penicillins Rash       Medications:    Current Outpatient Medications:   •  Ascorbic Acid (VITAMIN C PO), Take 500 mg by mouth As Needed., Disp: , Rfl:   •  cyclobenzaprine (FLEXERIL) 10 MG tablet, Take 10 mg by mouth 3 (Three) Times a Day., Disp: , Rfl:   •  Diclofenac Sodium (VOLTAREN) 1 % gel gel, diclofenac 1 % topical gel  APPLY 1 GRAM TOPICALLY TO THE AFFECTED AREA TWICE DAILY AS NEEDED FOR PAIN, Disp: , Rfl:   •  docusate sodium (COLACE) 100 MG capsule, TAKE 1 CAPSULE BY MOUTH TWICE DAILY, Disp: 60 capsule, Rfl: 2  •  DULoxetine (CYMBALTA) 60 MG capsule, Take 60 mg by mouth Daily., Disp: , Rfl:   •  fluticasone (FLONASE) 50 MCG/ACT nasal spray, SHAKE LIQUID AND USE 1 TO 2 SPRAYS IN EACH NOSTRIL EVERY DAY AS NEEDED, Disp: , Rfl:   •  gabapentin (NEURONTIN) 600 MG tablet, Take 600 mg by mouth 3 (Three) Times a Day., Disp: , Rfl:   •  hydroxychloroquine (PLAQUENIL) 200 MG tablet, Take 200 mg by mouth 2 (Two) Times a Day., Disp: , Rfl:   •  lisinopril (PRINIVIL,ZESTRIL) 10 MG tablet, Take 10 mg by mouth Daily., Disp: , Rfl:   •  loratadine (CLARITIN) 10 MG tablet, Take 10 mg by mouth Daily., Disp: , Rfl:   •  nitroglycerin (NITROSTAT) 0.4 MG SL tablet, Place 0.4 mg under the tongue Every 5 (Five) Minutes As Needed., Disp: , Rfl:   •  ondansetron (Zofran) 4 MG tablet, Take 2 tablets by mouth Every 8 (Eight) Hours As Needed for Nausea or Vomiting., Disp: 30 tablet, Rfl: 0  •  ondansetron ODT (ZOFRAN-ODT) 4 MG disintegrating tablet, , Disp: , Rfl:   •  polyethylene glycol (MiraLax) 17 GM/SCOOP powder, Take 17 g by mouth Daily., Disp: 510 g, Rfl: 2  •  potassium chloride (K-DUR,KLOR-CON) 20 MEQ CR tablet, , Disp: , Rfl:   •  prednisoLONE acetate (Pred Forte) 1 % ophthalmic suspension, Follow instructions on the After Visit Summary., Disp: 2 mL, Rfl: 0  •  promethazine (PHENERGAN) 12.5  "MG tablet, Take 1 tablet by mouth Every 8 (Eight) Hours As Needed for Nausea or Vomiting., Disp: 30 tablet, Rfl: 1  •  QUEtiapine (SEROquel) 25 MG tablet, Take 25 mg by mouth every night at bedtime., Disp: , Rfl:   •  rOPINIRole (REQUIP) 2 MG tablet, TAKE 1 TABLET BY MOUTH EVERY DAY 1-3 HOURS BEFORE BEDTIME, Disp: , Rfl:   •  zolpidem (AMBIEN) 5 MG tablet, , Disp: , Rfl:   •  HYDROcodone-acetaminophen (NORCO)  MG per tablet, TAKE 1/2 TO 1 TABLET BY MOUTH EVERY DAY AS NEEDED FOR PAIN, Disp: , Rfl:   •  meclizine (ANTIVERT) 12.5 MG tablet, Take 1 tablet by mouth 3 (Three) Times a Day As Needed for Dizziness., Disp: 30 tablet, Rfl: 0  •  milnacipran (SAVELLA) 50 MG tablet tablet, Savella 50 mg tablet, Disp: , Rfl:   •  Naloxegol Oxalate (Movantik) 25 MG tablet, Take 1 tablet by mouth Every Morning. (Patient not taking: No sig reported), Disp: 30 tablet, Rfl: 2  •  nortriptyline (PAMELOR) 25 MG capsule, nortriptyline 25 mg capsule, Disp: , Rfl:   •  traMADol (ULTRAM) 50 MG tablet, Take 50 mg by mouth 2 (Two) Times a Day. (Patient not taking: No sig reported), Disp: , Rfl:   •  VITAMIN E PO, Take  by mouth. (Patient not taking: No sig reported), Disp: , Rfl:     OBJECTIVE:    Vital Signs:   Vitals:    08/17/22 1356   BP: 130/68   Pulse: 84   Temp: 98.1 °F (36.7 °C)   SpO2: 99%   Weight: 65.1 kg (143 lb 9.6 oz)   Height: 157.5 cm (62\")       Physical Exam:   General Appearance:    Alert, cooperative, in no acute distress   Head:    Normocephalic, without obvious abnormality, atraumatic   Eyes:            Lids and lashes normal, conjunctivae and sclerae normal, no   icterus, no pallor, corneas clear, PERRL   Ears:    Ears appear intact with no abnormalities noted   Throat:   No oral lesions, no thrush, oral mucosa moist   Neck:   No adenopathy, supple, trachea midline, no thyromegaly,  no JVD   Lungs:     Clear to auscultation,respirations regular, even and                  unlabored    Heart:    Regular rhythm and " normal rate, normal S1 and S2, no            murmur   Abdomen:     no masses, no organomegaly, soft non-tender, non-distended, no guarding, there is no evidence of tenderness, no peritoneal signs   Extremities:   Moves all extremities well, no edema, no cyanosis, no             redness   Pulses:   Pulses palpable and equal bilaterally   Skin:   No bleeding, bruising or rash   Lymph nodes:   No palpable adenopathy   Neurologic:   Cranial nerves 2 - 12 grossly intact, sensation intact      Results Review:   I reviewed the patient's new clinical results.  I reviewed the patient's new imaging results and agree with the interpretation.  I reviewed the patient's other test results and agree with the interpretation    Review of Systems was reviewed and confirmed as accurate as documented by the MA.    ASSESSMENT/PLAN:    1. Colitis        I recommend a colonoscopy for further evaluation. The procedure was explained as well as the risks which include but are not limited to bleeding, infection, perforation, abdominal pain etc. The patient understands these risks and the procedure and wishes to proceed.     I am going to schedule this to be done in the next several weeks, I want the patient to start on some probiotics right now and then she needs to call me sooner if she has any further problems.    Electronically signed by Mj Morel MD  08/17/22 13:30 EDT

## 2022-08-17 ENCOUNTER — OFFICE VISIT (OUTPATIENT)
Dept: SURGERY | Facility: CLINIC | Age: 67
End: 2022-08-17

## 2022-08-17 VITALS
HEIGHT: 62 IN | BODY MASS INDEX: 26.43 KG/M2 | DIASTOLIC BLOOD PRESSURE: 68 MMHG | TEMPERATURE: 98.1 F | SYSTOLIC BLOOD PRESSURE: 130 MMHG | HEART RATE: 84 BPM | WEIGHT: 143.6 LBS | OXYGEN SATURATION: 99 %

## 2022-08-17 DIAGNOSIS — K52.9 COLITIS: Primary | ICD-10-CM

## 2022-08-17 PROCEDURE — 99213 OFFICE O/P EST LOW 20 MIN: CPT | Performed by: SURGERY

## 2022-08-17 RX ORDER — LEFLUNOMIDE 20 MG/1
TABLET ORAL
COMMUNITY
End: 2022-08-17

## 2022-08-17 RX ORDER — FLUTICASONE PROPIONATE 50 MCG
SPRAY, SUSPENSION (ML) NASAL
COMMUNITY
Start: 2022-08-02

## 2022-08-17 RX ORDER — CYCLOBENZAPRINE HCL 10 MG
10 TABLET ORAL 3 TIMES DAILY
COMMUNITY
Start: 2022-06-08 | End: 2022-11-17 | Stop reason: SDUPTHER

## 2022-08-17 RX ORDER — HYDROXYCHLOROQUINE SULFATE 200 MG/1
200 TABLET, FILM COATED ORAL
COMMUNITY
Start: 2022-06-01 | End: 2022-08-17

## 2022-08-17 RX ORDER — HYDROCODONE BITARTRATE AND ACETAMINOPHEN 10; 325 MG/1; MG/1
TABLET ORAL
COMMUNITY
Start: 2022-07-20 | End: 2022-10-25 | Stop reason: SDUPTHER

## 2022-08-17 RX ORDER — CALCIUM CARBONATE/VITAMIN D3 500MG-5MCG
1 TABLET ORAL 2 TIMES DAILY
COMMUNITY
Start: 2022-04-14 | End: 2022-08-17

## 2022-08-17 RX ORDER — ZOLPIDEM TARTRATE 5 MG/1
TABLET ORAL
COMMUNITY
Start: 2022-08-16 | End: 2022-09-21

## 2022-08-17 RX ORDER — QUETIAPINE FUMARATE 25 MG/1
25 TABLET, FILM COATED ORAL
COMMUNITY
Start: 2022-07-15 | End: 2022-09-21

## 2022-08-17 RX ORDER — ONDANSETRON 4 MG/1
TABLET, ORALLY DISINTEGRATING ORAL
COMMUNITY
Start: 2022-08-08 | End: 2023-03-29

## 2022-08-17 RX ORDER — GABAPENTIN 800 MG/1
800 TABLET ORAL 3 TIMES DAILY
COMMUNITY
Start: 2022-07-29

## 2022-08-17 RX ORDER — POLYETHYLENE GLYCOL 3350 17 G/17G
238 POWDER, FOR SOLUTION ORAL ONCE
Qty: 238 G | Refills: 0 | Status: SHIPPED | OUTPATIENT
Start: 2022-08-17 | End: 2022-08-17

## 2022-08-17 RX ORDER — ROPINIROLE 2 MG/1
TABLET, FILM COATED ORAL
COMMUNITY
Start: 2022-05-22 | End: 2023-03-01

## 2022-08-17 RX ORDER — POTASSIUM CHLORIDE 20 MEQ/1
TABLET, EXTENDED RELEASE ORAL
COMMUNITY
Start: 2022-08-07 | End: 2022-09-21

## 2022-08-17 RX ORDER — DULOXETIN HYDROCHLORIDE 60 MG/1
60 CAPSULE, DELAYED RELEASE ORAL DAILY
COMMUNITY
End: 2022-09-21 | Stop reason: SDUPTHER

## 2022-08-17 RX ORDER — LORATADINE 10 MG/1
10 TABLET ORAL AS NEEDED
COMMUNITY
Start: 2022-08-02

## 2022-08-17 RX ORDER — BISACODYL 5 MG
TABLET, DELAYED RELEASE (ENTERIC COATED) ORAL
Qty: 4 TABLET | Refills: 0 | Status: SHIPPED | OUTPATIENT
Start: 2022-08-17 | End: 2022-10-25 | Stop reason: SDUPTHER

## 2022-08-17 RX ORDER — LISINOPRIL 10 MG/1
10 TABLET ORAL DAILY
COMMUNITY
Start: 2022-05-22 | End: 2022-08-17

## 2022-08-17 RX ORDER — NORTRIPTYLINE HYDROCHLORIDE 25 MG/1
CAPSULE ORAL
COMMUNITY
End: 2022-09-21

## 2022-08-24 ENCOUNTER — HOSPITAL ENCOUNTER (OUTPATIENT)
Dept: CT IMAGING | Facility: HOSPITAL | Age: 67
Discharge: HOME | End: 2022-08-24
Payer: COMMERCIAL

## 2022-08-24 DIAGNOSIS — F17.210 CIGARETTE NICOTINE DEPENDENCE WITHOUT COMPLICATION: ICD-10-CM

## 2022-08-24 PROCEDURE — 71271 CT THORAX LUNG CANCER SCR C-: CPT

## 2022-09-08 ENCOUNTER — TELEPHONE (OUTPATIENT)
Dept: SURGERY | Facility: CLINIC | Age: 67
End: 2022-09-08

## 2022-09-08 NOTE — TELEPHONE ENCOUNTER
Called patient and confirmed colonoscopy, 09/13/22, Dr Morel, @[facility]

## 2022-09-13 ENCOUNTER — OUTSIDE FACILITY SERVICE (OUTPATIENT)
Dept: SURGERY | Facility: CLINIC | Age: 67
End: 2022-09-13

## 2022-09-13 PROCEDURE — 45380 COLONOSCOPY AND BIOPSY: CPT | Performed by: SURGERY

## 2022-09-15 NOTE — PROGRESS NOTES
Patient: Stephanie Jean    YOB: 1955    Date: 09/19/2022    Primary Care Provider: Margaret Mooney APRN    Reason for Consultation: Follow-up colonoscopy    Chief Complaint   Patient presents with   • Follow-up   • Colonoscopy       History of present illness:  Patient has a history significant for colitis.  I saw the patient in the office today as a followup from their recent colonoscopy with multiple biopsies, the pathology report did colonic mucosa with no significant pathologic abnormality.  They state that they have done well and are having no complaints.    She reports no problems at this time, she continues to take her probiotics.    The following portions of the patient's history were reviewed and updated as appropriate: allergies, current medications, past family history, past medical history, past social history, past surgical history and problem list.    Review of Systems   Constitutional: Negative for chills, fever and unexpected weight change.   HENT: Negative for hearing loss, trouble swallowing and voice change.    Eyes: Negative for visual disturbance.   Respiratory: Negative for apnea, cough, chest tightness, shortness of breath and wheezing.    Cardiovascular: Negative for chest pain, palpitations and leg swelling.   Gastrointestinal: Negative for abdominal distention, abdominal pain, anal bleeding, blood in stool, constipation, diarrhea, nausea, rectal pain and vomiting.   Endocrine: Negative for cold intolerance and heat intolerance.   Genitourinary: Negative for difficulty urinating, dysuria and flank pain.   Musculoskeletal: Negative for back pain and gait problem.   Skin: Negative for color change, rash and wound.   Neurological: Negative for dizziness, syncope, speech difficulty, weakness, light-headedness, numbness and headaches.   Hematological: Negative for adenopathy. Does not bruise/bleed easily.   Psychiatric/Behavioral: Negative for confusion. The patient is not  nervous/anxious.        Allergies:  Allergies   Allergen Reactions   • Penicillins Rash       Medications:    Current Outpatient Medications:   •  Ascorbic Acid (VITAMIN C PO), Take 500 mg by mouth As Needed., Disp: , Rfl:   •  atorvastatin (LIPITOR) 20 MG tablet, atorvastatin 20 mg tablet  TAKE 1 TABLET BY MOUTH EVERY NIGHT AT BEDTIME, Disp: , Rfl:   •  cyclobenzaprine (FLEXERIL) 10 MG tablet, Take 10 mg by mouth 3 (Three) Times a Day., Disp: , Rfl:   •  Diclofenac Sodium (VOLTAREN) 1 % gel gel, diclofenac 1 % topical gel  APPLY 1 GRAM TOPICALLY TO THE AFFECTED AREA TWICE DAILY AS NEEDED FOR PAIN, Disp: , Rfl:   •  docusate sodium (COLACE) 100 MG capsule, TAKE 1 CAPSULE BY MOUTH TWICE DAILY, Disp: 60 capsule, Rfl: 2  •  DULoxetine (CYMBALTA) 60 MG capsule, Take 60 mg by mouth Daily., Disp: , Rfl:   •  fluticasone (FLONASE) 50 MCG/ACT nasal spray, SHAKE LIQUID AND USE 1 TO 2 SPRAYS IN EACH NOSTRIL EVERY DAY AS NEEDED, Disp: , Rfl:   •  gabapentin (NEURONTIN) 600 MG tablet, Take 600 mg by mouth 3 (Three) Times a Day., Disp: , Rfl:   •  HYDROcodone-acetaminophen (NORCO)  MG per tablet, TAKE 1/2 TO 1 TABLET BY MOUTH EVERY DAY AS NEEDED FOR PAIN, Disp: , Rfl:   •  hydroxychloroquine (PLAQUENIL) 200 MG tablet, Take 200 mg by mouth 2 (Two) Times a Day., Disp: , Rfl:   •  lisinopril (PRINIVIL,ZESTRIL) 10 MG tablet, Take 10 mg by mouth Daily., Disp: , Rfl:   •  loratadine (CLARITIN) 10 MG tablet, Take 10 mg by mouth Daily., Disp: , Rfl:   •  meclizine (ANTIVERT) 12.5 MG tablet, Take 1 tablet by mouth 3 (Three) Times a Day As Needed for Dizziness., Disp: 30 tablet, Rfl: 0  •  nitroglycerin (NITROSTAT) 0.4 MG SL tablet, Place 0.4 mg under the tongue Every 5 (Five) Minutes As Needed., Disp: , Rfl:   •  ondansetron (Zofran) 4 MG tablet, Take 2 tablets by mouth Every 8 (Eight) Hours As Needed for Nausea or Vomiting., Disp: 30 tablet, Rfl: 0  •  ondansetron ODT (ZOFRAN-ODT) 4 MG disintegrating tablet, , Disp: , Rfl:   •   "pantoprazole (PROTONIX) 20 MG EC tablet, pantoprazole 20 mg tablet,delayed release  TAKE 1 TABLET BY MOUTH EVERY DAY, Disp: , Rfl:   •  promethazine (PHENERGAN) 12.5 MG tablet, Take 1 tablet by mouth Every 8 (Eight) Hours As Needed for Nausea or Vomiting., Disp: 30 tablet, Rfl: 1  •  rOPINIRole (REQUIP) 2 MG tablet, TAKE 1 TABLET BY MOUTH EVERY DAY 1-3 HOURS BEFORE BEDTIME, Disp: , Rfl:   •  VITAMIN E PO, Take  by mouth., Disp: , Rfl:   •  bisacodyl (Dulcolax) 5 MG EC tablet, Take 2 @ 3pm, 2 @ 7 pm day prior to colonoscopy, Disp: 4 tablet, Rfl: 0  •  milnacipran (SAVELLA) 50 MG tablet tablet, Savella 50 mg tablet, Disp: , Rfl:   •  Naloxegol Oxalate (Movantik) 25 MG tablet, Take 1 tablet by mouth Every Morning. (Patient not taking: No sig reported), Disp: 30 tablet, Rfl: 2  •  nortriptyline (PAMELOR) 25 MG capsule, nortriptyline 25 mg capsule, Disp: , Rfl:   •  polyethylene glycol (MiraLax) 17 GM/SCOOP powder, Take 17 g by mouth Daily., Disp: 510 g, Rfl: 2  •  potassium chloride (K-DUR,KLOR-CON) 20 MEQ CR tablet, , Disp: , Rfl:   •  prednisoLONE acetate (Pred Forte) 1 % ophthalmic suspension, Follow instructions on the After Visit Summary., Disp: 2 mL, Rfl: 0  •  QUEtiapine (SEROquel) 25 MG tablet, Take 25 mg by mouth every night at bedtime., Disp: , Rfl:   •  traMADol (ULTRAM) 50 MG tablet, Take 50 mg by mouth 2 (Two) Times a Day. (Patient not taking: No sig reported), Disp: , Rfl:   •  zolpidem (AMBIEN) 5 MG tablet, , Disp: , Rfl:     Vital Signs:  Vitals:    09/19/22 0843   BP: 126/68   Pulse: 95   Temp: 97.2 °F (36.2 °C)   SpO2: 98%   Weight: 61.2 kg (135 lb)   Height: 157.5 cm (62\")       Physical Exam:   General Appearance:    Alert, cooperative, in no acute distress   Abdomen:     no masses, no organomegaly, soft non-tender, non-distended, no guarding, wounds are well healed, no evidence of recurrent hernia, no peritoneal signs   Chest:      Clear to ausculation            Cor:     Regular rate and " rhythm    Results Review:   I reviewed the patient's new clinical results.  I reviewed the patient's new imaging results and agree with the interpretation.  I reviewed the patient's other test results and agree with the interpretation    Assessment / Plan:    1. Colitis        I did discuss the situation with the patient today in the office and they have done well from their recent colonoscopy with biopsy.  I have released the patient back to normal activity.  I need to see the patient back in the office in 10 years and they will need to have repeat colonoscopy at that time.    Electronically signed by Mj Morel MD  09/19/22

## 2022-09-19 ENCOUNTER — OFFICE VISIT (OUTPATIENT)
Dept: SURGERY | Facility: CLINIC | Age: 67
End: 2022-09-19

## 2022-09-19 VITALS
BODY MASS INDEX: 24.84 KG/M2 | TEMPERATURE: 97.2 F | WEIGHT: 135 LBS | OXYGEN SATURATION: 98 % | HEART RATE: 95 BPM | DIASTOLIC BLOOD PRESSURE: 68 MMHG | SYSTOLIC BLOOD PRESSURE: 126 MMHG | HEIGHT: 62 IN

## 2022-09-19 DIAGNOSIS — K52.9 COLITIS: Primary | ICD-10-CM

## 2022-09-19 PROCEDURE — 99213 OFFICE O/P EST LOW 20 MIN: CPT | Performed by: SURGERY

## 2022-09-19 RX ORDER — PANTOPRAZOLE SODIUM 20 MG/1
TABLET, DELAYED RELEASE ORAL
COMMUNITY
End: 2022-10-25 | Stop reason: SDUPTHER

## 2022-09-19 RX ORDER — ATORVASTATIN CALCIUM 20 MG/1
TABLET, FILM COATED ORAL
COMMUNITY
End: 2022-10-19 | Stop reason: SDUPTHER

## 2022-09-21 ENCOUNTER — OFFICE VISIT (OUTPATIENT)
Dept: INTERNAL MEDICINE | Facility: CLINIC | Age: 67
End: 2022-09-21

## 2022-09-21 ENCOUNTER — TELEPHONE (OUTPATIENT)
Dept: INTERNAL MEDICINE | Facility: CLINIC | Age: 67
End: 2022-09-21

## 2022-09-21 VITALS
BODY MASS INDEX: 25.4 KG/M2 | TEMPERATURE: 98 F | OXYGEN SATURATION: 97 % | SYSTOLIC BLOOD PRESSURE: 120 MMHG | DIASTOLIC BLOOD PRESSURE: 90 MMHG | WEIGHT: 138 LBS | HEART RATE: 112 BPM | HEIGHT: 62 IN

## 2022-09-21 DIAGNOSIS — R51.9 NONINTRACTABLE HEADACHE, UNSPECIFIED CHRONICITY PATTERN, UNSPECIFIED HEADACHE TYPE: ICD-10-CM

## 2022-09-21 DIAGNOSIS — I10 PRIMARY HYPERTENSION: Primary | ICD-10-CM

## 2022-09-21 DIAGNOSIS — R42 DIZZINESS: ICD-10-CM

## 2022-09-21 DIAGNOSIS — M32.9 SLE (SYSTEMIC LUPUS ERYTHEMATOSUS RELATED SYNDROME): ICD-10-CM

## 2022-09-21 DIAGNOSIS — E78.2 MIXED HYPERLIPIDEMIA: ICD-10-CM

## 2022-09-21 DIAGNOSIS — M79.7 FIBROMYALGIA: ICD-10-CM

## 2022-09-21 DIAGNOSIS — R20.0 RIGHT FACIAL NUMBNESS: ICD-10-CM

## 2022-09-21 DIAGNOSIS — F41.9 ANXIETY: ICD-10-CM

## 2022-09-21 DIAGNOSIS — R20.0 RIGHT LEG NUMBNESS: ICD-10-CM

## 2022-09-21 PROCEDURE — 99204 OFFICE O/P NEW MOD 45 MIN: CPT | Performed by: FAMILY MEDICINE

## 2022-09-21 RX ORDER — BUSPIRONE HYDROCHLORIDE 5 MG/1
5 TABLET ORAL 2 TIMES DAILY PRN
Qty: 180 TABLET | Refills: 3 | Status: SHIPPED | OUTPATIENT
Start: 2022-09-21 | End: 2022-10-25 | Stop reason: SDUPTHER

## 2022-09-21 RX ORDER — LEFLUNOMIDE 10 MG/1
10 TABLET ORAL DAILY
COMMUNITY
End: 2022-10-25 | Stop reason: SDUPTHER

## 2022-09-21 RX ORDER — VIT E ACET/MIN OIL/DIMETHICONE 30000 UNIT
CREAM (GRAM) TOPICAL
COMMUNITY
End: 2022-10-25 | Stop reason: SDUPTHER

## 2022-09-21 RX ORDER — DULOXETIN HYDROCHLORIDE 60 MG/1
60 CAPSULE, DELAYED RELEASE ORAL DAILY
Qty: 90 CAPSULE | Refills: 3 | Status: SHIPPED | OUTPATIENT
Start: 2022-09-21

## 2022-09-21 NOTE — TELEPHONE ENCOUNTER
Caller: Stephanie Jean    Relationship to patient: Self    Best call back number: [phone]    Patient is needing: PATIENT STATED THAT SHE THOUGHT PROVIDER WAS SENDING IN ANOTHER MEDICATION FOR HER TO PHARMACY BECAUSE THERE WAS ONLY ONE THERE    PLEASE ADVISE

## 2022-09-21 NOTE — PROGRESS NOTES
Stephanie Jean is a 67 y.o. female.    Chief Complaint   Patient presents with   • Hypertension   • Hyperlipidemia       HPI   Patient has hypertension.  They are taking lisinopril (Prinivil).  They have been compliant with medications; however, did not take medication today as she was in hurry this morning.  The patient denies any side effects to the medication.  Blood pressure is not controlled in the office today.  Blood pressure has been running good at home.  They are following a low salt diet.  They are active.   Has a congenital heart anomaly (cardiomyopathy) as well and has been seen by cardiology.    Patient has had hyperlipidemia for few years. She has been compliant with low fat diet. She has been compliant with taking the medications, without side effects.     Takes cymbalta for fibromyalgia related pain.  She does need a refill on this medication. Denies any improvement with anxiety on cymbalta.  She does admit to increased anxiety due to health problems and would like to start a new medication.  She see's rheumatology as well for lupus and is also treated with leflunomide and plaquenil.    In addition, patient reports numbness to lips and eyes.  Right eye has been bothering her for the last week.  She has pain behing her eye.  Admits to increased stress. Right leg feels weak.  Gait is off.  She has dizziness randomly.  Memory is worse.  She has had CT scans of the head and neck.  Also had an MRI of the brain without contrast.  Only chronic small vessel changes noted on previous tests.     The following portions of the patient's history were reviewed and updated as appropriate: allergies, current medications, past family history, past medical history, past social history, past surgical history and problem list.     Past Medical History:   Diagnosis Date   • Anemia 2021   • Arthritis    • Cataract 2021    both eyes surgery for cataract   • Colon polyps    • Dysphagia    • Elevated cholesterol    • Family  history of colon cancer    • Fibromyalgia    • Fibromyalgia, primary 2003   • GERD (gastroesophageal reflux disease)    • Heart murmur    • Hoarseness    • Hyperlipidemia    • Hypertension 2021   • Inflammatory bowel disease 2022    recent hospitalization for colitis   • Low back pain 2003    treated by [facility]   • Lupus (HCC)    • Marijuana use     Daily    • Osteopenia 2021    Osteopenia   • Pulmonary nodule    • Sjogren's disease (HCC)    • Urinary tract infection 2022       Past Surgical History:   Procedure Laterality Date   • APPENDECTOMY     • BREAST SURGERY  1975    lumpectomy 1975   • CATARACT EXTRACTION W/ INTRAOCULAR LENS IMPLANT Left 12/20/2021    Procedure: CATARACT PHACO EXTRACTION WITH INTRAOCULAR LENS IMPLANT LEFT COMPLICATED WITH MALYUGIN RING;  Surgeon: Zachary Landers MD;  Location: [facility] OR;  Service: Ophthalmology;  Laterality: Left;   • CATARACT EXTRACTION W/ INTRAOCULAR LENS IMPLANT Right 01/03/2022    Procedure: CATARACT PHACO EXTRACTION WITH INTRAOCULAR LENS IMPLANT RIGHT;  Surgeon: Zachary Landers MD;  Location: [facility] OR;  Service: Ophthalmology;  Laterality: Right;   • COLONOSCOPY     • ENDOSCOPY     • ENDOSCOPY N/A 06/01/2020    Procedure: ESOPHAGOGASTRODUODENOSCOPY;  Surgeon: Chaparro Marrero MD;  Location: [facility] ENDOSCOPY;  Service: Gastroenterology;  Laterality: N/A;   • EYE SURGERY  2021-22    cataract removal   • HYSTERECTOMY     • TOTAL ABDOMINAL HYSTERECTOMY WITH SALPINGO OOPHORECTOMY  1998   • TUBAL ABDOMINAL LIGATION  1984       Family History   Problem Relation Age of Onset   • Hypertension Mother    • Aneurysm Mother    • Heart disease Mother         passed away at 54 yr old after 5 vessel bypass   • Hyperlipidemia Mother    • Stroke Mother         aneurysm ruptured   • Emphysema Father    • COPD Father    • Ulcerative colitis Brother    • Alcohol abuse Brother    • Cancer Brother         stomach cancer   • COPD Brother    •  Hyperlipidemia Brother    • Liver disease Brother    • Colon cancer Maternal Uncle    • Arthritis Maternal Uncle    • Cancer Maternal Aunt         breast cancer   • Cancer Sister         breast   • COPD Paternal Uncle    • Early death Brother         ulcerative colitis   • Learning disabilities Brother         mental retardation   • Mental illness Brother         schizophrenia   • Kidney disease Paternal Aunt         nephrectomy   • Cirrhosis Neg Hx    • Liver cancer Neg Hx    • Crohn's disease Neg Hx        Social History     Socioeconomic History   • Marital status:    Tobacco Use   • Smoking status: Former Smoker     Packs/day: 1.00     Years: 40.00     Pack years: 40.00     Types: Cigarettes     Quit date: 2018     Years since quittin.7   • Smokeless tobacco: Never Used   Substance and Sexual Activity   • Alcohol use: No   • Drug use: Yes     Frequency: 7.0 times per week     Types: Marijuana     Comment: OCCASIONALLY   • Sexual activity: Yes     Partners: Male     Birth control/protection: Surgical, Post-menopausal, Tubal ligation     Comment: WALE       Allergies   Allergen Reactions   • Penicillins Rash         Current Outpatient Medications:   •  Ascorbic Acid (VITAMIN C PO), Take 500 mg by mouth As Needed., Disp: , Rfl:   •  atorvastatin (LIPITOR) 20 MG tablet, atorvastatin 20 mg tablet  TAKE 1 TABLET BY MOUTH EVERY NIGHT AT BEDTIME, Disp: , Rfl:   •  bisacodyl (Dulcolax) 5 MG EC tablet, Take 2 @ 3pm, 2 @ 7 pm day prior to colonoscopy, Disp: 4 tablet, Rfl: 0  •  cyclobenzaprine (FLEXERIL) 10 MG tablet, Take 10 mg by mouth 3 (Three) Times a Day., Disp: , Rfl:   •  Diclofenac Sodium (VOLTAREN) 1 % gel gel, diclofenac 1 % topical gel  APPLY 1 GRAM TOPICALLY TO THE AFFECTED AREA TWICE DAILY AS NEEDED FOR PAIN, Disp: , Rfl:   •  docusate sodium (COLACE) 100 MG capsule, TAKE 1 CAPSULE BY MOUTH TWICE DAILY, Disp: 60 capsule, Rfl: 2  •  DULoxetine (CYMBALTA) 60 MG capsule, Take 1 capsule by mouth  Daily., Disp: 90 capsule, Rfl: 3  •  fluticasone (FLONASE) 50 MCG/ACT nasal spray, SHAKE LIQUID AND USE 1 TO 2 SPRAYS IN EACH NOSTRIL EVERY DAY AS NEEDED, Disp: , Rfl:   •  gabapentin (NEURONTIN) 600 MG tablet, Take 600 mg by mouth 3 (Three) Times a Day., Disp: , Rfl:   •  HYDROcodone-acetaminophen (NORCO)  MG per tablet, TAKE 1/2 TO 1 TABLET BY MOUTH EVERY DAY AS NEEDED FOR PAIN, Disp: , Rfl:   •  hydroxychloroquine (PLAQUENIL) 200 MG tablet, Take 200 mg by mouth 2 (Two) Times a Day., Disp: , Rfl:   •  lisinopril (PRINIVIL,ZESTRIL) 10 MG tablet, Take 10 mg by mouth Daily., Disp: , Rfl:   •  loratadine (CLARITIN) 10 MG tablet, Take 10 mg by mouth Daily., Disp: , Rfl:   •  meclizine (ANTIVERT) 12.5 MG tablet, Take 1 tablet by mouth 3 (Three) Times a Day As Needed for Dizziness., Disp: 30 tablet, Rfl: 0  •  nitroglycerin (NITROSTAT) 0.4 MG SL tablet, Place 0.4 mg under the tongue Every 5 (Five) Minutes As Needed., Disp: , Rfl:   •  ondansetron (Zofran) 4 MG tablet, Take 2 tablets by mouth Every 8 (Eight) Hours As Needed for Nausea or Vomiting., Disp: 30 tablet, Rfl: 0  •  ondansetron ODT (ZOFRAN-ODT) 4 MG disintegrating tablet, , Disp: , Rfl:   •  pantoprazole (PROTONIX) 20 MG EC tablet, pantoprazole 20 mg tablet,delayed release  TAKE 1 TABLET BY MOUTH EVERY DAY, Disp: , Rfl:   •  polyethylene glycol (MiraLax) 17 GM/SCOOP powder, Take 17 g by mouth Daily., Disp: 510 g, Rfl: 2  •  promethazine (PHENERGAN) 12.5 MG tablet, Take 1 tablet by mouth Every 8 (Eight) Hours As Needed for Nausea or Vomiting., Disp: 30 tablet, Rfl: 1  •  rOPINIRole (REQUIP) 2 MG tablet, TAKE 1 TABLET BY MOUTH EVERY DAY 1-3 HOURS BEFORE BEDTIME, Disp: , Rfl:   •  Vitamin A 3 MG (78174 UT) capsule, Take 10,000 Units by mouth Daily., Disp: , Rfl:   •  Vitamin E 04803 UNIT/60GM cream, Apply  topically., Disp: , Rfl:   •  VITAMIN E PO, Take  by mouth., Disp: , Rfl:   •  busPIRone (BUSPAR) 5 MG tablet, Take 1 tablet by mouth 2 (Two) Times a Day  "As Needed (anxiety)., Disp: 180 tablet, Rfl: 3  •  leflunomide (ARAVA) 10 MG tablet, Take 10 mg by mouth Daily., Disp: , Rfl:     ROS    Review of Systems   Constitutional: Negative for chills, fatigue and fever.   HENT: Negative for congestion, postnasal drip and sore throat.    Eyes: Positive for pain. Negative for blurred vision and visual disturbance.   Respiratory: Negative for cough, shortness of breath and wheezing.    Cardiovascular: Negative for chest pain and leg swelling.   Gastrointestinal: Negative for abdominal pain, constipation, diarrhea, nausea and vomiting.   Endocrine: Negative for cold intolerance and heat intolerance.   Genitourinary: Positive for urinary incontinence. Negative for dysuria and frequency.   Musculoskeletal: Positive for arthralgias and myalgias.   Skin: Negative for color change and rash.   Allergic/Immunologic: Negative for environmental allergies.   Neurological: Positive for dizziness, weakness (right leg), numbness (lips, face), headache and memory problem.   Hematological: Bruises/bleeds easily.   Psychiatric/Behavioral: Negative for depressed mood. The patient is not nervous/anxious.        Vitals:    09/21/22 0932   BP: 120/90   Pulse: 112   Temp: 98 °F (36.7 °C)   SpO2: 97%   Weight: 62.6 kg (138 lb)   Height: 157.5 cm (62\")     Body mass index is 25.24 kg/m².    Physical Exam     Physical Exam  Constitutional:       General: She is not in acute distress.     Appearance: Normal appearance. She is well-developed.   HENT:      Head: Normocephalic and atraumatic.      Comments: Small divots noted to top of skull bilaterally that are tender to palpation.      Right Ear: External ear normal.      Left Ear: External ear normal.   Eyes:      Extraocular Movements: Extraocular movements intact.      Conjunctiva/sclera: Conjunctivae normal.   Cardiovascular:      Rate and Rhythm: Normal rate and regular rhythm.      Heart sounds: No murmur heard.  Pulmonary:      Effort: Pulmonary " effort is normal. No respiratory distress.      Breath sounds: Normal breath sounds. No wheezing.   Abdominal:      General: Bowel sounds are normal. There is no distension.      Palpations: Abdomen is soft.      Tenderness: There is no abdominal tenderness.   Musculoskeletal:      Cervical back: Normal range of motion and neck supple.      Right lower leg: No edema.      Left lower leg: No edema.   Lymphadenopathy:      Cervical: No cervical adenopathy.   Skin:     General: Skin is warm and dry.   Neurological:      Mental Status: She is alert and oriented to person, place, and time.      Cranial Nerves: No cranial nerve deficit.   Psychiatric:         Mood and Affect: Mood is anxious.         Behavior: Behavior normal.         Assessment/Plan    Problems Addressed this Visit     Dizziness    Relevant Orders    MRI Brain With & Without Contrast    Ambulatory Referral to Neurology    Primary hypertension - Primary    Mixed hyperlipidemia    Fibromyalgia    SLE (systemic lupus erythematosus related syndrome) (HCC)    Relevant Medications    Vitamin E 96927 UNIT/60GM cream    leflunomide (ARAVA) 10 MG tablet    Anxiety      Other Visit Diagnoses     Right facial numbness        Relevant Orders    MRI Brain With & Without Contrast    Ambulatory Referral to Neurology    Nonintractable headache, unspecified chronicity pattern, unspecified headache type        Relevant Orders    MRI Brain With & Without Contrast    Ambulatory Referral to Neurology    Right leg numbness        Relevant Orders    MRI Brain With & Without Contrast    Ambulatory Referral to Neurology        HTN mildly uncontrolled due to not taking medication this morning.  Patient reports good readings at home.  Will continue current dosage of lisinopril.  Will obtain updated lipid panel to monitor hyperlipidemia.  Continue atorvastatin.      Patient will continue to see rheumatology for SLE.  May continue cymbalta for fibromyalgia.  Cymbalta not properly  controlling anxiety.  Will add in buspar.  Advised on how to take buspar and potential side effects with the medication.      Patient does have several neurological symptoms that are concerning despite unremarkable workup recently.  Reviewed CT, MRI, and x-ray reports.  Will obtain MRI of the brain with contrast and refer to neurology.  Patient was advised some of these symptoms could be secondary to uncontrolled anxiety as well.        New Medications Ordered This Visit   Medications   • DULoxetine (CYMBALTA) 60 MG capsule     Sig: Take 1 capsule by mouth Daily.     Dispense:  90 capsule     Refill:  3   • busPIRone (BUSPAR) 5 MG tablet     Sig: Take 1 tablet by mouth 2 (Two) Times a Day As Needed (anxiety).     Dispense:  180 tablet     Refill:  3       No orders of the defined types were placed in this encounter.      Return in about 1 month (around 10/21/2022) for Medicare Wellness- welcome to medicare.      Nhung Shepard,

## 2022-10-17 ENCOUNTER — APPOINTMENT (OUTPATIENT)
Dept: CT IMAGING | Facility: HOSPITAL | Age: 67
End: 2022-10-17
Payer: MEDICARE

## 2022-10-17 ENCOUNTER — HOSPITAL ENCOUNTER (OUTPATIENT)
Facility: HOSPITAL | Age: 67
Setting detail: OBSERVATION
Discharge: HOME | End: 2022-10-18
Attending: HOSPITALIST | Admitting: INTERNAL MEDICINE
Payer: MEDICARE

## 2022-10-17 ENCOUNTER — OUTSIDE FACILITY SERVICE (OUTPATIENT)
Dept: CARDIOLOGY | Facility: CLINIC | Age: 67
End: 2022-10-17

## 2022-10-17 DIAGNOSIS — R55 VASOVAGAL SYNCOPE: Primary | ICD-10-CM

## 2022-10-17 DIAGNOSIS — S09.90XA INJURY OF HEAD, INITIAL ENCOUNTER: ICD-10-CM

## 2022-10-17 LAB
A/G RATIO: 1.6 (ref 0.8–2)
ALBUMIN SERPL-MCNC: 4.6 G/DL (ref 3.4–4.8)
ALP BLD-CCNC: 96 U/L (ref 25–100)
ALT SERPL-CCNC: 28 U/L (ref 4–36)
AMORPHOUS: ABNORMAL /HPF
AMPHETAMINE SCREEN, URINE: ABNORMAL
ANION GAP SERPL CALCULATED.3IONS-SCNC: 14 MMOL/L (ref 3–16)
AST SERPL-CCNC: 34 U/L (ref 8–33)
BARBITURATE SCREEN URINE: ABNORMAL
BASOPHILS ABSOLUTE: 0 K/UL (ref 0–0.1)
BASOPHILS RELATIVE PERCENT: 0.8 %
BENZODIAZEPINE SCREEN, URINE: ABNORMAL
BILIRUB SERPL-MCNC: 0.3 MG/DL (ref 0.3–1.2)
BILIRUBIN URINE: NEGATIVE
BLOOD, URINE: NEGATIVE
BUN BLDV-MCNC: 19 MG/DL (ref 6–20)
BUPRENORPHINE QUAL, URINE: ABNORMAL
CALCIUM SERPL-MCNC: 9.9 MG/DL (ref 8.5–10.5)
CANNABINOID SCREEN URINE: ABNORMAL
CHLORIDE BLD-SCNC: 101 MMOL/L (ref 98–107)
CLARITY: ABNORMAL
CO2: 22 MMOL/L (ref 20–30)
COARSE CASTS, UA: ABNORMAL /LPF (ref 0–2)
COCAINE METABOLITE SCREEN URINE: ABNORMAL
COLOR: YELLOW
CREAT SERPL-MCNC: 1.2 MG/DL (ref 0.4–1.2)
EOSINOPHILS ABSOLUTE: 0.1 K/UL (ref 0–0.4)
EOSINOPHILS RELATIVE PERCENT: 1.3 %
EPITHELIAL CELLS, UA: ABNORMAL /HPF (ref 0–5)
GFR AFRICAN AMERICAN: 55
GFR NON-AFRICAN AMERICAN: 45
GLOBULIN: 2.9 G/DL
GLUCOSE BLD-MCNC: 104 MG/DL (ref 74–106)
GLUCOSE URINE: NEGATIVE MG/DL
HCT VFR BLD CALC: 35.3 % (ref 37–47)
HEMOGLOBIN: 11.8 G/DL (ref 11.5–16.5)
HYALINE CASTS: ABNORMAL /LPF (ref 0–2)
IMMATURE GRANULOCYTES #: 0 K/UL
IMMATURE GRANULOCYTES %: 0.4 % (ref 0–5)
KETONES, URINE: ABNORMAL MG/DL
LEUKOCYTE ESTERASE, URINE: NEGATIVE
LYMPHOCYTES ABSOLUTE: 2.6 K/UL (ref 1.5–4)
LYMPHOCYTES RELATIVE PERCENT: 49.6 %
Lab: ABNORMAL
MCH RBC QN AUTO: 30.3 PG (ref 27–32)
MCHC RBC AUTO-ENTMCNC: 33.4 G/DL (ref 31–35)
MCV RBC AUTO: 90.7 FL (ref 80–100)
METHADONE SCREEN, URINE: ABNORMAL
METHAMPHETAMINE, URINE: ABNORMAL
MICROSCOPIC EXAMINATION: YES
MONOCYTES ABSOLUTE: 0.4 K/UL (ref 0.2–0.8)
MONOCYTES RELATIVE PERCENT: 7.3 %
MUCUS: ABNORMAL /LPF
NEUTROPHILS ABSOLUTE: 2.2 K/UL (ref 2–7.5)
NEUTROPHILS RELATIVE PERCENT: 40.6 %
NITRITE, URINE: NEGATIVE
OPIATE SCREEN URINE: ABNORMAL
PDW BLD-RTO: 13 % (ref 11–16)
PH UA: 5.5 (ref 5–8)
PHENCYCLIDINE SCREEN URINE: ABNORMAL
PLATELET # BLD: 215 K/UL (ref 150–400)
PMV BLD AUTO: 10.1 FL (ref 6–10)
POTASSIUM REFLEX MAGNESIUM: 3.6 MMOL/L (ref 3.4–5.1)
PROPOXYPHENE SCREEN, URINE: ABNORMAL
PROTEIN UA: ABNORMAL MG/DL
RBC # BLD: 3.89 M/UL (ref 3.8–5.8)
RBC UA: ABNORMAL /HPF (ref 0–4)
SARS-COV-2, NAAT: NOT DETECTED
SODIUM BLD-SCNC: 137 MMOL/L (ref 136–145)
SPECIFIC GRAVITY UA: 1.01 (ref 1–1.03)
TOTAL PROTEIN: 7.5 G/DL (ref 6.4–8.3)
TRICYCLIC, URINE: POSITIVE
TROPONIN: <0.3 NG/ML
UR OXYCODONE RAPID SCREEN: POSITIVE
URINE REFLEX TO CULTURE: ABNORMAL
URINE TYPE: ABNORMAL
UROBILINOGEN, URINE: 0.2 E.U./DL
WBC # BLD: 5.3 K/UL (ref 4–11)
WBC UA: ABNORMAL /HPF (ref 0–5)

## 2022-10-17 PROCEDURE — 96360 HYDRATION IV INFUSION INIT: CPT

## 2022-10-17 PROCEDURE — 2580000003 HC RX 258: Performed by: HOSPITALIST

## 2022-10-17 PROCEDURE — 84484 ASSAY OF TROPONIN QUANT: CPT

## 2022-10-17 PROCEDURE — 93308 TTE F-UP OR LMTD: CPT | Performed by: INTERNAL MEDICINE

## 2022-10-17 PROCEDURE — 99285 EMERGENCY DEPT VISIT HI MDM: CPT

## 2022-10-17 PROCEDURE — 6370000000 HC RX 637 (ALT 250 FOR IP): Performed by: PHYSICIAN ASSISTANT

## 2022-10-17 PROCEDURE — 6360000002 HC RX W HCPCS: Performed by: PHYSICIAN ASSISTANT

## 2022-10-17 PROCEDURE — 96361 HYDRATE IV INFUSION ADD-ON: CPT

## 2022-10-17 PROCEDURE — 36415 COLL VENOUS BLD VENIPUNCTURE: CPT

## 2022-10-17 PROCEDURE — 80053 COMPREHEN METABOLIC PANEL: CPT

## 2022-10-17 PROCEDURE — 70450 CT HEAD/BRAIN W/O DYE: CPT

## 2022-10-17 PROCEDURE — 93005 ELECTROCARDIOGRAM TRACING: CPT

## 2022-10-17 PROCEDURE — 96372 THER/PROPH/DIAG INJ SC/IM: CPT

## 2022-10-17 PROCEDURE — G0378 HOSPITAL OBSERVATION PER HR: HCPCS

## 2022-10-17 PROCEDURE — 2580000003 HC RX 258: Performed by: PHYSICIAN ASSISTANT

## 2022-10-17 PROCEDURE — 81001 URINALYSIS AUTO W/SCOPE: CPT

## 2022-10-17 PROCEDURE — 74176 CT ABD & PELVIS W/O CONTRAST: CPT

## 2022-10-17 PROCEDURE — 80307 DRUG TEST PRSMV CHEM ANLYZR: CPT

## 2022-10-17 PROCEDURE — 6370000000 HC RX 637 (ALT 250 FOR IP): Performed by: INTERNAL MEDICINE

## 2022-10-17 PROCEDURE — 85025 COMPLETE CBC W/AUTO DIFF WBC: CPT

## 2022-10-17 PROCEDURE — P9612 CATHETERIZE FOR URINE SPEC: HCPCS

## 2022-10-17 PROCEDURE — 87635 SARS-COV-2 COVID-19 AMP PRB: CPT

## 2022-10-17 RX ORDER — GABAPENTIN 600 MG/1
600 TABLET ORAL 3 TIMES DAILY
COMMUNITY

## 2022-10-17 RX ORDER — ONDANSETRON 4 MG/1
4 TABLET, ORALLY DISINTEGRATING ORAL EVERY 8 HOURS PRN
Status: DISCONTINUED | OUTPATIENT
Start: 2022-10-17 | End: 2022-10-18 | Stop reason: HOSPADM

## 2022-10-17 RX ORDER — OXYCODONE AND ACETAMINOPHEN 7.5; 325 MG/1; MG/1
1 TABLET ORAL DAILY
COMMUNITY

## 2022-10-17 RX ORDER — ATORVASTATIN CALCIUM 20 MG/1
20 TABLET, FILM COATED ORAL NIGHTLY
Status: DISCONTINUED | OUTPATIENT
Start: 2022-10-17 | End: 2022-10-18 | Stop reason: HOSPADM

## 2022-10-17 RX ORDER — HYDROXYCHLOROQUINE SULFATE 200 MG/1
200 TABLET, FILM COATED ORAL 2 TIMES DAILY
COMMUNITY

## 2022-10-17 RX ORDER — ACETAMINOPHEN 325 MG/1
650 TABLET ORAL EVERY 6 HOURS PRN
Status: DISCONTINUED | OUTPATIENT
Start: 2022-10-17 | End: 2022-10-18 | Stop reason: HOSPADM

## 2022-10-17 RX ORDER — HYDROXYCHLOROQUINE SULFATE 200 MG/1
200 TABLET, FILM COATED ORAL 2 TIMES DAILY
Status: DISCONTINUED | OUTPATIENT
Start: 2022-10-17 | End: 2022-10-18 | Stop reason: HOSPADM

## 2022-10-17 RX ORDER — ENOXAPARIN SODIUM 100 MG/ML
40 INJECTION SUBCUTANEOUS DAILY
Status: DISCONTINUED | OUTPATIENT
Start: 2022-10-17 | End: 2022-10-18 | Stop reason: HOSPADM

## 2022-10-17 RX ORDER — DULOXETIN HYDROCHLORIDE 30 MG/1
60 CAPSULE, DELAYED RELEASE ORAL DAILY
Status: DISCONTINUED | OUTPATIENT
Start: 2022-10-18 | End: 2022-10-18 | Stop reason: HOSPADM

## 2022-10-17 RX ORDER — LEFLUNOMIDE 10 MG/1
10 TABLET ORAL DAILY
Status: DISCONTINUED | OUTPATIENT
Start: 2022-10-18 | End: 2022-10-18 | Stop reason: HOSPADM

## 2022-10-17 RX ORDER — 0.9 % SODIUM CHLORIDE 0.9 %
1000 INTRAVENOUS SOLUTION INTRAVENOUS ONCE
Status: COMPLETED | OUTPATIENT
Start: 2022-10-17 | End: 2022-10-17

## 2022-10-17 RX ORDER — LEFLUNOMIDE 10 MG/1
10 TABLET ORAL DAILY
COMMUNITY

## 2022-10-17 RX ORDER — POLYETHYLENE GLYCOL 3350 17 G/17G
17 POWDER, FOR SOLUTION ORAL DAILY PRN
Status: DISCONTINUED | OUTPATIENT
Start: 2022-10-17 | End: 2022-10-18 | Stop reason: HOSPADM

## 2022-10-17 RX ORDER — ONDANSETRON 2 MG/ML
4 INJECTION INTRAMUSCULAR; INTRAVENOUS EVERY 6 HOURS PRN
Status: DISCONTINUED | OUTPATIENT
Start: 2022-10-17 | End: 2022-10-18 | Stop reason: HOSPADM

## 2022-10-17 RX ORDER — CYCLOBENZAPRINE HCL 10 MG
10 TABLET ORAL 3 TIMES DAILY
Status: DISCONTINUED | OUTPATIENT
Start: 2022-10-17 | End: 2022-10-18 | Stop reason: HOSPADM

## 2022-10-17 RX ORDER — PANTOPRAZOLE SODIUM 20 MG/1
20 TABLET, DELAYED RELEASE ORAL DAILY
Status: DISCONTINUED | OUTPATIENT
Start: 2022-10-18 | End: 2022-10-18 | Stop reason: CLARIF

## 2022-10-17 RX ORDER — ROPINIROLE 1 MG/1
2 TABLET, FILM COATED ORAL NIGHTLY
Status: DISCONTINUED | OUTPATIENT
Start: 2022-10-17 | End: 2022-10-18 | Stop reason: HOSPADM

## 2022-10-17 RX ORDER — ATORVASTATIN CALCIUM 20 MG/1
20 TABLET, FILM COATED ORAL NIGHTLY
COMMUNITY

## 2022-10-17 RX ORDER — SODIUM CHLORIDE 9 MG/ML
INJECTION, SOLUTION INTRAVENOUS CONTINUOUS
Status: DISCONTINUED | OUTPATIENT
Start: 2022-10-17 | End: 2022-10-18 | Stop reason: HOSPADM

## 2022-10-17 RX ORDER — PANTOPRAZOLE SODIUM 20 MG/1
20 TABLET, DELAYED RELEASE ORAL DAILY
COMMUNITY

## 2022-10-17 RX ORDER — GABAPENTIN 300 MG/1
600 CAPSULE ORAL 3 TIMES DAILY
Status: DISCONTINUED | OUTPATIENT
Start: 2022-10-17 | End: 2022-10-18 | Stop reason: HOSPADM

## 2022-10-17 RX ORDER — ACETAMINOPHEN 650 MG/1
650 SUPPOSITORY RECTAL EVERY 6 HOURS PRN
Status: DISCONTINUED | OUTPATIENT
Start: 2022-10-17 | End: 2022-10-18 | Stop reason: HOSPADM

## 2022-10-17 RX ORDER — ONDANSETRON 4 MG/1
4 TABLET, ORALLY DISINTEGRATING ORAL EVERY 4 HOURS PRN
Status: DISCONTINUED | OUTPATIENT
Start: 2022-10-17 | End: 2022-10-18 | Stop reason: HOSPADM

## 2022-10-17 RX ADMIN — ACETAMINOPHEN 650 MG: 325 TABLET, FILM COATED ORAL at 21:45

## 2022-10-17 RX ADMIN — ENOXAPARIN SODIUM 40 MG: 100 INJECTION SUBCUTANEOUS at 20:49

## 2022-10-17 RX ADMIN — SODIUM CHLORIDE 1000 ML: 9 INJECTION, SOLUTION INTRAVENOUS at 14:07

## 2022-10-17 RX ADMIN — GABAPENTIN 600 MG: 300 CAPSULE ORAL at 21:46

## 2022-10-17 RX ADMIN — ROPINIROLE HYDROCHLORIDE 2 MG: 1 TABLET, FILM COATED ORAL at 21:45

## 2022-10-17 RX ADMIN — CYCLOBENZAPRINE 10 MG: 10 TABLET, FILM COATED ORAL at 21:46

## 2022-10-17 RX ADMIN — HYDROXYCHLOROQUINE SULFATE 200 MG: 200 TABLET ORAL at 21:46

## 2022-10-17 RX ADMIN — SODIUM CHLORIDE: 9 INJECTION, SOLUTION INTRAVENOUS at 20:43

## 2022-10-17 RX ADMIN — ATORVASTATIN CALCIUM 20 MG: 20 TABLET, FILM COATED ORAL at 21:46

## 2022-10-17 ASSESSMENT — PAIN SCALES - GENERAL: PAINLEVEL_OUTOF10: 5

## 2022-10-17 ASSESSMENT — LIFESTYLE VARIABLES: HOW OFTEN DO YOU HAVE A DRINK CONTAINING ALCOHOL: NEVER

## 2022-10-17 NOTE — ED NOTES
Patient is agreeable to admission for observation. MD Carvalho texted on call at this time.       Bryanna Schumacher RN  10/17/22 0297

## 2022-10-17 NOTE — ED NOTES
Patient off unit via wheelchair to medical unit room 15, at this time.       Feliciano Lacey RN  10/17/22 0904

## 2022-10-17 NOTE — ED NOTES
On call Edyta accepted patient for admission to medical unit at this time. RN called med unit; patient will go to room 15 on medical unit.       Josse Emerson RN  10/17/22 44022 Jeanna Navarro RN  10/17/22 0987

## 2022-10-17 NOTE — ED NOTES
Patient arrives to ER via POV driven by . Patient assited to stretcher by staff. Patient very drowsy, not really speaking at this time; only moaning. Patient taken to Trauma 1. Per family, patient was found in the floor unresponsive and laying in vomit. MD at bedside for triage. Patient awake, but drowsy. Patient able to state she was at [facility], knows today is Monday, states the month incorrectly as \"September\", but answers year and President correctly. Patient tells MD she had a cramping in her stomach and felt dizzy. She fell and hit her head. The last thing she remembers, she was up fixing her  a sandwich. Patient with knot noted to her left forehead.       Haley Rodriguez RN  10/17/22 6083

## 2022-10-17 NOTE — ED PROVIDER NOTES
[facility] ENCOUNTER      Pt Name: Merlinda Sabin  MRN: [id number]  YOB: 1955  Date of evaluation: 10/17/2022  Provider: Shaq Terjo, [facility]       Chief Complaint   Patient presents with    Loss of Consciousness    Head Injury    Emesis    Dizziness         HISTORY OF PRESENT ILLNESS  (Location/Symptom, Timing/Onset, Context/Setting, Quality, Duration, Modifying Factors, Severity.)   Merlinda Sabin is a 74 y.o. female who presents to the emergency department for loss of consciousness. Patient states that the incident just happened prior to arrival here. She states that she was fixing her  a sandwich. She advised that she had some severe abdominal cramping she advised that she felt like she needed to have a bowel movement. Patient got nauseated felt lightheaded and dizzy and sort of just went down. She did hit her left frontal area. Patient states that she had nausea and vomiting and then had a rather large soft bowel movement. Patient denied any chest pain or shortness of breath with the symptoms. Denied any sensation of palpitations. Denies any headache out of ordinary. Patient advised that her stomach does not hurt it just feels different than what it normally does. She denies any actual nausea now. Patient states that she did have some burning with urination yesterday but denies any change urinary frequency. She states that she believes that she is eating and drinking without any difficulty. Patient is alert and awake and oriented x4 answering questions appropriately. She is able to move all 4 extremities without any difficulty. Nursing notes were reviewed.     REVIEW OFSYSTEMS    (2-9 systems for level 4, 10 or more for level 5)   ROS:  General:  No fevers, no chills, no weakness  Cardiovascular:  No chest pain, no palpitations  Respiratory:  No shortness of breath, no cough, no wheezing  Gastrointestinal: +pain/cramping-resolved, + nausea, +vomiting, no diarrhea, +large soft BM  Musculoskeletal:  No muscle pain, no joint pain  Skin:  No rash, no easy bruising  Neurologic:  No speech problems, no headache, no extremity weakness, +LOC  Psychiatric:  No anxiety  Genitourinary:  + dysuria, no hematuria    Except as noted above the remainder of the review of systems was reviewed and negative. PAST MEDICAL HISTORY     Past Medical History:   Diagnosis Date    Lupus ([facility] [address]     Sjogren's disease ([facility] [address]          SURGICAL HISTORY       Past Surgical History:   Procedure Laterality Date    HYSTERECTOMY, TOTAL ABDOMINAL (CERVIX REMOVED)           CURRENT MEDICATIONS       Previous Medications    ASPIRIN 81 MG TABLET    Take 81 mg by mouth daily    CYCLOBENZAPRINE (FLEXERIL) 10 MG TABLET    Take 10 mg by mouth 3 times daily    DULOXETINE (CYMBALTA) 60 MG EXTENDED RELEASE CAPSULE    Take 60 mg by mouth daily    ONDANSETRON (ZOFRAN ODT) 4 MG DISINTEGRATING TABLET    Take 1 tablet by mouth every 4 hours as needed for Nausea or Vomiting    POTASSIUM CHLORIDE (KLOR-CON M) 20 MEQ EXTENDED RELEASE TABLET    Take 1 tablet by mouth in the morning for 3 days. PRAVASTATIN (PRAVACHOL) 20 MG TABLET    Take 20 mg by mouth daily    ROPINIROLE (REQUIP) 2 MG TABLET    Take 2 mg by mouth nightly       ALLERGIES     Pcn [penicillins]    FAMILY HISTORY     History reviewed. No pertinent family history.        SOCIAL HISTORY       Social History     Socioeconomic History    Marital status:      Spouse name: None    Number of children: None    Years of education: None    Highest education level: None   Tobacco Use    Smoking status: Every Day     Packs/day: 1.00     Years: 30.00     Pack years: 30.00     Types: Cigarettes    Smokeless tobacco: Never   Substance and Sexual Activity    Alcohol use: No    Drug use: No         PHYSICAL EXAM    (up to 7 for level 4, 8 or more for level 5)     ED Triage Vitals [10/17/22 1354]   BP Temp Temp Source Heart Rate Resp SpO2 Height Weight   113/70 97.7 °F (36.5 °C) Oral (!) 101 16 100 % 5' 6\" (1.676 m) 140 lb (63.5 kg)       Physical Exam  General :Patient is awake, alert, oriented x4, in no acute distress, nontoxic appearing  HEENT: Pupils are equally round and reactive to light, EOMI, conjunctivae clear. Oral mucosa is moist, no exudate. Uvula is midline, abrasion with hematoma noted to the left frontal lobe  Cardiac: Heart regular rate, rhythm, no murmurs, rubs, or gallops  Lungs: Lungs are clear to auscultation, there is no wheezing, rhonchi, or rales. There is no use of accessory muscles. Abdomen: Abdomen is soft, nontender, nondistended. There is no firm or pulsatile masses, no rebound rigidity or guarding. Musculoskeletal: 5 out of 5 strength in all 4 extremities. No focal muscle deficits are appreciated  Neuro: Motor intact, sensory intact, level of consciousness is normal  Dermatology: Skin is warm and dry  Psych: Mentation is grossly normal, cognition is grossly normal. Affect is appropriate. DIAGNOSTIC RESULTS     EKG: All EKG's are interpreted by the Emergency Department Physician who either signs or Co-signs this chart in the [address] a cardiologist.    The EKG interpreted by me shows sinus rhythm. Probable left atrial enlargement. Borderline right axis deviation. Minimal ST depression lateral leads. Heart rate is 95 bpm, NC Anna 174 ms, QRS durations 96, QT is 381 and QTc is 481 ms. No acute ST depressions concerning for acute myocardial ischemia. No ST elevations concerning for acute myocardial infarction. RADIOLOGY:   Non-plain film images such as CT, Ultrasound and MRI are read by the radiologist. Plain radiographic images are visualized and preliminarily interpreted by the emergency physician with the below findings:      [] Radiologist's Report Reviewed:  CT Head WO Contrast   Final Result      No acute intracranial abnormality.       CT ABDOMEN PELVIS WO CONTRAST Additional Contrast? None   Final Result      1. No acute abnormality. 2. 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 3. Severe vascular calcifications. 4. L5 pars defects with grade 1 anterolisthesis and severe bilateral L5 foraminal stenosis.             ED BEDSIDE ULTRASOUND:   Performed by ED Physician - none    LABS:    I have reviewed and interpreted all of the currently available lab results from this visit (ifapplicable):  Results for orders placed or performed during the hospital encounter of 10/17/22   Urinalysis with Reflex to Culture    Specimen: Urine   Result Value Ref Range    Color, UA Yellow Straw/Yellow    Clarity, UA SLCLOUDY Clear    Glucose, Ur Negative Negative mg/dL    Bilirubin Urine Negative Negative    Ketones, Urine TRACE (A) Negative mg/dL    Specific Gravity, UA 1.015 1.005 - 1.030    Blood, Urine Negative Negative    pH, UA 5.5 5.0 - 8.0    Protein, UA TRACE (A) Negative mg/dL    Urobilinogen, Urine 0.2 <2.0 E.U./dL    Nitrite, Urine Negative Negative    Leukocyte Esterase, Urine Negative Negative    Microscopic Examination YES     Urine Type Catheter     Urine Reflex to Culture Not Indicated    Drug Screen, Multiple, Urine   Result Value Ref Range    PCP Screen, Urine Neg Negative <25 ng/mL    Benzodiazepine Screen, Urine Neg Negative <300 ng/mL    Cocaine Metabolite Screen, Urine Neg Negative <300 ng/mL    Amphetamine Screen, Urine Neg Negative <1000 ng/mL    Cannabinoid Scrn, Ur Neg Negative <50 ng/mL    Opiate Scrn, Ur Neg Negative <300 ng/mL    Barbiturate Screen, Ur Neg Negative <709 ng/mL    Tricyclic POSITIVE (A) Negative <300 ng/mL    Methadone Screen, Urine Neg Negative <300 ng/ml    Propoxyphene Screen, Urine Neg Negative <300 ng/mL    Methamphetamine, Urine Neg Negative <1000 ng/mL    UR Oxycodone Rapid Screen POSITIVE (A) Negative <100 ng/mL    Buprenorphine Qual, Urine Neg Negative <25 ng/mL    Drug Screen Comment: see below    CBC with Auto Differential Result Value Ref Range    WBC 5.3 4.0 - 11.0 K/uL    RBC 3.89 3.80 - 5.80 M/uL    Hemoglobin 11.8 11.5 - 16.5 g/dL    Hematocrit 35.3 (L) 37.0 - 47.0 %    MCV 90.7 80.0 - 100.0 fL    MCH 30.3 27.0 - 32.0 pg    MCHC 33.4 31.0 - 35.0 g/dL    RDW 13.0 11.0 - 16.0 %    Platelets 735 005 - 895 K/uL    MPV 10.1 (H) 6.0 - 10.0 fL    Neutrophils % 40.6 %    Immature Granulocytes % 0.4 0.0 - 5.0 %    Lymphocytes % 49.6 %    Monocytes % 7.3 %    Eosinophils % 1.3 %    Basophils % 0.8 %    Neutrophils Absolute 2.2 2.0 - 7.5 K/uL    Immature Granulocytes # 0.0 K/uL    Lymphocytes Absolute 2.6 1.5 - 4.0 K/uL    Monocytes Absolute 0.4 0.2 - 0.8 K/uL    Eosinophils Absolute 0.1 0.0 - 0.4 K/uL    Basophils Absolute 0.0 0.0 - 0.1 K/uL   Comprehensive Metabolic Panel w/ Reflex to MG   Result Value Ref Range    Sodium 137 136 - 145 mmol/L    Potassium reflex Magnesium 3.6 3.4 - 5.1 mmol/L    Chloride 101 98 - 107 mmol/L    CO2 22 20 - 30 mmol/L    Anion Gap 14 3 - 16    Glucose 104 74 - 106 mg/dL    BUN 19 6 - 20 mg/dL    Creatinine 1.2 0.4 - 1.2 mg/dL    GFR Non-African American 45 (L) >59    GFR  55 (L) >59    Calcium 9.9 8.5 - 10.5 mg/dL    Total Protein 7.5 6.4 - 8.3 g/dL    Albumin 4.6 3.4 - 4.8 g/dL    Albumin/Globulin Ratio 1.6 0.8 - 2.0    Total Bilirubin 0.3 0.3 - 1.2 mg/dL    Alkaline Phosphatase 96 25 - 100 U/L    ALT 28 4 - 36 U/L    AST 34 (H) 8 - 33 U/L    Globulin 2.9 Not Established g/dL   Troponin   Result Value Ref Range    Troponin <0.30 <0.30 ng/mL   Microscopic Urinalysis   Result Value Ref Range    Hyaline Casts, UA 6-10 (A) 0 - 2 /LPF    Coarse Casts, UA 0-2 0 - 2 /LPF    Mucus, UA 1+ (A) None Seen /LPF    WBC, UA 6-9 (A) 0 - 5 /HPF    RBC, UA 0-2 0 - 4 /HPF    Epithelial Cells, UA 2-5 0 - 5 /HPF    Amorphous, UA 1+ /HPF        All other labs were within normal range or not returned as of this dictation.     EMERGENCY DEPARTMENT COURSE and DIFFERENTIAL DIAGNOSIS/MDM:   Vitals:    Vitals: 10/17/22 1354 10/17/22 1500 10/17/22 1515   BP: 113/70 (!) 147/75 (!) 146/75   Pulse: (!) 101 87 95   Resp: 12 13 11   Temp: 97.7 °F (36.5 °C)     TempSrc: Oral     SpO2: 100% 98% 100%   Weight: 140 lb (63.5 kg)     Height: 5' 6\" (1.676 m)         MEDICATIONS ADMINISTERED IN ED:  Medications   0.9 % sodium chloride bolus (0 mLs IntraVENous Stopped 10/17/22 1508)       After initial evaluation examination I did have a conversation with the patient about the upcoming plan, treatment possible disposition which they are agreeable to the times dictation. Patient advised we give her fluid bolus normal saline 1 L. We will also check a fingerstick glucose. Patient will have CT scan of the head secondary to her head trauma and will also perform CT scan of the abdomen pelvis since she had abdominal cramping nausea vomiting and bowel movement just from her syncopal type episode. Patient seems that she vasovagal especially with the symptoms of the abdominal cramping nausea and vomiting happening beforehand. Patient will have CBC, CMP, troponin, UA, urine drug screen and fingerstick glucose performed. She also have a EKG performed. Patient final disposition will determine once her radiological diagnostic studies been performed reviewed however on arrival here she is answering questions appropriately she is alert awake and orient x4. She is moving all 4 extremities with equal strength. Blood work showed white count 5300, hemoglobin is 11.8, hematocrit 35.3, platelet counts 841. Comprehensive metabolic panel was benign. Troponin was nondetectable less than 0.30. UA was negative showed trace ketones and trace protein. Microscopy showed 6-10 hyaline cast, 0-2 coarse casts, +1 mucus, 6-9 white cells, 0-2 red cells, 2-5 epithelial and +1 amorphous. Did not meet criteria for culture    Urine drug screen positive for tricyclic's and oxycodone.     CT scan of the head without contrast read by radiology as no acute intracranial abnormality. CT scan of the abdomen pelvis without contrast read by radiology as no acute abnormality. 1.5 cm adrenal nodule likely adenoma. Severe vascular calcifications. L5 pars defect with grade 1 anterior listhesis and severe bilateral L5 foraminal stenosis    Patient's radiological diagnostic studies were discussed with her she does state her understanding. Patient advised of the evaluation here was benign. Her symptoms do seem consistent with a vasovagal type syncope especially with the abdominal pain and cramping/pain the nausea vomiting and then the bowel movement after she had went down. Patient has remained stable here. Patient was offered hospitalization here/admission but she declined she states that she wants to go home. Patient advised that if her symptoms worsens or new symptoms arise she needs return back to the emergency department for further evaluation and work-up just because she does not want to be admitted at this time does not mean that we would not try to admit her or if needed transfer her to another facility for evaluation she states her understanding of this but again has elected to be discharged home. Leaving the room apparently the patient had conversation with her family because they were not happy with her decision to be discharged home. The patient is now stating that she is agreeable for an observation admission for evaluation of her syncope. We will call the hospitalist that is on-call to see if they are agreeable to admit the patient for further evaluation work-up. Is this patient to be included in the SEP-1 Core Measure due to severe sepsis or septic shock? No   Exclusion criteria - the patient is NOT to be included for SEP-1 Core Measure due to: Infection is not suspected          CONSULTS:  None    PROCEDURES:  Procedures    CRITICAL CARE TIME    Total Critical Care time was 0 minutes, excluding separately reportable procedures.    There was a high probability of clinically significant/life threatening deterioration in the patient's condition which required my urgent intervention. FINAL IMPRESSION      1. Vasovagal syncope    2. Injury of head, initial encounter          DISPOSITION/PLAN   DISPOSITION Decision To Admit 10/17/2022 04:05:05 PM      PATIENT REFERRED TO:  Dave Dempsey  [phone]    In 2 days      [facility] Emergency Department  [address] [facility] [address],[facility] [address] [address]  [phone]    As needed, If symptoms worsen    [facility]io Hager [facility]  [phone]    Schedule an appointment as soon as possible for a visit in 1 week      DISCHARGE MEDICATIONS:  New Prescriptions    No medications on file       Comment: Please note this report has been produced using speech recognition software and may contain errorsrelated to that system including errors in grammar, punctuation, and spelling, as well as words and phrases that may be inappropriate. If there are any questions or concerns please feel free to contact the dictating providerfor clarification.     Deandra Magana,   Attending Emergency Physician               Deandra Magana, DO  10/17/22 [address] DO  10/17/22 2369

## 2022-10-18 ENCOUNTER — APPOINTMENT (OUTPATIENT)
Dept: ULTRASOUND IMAGING | Facility: HOSPITAL | Age: 67
End: 2022-10-18
Payer: MEDICARE

## 2022-10-18 VITALS
HEIGHT: 66 IN | SYSTOLIC BLOOD PRESSURE: 119 MMHG | DIASTOLIC BLOOD PRESSURE: 61 MMHG | TEMPERATURE: 98.4 F | HEART RATE: 94 BPM | RESPIRATION RATE: 18 BRPM | OXYGEN SATURATION: 96 % | BODY MASS INDEX: 22.5 KG/M2 | WEIGHT: 140 LBS

## 2022-10-18 PROBLEM — M35.00 H/O SJOGREN'S DISEASE (HCC): Status: ACTIVE | Noted: 2022-10-18

## 2022-10-18 PROBLEM — M32.9 LUPUS (HCC): Status: ACTIVE | Noted: 2022-10-18

## 2022-10-18 LAB
A/G RATIO: 1.6 (ref 0.8–2)
ALBUMIN SERPL-MCNC: 3.6 G/DL (ref 3.4–4.8)
ALP BLD-CCNC: 71 U/L (ref 25–100)
ALT SERPL-CCNC: 21 U/L (ref 4–36)
ANION GAP SERPL CALCULATED.3IONS-SCNC: 8 MMOL/L (ref 3–16)
AST SERPL-CCNC: 23 U/L (ref 8–33)
BILIRUB SERPL-MCNC: <0.2 MG/DL (ref 0.3–1.2)
BUN BLDV-MCNC: 20 MG/DL (ref 6–20)
CALCIUM SERPL-MCNC: 8.6 MG/DL (ref 8.5–10.5)
CHLORIDE BLD-SCNC: 108 MMOL/L (ref 98–107)
CO2: 25 MMOL/L (ref 20–30)
CREAT SERPL-MCNC: 1 MG/DL (ref 0.4–1.2)
GFR SERPL CREATININE-BSD FRML MDRD: >60 ML/MIN/{1.73_M2}
GLOBULIN: 2.3 G/DL
GLUCOSE BLD-MCNC: 96 MG/DL (ref 74–106)
HCT VFR BLD CALC: 30 % (ref 37–47)
HEMOGLOBIN: 9.8 G/DL (ref 11.5–16.5)
MCH RBC QN AUTO: 30.2 PG (ref 27–32)
MCHC RBC AUTO-ENTMCNC: 32.7 G/DL (ref 31–35)
MCV RBC AUTO: 92.3 FL (ref 80–100)
PDW BLD-RTO: 13.2 % (ref 11–16)
PLATELET # BLD: 170 K/UL (ref 150–400)
PMV BLD AUTO: 9.8 FL (ref 6–10)
POTASSIUM REFLEX MAGNESIUM: 4.2 MMOL/L (ref 3.4–5.1)
RBC # BLD: 3.25 M/UL (ref 3.8–5.8)
SODIUM BLD-SCNC: 141 MMOL/L (ref 136–145)
TOTAL PROTEIN: 5.9 G/DL (ref 6.4–8.3)
TSH SERPL DL<=0.05 MIU/L-ACNC: 0.77 UIU/ML (ref 0.27–4.2)
WBC # BLD: 4.3 K/UL (ref 4–11)

## 2022-10-18 PROCEDURE — 36415 COLL VENOUS BLD VENIPUNCTURE: CPT

## 2022-10-18 PROCEDURE — 85027 COMPLETE CBC AUTOMATED: CPT

## 2022-10-18 PROCEDURE — 99236 HOSP IP/OBS SAME DATE HI 85: CPT | Performed by: INTERNAL MEDICINE

## 2022-10-18 PROCEDURE — 6370000000 HC RX 637 (ALT 250 FOR IP): Performed by: PHYSICIAN ASSISTANT

## 2022-10-18 PROCEDURE — 84443 ASSAY THYROID STIM HORMONE: CPT

## 2022-10-18 PROCEDURE — 93880 EXTRACRANIAL BILAT STUDY: CPT

## 2022-10-18 PROCEDURE — 80053 COMPREHEN METABOLIC PANEL: CPT

## 2022-10-18 PROCEDURE — 6370000000 HC RX 637 (ALT 250 FOR IP): Performed by: INTERNAL MEDICINE

## 2022-10-18 PROCEDURE — 96361 HYDRATE IV INFUSION ADD-ON: CPT

## 2022-10-18 PROCEDURE — 93306 TTE W/DOPPLER COMPLETE: CPT

## 2022-10-18 PROCEDURE — G0378 HOSPITAL OBSERVATION PER HR: HCPCS

## 2022-10-18 RX ORDER — PANTOPRAZOLE SODIUM 40 MG/1
40 TABLET, DELAYED RELEASE ORAL
Status: DISCONTINUED | OUTPATIENT
Start: 2022-10-18 | End: 2022-10-18 | Stop reason: HOSPADM

## 2022-10-18 RX ORDER — LISINOPRIL 10 MG/1
10 TABLET ORAL NIGHTLY
Qty: 30 TABLET | Refills: 3 | Status: SHIPPED | OUTPATIENT
Start: 2022-10-18

## 2022-10-18 RX ORDER — LISINOPRIL 10 MG/1
10 TABLET ORAL DAILY
Status: ON HOLD | COMMUNITY
End: 2022-10-18 | Stop reason: SDUPTHER

## 2022-10-18 RX ORDER — OXYCODONE AND ACETAMINOPHEN 7.5; 325 MG/1; MG/1
1 TABLET ORAL DAILY
Status: DISCONTINUED | OUTPATIENT
Start: 2022-10-18 | End: 2022-10-18 | Stop reason: HOSPADM

## 2022-10-18 RX ADMIN — HYDROXYCHLOROQUINE SULFATE 200 MG: 200 TABLET ORAL at 09:10

## 2022-10-18 RX ADMIN — DULOXETINE HYDROCHLORIDE 60 MG: 30 CAPSULE, DELAYED RELEASE ORAL at 09:09

## 2022-10-18 RX ADMIN — LEFLUNOMIDE 10 MG: 10 TABLET ORAL at 09:10

## 2022-10-18 RX ADMIN — ACETAMINOPHEN 650 MG: 325 TABLET, FILM COATED ORAL at 09:12

## 2022-10-18 RX ADMIN — PANTOPRAZOLE SODIUM 40 MG: 40 TABLET, DELAYED RELEASE ORAL at 09:10

## 2022-10-18 RX ADMIN — CYCLOBENZAPRINE 10 MG: 10 TABLET, FILM COATED ORAL at 09:09

## 2022-10-18 RX ADMIN — GABAPENTIN 600 MG: 300 CAPSULE ORAL at 09:09

## 2022-10-18 ASSESSMENT — PAIN SCALES - GENERAL: PAINLEVEL_OUTOF10: 7

## 2022-10-18 NOTE — FLOWSHEET NOTE
10/17/22 2050   Assessment   Charting Type Shift assessment   Psychosocial   Psychosocial (WDL) WDL   Neurological   Neuro (WDL) WDL   Level of Consciousness 0   [place] Coma Scale   Eye Opening 4   Best Verbal Response 5   Best Motor Response 6   [place] Coma Scale Score 15   HEENT (Head, Ears, Eyes, Nose, & Throat)   HEENT (WDL) WDL   Respiratory   Respiratory (WDL) WDL   Cardiac   Cardiac (WDL) WDL   Gastrointestinal   Abdominal (WDL) WDL   Last BM (including prior to admit) 10/17/22   RUQ Bowel Sounds Active   LUQ Bowel Sounds Active   RLQ Bowel Sounds Active   LLQ Bowel Sounds Active   Genitourinary   Genitourinary (WDL) WDL   Peripheral Vascular   Peripheral Vascular (WDL) WDL   Skin Integumentary    Skin Integumentary (WDL) WDL   Musculoskeletal   Musculoskeletal (WDL) X  (Gen weakness r/t lupus)

## 2022-10-18 NOTE — CONSULTS
Cardiology Consultation    Patient: Oliver Mobley  1955  [phone]  [facility]    PCP:Catherine Frederick, ADRI  [unfilled]  10/17/2022    DATE OF CONSULTATION:10/18/574197:46 AM    IDENTIFICATION:A 74 y.o. female     REASON FOR CONSULTATION: Syncope    ASSESSMENT/PLAN:  Vasovagal syncope: Triggered by abdominal cramping and defecation. Previous low risk cardiac evaluation. No concern for for primary cardiac arrhythmia. Discussed abortive measures. Discussed avoiding dehydration. Directed her to move ACE inhibitor dosing to bedtime. Continue keep home blood pressure log. Repeat ED or inpatient evaluation not recommended for future occurrences. No further cardiac testing indicated during this hospitalization. Unremarkable carotid duplex imaging. No significant structural or functional heart disease on transthoracic echocardiogram.  We will follow-up with cardiology office in 8 to 12 weeks. Anomalous right coronary artery: No previous clinical symptoms concerning for acute coronary ischemia. Patient's age this is likely an incidental and overall benign finding. No further evaluation recommended this time. History of present illness:  19-year-old female with a history of hypertension, recurrent syncope, and anomalous right coronary artery presenting with episode of syncope. She reports an episode of syncope associated with bowel movement. Developed a sensation that she needed to go the bathroom. Sat down the toilet and began to experience abdominal cramping. This progressed to lightheadedness and a brief episode of loss of consciousness. This was associated with some nausea and emesis. Denies chest pain or palpitations. She has had previous episodes which were also associated with having a bowel movement. She does feel fatigued for several hours up to a day or 2 after these events.   She has had an extensive previous cardiac evaluation which was unremarkable for an incidental finding of anomalous coronary artery with only a brief intra-arterial course. OBJECTIVE:  Reviewed in EHR  I/O last 3 completed shifts: In: 950 [P.O.:200; I.V.:750]  Out: -   I/O this shift:  In: 200 [P.O.:200]  Out: -     Previous cardiac studies and procedures:  October 2021 CT coronary arteries  1. 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 The RCA takes a sharp, angulated course downward and has a short interarterial course, but does not contain a slit-like orifice. Consider GXT NM SPECT stress perfusion study for further risk stratification. 2. No coronary calcification with an Agatston score = 0 using the AJ-130 method. Vascular age is 46 years. 3. Minimal non-calcified disease in the proximal LAD as described above. 4. CAD-RADS 1: Management recommendations:  Consider non-atherosclerotic causes of chest pain. Consider referral for outpatient follow-up for preventative therapy and risk factor modification. 5.  No significant non coronary cardiac findings in particular normal cardiac chambers, non-coronary vessels in the field of view and unremarkable pericardium. 6.  Extracardiac structures in the field of view are unremarkable. November 2021   TTE: Normal  14-day ambulatory ECG monitor: Normal    December 2021 MR cardiac stress  1. No vasodilator (regadenoson) induced CMR perfusion defect. 2. Normal resting global and regional LV function. 3. Normal sized left ventricle with normal post-vasodilator global systolic function (EF 90%). 4. No CMR evidence for LV scar. 5. Normal sized right ventricle with normal post-vasodilator global systolic function (EF 72%).      PHYSICAL EXAMINATION:    General appearance: Alert, cooperative, NAD, appears stated age  Head:   Normocephalic, atraumatic  Eyes:   Conjunctiva and cornea clear, EOM intact bilaterally  Ears:    External pinna normal  Nose:   Normal nares, septum midline, no drainage  Throat:   Lips normal, mucosa and tongue normal  Neck:      Supple, no AJ, trachea midline, no thyromegaly  Lungs:   Clear throughout bilaterally, normal effort, normal to palpation. Chest wall:  Non tender, no deformity  CV:    Regular rate and rhythm, no murmur, rubs, or gallups, normal    PMI, normal s1 and s1, no s3 or s4. Abdomen:  Soft, nontender, positive bowel sounds in all quandrants, no    Hepatosplenomegaly  Extremities:  Normal, atraumatic, no cyanosis, no edema  Pulses:  2+ symmetric in all extremities  Skin:   Color, texture, and turgor normal.  No rashes or lesions  Lymph nodes  Cervical, supraclavicular, and axillary nodes normal  Neurologic:  Normal strength, sensation intact throughout      PROBLEM LIST:  Principal Problem:    Syncope and collapse  Active Problems:    Lupus ([facility] [address]  Resolved Problems:    * No resolved hospital problems.  *      Past Medical History:   Diagnosis Date    Congenital anomalies of the heart     Lupus ([facility] [address]     Sjogren's disease ([facility] [address]      Past Surgical History:   Procedure Laterality Date    HYSTERECTOMY, TOTAL ABDOMINAL (CERVIX REMOVED)         ROS  Constitutional:  Denies fever or chills , admits to weakness  Eyes:  Denies change in visual acuity   HENT:  Denies nasal congestion or sore throat   Respiratory:  Denies cough or shortness of breath   Cardiovascular: See HPI   GI: See HPI  :  Denies dysuria or frequency  Musculoskeletal:  Admits to chronic lower back pain and joint pain   Integument:  Denies rash or itching  Neurologic:  Denies headache, focal weakness or sensory changes   Psychiatric:  Denies depression or anxiety        SOCIAL HX  Social History     Socioeconomic History    Marital status:      Spouse name: Not on file    Number of children: Not on file    Years of education: Not on file    Highest education level: Not on file   Occupational History    Not on file   Tobacco Use    Smoking status: Every Day     Packs/day: 1.00 Years: 30.00     Pack years: 30.00     Types: Cigarettes    Smokeless tobacco: Never   Substance and Sexual Activity    Alcohol use: No    Drug use: No    Sexual activity: Not on file   Other Topics Concern    Not on file   Social History Narrative    Not on file     Social Determinants of Health     Financial Resource Strain: Not on file   Food Insecurity: Not on file   Transportation Needs: Not on file   Physical Activity: Not on file   Stress: Not on file   Social Connections: Not on file   Intimate Partner Violence: Not on file   Housing Stability: Not on file       FAMILY HX  History reviewed. No pertinent family history. Principal Problem:    Syncope and collapse  Active Problems:    Lupus ([facility] [address]  Resolved Problems:    * No resolved hospital problems.  Ellis Ratliff MD  11:46 AM 10/18/2022

## 2022-10-18 NOTE — FLOWSHEET NOTE
10/18/22 0912   Assessment   Charting Type Shift assessment   Psychosocial   Psychosocial (WDL) WDL   Neurological   Neuro (WDL) WDL   Level of Consciousness 0   [place] Coma Scale   Eye Opening 4   Best Verbal Response 5   Best Motor Response 6   [place] Coma Scale Score 15   HEENT (Head, Ears, Eyes, Nose, & Throat)   HEENT (WDL) WDL   Respiratory   Respiratory (WDL) WDL   Cardiac   Cardiac (WDL) WDL   Gastrointestinal   Abdominal (WDL) WDL   RUQ Bowel Sounds Active   LUQ Bowel Sounds Active   RLQ Bowel Sounds Active   LLQ Bowel Sounds Active   Genitourinary   Genitourinary (WDL) WDL   Peripheral Vascular   Peripheral Vascular (WDL) WDL   Skin Integumentary    Skin Integumentary (WDL) WDL   Musculoskeletal   Musculoskeletal (WDL) X  (Gen weakness r/t lupus)   Care Plan - Discharge Planning Goals   Discharge to home or other facility with appropriate resources Identify barriers to discharge with patient and caregiver; Identify discharge learning needs (meds, wound care, etc)   Pt is alert and oriented. Pt is resting in bed and appears to have no acute distress. Pt currently on room air. Pt currently on telemetry monitoring. Pt's lung sounds are clear. Pt encouraged to cough and deep breathe. Pt call bell and bedside table within reach.

## 2022-10-18 NOTE — H&P
Short Stay Summary      Patient ID: Adonay Breaux      Patient's PCP: Miriam Lassiter Date: 10/17/2022     Discharge Date:   10/18/2022    Admitting Physician: Murphy Narvaez MD    Discharge Physician: KARLENE Brown     Reason for this admission:   Syncope     Discharge Diagnoses: Active Hospital Problems    Diagnosis Date Noted    Lupus ([facility] [address] [M32.9] 10/18/2022     Priority: Medium    H/O Sjogren's disease ([address] [M35.00] 10/18/2022     Priority: Medium    Syncope and collapse [R55] 10/17/2022     Priority: Medium       Procedures:  US CAROTID ARTERY BILATERAL   Final Result      1. Estimated diameter reduction of the right internal carotid artery is 20-40 %. 2.  Estimated diameter reduction left internal carotid artery is 20-40 %. 3.  Vertebral arteries bilaterally with antegrade flow. CT Head WO Contrast   Final Result      No acute intracranial abnormality. CT ABDOMEN PELVIS WO CONTRAST Additional Contrast? None   Final Result      1. No acute abnormality. 2. 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 3. Severe vascular calcifications. 4. L5 pars defects with grade 1 anterolisthesis and severe bilateral L5 foraminal stenosis. Consults:   IP CONSULT TO CASE MANAGEMENT  IP CONSULT TO CARDIOLOGY  PT OT    HISTORY OF PRESENT ILLNESS:   The patient is a 74 y.o. female with PMH of anomalous right coronary artery, lupus and Sjogren's disease who presented to the emergency department secondary to loss of consciousness at home. Patient states she was in her kitchen fixing her  a sandwich. She then developed severe abdominal cramping and felt like she was going to have a bowel movement. She became very nauseated and lightheaded then became unconscious and fell to the ground. She hit her left frontal area on her kitchen floor. After she awoke, she had nausea, vomiting and a large soft bowel movement.   She denied any chest pain, shortness of breath, palpitations, headaches. Patient has history of syncope approximately 2 years ago where she underwent extensive testing at [address] [place] [address]. She was found to have an anomalous right coronary artery vessel during workup, but was told that since she had not had any issues in the past, it was unlikely to cause any in the future. Vitals upon presentation: Blood pressure 113/70, pulse 101, respirations 12, temperature 97.7, oxygen saturation 100% on room air. Labs essentially within normal limits. UDS positive for TCA and oxycodone, which is appropriate per review of home MAR. UA unremarkable. COVID-19 negative. TSH 0.77. Troponin negative x3. Patient was placed on telemetry and admitted for further care. Review of system  Constitutional:  Denies fever or chills. Eyes:  Denies change in visual acuity or discharge. HENT:  Denies nasal congestion or sore throat. Respiratory:  Denies cough or shortness of breath. Cardiovascular:  Denies chest pain, palpitation or swelling in LEs. GI:  Denies vomiting, bloody stools or diarrhea. Positive for abdominal pain, nausea. :  Denies dysuria or frequency. Musculoskeletal:  Denies back pain or joint pain. Integument:  Denies rash or itching. Neurologic:  Denies headache, focal weakness or sensory changes. Positive for syncope and dizziness. Endocrine:  Denies polyuria or polydipsia. Lymphatic:  Denies swollen glands or night sweats. Psychiatric:  Denies depression or anxiety. Past Medical History:      Diagnosis Date    Congenital anomalies of the heart     Lupus ([facility] [address]     Sjogren's disease ([facility] [address]        Past Surgical History:      Procedure Laterality Date    HYSTERECTOMY, TOTAL ABDOMINAL (CERVIX REMOVED)         Social History:   TOBACCO:   reports that she has been smoking cigarettes. She has a 30.00 pack-year smoking history. She has never used smokeless tobacco.  ETOH:   reports no history of alcohol use.   OCCUPATION:  None     Family History:   History reviewed. No pertinent family history. Allergies:  Pcn [penicillins]    Medications Prior to Admission:    Prior to Admission medications    Medication Sig Start Date End Date Taking? Authorizing Provider   hydroxychloroquine (PLAQUENIL) 200 MG tablet Take 200 mg by mouth 2 times daily   Yes Historical Provider, MD   pantoprazole (PROTONIX) 20 MG tablet Take 20 mg by mouth daily   Yes Historical Provider, MD   gabapentin (NEURONTIN) 600 MG tablet Take 600 mg by mouth 3 times daily. Yes Historical Provider, MD   oxyCODONE-acetaminophen (PERCOCET) 7.5-325 MG per tablet Take 1 tablet by mouth daily. Yes Historical Provider, MD   leflunomide (ARAVA) 10 MG tablet Take 10 mg by mouth daily   Yes Historical Provider, MD   atorvastatin (LIPITOR) 20 MG tablet Take 20 mg by mouth nightly   Yes Historical Provider, MD   ondansetron (ZOFRAN ODT) 4 MG disintegrating tablet Take 1 tablet by mouth every 4 hours as needed for Nausea or Vomiting 8/7/22   Christopher Carvalho DO   rOPINIRole (REQUIP) 2 MG tablet Take 2 mg by mouth nightly 5/11/20   Historical Provider, MD   cyclobenzaprine (FLEXERIL) 10 MG tablet Take 10 mg by mouth 3 times daily    Historical Provider, MD   DULoxetine (CYMBALTA) 60 MG extended release capsule Take 60 mg by mouth daily    Historical Provider, MD       Vital Signs  Temp: 98.4 °F (36.9 °C)  Heart Rate: 94  Resp: 18  BP: 119/61  SpO2: 96 %  O2 Device: None (Room air)       Vital signs reviewed in electronic chart. Physical exam  Constitutional:  Well developed, well nourished, no acute distress. Eyes:  conjunctiva normal, EOMI. HENT:  Atraumatic, external ears normal, external nose/nares normal, oropharynx moist, no pharyngeal exudates. Neck:  Supple. No JVD or thyromegaly. Respiratory:  No respiratory distress, normal breath sounds, no rales, no wheezing. Cardiovascular:  Normal rate, normal rhythm, no murmurs, no gallops, no rubs.   GI:  Soft, nondistended, normal bowel sounds, nontender, no organomegaly, no mass. :  No costovertebral angle tenderness. Musculoskeletal:  No edema, no tenderness, no obvious deformities. Patient is moving all extremities. Integument:  Well hydrated, no rash. Lymphatic:  No cervical or axillary lymphadenopathy noted. Neurologic:  Alert & oriented x 3,  no focal deficits noted. Strength is equal throughout. Psychiatric:  Speech and behavior appropriate. Lab Results   Component Value Date    WBC 4.3 10/18/2022    HGB 9.8 (L) 10/18/2022    HCT 30.0 (L) 10/18/2022    MCV 92.3 10/18/2022     10/18/2022       Lab Results   Component Value Date     10/18/2022    K 4.2 10/18/2022     (H) 10/18/2022    CO2 25 10/18/2022    BUN 20 10/18/2022    CREATININE 1.0 10/18/2022    GLUCOSE 96 10/18/2022    CALCIUM 8.6 10/18/2022    PROT 5.9 (L) 10/18/2022    LABALBU 3.6 10/18/2022    BILITOT <0.2 (L) 10/18/2022    ALKPHOS 71 10/18/2022    AST 23 10/18/2022    ALT 21 10/18/2022    LABGLOM >60 10/18/2022    GFRAA 55 (L) 10/17/2022    AGRATIO 1.6 10/18/2022    GLOB 2.3 10/18/2022           PA/lat CXR:   US CAROTID ARTERY BILATERAL   Final Result      1. Estimated diameter reduction of the right internal carotid artery is 20-40 %. 2.  Estimated diameter reduction left internal carotid artery is 20-40 %. 3.  Vertebral arteries bilaterally with antegrade flow. CT Head WO Contrast   Final Result      No acute intracranial abnormality. CT ABDOMEN PELVIS WO CONTRAST Additional Contrast? None   Final Result      1. No acute abnormality. 2. 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 3. Severe vascular calcifications. 4. L5 pars defects with grade 1 anterolisthesis and severe bilateral L5 foraminal stenosis. EKG: NSR, rate 95 bpm, LA interval 174 ms, QRS duration 96, , QTc 481 ms    Assessment and Plan/Hospital course:      Active Hospital Problems    Diagnosis Date Noted    Lupus ([address] [M32.9] 10/18/2022     Priority: Medium    H/O Sjogren's disease ([facility] [address] [M35.00] 10/18/2022     Priority: Medium    Syncope and collapse [R55] 10/17/2022     Priority: Medium     Patient was monitored on telemetry with no arrhythmias noted. Orthostatics obtained with no evidence of orthostasis. Labs with no evidence of dehydration. Troponin negative x3. TSH 0.77. UDS appropriate per patient's home medications. Cardiology, Dr. Luz Elena Chance consulted and felt symptoms likely secondary to vasovagal syncope. He recommended good hydration and taking lisinopril at night. Patient to follow-up in cardiology clinic in 4 weeks. Disposition: home    Discharged Condition: Stable    Activity: activity as tolerated  Diet: regular diet  Follow Up: Primary Care Physician in one week and with  in 4 weeks    Discharge Medications:     Current Discharge Medication List             Details   lisinopril (PRINIVIL;ZESTRIL) 10 MG tablet Take 1 tablet by mouth at bedtime  Qty: 30 tablet, Refills: 3                Details   hydroxychloroquine (PLAQUENIL) 200 MG tablet Take 200 mg by mouth 2 times daily      pantoprazole (PROTONIX) 20 MG tablet Take 20 mg by mouth daily      gabapentin (NEURONTIN) 600 MG tablet Take 600 mg by mouth 3 times daily. oxyCODONE-acetaminophen (PERCOCET) 7.5-325 MG per tablet Take 1 tablet by mouth daily. leflunomide (ARAVA) 10 MG tablet Take 10 mg by mouth daily      atorvastatin (LIPITOR) 20 MG tablet Take 20 mg by mouth nightly      ondansetron (ZOFRAN ODT) 4 MG disintegrating tablet Take 1 tablet by mouth every 4 hours as needed for Nausea or Vomiting  Qty: 10 tablet, Refills: 0      rOPINIRole (REQUIP) 2 MG tablet Take 2 mg by mouth nightly      cyclobenzaprine (FLEXERIL) 10 MG tablet Take 10 mg by mouth 3 times daily      DULoxetine (CYMBALTA) 60 MG extended release capsule Take 60 mg by mouth daily              Patient was seen and examined by Dr. Dixie Silva and plan of care reviewed. Treatment plan was formulated collaboratively. Signed:  Electronically signed by KARLENE Goldberg on 10/18/2022 at 11:36 AM       Thank you ADRI Malone for the opportunity to be involved in this patient's care. If you have any questions or concerns please feel free to contact me at [phone].

## 2022-10-18 NOTE — PLAN OF CARE
Problem: Discharge Planning  Goal: Discharge to home or other facility with appropriate resources  10/18/2022 1200 by Keo Curtis RN  Outcome: Adequate for Discharge  10/18/2022 1139 by Zoran Woods RN  Outcome: Progressing  Flowsheets (Taken 10/18/2022 0912)  Discharge to home or other facility with appropriate resources:   Identify barriers to discharge with patient and caregiver   Identify discharge learning needs (meds, wound care, etc)     Problem: Safety - Adult  Goal: Free from fall injury  10/18/2022 1200 by Keo Curtis RN  Outcome: Adequate for Discharge  10/18/2022 1139 by Zoran Woods RN  Outcome: Progressing  Flowsheets (Taken 10/18/2022 0912)  Free From Fall Injury: Instruct family/caregiver on patient safety

## 2022-10-18 NOTE — ACP (ADVANCE CARE PLANNING)
Advance Care Planning     General Advance Care Planning (ACP) Conversation    Date of Conversation: 10/18/2022  Conducted with: Patient with Decision Making Capacity    Healthcare Decision Maker:    Primary Decision Maker: Bridget Quezada - Spouse - [phone]  Click here to complete Healthcare Decision Makers including selection of the Healthcare Decision Maker Relationship (ie \"Primary\"). Today we documented Decision Maker(s) consistent with Legal Next of Kin hierarchy.     Content/Action Overview:  Has NO ACP documents/care preferences - information provided, considering goals and options  Reviewed DNR/DNI and patient elects Full Code (Attempt Resuscitation)  artificial nutrition, ventilation preferences, and resuscitation preferences      Length of Voluntary ACP Conversation in minutes:  <16 minutes (Non-Billable)    Curtis Saenz

## 2022-10-18 NOTE — PLAN OF CARE
Problem: Discharge Planning  Goal: Discharge to home or other facility with appropriate resources  10/18/2022 1139 by Phuong Elise RN  Outcome: Progressing  Flowsheets (Taken 10/18/2022 0912)  Discharge to home or other facility with appropriate resources:   Identify barriers to discharge with patient and caregiver   Identify discharge learning needs (meds, wound care, etc)  10/17/2022 2148 by Derian Cole RN  Outcome: Progressing     Problem: Safety - Adult  Goal: Free from fall injury  10/18/2022 1139 by Phuong Elise RN  Outcome: Progressing  Flowsheets (Taken 10/18/2022 0912)  Free From Fall Injury: Instruct family/caregiver on patient safety  10/17/2022 2148 by Derian Cole RN  Outcome: Progressing

## 2022-10-18 NOTE — DISCHARGE INSTR - DIET

## 2022-10-18 NOTE — PROGRESS NOTES
Pt discharged at this time, IV removed with tip intact and no complications. Pt educated on new medications and when to take medications. Pt educated on pharmacy to  medications. Pt educated on follow up appointments. Pt's spouse at bedside. Pt verbalized understanding and had no further questions. Pt was stable at time of discharge. Pt ambulated from unit to parking lot and left via private vehicle.

## 2022-10-18 NOTE — DISCHARGE SUMMARY
Short Stay Summary        Patient ID: Charolet Appl                 Patient's PCP: Jeovanny Box Date:    10/17/2022      Discharge Date:   10/18/2022     Admitting Physician: Alexander Sanders MD     Discharge Physician: Kennth Moritz, PA      Reason for this admission:   Syncope      Discharge Diagnoses: Active Hospital Problems     Diagnosis Date Noted    Lupus ([facility] [address] [M32.9] 10/18/2022       Priority: Medium    H/O Sjogren's disease ([facility] [address] [M35.00] 10/18/2022       Priority: Medium    Syncope and collapse [R55] 10/17/2022       Priority: Medium         Procedures:  US CAROTID ARTERY BILATERAL   Final Result       1. Estimated diameter reduction of the right internal carotid artery is 20-40 %. 2.  Estimated diameter reduction left internal carotid artery is 20-40 %. 3.  Vertebral arteries bilaterally with antegrade flow. CT Head WO Contrast   Final Result       No acute intracranial abnormality. CT ABDOMEN PELVIS WO CONTRAST Additional Contrast? None   Final Result       1. No acute abnormality. 2. 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 3. Severe vascular calcifications. 4. L5 pars defects with grade 1 anterolisthesis and severe bilateral L5 foraminal stenosis. Consults:   IP CONSULT TO CASE MANAGEMENT  IP CONSULT TO CARDIOLOGY  PT OT     HISTORY OF PRESENT ILLNESS:   The patient is a 74 y.o. female with PMH of anomalous right coronary artery, lupus and Sjogren's disease who presented to the emergency department secondary to loss of consciousness at home. Patient states she was in her kitchen fixing her  a sandwich. She then developed severe abdominal cramping and felt like she was going to have a bowel movement. She became very nauseated and lightheaded then became unconscious and fell to the ground. She hit her left frontal area on her kitchen floor. After she awoke, she had nausea, vomiting and a large soft bowel movement. She denied any chest pain, shortness of breath, palpitations, headaches. Patient has history of syncope approximately 2 years ago where she underwent extensive testing at [address] [place] [address]. She was found to have an anomalous right coronary artery vessel during workup, but was told that since she had not had any issues in the past, it was unlikely to cause any in the future. Vitals upon presentation: Blood pressure 113/70, pulse 101, respirations 12, temperature 97.7, oxygen saturation 100% on room air. Labs essentially within normal limits. UDS positive for TCA and oxycodone, which is appropriate per review of home MAR. UA unremarkable. COVID-19 negative. TSH 0.77. Troponin negative x3. Patient was placed on telemetry and admitted for further care. Review of system  Constitutional:  Denies fever or chills. Eyes:  Denies change in visual acuity or discharge. HENT:  Denies nasal congestion or sore throat. Respiratory:  Denies cough or shortness of breath. Cardiovascular:  Denies chest pain, palpitation or swelling in LEs. GI:  Denies vomiting, bloody stools or diarrhea. Positive for abdominal pain, nausea. :  Denies dysuria or frequency. Musculoskeletal:  Denies back pain or joint pain. Integument:  Denies rash or itching. Neurologic:  Denies headache, focal weakness or sensory changes. Positive for syncope and dizziness. Endocrine:  Denies polyuria or polydipsia. Lymphatic:  Denies swollen glands or night sweats. Psychiatric:  Denies depression or anxiety. Past Medical History:  Past Medical History             Diagnosis Date    Congenital anomalies of the heart      Lupus ([facility] [address]      Sjogren's disease ([facility] [address]              Past Surgical History:  Past Surgical History             Procedure Laterality Date    HYSTERECTOMY, TOTAL ABDOMINAL (CERVIX REMOVED)                Social History:   TOBACCO:   reports that she has been smoking cigarettes.  She has a 30.00 pack-year smoking history. She has never used smokeless tobacco.  ETOH:   reports no history of alcohol use. OCCUPATION:  None      Family History:   Family History   History reviewed. No pertinent family history. Allergies:  Pcn [penicillins]     Medications Prior to Admission:    Home Medications           Prior to Admission medications    Medication Sig Start Date End Date Taking? Authorizing Provider   hydroxychloroquine (PLAQUENIL) 200 MG tablet Take 200 mg by mouth 2 times daily     Yes Historical Provider, MD   pantoprazole (PROTONIX) 20 MG tablet Take 20 mg by mouth daily     Yes Historical Provider, MD   gabapentin (NEURONTIN) 600 MG tablet Take 600 mg by mouth 3 times daily. Yes Historical Provider, MD   oxyCODONE-acetaminophen (PERCOCET) 7.5-325 MG per tablet Take 1 tablet by mouth daily. Yes Historical Provider, MD   leflunomide (ARAVA) 10 MG tablet Take 10 mg by mouth daily     Yes Historical Provider, MD   atorvastatin (LIPITOR) 20 MG tablet Take 20 mg by mouth nightly     Yes Historical Provider, MD   ondansetron (ZOFRAN ODT) 4 MG disintegrating tablet Take 1 tablet by mouth every 4 hours as needed for Nausea or Vomiting 8/7/22     Christopher Carvalho DO   rOPINIRole (REQUIP) 2 MG tablet Take 2 mg by mouth nightly 5/11/20     Historical Provider, MD   cyclobenzaprine (FLEXERIL) 10 MG tablet Take 10 mg by mouth 3 times daily       Historical Provider, MD   DULoxetine (CYMBALTA) 60 MG extended release capsule Take 60 mg by mouth daily       Historical Provider, MD            Vital Signs  Temp: 98.4 °F (36.9 °C)  Heart Rate: 94  Resp: 18  BP: 119/61  SpO2: 96 %  O2 Device: None (Room air)     Vital signs reviewed in electronic chart. Physical exam  Constitutional:  Well developed, well nourished, no acute distress. Eyes:  conjunctiva normal, EOMI. HENT:  Atraumatic, external ears normal, external nose/nares normal, oropharynx moist, no pharyngeal exudates. Neck:  Supple.  No JVD or thyromegaly. Respiratory:  No respiratory distress, normal breath sounds, no rales, no wheezing. Cardiovascular:  Normal rate, normal rhythm, no murmurs, no gallops, no rubs. GI:  Soft, nondistended, normal bowel sounds, nontender, no organomegaly, no mass. :  No costovertebral angle tenderness. Musculoskeletal:  No edema, no tenderness, no obvious deformities. Patient is moving all extremities. Integument:  Well hydrated, no rash. Lymphatic:  No cervical or axillary lymphadenopathy noted. Neurologic:  Alert & oriented x 3,  no focal deficits noted. Strength is equal throughout. Psychiatric:  Speech and behavior appropriate. Lab Results   Component Value Date     WBC 4.3 10/18/2022     HGB 9.8 (L) 10/18/2022     HCT 30.0 (L) 10/18/2022     MCV 92.3 10/18/2022      10/18/2022               Lab Results   Component Value Date      10/18/2022     K 4.2 10/18/2022      (H) 10/18/2022     CO2 25 10/18/2022     BUN 20 10/18/2022     CREATININE 1.0 10/18/2022     GLUCOSE 96 10/18/2022     CALCIUM 8.6 10/18/2022     PROT 5.9 (L) 10/18/2022     LABALBU 3.6 10/18/2022     BILITOT <0.2 (L) 10/18/2022     ALKPHOS 71 10/18/2022     AST 23 10/18/2022     ALT 21 10/18/2022     LABGLOM >60 10/18/2022     GFRAA 55 (L) 10/17/2022     AGRATIO 1.6 10/18/2022     GLOB 2.3 10/18/2022               PA/lat CXR:   US CAROTID ARTERY BILATERAL   Final Result       1. Estimated diameter reduction of the right internal carotid artery is 20-40 %. 2.  Estimated diameter reduction left internal carotid artery is 20-40 %. 3.  Vertebral arteries bilaterally with antegrade flow. CT Head WO Contrast   Final Result       No acute intracranial abnormality. CT ABDOMEN PELVIS WO CONTRAST Additional Contrast? None   Final Result       1. No acute abnormality. 2. 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 3. Severe vascular calcifications.    4. L5 pars defects with grade 1 anterolisthesis and severe bilateral L5 foraminal stenosis. EKG: NSR, rate 95 bpm, WI interval 174 ms, QRS duration 96, , QTc 481 ms     Assessment and Plan/Hospital course: Active Hospital Problems     Diagnosis Date Noted    Lupus ([facility] [M32.9] 10/18/2022       Priority: Medium    H/O Sjogren's disease ([facility] [address] [M35.00] 10/18/2022       Priority: Medium    Syncope and collapse [R55] 10/17/2022       Priority: Medium      Patient was monitored on telemetry with no arrhythmias noted. Orthostatics obtained with no evidence of orthostasis. Labs with no evidence of dehydration. Troponin negative x3. TSH 0.77. UDS appropriate per patient's home medications. Cardiology, Dr. Cherrie Lou consulted and felt symptoms likely secondary to vasovagal syncope. He recommended good hydration and taking lisinopril at night. Patient to follow-up in cardiology clinic in 4 weeks. Disposition: home     Discharged Condition: Stable     Activity: activity as tolerated  Diet: regular diet  Follow Up: Primary Care Physician in one week and with  in 4 weeks     Discharge Medications:      Discharge Medications    Current Discharge Medication List                    Details   lisinopril (PRINIVIL;ZESTRIL) 10 MG tablet Take 1 tablet by mouth at bedtime  Qty: 30 tablet, Refills: 3                        Details   hydroxychloroquine (PLAQUENIL) 200 MG tablet Take 200 mg by mouth 2 times daily       pantoprazole (PROTONIX) 20 MG tablet Take 20 mg by mouth daily       gabapentin (NEURONTIN) 600 MG tablet Take 600 mg by mouth 3 times daily. oxyCODONE-acetaminophen (PERCOCET) 7.5-325 MG per tablet Take 1 tablet by mouth daily.        leflunomide (ARAVA) 10 MG tablet Take 10 mg by mouth daily       atorvastatin (LIPITOR) 20 MG tablet Take 20 mg by mouth nightly       ondansetron (ZOFRAN ODT) 4 MG disintegrating tablet Take 1 tablet by mouth every 4 hours as needed for Nausea or Vomiting  Qty: 10 tablet, Refills: 0       rOPINIRole (REQUIP) 2 MG tablet Take 2 mg by mouth nightly       cyclobenzaprine (FLEXERIL) 10 MG tablet Take 10 mg by mouth 3 times daily       DULoxetine (CYMBALTA) 60 MG extended release capsule Take 60 mg by mouth daily                    Patient was seen and examined by Dr. Dago Centeno and plan of care reviewed. Treatment plan was formulated collaboratively. Signed:  Electronically signed by KARLENE Bruce on 10/18/2022 at 11:36 AM         Thank you ADRI Laughlin for the opportunity to be involved in this patient's care. If you have any questions or concerns please feel free to contact me at [phone].

## 2022-10-18 NOTE — PLAN OF CARE
Problem: Discharge Planning  Goal: Discharge to home or other facility with appropriate resources  10/17/2022 2148 by Markel Campos RN  Outcome: Progressing  10/17/2022 1836 by Pardeep Becerril RN  Outcome: Progressing     Problem: Safety - Adult  Goal: Free from fall injury  10/17/2022 2148 by Markel Campos RN  Outcome: Progressing  10/17/2022 1836 by Pardeep Becerril RN  Outcome: Progressing

## 2022-10-19 ENCOUNTER — OUTSIDE FACILITY SERVICE (OUTPATIENT)
Dept: CARDIOLOGY | Facility: CLINIC | Age: 67
End: 2022-10-19

## 2022-10-19 PROCEDURE — 99222 1ST HOSP IP/OBS MODERATE 55: CPT | Performed by: INTERNAL MEDICINE

## 2022-10-19 RX ORDER — ATORVASTATIN CALCIUM 20 MG/1
20 TABLET, FILM COATED ORAL NIGHTLY
Qty: 90 TABLET | Refills: 0 | Status: SHIPPED | OUTPATIENT
Start: 2022-10-19 | End: 2022-11-17 | Stop reason: SDUPTHER

## 2022-10-19 NOTE — TELEPHONE ENCOUNTER
Caller: [id number] - [place], [place] - [address] [place] [address] AT [facility] [phone] [facility] [phone]    Relationship: Pharmacy    Best call back number: [phone]  Requested Prescriptions:   Requested Prescriptions     Pending Prescriptions Disp Refills   • atorvastatin (LIPITOR) 20 MG tablet 90 tablet         Pharmacy where request should be sent:      [id number] - [place] - [address] [place] [address] AT [facility] - [phone] [facility] [phone]        Additional details provided by patient:     Does the patient have less than a 3 day supply:  [x] Yes  [] No    Geovanna Ortiz Rep   10/19/22 08:30 EDT

## 2022-10-19 NOTE — TELEPHONE ENCOUNTER
Rx Refill Note  Requested Prescriptions     Pending Prescriptions Disp Refills   • atorvastatin (LIPITOR) 20 MG tablet 90 tablet       Last office visit with prescribing clinician: 9/21/2022      Next office visit with prescribing clinician: 10/25/2022            David Salvador MA  10/19/22, 08:51 EDT

## 2022-10-25 ENCOUNTER — OFFICE VISIT (OUTPATIENT)
Dept: INTERNAL MEDICINE | Facility: CLINIC | Age: 67
End: 2022-10-25

## 2022-10-25 VITALS
SYSTOLIC BLOOD PRESSURE: 122 MMHG | TEMPERATURE: 98 F | HEART RATE: 78 BPM | WEIGHT: 138 LBS | BODY MASS INDEX: 25.4 KG/M2 | OXYGEN SATURATION: 97 % | HEIGHT: 62 IN | DIASTOLIC BLOOD PRESSURE: 82 MMHG

## 2022-10-25 DIAGNOSIS — F41.9 ANXIETY: ICD-10-CM

## 2022-10-25 DIAGNOSIS — K59.00 CONSTIPATION, UNSPECIFIED CONSTIPATION TYPE: ICD-10-CM

## 2022-10-25 DIAGNOSIS — S09.90XD INJURY OF HEAD, SUBSEQUENT ENCOUNTER: ICD-10-CM

## 2022-10-25 DIAGNOSIS — R55 VASOVAGAL SYNCOPE: Primary | ICD-10-CM

## 2022-10-25 PROCEDURE — 99214 OFFICE O/P EST MOD 30 MIN: CPT | Performed by: FAMILY MEDICINE

## 2022-10-25 RX ORDER — PANTOPRAZOLE SODIUM 20 MG/1
40 TABLET, DELAYED RELEASE ORAL DAILY
Qty: 90 TABLET | Refills: 3 | Status: SHIPPED | OUTPATIENT
Start: 2022-10-25

## 2022-10-25 RX ORDER — BUSPIRONE HYDROCHLORIDE 10 MG/1
10 TABLET ORAL 2 TIMES DAILY PRN
Qty: 180 TABLET | Refills: 3 | Status: SHIPPED | OUTPATIENT
Start: 2022-10-25

## 2022-10-25 RX ORDER — LEFLUNOMIDE 10 MG/1
1 TABLET ORAL DAILY
COMMUNITY
End: 2022-11-13

## 2022-10-27 ENCOUNTER — HOSPITAL ENCOUNTER (OUTPATIENT)
Dept: MRI IMAGING | Facility: HOSPITAL | Age: 67
Discharge: HOME | End: 2022-10-27
Admitting: FAMILY MEDICINE

## 2022-10-27 ENCOUNTER — OFFICE VISIT (OUTPATIENT)
Dept: GASTROENTEROLOGY | Facility: CLINIC | Age: 67
End: 2022-10-27

## 2022-10-27 VITALS
HEIGHT: 62 IN | WEIGHT: 140 LBS | SYSTOLIC BLOOD PRESSURE: 147 MMHG | BODY MASS INDEX: 25.76 KG/M2 | TEMPERATURE: 97.1 F | HEART RATE: 87 BPM | DIASTOLIC BLOOD PRESSURE: 78 MMHG

## 2022-10-27 DIAGNOSIS — D69.6 THROMBOCYTOPENIA, ACQUIRED: ICD-10-CM

## 2022-10-27 DIAGNOSIS — R10.84 GENERALIZED ABDOMINAL PAIN: ICD-10-CM

## 2022-10-27 DIAGNOSIS — K59.04 CHRONIC IDIOPATHIC CONSTIPATION: ICD-10-CM

## 2022-10-27 DIAGNOSIS — Z86.39 HISTORY OF ELEVATED LIPIDS: ICD-10-CM

## 2022-10-27 DIAGNOSIS — R11.0 NAUSEA: ICD-10-CM

## 2022-10-27 DIAGNOSIS — D68.9 COAGULOPATHY: ICD-10-CM

## 2022-10-27 DIAGNOSIS — K76.0 FATTY (CHANGE OF) LIVER, NOT ELSEWHERE CLASSIFIED: ICD-10-CM

## 2022-10-27 DIAGNOSIS — E78.5 HYPERLIPIDEMIA, UNSPECIFIED HYPERLIPIDEMIA TYPE: ICD-10-CM

## 2022-10-27 DIAGNOSIS — K21.00 GASTROESOPHAGEAL REFLUX DISEASE WITH ESOPHAGITIS WITHOUT HEMORRHAGE: ICD-10-CM

## 2022-10-27 DIAGNOSIS — R79.89 ELEVATED LFTS: Primary | ICD-10-CM

## 2022-10-27 DIAGNOSIS — D50.0 IRON DEFICIENCY ANEMIA DUE TO CHRONIC BLOOD LOSS: ICD-10-CM

## 2022-10-27 PROCEDURE — 0 GADOBENATE DIMEGLUMINE 529 MG/ML SOLUTION: Performed by: FAMILY MEDICINE

## 2022-10-27 PROCEDURE — A9577 INJ MULTIHANCE: HCPCS | Performed by: FAMILY MEDICINE

## 2022-10-27 PROCEDURE — 70553 MRI BRAIN STEM W/O & W/DYE: CPT

## 2022-10-27 PROCEDURE — 99214 OFFICE O/P EST MOD 30 MIN: CPT | Performed by: INTERNAL MEDICINE

## 2022-10-27 RX ORDER — OXYCODONE AND ACETAMINOPHEN 7.5; 325 MG/1; MG/1
2 TABLET ORAL DAILY
COMMUNITY

## 2022-10-27 RX ORDER — DOCUSATE SODIUM 100 MG/1
100 CAPSULE, LIQUID FILLED ORAL 2 TIMES DAILY
Qty: 180 CAPSULE | Refills: 3 | Status: SHIPPED | OUTPATIENT
Start: 2022-10-27 | End: 2023-03-30

## 2022-10-27 RX ADMIN — GADOBENATE DIMEGLUMINE 12 ML: 529 INJECTION, SOLUTION INTRAVENOUS at 07:51

## 2022-10-27 NOTE — PROGRESS NOTES
Follow Up Note     Date: 10/27/2022   Patient Name: Stephanie Jean  MRN: [id number]  : 1955     Referring Physician: Margaret Mooney APRN    Chief Complaint:    Chief Complaint   Patient presents with   • Follow-up   • Elevated Hepatic Enzymes   • Constipation   • Heartburn   • Anemia   • Nausea       Interval History:   10/27/2022  Stephanie Jean is a 67 y.o. female who is here today for follow up for her recent episode of abdominal pain abdominal cramps nausea, near syncope.  She also had a recent episode of colitis with bloody diarrhea in 2022.  She states that she is doing well now no significant constipation with the MiraLAX and Colace.    2020  Stephanie Jean is a 64 y.o. female who is here today to establish care with Gastroenterology for evaluation of heartburn.  History of upper abdominal pain mainly in the epigastric region since about 2-3 months. Pain started gradually, intermittent, aching pain lasting for about few minutes and occasionally longer.  She was given dexilant and sucralfate which did not help initially and later on changed to pantoprazole.  She is on pantoprazole now with reasonable control of pain and reflux symptoms with once in few days but for the last 2 weeks she did not take any PPI. Associated significant heart burn.   She was on PPI before after she was noted to have gastritis on EGD about 10 years ago. Stopped herself year ago. Occasional problem with swallowing to solid food. She also has sjogrens syndrome.   She was mostly constipated now. Bowel movements every once in 2-3 days and occasionally once in 3-5 days. No lower abdominal pain.      There is no history of  hematochezia or melena. There is no history of anemia. Prior history of EGD about 10 yrs ago. Last colonoscopy in 2020 about 4 polyps removed and advised to repeat in 3 years time. Brother had UC and uncle from mother's side had colon Ca. No history of any abdominal surgery except  hysterectomy. Denies alcohol abuse or cigarette smoking.  She is on arthrotec tablets    Subjective      Past Medical History:   Past Medical History:   Diagnosis Date   • Anemia 2021   • Arthritis    • Cataract 2021    both eyes surgery for cataract   • Colon polyps    • Dysphagia    • Elevated cholesterol    • Family history of colon cancer    • Fibromyalgia    • Fibromyalgia, primary 2003   • GERD (gastroesophageal reflux disease)    • GI (gastrointestinal bleed) 8/8/2022   • Heart murmur    • Hoarseness    • Hyperlipidemia    • Hypertension 2021   • Inflammatory bowel disease 2022    recent hospitalization for colitis   • Irritable bowel syndrome    • Low back pain 2003    treated by [facility]   • Lupus (HCC)    • Marijuana use     Daily    • Osteopenia 2021    Osteopenia   • Pulmonary nodule    • Sjogren's disease (HCC)    • Urinary tract infection 2022   • Vasovagal syncope      Past Surgical History:   Past Surgical History:   Procedure Laterality Date   • ABDOMINAL SURGERY  1998    [facility]   • APPENDECTOMY     • BREAST SURGERY  1975    lumpectomy 1975   • CATARACT EXTRACTION W/ INTRAOCULAR LENS IMPLANT Left 12/20/2021    Procedure: CATARACT PHACO EXTRACTION WITH INTRAOCULAR LENS IMPLANT LEFT COMPLICATED WITH MALYUGIN RING;  Surgeon: Zachary Landers MD;  Location: [facility] OR;  Service: Ophthalmology;  Laterality: Left;   • CATARACT EXTRACTION W/ INTRAOCULAR LENS IMPLANT Right 01/03/2022    Procedure: CATARACT PHACO EXTRACTION WITH INTRAOCULAR LENS IMPLANT RIGHT;  Surgeon: Zachary Landers MD;  Location: [facility] OR;  Service: Ophthalmology;  Laterality: Right;   • COLONOSCOPY     • ENDOSCOPY     • ENDOSCOPY N/A 06/01/2020    Procedure: ESOPHAGOGASTRODUODENOSCOPY;  Surgeon: Chaparro Marrero MD;  Location: [facility] ENDOSCOPY;  Service: Gastroenterology;  Laterality: N/A;   • EYE SURGERY  2021-22    cataract removal   • HYSTERECTOMY     • TOTAL ABDOMINAL HYSTERECTOMY WITH SALPINGO  OOPHORECTOMY     • TUBAL ABDOMINAL LIGATION     • UPPER GASTROINTESTINAL ENDOSCOPY         Family History:   Family History   Problem Relation Age of Onset   • Hypertension Mother    • Aneurysm Mother    • Heart disease Mother         passed away at 54 yr old after 5 vessel bypass   • Hyperlipidemia Mother    • Stroke Mother         aneurysm ruptured   • Emphysema Father    • COPD Father    • Ulcerative colitis Brother    • Alcohol abuse Brother    • Cancer Brother         stomach cancer   • COPD Brother    • Hyperlipidemia Brother    • Liver disease Brother    • Cirrhosis Brother    • Colon polyps Brother    • Stomach cancer Brother    • Colon cancer Maternal Uncle    • Arthritis Maternal Uncle    • Cancer Maternal Aunt         breast cancer   • Cancer Sister         breast   • COPD Paternal Uncle    • Early death Brother         ulcerative colitis   • Learning disabilities Brother         mental retardation   • Mental illness Brother         schizophrenia   • Ulcerative colitis Brother    • Kidney disease Paternal Aunt         nephrectomy   • Liver cancer Neg Hx    • Crohn's disease Neg Hx        Social History:   Social History     Socioeconomic History   • Marital status:    Tobacco Use   • Smoking status: Former     Packs/day: 1.00     Years: 40.00     Pack years: 40.00     Types: Cigarettes     Quit date: 2018     Years since quittin.8   • Smokeless tobacco: Never   Vaping Use   • Vaping Use: Never used   Substance and Sexual Activity   • Alcohol use: No   • Drug use: Not Currently     Frequency: 7.0 times per week     Types: Marijuana     Comment: OCCASIONALLY   • Sexual activity: Defer       Medications:     Current Outpatient Medications:   •  Ascorbic Acid (VITAMIN C PO), Take 500 mg by mouth As Needed., Disp: , Rfl:   •  atorvastatin (LIPITOR) 20 MG tablet, Take 1 tablet by mouth Every Night., Disp: 90 tablet, Rfl: 0  •  busPIRone (BUSPAR) 10 MG tablet, Take 1 tablet by mouth 2  (Two) Times a Day As Needed (anxiety)., Disp: 180 tablet, Rfl: 3  •  cyclobenzaprine (FLEXERIL) 10 MG tablet, Take 10 mg by mouth 3 (Three) Times a Day., Disp: , Rfl:   •  Diclofenac Sodium (VOLTAREN) 1 % gel gel, diclofenac 1 % topical gel  APPLY 1 GRAM TOPICALLY TO THE AFFECTED AREA TWICE DAILY AS NEEDED FOR PAIN, Disp: , Rfl:   •  docusate sodium (COLACE) 100 MG capsule, TAKE 1 CAPSULE BY MOUTH TWICE DAILY, Disp: 60 capsule, Rfl: 2  •  DULoxetine (CYMBALTA) 60 MG capsule, Take 1 capsule by mouth Daily., Disp: 90 capsule, Rfl: 3  •  fluticasone (FLONASE) 50 MCG/ACT nasal spray, SHAKE LIQUID AND USE 1 TO 2 SPRAYS IN EACH NOSTRIL EVERY DAY AS NEEDED, Disp: , Rfl:   •  gabapentin (NEURONTIN) 600 MG tablet, Take 600 mg by mouth 3 (Three) Times a Day., Disp: , Rfl:   •  lisinopril (PRINIVIL,ZESTRIL) 10 MG tablet, Take 10 mg by mouth Daily., Disp: , Rfl:   •  loratadine (CLARITIN) 10 MG tablet, Take 10 mg by mouth Daily., Disp: , Rfl:   •  meclizine (ANTIVERT) 12.5 MG tablet, Take 1 tablet by mouth 3 (Three) Times a Day As Needed for Dizziness., Disp: 30 tablet, Rfl: 0  •  nitroglycerin (NITROSTAT) 0.4 MG SL tablet, Place 0.4 mg under the tongue Every 5 (Five) Minutes As Needed., Disp: , Rfl:   •  ondansetron ODT (ZOFRAN-ODT) 4 MG disintegrating tablet, , Disp: , Rfl:   •  oxyCODONE-acetaminophen (PERCOCET) 7.5-325 MG per tablet, Take 1.5 tablets by mouth Daily., Disp: , Rfl:   •  pantoprazole (PROTONIX) 20 MG EC tablet, Take 2 tablets by mouth Daily., Disp: 90 tablet, Rfl: 3  •  polyethylene glycol (MiraLax) 17 GM/SCOOP powder, Take 17 g by mouth Daily., Disp: 510 g, Rfl: 2  •  promethazine (PHENERGAN) 12.5 MG tablet, Take 1 tablet by mouth Every 8 (Eight) Hours As Needed for Nausea or Vomiting., Disp: 30 tablet, Rfl: 1  •  rOPINIRole (REQUIP) 2 MG tablet, TAKE 1 TABLET BY MOUTH EVERY DAY 1-3 HOURS BEFORE BEDTIME, Disp: , Rfl:   •  VITAMIN E PO, Take  by mouth., Disp: , Rfl:   •  leflunomide (ARAVA) 10 MG tablet, Take  "1 tablet by mouth Daily., Disp: , Rfl:   •  ondansetron (Zofran) 4 MG tablet, Take 2 tablets by mouth Every 8 (Eight) Hours As Needed for Nausea or Vomiting., Disp: 30 tablet, Rfl: 0  No current facility-administered medications for this visit.    Allergies:   Allergies   Allergen Reactions   • Penicillins Rash       Review of Systems:   Review of Systems   Constitutional: Negative for appetite change, fatigue, fever and unexpected weight loss.   HENT: Negative for trouble swallowing.    Gastrointestinal: Positive for abdominal distention, abdominal pain (Cramping), anal bleeding, nausea, vomiting, GERD and indigestion. Negative for blood in stool, constipation, diarrhea and rectal pain.   Neurological: Positive for syncope, light-headedness and numbness.       The following portions of the patient's history were reviewed and updated as appropriate: allergies, current medications, past family history, past medical history, past social history, past surgical history and problem list.    Objective     Physical Exam:  Vital Signs:   Vitals:    10/27/22 1653   BP: 147/78   Pulse: 87   Temp: 97.1 °F (36.2 °C)   TempSrc: Infrared   Weight: 63.5 kg (140 lb)   Height: 157.5 cm (62\")       Physical Exam  Constitutional:       Appearance: Normal appearance.   HENT:      Head: Normocephalic and atraumatic.   Eyes:      Conjunctiva/sclera: Conjunctivae normal.   Abdominal:      General: Abdomen is flat. There is no distension.      Palpations: There is no mass.      Tenderness: There is no abdominal tenderness. There is no guarding or rebound.      Hernia: No hernia is present.   Musculoskeletal:      Cervical back: Normal range of motion and neck supple.   Neurological:      Mental Status: She is alert.         Results Review:   I reviewed the patient's new clinical results.    Admission on 08/08/2022, Discharged on 08/10/2022   Component Date Value Ref Range Status   • Glucose 08/08/2022 106 (H)  65 - 99 mg/dL Final   • BUN " 08/08/2022 13  8 - 23 mg/dL Final   • Creatinine 08/08/2022 0.84  0.57 - 1.00 mg/dL Final   • Sodium 08/08/2022 137  136 - 145 mmol/L Final   • Potassium 08/08/2022 3.5  3.5 - 5.2 mmol/L Final   • Chloride 08/08/2022 105  98 - 107 mmol/L Final   • CO2 08/08/2022 23.4  22.0 - 29.0 mmol/L Final   • Calcium 08/08/2022 8.1 (L)  8.6 - 10.5 mg/dL Final   • Total Protein 08/08/2022 6.0  6.0 - 8.5 g/dL Final   • Albumin 08/08/2022 3.50  3.50 - 5.20 g/dL Final   • ALT (SGPT) 08/08/2022 16  1 - 33 U/L Final   • AST (SGOT) 08/08/2022 20  1 - 32 U/L Final   • Alkaline Phosphatase 08/08/2022 124 (H)  39 - 117 U/L Final   • Total Bilirubin 08/08/2022 0.5  0.0 - 1.2 mg/dL Final   • Globulin 08/08/2022 2.5  gm/dL Final   • A/G Ratio 08/08/2022 1.4  g/dL Final   • BUN/Creatinine Ratio 08/08/2022 15.5  7.0 - 25.0 Final   • Anion Gap 08/08/2022 8.6  5.0 - 15.0 mmol/L Final   • eGFR 08/08/2022 77.7  >60.0 mL/min/1.73 Final    National Kidney Foundation and American Society of Nephrology (ASN) Task Force recommended calculation based on the Chronic Kidney Disease Epidemiology Collaboration (CKD-EPI) equation refit without adjustment for race.   • Lipase 08/08/2022 24  13 - 60 U/L Final   • Color, UA 08/08/2022 Yellow  Yellow, Straw Final   • Appearance, UA 08/08/2022 Clear  Clear Final   • pH, UA 08/08/2022 6.0  5.0 - 8.0 Final   • Specific Gravity, UA 08/08/2022 1.029  1.005 - 1.030 Final   • Glucose, UA 08/08/2022 Negative  Negative Final   • Ketones, UA 08/08/2022 Negative  Negative Final   • Bilirubin, UA 08/08/2022 Negative  Negative Final   • Blood, UA 08/08/2022 Negative  Negative Final   • Protein, UA 08/08/2022 Negative  Negative Final   • Leuk Esterase, UA 08/08/2022 Negative  Negative Final   • Nitrite, UA 08/08/2022 Negative  Negative Final   • Urobilinogen, UA 08/08/2022 0.2 E.U./dL  0.2 - 1.0 E.U./dL Final   • WBC 08/08/2022 9.81  3.40 - 10.80 10*3/mm3 Final   • RBC 08/08/2022 3.51 (L)  3.77 - 5.28 10*6/mm3 Final   •  Hemoglobin 08/08/2022 10.9 (L)  12.0 - 15.9 g/dL Final   • Hematocrit 08/08/2022 31.9 (L)  34.0 - 46.6 % Final   • MCV 08/08/2022 90.9  79.0 - 97.0 fL Final   • MCH 08/08/2022 31.1  26.6 - 33.0 pg Final   • MCHC 08/08/2022 34.2  31.5 - 35.7 g/dL Final   • RDW 08/08/2022 13.2  12.3 - 15.4 % Final   • RDW-SD 08/08/2022 43.9  37.0 - 54.0 fl Final   • MPV 08/08/2022 10.3  6.0 - 12.0 fL Final   • Platelets 08/08/2022 172  140 - 450 10*3/mm3 Final   • Neutrophil % 08/08/2022 73.9  42.7 - 76.0 % Final   • Lymphocyte % 08/08/2022 15.1 (L)  19.6 - 45.3 % Final   • Monocyte % 08/08/2022 10.4  5.0 - 12.0 % Final   • Eosinophil % 08/08/2022 0.1 (L)  0.3 - 6.2 % Final   • Basophil % 08/08/2022 0.3  0.0 - 1.5 % Final   • Immature Grans % 08/08/2022 0.2  0.0 - 0.5 % Final   • Neutrophils, Absolute 08/08/2022 7.25 (H)  1.70 - 7.00 10*3/mm3 Final   • Lymphocytes, Absolute 08/08/2022 1.48  0.70 - 3.10 10*3/mm3 Final   • Monocytes, Absolute 08/08/2022 1.02 (H)  0.10 - 0.90 10*3/mm3 Final   • Eosinophils, Absolute 08/08/2022 0.01  0.00 - 0.40 10*3/mm3 Final   • Basophils, Absolute 08/08/2022 0.03  0.00 - 0.20 10*3/mm3 Final   • Immature Grans, Absolute 08/08/2022 0.02  0.00 - 0.05 10*3/mm3 Final   • nRBC 08/08/2022 0.0  0.0 - 0.2 /100 WBC Final   • Lactate 08/08/2022 0.7  0.5 - 2.0 mmol/L Final   • Hemoglobin 08/08/2022 9.6 (L)  12.0 - 15.9 g/dL Final   • Hematocrit 08/08/2022 28.0 (L)  34.0 - 46.6 % Final   • ABO Type 08/08/2022 A   Final   • RH type 08/08/2022 Positive   Final   • Antibody Screen 08/08/2022 Negative   Final   • T&S Expiration Date 08/08/2022 8/11/2022 11:59:59 PM   Final   • ABO Type 08/08/2022 A   Final   • RH type 08/08/2022 Positive   Final   • Campylobacter 08/09/2022 Not Detected  Not Detected Final   • Plesiomonas shigelloides 08/09/2022 Not Detected  Not Detected Final   • Salmonella 08/09/2022 Not Detected  Not Detected Final   • Vibrio 08/09/2022 Not Detected  Not Detected Final   • Vibrio cholerae  08/09/2022 Not Detected  Not Detected Final   • Yersinia enterocolitica 08/09/2022 Not Detected  Not Detected Final   • Enteroaggregative E. coli (EAEC) 08/09/2022 Not Detected  Not Detected Final   • Enteropathogenic E. coli (EPEC) 08/09/2022 Not Detected  Not Detected Final   • Enterotoxigenic E. coli (ETEC) lt/* 08/09/2022 Not Detected  Not Detected Final   • Shiga-like toxin-producing E. coli* 08/09/2022 Not Detected  Not Detected Final   • Shigella/Enteroinvasive E. coli (E* 08/09/2022 Not Detected  Not Detected Final   • Cryptosporidium 08/09/2022 Not Detected  Not Detected Final   • Cyclospora cayetanensis 08/09/2022 Not Detected  Not Detected Final   • Entamoeba histolytica 08/09/2022 Not Detected  Not Detected Final   • Giardia lamblia 08/09/2022 Not Detected  Not Detected Final   • Adenovirus F40/41 08/09/2022 Not Detected  Not Detected Final   • Astrovirus 08/09/2022 Not Detected  Not Detected Final   • Norovirus GI/GII 08/09/2022 Not Detected  Not Detected Final   • Rotavirus A 08/09/2022 Not Detected  Not Detected Final   • Sapovirus (I, II, IV or V) 08/09/2022 Not Detected  Not Detected Final   • C Diff GDH / Toxin 08/09/2022 Negative  Negative Final   • WBC 08/09/2022 8.76  3.40 - 10.80 10*3/mm3 Final   • RBC 08/09/2022 3.09 (L)  3.77 - 5.28 10*6/mm3 Final   • Hemoglobin 08/09/2022 9.5 (L)  12.0 - 15.9 g/dL Final   • Hematocrit 08/09/2022 28.5 (L)  34.0 - 46.6 % Final   • MCV 08/09/2022 92.2  79.0 - 97.0 fL Final   • MCH 08/09/2022 30.7  26.6 - 33.0 pg Final   • MCHC 08/09/2022 33.3  31.5 - 35.7 g/dL Final   • RDW 08/09/2022 13.0  12.3 - 15.4 % Final   • RDW-SD 08/09/2022 43.9  37.0 - 54.0 fl Final   • MPV 08/09/2022 10.2  6.0 - 12.0 fL Final   • Platelets 08/09/2022 137 (L)  140 - 450 10*3/mm3 Final   • Neutrophil % 08/09/2022 76.6 (H)  42.7 - 76.0 % Final   • Lymphocyte % 08/09/2022 13.4 (L)  19.6 - 45.3 % Final   • Monocyte % 08/09/2022 8.6  5.0 - 12.0 % Final   • Eosinophil % 08/09/2022 0.2 (L)   0.3 - 6.2 % Final   • Basophil % 08/09/2022 0.5  0.0 - 1.5 % Final   • Immature Grans % 08/09/2022 0.7 (H)  0.0 - 0.5 % Final   • Neutrophils, Absolute 08/09/2022 6.72  1.70 - 7.00 10*3/mm3 Final   • Lymphocytes, Absolute 08/09/2022 1.17  0.70 - 3.10 10*3/mm3 Final   • Monocytes, Absolute 08/09/2022 0.75  0.10 - 0.90 10*3/mm3 Final   • Eosinophils, Absolute 08/09/2022 0.02  0.00 - 0.40 10*3/mm3 Final   • Basophils, Absolute 08/09/2022 0.04  0.00 - 0.20 10*3/mm3 Final   • Immature Grans, Absolute 08/09/2022 0.06 (H)  0.00 - 0.05 10*3/mm3 Final   • nRBC 08/09/2022 0.0  0.0 - 0.2 /100 WBC Final   • Glucose 08/09/2022 69  65 - 99 mg/dL Final   • BUN 08/09/2022 9  8 - 23 mg/dL Final   • Creatinine 08/09/2022 0.61  0.57 - 1.00 mg/dL Final   • Sodium 08/09/2022 139  136 - 145 mmol/L Final   • Potassium 08/09/2022 3.6  3.5 - 5.2 mmol/L Final   • Chloride 08/09/2022 109 (H)  98 - 107 mmol/L Final   • CO2 08/09/2022 21.8 (L)  22.0 - 29.0 mmol/L Final   • Calcium 08/09/2022 7.7 (L)  8.6 - 10.5 mg/dL Final   • Total Protein 08/09/2022 5.1 (L)  6.0 - 8.5 g/dL Final   • Albumin 08/09/2022 3.00 (L)  3.50 - 5.20 g/dL Final   • ALT (SGPT) 08/09/2022 13  1 - 33 U/L Final   • AST (SGOT) 08/09/2022 17  1 - 32 U/L Final   • Alkaline Phosphatase 08/09/2022 96  39 - 117 U/L Final   • Total Bilirubin 08/09/2022 0.3  0.0 - 1.2 mg/dL Final   • Globulin 08/09/2022 2.1  gm/dL Final   • A/G Ratio 08/09/2022 1.4  g/dL Final   • BUN/Creatinine Ratio 08/09/2022 14.8  7.0 - 25.0 Final   • Anion Gap 08/09/2022 8.2  5.0 - 15.0 mmol/L Final   • eGFR 08/09/2022 100.0  >60.0 mL/min/1.73 Final    National Kidney Foundation and American Society of Nephrology (ASN) Task Force recommended calculation based on the Chronic Kidney Disease Epidemiology Collaboration (CKD-EPI) equation refit without adjustment for race.   • Protime 08/09/2022 15.2 (H)  12.5 - 14.5 Seconds Final   • INR 08/09/2022 1.16 (H)  0.90 - 1.10 Final   • Magnesium 08/09/2022 1.8  1.6 -  2.4 mg/dL Final   • Glucose 08/10/2022 62 (L)  65 - 99 mg/dL Final   • BUN 08/10/2022 6 (L)  8 - 23 mg/dL Final   • Creatinine 08/10/2022 0.63  0.57 - 1.00 mg/dL Final   • Sodium 08/10/2022 142  136 - 145 mmol/L Final   • Potassium 08/10/2022 3.5  3.5 - 5.2 mmol/L Final   • Chloride 08/10/2022 109 (H)  98 - 107 mmol/L Final   • CO2 08/10/2022 23.0  22.0 - 29.0 mmol/L Final   • Calcium 08/10/2022 8.1 (L)  8.6 - 10.5 mg/dL Final   • BUN/Creatinine Ratio 08/10/2022 9.5  7.0 - 25.0 Final   • Anion Gap 08/10/2022 10.0  5.0 - 15.0 mmol/L Final   • eGFR 08/10/2022 99.2  >60.0 mL/min/1.73 Final    National Kidney Foundation and American Society of Nephrology (ASN) Task Force recommended calculation based on the Chronic Kidney Disease Epidemiology Collaboration (CKD-EPI) equation refit without adjustment for race.   • WBC 08/10/2022 6.26  3.40 - 10.80 10*3/mm3 Final   • RBC 08/10/2022 3.08 (L)  3.77 - 5.28 10*6/mm3 Final   • Hemoglobin 08/10/2022 9.6 (L)  12.0 - 15.9 g/dL Final   • Hematocrit 08/10/2022 27.9 (L)  34.0 - 46.6 % Final   • MCV 08/10/2022 90.6  79.0 - 97.0 fL Final   • MCH 08/10/2022 31.2  26.6 - 33.0 pg Final   • MCHC 08/10/2022 34.4  31.5 - 35.7 g/dL Final   • RDW 08/10/2022 12.8  12.3 - 15.4 % Final   • RDW-SD 08/10/2022 42.6  37.0 - 54.0 fl Final   • MPV 08/10/2022 10.4  6.0 - 12.0 fL Final   • Platelets 08/10/2022 140  140 - 450 10*3/mm3 Final   • Neutrophil % 08/10/2022 72.7  42.7 - 76.0 % Final   • Lymphocyte % 08/10/2022 17.9 (L)  19.6 - 45.3 % Final   • Monocyte % 08/10/2022 7.2  5.0 - 12.0 % Final   • Eosinophil % 08/10/2022 1.1  0.3 - 6.2 % Final   • Basophil % 08/10/2022 0.5  0.0 - 1.5 % Final   • Immature Grans % 08/10/2022 0.6 (H)  0.0 - 0.5 % Final   • Neutrophils, Absolute 08/10/2022 4.55  1.70 - 7.00 10*3/mm3 Final   • Lymphocytes, Absolute 08/10/2022 1.12  0.70 - 3.10 10*3/mm3 Final   • Monocytes, Absolute 08/10/2022 0.45  0.10 - 0.90 10*3/mm3 Final   • Eosinophils, Absolute 08/10/2022 0.07   0.00 - 0.40 10*3/mm3 Final   • Basophils, Absolute 08/10/2022 0.03  0.00 - 0.20 10*3/mm3 Final   • Immature Grans, Absolute 08/10/2022 0.04  0.00 - 0.05 10*3/mm3 Final   • nRBC 08/10/2022 0.0  0.0 - 0.2 /100 WBC Final   • Glucose 08/10/2022 57 (L)  70 - 130 mg/dL Final    Serial Number: [id number]:  [id number]   • Glucose 08/10/2022 61 (L)  70 - 130 mg/dL Final    Serial Number: [id number]:  [id number]   • Glucose 08/10/2022 195 (H)  70 - 130 mg/dL Final    Serial Number: [id number]:  [id number]      MRI Brain With & Without Contrast    Result Date: 10/27/2022  No acute intracranial process. No abnormal intracranial enhancement. Mild amount of white matter changes, likely due to chronic microvascular ischemic white matter disease or demyelinating disease. Correlate clinically.     Images personally reviewed, interpreted and dictated by KALPANA Posadas.  This report was signed and finalized on 10/27/2022 11:37 AM by KALPANA Posadas.    CT Abdomen Pelvis With Contrast    Result Date: 8/8/2022  Diffuse colonic wall thickening, submucosal edema, mucosal hyperenhancement, and pericolonic inflammation involving the splenic flexure, descending colon, and proximal sigmoid colon. Small volume ascites. Findings are concerning for most likely infectious colitis or inflammatory colitis and less likely ischemic colitis. Consider post treatment colonoscopic evaluation to rule out occult pathology.    Images personally reviewed, interpreted and dictated by KALPANA Posadas.      This study was performed with techniques to keep radiation doses as low as reasonably achievable (ALARA). Individualized dose reduction techniques using automated exposure control or adjustment of mA and/or kV according to the patient size were employed.      This report was signed and finalized on 8/8/2022 4:20 PM by KALPANA Posadas.    US CAROTID ARTERY BILATERAL    Result Date: 10/18/2022  1.  Estimated diameter  reduction of the right internal carotid artery is 20-40 %. 2.  Estimated diameter reduction left internal carotid artery is 20-40 %. 3.  Vertebral arteries bilaterally with antegrade flow.    CT ABDOMEN PELVIS WO CONTRAST Additional Contrast? None    Result Date: 10/17/2022  1. No acute abnormality. 2. 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 3. Severe vascular calcifications. 4. L5 pars defects with grade 1 anterolisthesis and severe bilateral L5 foraminal stenosis.    CT Head WO Contrast    Result Date: 10/17/2022  No acute intracranial abnormality.    CT lung screen [Initial/Annual]    Result Date: 8/24/2022  1.  No suspicious pulmonary nodules. ASSESSMENT: Lung RADS Category 1 MODIFIER: RECOMMENDATION: Follow-up low-dose chest CT in 12 months    XR SKULL (<4 VIEWS)    Result Date: 8/16/2022  1. No suspicious osseous abnormality.    Mammo Screening Digital Tomosynthesis Bilateral With CAD    Result Date: 10/13/2022  No mammographic evidence of malignancy. BI-RADS CATEGORY: Overall: 2 - Benign RECOMMENDATION:      - Routine Screening Mammogram in 1 Year. Patient Lifetime Risk Score of Breast Malignancy: 11.3 % This risk assessment is calculated using the [place] Risk Assessment model which may underestimate the lifetime risk of breast malignancy. COMMUNICATION: Computer-aided detection (CAD) and tomosynthesis were utilized by the radiologist in the interpretation of this examination. The results and recommendations will be sent to the patient in a printed lay language version of the imaging report.        Assessment / Plan      1.  Chronic constipation   2.  Suspected IBS with constipation  3.  Intermittent nausea  chronic idiopathic constipation versus possible IBS with constipation  10/27/2022  Recent episode of acute abdominal cramps with the dizziness and tingling numbness may be likely secondary to IBS related acute abdominal pain induced near syncope.    However she had an MRI scan and awaiting  neurology consultation.  Her abdominal pain resolved now.  Her bowel movement improved with the bowel movement once in 1 to 2 days and takes MiraLAX and senna twice daily.  She tried Linzess and Movantik before which did not help her.Her nausea improved now.   We will continue the current therapy.  Continue dicyclomine 20 m p.o. twice daily as needed for abdominal pain.   We will get a serum porphyrin level.    4. Gastroesophageal reflux disease without esophagitis  Her reflux symptoms got worse recently and dose was increased by PCP to Protonix 40 mg p.o. daily again good symptom control. Will continue PPI at current dose.    12/2/2021  She had an EGD done on 6/1/2020 which revealed a mild erythematous gastric mucosa,  biopsies were suggestive for reactive gastropathy without any signs of H. pylori infection. There was a single polyp identified in the duodenal bulb which was resected and pathology is more consistent with a chronic peptic duodenitis with a venogram hyperplasia, without any dysplasia or metaplasia. Continue protonix for one more month and reduc to 20mg po daily for longtem as her symptoms recurred while off from PPI in the past. Also she is been diagnosed wioth lupus recently.     5.   Iron deficiency anemia  6.  Thrombocytopenia  7.  Elevated liver enzymes/coagulopathy  Etiology of her anemia is unclear.  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  She had a recent colitis in August 2022 with a bloody diarrhea.  CT abdomen pelvis done with contrast in August 2022 revealed a left-sided colon colitis.  Subsequently she had a colonoscopy done by Dr. Kemp on 8/17/2022 which did not reveal any endoscopic signs of colitis.  However TI was not visualized.  Random colon biopsies done were all normal without any signs of colitis.  Prior EGD done in 2020 did not reveal any upper GI source of bleeding.    Her brother has a ulcerative colitis.  Given her chronic  anemia and rheumatoid arthritis, Crohn's disease involving the small bowel need to be ruled out.  Her recent episode of colitis could be infective as her colonoscopy does not show any colitis  We will schedule her for small bowel PillCam study for further evaluation.  If PillCam study is normal we will refer her to hematology.    Patient has intermittent elevated liver enzymes.  Recent PT/INR done in August 2022 revealed INR of 1.2.  CT scan done in August 2022 also revealed mild ascites.  Given her thrombocytopenia this is concerning for possibility of early cirrhosis.  Patient did not keep up the appointment for prior ultrasound request.    We will get a Nashfrosure and repeat PT/INR  We will also get ultrasound abdomen.    12/2/2021  This is most likely associated with rheumatoid arthritis and methotrexate use .  Recently methotrexate was discontinued and she is on Arava now . Recent lab work reviewed which reveals a platelets of 128,000, iron studies done shows iron of 40 iron sat 16 percent ferritin 110 in July 2021.  She has a chronic anemia since last few years last hemoglobin was 11.8 g/dL in nov 2021.  Folic acid and vitamin B12 level were normal in July 2020 no signs of any GI bleed.  She also had a borderline elevated liver enzymes once earlier this year, will get an ultrasound abdomen for further evaluation.     8. Personal history of colonic polyps  9. Family hx of colon cancer  Colonoscopy in January 2018 [facility].  Per patient for benign polyps removed less than 1 cm in size  She had a colonoscopy by Dr. Kemp in August 2022 and did no polyps removed   uncle had a colon cancer.  No family history of any colon cancer with a first-degree relatives.  Repeat colonoscopy in 10 years time in 2032 or earlier.        Follow Up:   No follow-ups on file.    Chaparro Marrero MD  [facility] [place]  10/27/2022  16:55 EDT     Please note that portions of this note may have been  completed with a voice recognition program.

## 2022-10-28 ENCOUNTER — TELEPHONE (OUTPATIENT)
Dept: GASTROENTEROLOGY | Facility: CLINIC | Age: 67
End: 2022-10-28

## 2022-10-28 ENCOUNTER — LAB (OUTPATIENT)
Dept: LAB | Facility: HOSPITAL | Age: 67
End: 2022-10-28

## 2022-10-28 DIAGNOSIS — Z86.39 HISTORY OF ELEVATED LIPIDS: ICD-10-CM

## 2022-10-28 DIAGNOSIS — R79.89 ELEVATED LFTS: ICD-10-CM

## 2022-10-28 DIAGNOSIS — R10.84 GENERALIZED ABDOMINAL PAIN: ICD-10-CM

## 2022-10-28 DIAGNOSIS — K76.0 FATTY (CHANGE OF) LIVER, NOT ELSEWHERE CLASSIFIED: ICD-10-CM

## 2022-10-28 DIAGNOSIS — E78.5 HYPERLIPIDEMIA, UNSPECIFIED HYPERLIPIDEMIA TYPE: ICD-10-CM

## 2022-10-28 LAB
INR PPP: 0.88 (ref 0.9–1.1)
PROTHROMBIN TIME: 12.2 SECONDS (ref 12.5–14.5)

## 2022-10-28 PROCEDURE — 84311 SPECTROPHOTOMETRY: CPT

## 2022-10-28 PROCEDURE — 83883 ASSAY NEPHELOMETRY NOT SPEC: CPT

## 2022-10-28 PROCEDURE — 82247 BILIRUBIN TOTAL: CPT

## 2022-10-28 PROCEDURE — 82465 ASSAY BLD/SERUM CHOLESTEROL: CPT

## 2022-10-28 PROCEDURE — 84478 ASSAY OF TRIGLYCERIDES: CPT

## 2022-10-28 PROCEDURE — 84450 TRANSFERASE (AST) (SGOT): CPT

## 2022-10-28 PROCEDURE — 83010 ASSAY OF HAPTOGLOBIN QUANT: CPT

## 2022-10-28 PROCEDURE — 85610 PROTHROMBIN TIME: CPT

## 2022-10-28 PROCEDURE — 36415 COLL VENOUS BLD VENIPUNCTURE: CPT

## 2022-10-28 PROCEDURE — 82947 ASSAY GLUCOSE BLOOD QUANT: CPT

## 2022-10-28 PROCEDURE — 84460 ALANINE AMINO (ALT) (SGPT): CPT

## 2022-10-28 PROCEDURE — 82977 ASSAY OF GGT: CPT

## 2022-10-28 PROCEDURE — 82172 ASSAY OF APOLIPOPROTEIN: CPT

## 2022-10-28 NOTE — TELEPHONE ENCOUNTER
----- Message from Chaparro Marrero MD sent at 10/27/2022  6:47 PM EDT -----    Let patient know that I have ordered an ultrasound liver to rule out cirrhosis.

## 2022-11-03 LAB
A2 MACROGLOB SERPL-MCNC: 238 MG/DL (ref 110–276)
ALT SERPL W P-5'-P-CCNC: 20 IU/L (ref 0–40)
APO A-I SERPL-MCNC: 170 MG/DL (ref 116–209)
AST SERPL W P-5'-P-CCNC: 24 IU/L (ref 0–40)
BILIRUB SERPL-MCNC: 0.1 MG/DL (ref 0–1.2)
CHOLEST SERPL-MCNC: 180 MG/DL (ref 100–199)
FIBROSIS SCORING:: ABNORMAL
FIBROSIS STAGE SERPL QL: ABNORMAL
GGT SERPL-CCNC: 12 IU/L (ref 0–60)
GLUCOSE SERPL-MCNC: 126 MG/DL (ref 70–99)
HAPTOGLOB SERPL-MCNC: 139 MG/DL (ref 37–355)
INTERPRETATIONS: (REFERENCE): ABNORMAL
LABORATORY COMMENT REPORT: ABNORMAL
LIVER FIBR SCORE SERPL CALC.FIBROSURE: 0.06 (ref 0–0.21)
NASH SCORING (REFERENCE): ABNORMAL
NECROINFLAMMATORY ACT GRADE SERPL QL: ABNORMAL
NECROINFLAMMATORY ACT SCORE SERPL: 0.25
SERVICE CMNT-IMP: ABNORMAL
STEATOSIS GRADE (REFERENCE): ABNORMAL
STEATOSIS GRADING (REFERENCE): ABNORMAL
STEATOSIS SCORE (REFERENCE): 0.32 (ref 0–0.3)
TRIGL SERPL-MCNC: 149 MG/DL (ref 0–149)

## 2022-11-04 LAB — PENTA-CP SERPL-MCNC: <0.1 UG/DL (ref 0–1)

## 2022-11-15 ENCOUNTER — PROCEDURE VISIT (OUTPATIENT)
Dept: GASTROENTEROLOGY | Facility: CLINIC | Age: 67
End: 2022-11-15

## 2022-11-15 ENCOUNTER — OFFICE VISIT (OUTPATIENT)
Dept: CARDIOLOGY | Facility: CLINIC | Age: 67
End: 2022-11-15

## 2022-11-15 VITALS
BODY MASS INDEX: 26.2 KG/M2 | WEIGHT: 142.4 LBS | HEIGHT: 62 IN | DIASTOLIC BLOOD PRESSURE: 70 MMHG | SYSTOLIC BLOOD PRESSURE: 106 MMHG

## 2022-11-15 VITALS
HEART RATE: 90 BPM | OXYGEN SATURATION: 100 % | BODY MASS INDEX: 26.13 KG/M2 | SYSTOLIC BLOOD PRESSURE: 132 MMHG | TEMPERATURE: 97 F | HEIGHT: 62 IN | WEIGHT: 142 LBS | DIASTOLIC BLOOD PRESSURE: 80 MMHG | RESPIRATION RATE: 16 BRPM

## 2022-11-15 DIAGNOSIS — R55 SYNCOPE AND COLLAPSE: Primary | ICD-10-CM

## 2022-11-15 DIAGNOSIS — D50.9 IRON DEFICIENCY ANEMIA, UNSPECIFIED IRON DEFICIENCY ANEMIA TYPE: Primary | ICD-10-CM

## 2022-11-15 PROCEDURE — 99213 OFFICE O/P EST LOW 20 MIN: CPT | Performed by: NURSE PRACTITIONER

## 2022-11-15 PROCEDURE — 91110 GI TRC IMG INTRAL ESOPH-ILE: CPT | Performed by: INTERNAL MEDICINE

## 2022-11-15 RX ORDER — HYDROXYCHLOROQUINE SULFATE 200 MG/1
200 TABLET, FILM COATED ORAL 2 TIMES DAILY
COMMUNITY

## 2022-11-15 NOTE — PROGRESS NOTES
[facility] Cardiology Office Consult Note    Stephanie Jean  [id number]  11/15/2022    Referred By: No ref. provider found    Chief Complaint: Syncope    History of Present Illness:   Mrs. Stephanie Jean is a 67 y.o. female who presents to the Cardiology Clinic for evaluation of syncope.  The patient was initially seen by cardiology in 5/21 at which time she reported a 6-month history of intermittent chest discomfort.  Subsequently underwent a stress test which was normal.  An echocardiogram showed an LVEF of 56-60% with normal diastolic function.  An outpatient cardiac monitor showed a less than 1% PVC burden and brief infrequent runs of SVT.  Her 1 complaint/symptom correlated with sinus bradycardia in the mid 50 bpm range.  She presents today after having 2 recent syncopal episodes (8/22 and 10/22).  Her initial episode occurred after sudden onset of abdominal cramping with a subsequent BM, nausea, and vomiting.  She denies having lost consciousness at that time, but had a near-syncopal episode while on the toilet.  She The patient describes that both episodes (the last of which occurred last month) began with sudden, severe, abdominal cramping with the urgency of an impending BM.  Her last episode was characterized by dizziness at which time she suddenly fell in the kitchen hitting her head on the floor. She reports that upon waking she noticed she had again experienced fecal incontinence with another large bowel movement.  She had also been vomiting.   She was carried to the car by her  and taken to [facility] via private vehicle.  The patient had negative troponin enzymes x3.  Labs showed no evidence of dehydration.  The patient was instructed to take her lisinopril at nighttime.  She specifically denies recurrent symptoms including dizziness and syncope/presyncope.  She denies palpitations.  She denies any history of chest pain or dyspnea.  She wonders if this is related to a  medication she used to take for rheumatoid arthritis (leflunomide).  She has since discontinued this medication.  She denies any history of arrhythmia.  She is scheduled to go see a neurologist next month.  She is being followed by gastrology and is currently wearing a holster for a pill cam today.  She offers no other complaints or concerns at this time.    Other results from her most recent ED visit are as follows:    Lab Results   Component Value Date   WBC 4.3 10/18/2022   HGB 9.8 (L) 10/18/2022   HCT 30.0 (L) 10/18/2022   MCV 92.3 10/18/2022    10/18/2022         Lab Results   Component Value Date    10/18/2022   K 4.2 10/18/2022    (H) 10/18/2022   CO2 25 10/18/2022   BUN 20 10/18/2022   CREATININE 1.0 10/18/2022   GLUCOSE 96 10/18/2022   CALCIUM 8.6 10/18/2022   PROT 5.9 (L) 10/18/2022   LABALBU 3.6 10/18/2022   BILITOT <0.2 (L) 10/18/2022   ALKPHOS 71 10/18/2022   AST 23 10/18/2022   ALT 21 10/18/2022   LABGLOM >60 10/18/2022   GFRAA 55 (L) 10/17/2022   AGRATIO 1.6 10/18/2022   GLOB 2.3 10/18/2022           PA/lat CXR:   US CAROTID ARTERY BILATERAL   Final Result     1. Estimated diameter reduction of the right internal carotid artery is 20-40 %.   2. Estimated diameter reduction left internal carotid artery is 20-40 %.   3. Vertebral arteries bilaterally with antegrade flow.     CT Head WO Contrast   Final Result     No acute intracranial abnormality.     CT ABDOMEN PELVIS WO CONTRAST Additional Contrast? None   Final Result     1. No acute abnormality.   2. 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   3. Severe vascular calcifications.   4. L5 pars defects with grade 1 anterolisthesis and severe bilateral L5 foraminal stenosis.           EKG: NSR, rate 95 bpm, TX interval 174 ms, QRS duration 96, , QTc 481 ms         Past Cardiac Testin. Last Coronary Angio: None  2. Prior Stress Testin2021   1. Dyspnea and chest discomfort        with infusion   2. No  ST segment shift with infusion   3. Normal hemodynamic response        to infusion   4. Perfusion imaging shows a fixed       distal anterior and apical defect       with stress and rest consistent        with chest wall/breast attenuation.       No stress induced defects.    5. Normal 3 D contractility.    6. EF 64%  3. Last Echo: 10/18/2022   1. Overall LVF is normal   2. Est EF 60-65%   3. Grade 1 diastolic dysfunction  4. Prior Holter Monitor: Holter Monitor - 72 Hour Up To 15 Days (11/29/2021 10:16)   1. Baseline rhythm is sinsu rhythm with <0.01% PACs and 0.18% PVCs.    2. Three atrial runs, max 5 beats.    3. One episode of probable sinus tachycardia at 170 BPM, although cannot exclude SVT given single lead strip - asymptomatic.    4. One complaint/symptom correlated with sinus bradycardia in the mid 50 BPM range.       Review of Systems:   Review of Systems   Constitutional: Negative for activity change, chills, diaphoresis, fatigue, fever and unexpected weight gain.   Eyes: Negative for blurred vision and visual disturbance.   Respiratory: Negative for apnea, cough, chest tightness, shortness of breath and wheezing.    Cardiovascular: Negative for chest pain, palpitations and leg swelling.   Gastrointestinal: Negative for abdominal distention, blood in stool, GERD and indigestion.   Endocrine: Negative for cold intolerance and heat intolerance.   Genitourinary: Negative for hematuria.   Musculoskeletal: Negative for gait problem, joint swelling and myalgias.   Skin: Negative for color change, pallor and wound.   Neurological: Negative for dizziness, seizures, syncope, weakness, light-headedness, numbness, headache and confusion.   Hematological: Does not bruise/bleed easily.   Psychiatric/Behavioral: Negative for behavioral problems, sleep disturbance, suicidal ideas and depressed mood.     I have reviewed and confirmed the accuracy of the ROS as documented by the MA/LPN/RN Whit Barajas,  APRN      Past Medical History:   Past Medical History:   Diagnosis Date   • Anemia 2021   • Arthritis    • Cataract 2021    both eyes surgery for cataract   • Colon polyps    • Dysphagia    • Elevated cholesterol    • Family history of colon cancer    • Fibromyalgia    • Fibromyalgia, primary 2003   • GERD (gastroesophageal reflux disease)    • GI (gastrointestinal bleed) 8/8/2022   • Heart murmur    • Hoarseness    • Hyperlipidemia    • Hypertension 2021   • Inflammatory bowel disease 2022    recent hospitalization for colitis   • Irritable bowel syndrome    • Low back pain 2003    treated by [facility]   • Lupus (HCC)    • Marijuana use     Daily    • Osteopenia 2021    Osteopenia   • Pulmonary nodule    • Sjogren's disease (HCC)    • Urinary tract infection 2022   • Vasovagal syncope        Past Surgical History:   Past Surgical History:   Procedure Laterality Date   • ABDOMINAL SURGERY  1998    [facility]   • APPENDECTOMY     • BREAST SURGERY  1975    lumpectomy 1975   • CATARACT EXTRACTION W/ INTRAOCULAR LENS IMPLANT Left 12/20/2021    Procedure: CATARACT PHACO EXTRACTION WITH INTRAOCULAR LENS IMPLANT LEFT COMPLICATED WITH MALYUGIN RING;  Surgeon: Zachary Landers MD;  Location: [facility] OR;  Service: Ophthalmology;  Laterality: Left;   • CATARACT EXTRACTION W/ INTRAOCULAR LENS IMPLANT Right 01/03/2022    Procedure: CATARACT PHACO EXTRACTION WITH INTRAOCULAR LENS IMPLANT RIGHT;  Surgeon: Zachary Landers MD;  Location: [facility] OR;  Service: Ophthalmology;  Laterality: Right;   • COLONOSCOPY     • ENDOSCOPY     • ENDOSCOPY N/A 06/01/2020    Procedure: ESOPHAGOGASTRODUODENOSCOPY;  Surgeon: Chaparro Marrero MD;  Location: [facility] ENDOSCOPY;  Service: Gastroenterology;  Laterality: N/A;   • EYE SURGERY  2021-22    cataract removal   • HYSTERECTOMY     • TOTAL ABDOMINAL HYSTERECTOMY WITH SALPINGO OOPHORECTOMY  1998   • TUBAL ABDOMINAL LIGATION  1984   • UPPER GASTROINTESTINAL ENDOSCOPY          Family History:   Family History   Problem Relation Age of Onset   • Hypertension Mother    • Aneurysm Mother    • Heart disease Mother         passed away at 54 yr old after 5 vessel bypass   • Hyperlipidemia Mother    • Stroke Mother         aneurysm ruptured   • Emphysema Father    • COPD Father    • Ulcerative colitis Brother    • Alcohol abuse Brother    • Cancer Brother         stomach cancer   • COPD Brother    • Hyperlipidemia Brother    • Liver disease Brother    • Cirrhosis Brother    • Colon polyps Brother    • Stomach cancer Brother    • Colon cancer Maternal Uncle    • Arthritis Maternal Uncle    • Cancer Maternal Aunt         breast cancer   • Cancer Sister         breast   • COPD Paternal Uncle    • Early death Brother         ulcerative colitis   • Learning disabilities Brother         mental retardation   • Mental illness Brother         schizophrenia   • Ulcerative colitis Brother    • Kidney disease Paternal Aunt         nephrectomy   • Liver cancer Neg Hx    • Crohn's disease Neg Hx        Social History:   Social History     Socioeconomic History   • Marital status:    Tobacco Use   • Smoking status: Former     Packs/day: 1.00     Years: 40.00     Pack years: 40.00     Types: Cigarettes     Quit date: 2018     Years since quittin.8   • Smokeless tobacco: Never   Vaping Use   • Vaping Use: Never used   Substance and Sexual Activity   • Alcohol use: No   • Drug use: Not Currently     Frequency: 7.0 times per week     Types: Marijuana     Comment: OCCASIONALLY   • Sexual activity: Yes     Partners: Male     Birth control/protection: Hysterectomy     Comment: [facility]       Medications:     Current Outpatient Medications:   •  Ascorbic Acid (VITAMIN C PO), Take 500 mg by mouth As Needed., Disp: , Rfl:   •  busPIRone (BUSPAR) 10 MG tablet, Take 1 tablet by mouth 2 (Two) Times a Day As Needed (anxiety)., Disp: 180 tablet, Rfl: 3  •  Diclofenac Sodium (VOLTAREN) 1 % gel gel, diclofenac  1 % topical gel  APPLY 1 GRAM TOPICALLY TO THE AFFECTED AREA TWICE DAILY AS NEEDED FOR PAIN, Disp: , Rfl:   •  docusate sodium (COLACE) 100 MG capsule, Take 1 capsule by mouth 2 (Two) Times a Day., Disp: 180 capsule, Rfl: 3  •  DULoxetine (CYMBALTA) 60 MG capsule, Take 1 capsule by mouth Daily., Disp: 90 capsule, Rfl: 3  •  fluticasone (FLONASE) 50 MCG/ACT nasal spray, SHAKE LIQUID AND USE 1 TO 2 SPRAYS IN EACH NOSTRIL EVERY DAY AS NEEDED, Disp: , Rfl:   •  gabapentin (NEURONTIN) 600 MG tablet, Take 600 mg by mouth 3 (Three) Times a Day., Disp: , Rfl:   •  hydroxychloroquine (PLAQUENIL) 200 MG tablet, , Disp: , Rfl:   •  lisinopril (PRINIVIL,ZESTRIL) 10 MG tablet, Take 10 mg by mouth Daily., Disp: , Rfl:   •  loratadine (CLARITIN) 10 MG tablet, Take 10 mg by mouth Daily., Disp: , Rfl:   •  meclizine (ANTIVERT) 12.5 MG tablet, Take 1 tablet by mouth 3 (Three) Times a Day As Needed for Dizziness., Disp: 30 tablet, Rfl: 0  •  nitroglycerin (NITROSTAT) 0.4 MG SL tablet, Place 0.4 mg under the tongue Every 5 (Five) Minutes As Needed., Disp: , Rfl:   •  ondansetron ODT (ZOFRAN-ODT) 4 MG disintegrating tablet, , Disp: , Rfl:   •  oxyCODONE-acetaminophen (PERCOCET) 7.5-325 MG per tablet, Take 1.5 tablets by mouth Daily., Disp: , Rfl:   •  pantoprazole (PROTONIX) 20 MG EC tablet, Take 2 tablets by mouth Daily., Disp: 90 tablet, Rfl: 3  •  polyethylene glycol (MiraLax) 17 GM/SCOOP powder, Take 17 g by mouth Daily., Disp: 510 g, Rfl: 2  •  promethazine (PHENERGAN) 12.5 MG tablet, Take 1 tablet by mouth Every 8 (Eight) Hours As Needed for Nausea or Vomiting., Disp: 30 tablet, Rfl: 1  •  rOPINIRole (REQUIP) 2 MG tablet, TAKE 1 TABLET BY MOUTH EVERY DAY 1-3 HOURS BEFORE BEDTIME, Disp: , Rfl:   •  VITAMIN E PO, Take  by mouth., Disp: , Rfl:   •  aspirin 81 MG EC tablet, Take 81 mg by mouth Daily., Disp: , Rfl:   •  atorvastatin (LIPITOR) 20 MG tablet, Take 1 tablet by mouth Every Night., Disp: 90 tablet, Rfl: 3  •  cyclobenzaprine  "(FLEXERIL) 10 MG tablet, Take 1 tablet by mouth 3 (Three) Times a Day., Disp: 270 tablet, Rfl: 3    Allergies:   Allergies   Allergen Reactions   • Penicillins Rash       Physical Exam:  Vital Signs:   Vitals:    11/15/22 1554   BP: 132/80   Pulse: 90   Resp: 16   Temp: 97 °F (36.1 °C)   SpO2: 100%   Weight: 64.4 kg (142 lb)   Height: 157.5 cm (62\")     Body mass index is 25.97 kg/m².    Physical Exam  Vitals and nursing note reviewed.   Constitutional:       General: She is not in acute distress.     Appearance: Normal appearance. She is well-developed.   HENT:      Head: Normocephalic and atraumatic.   Eyes:      General: No scleral icterus.     Extraocular Movements: Extraocular movements intact.   Neck:      Trachea: Trachea normal.   Cardiovascular:      Rate and Rhythm: Normal rate and regular rhythm.      Pulses: Normal pulses.      Heart sounds: Normal heart sounds. No murmur heard.    No friction rub. No gallop.   Pulmonary:      Effort: Pulmonary effort is normal.      Breath sounds: Normal breath sounds.   Musculoskeletal:         General: Normal range of motion.      Cervical back: Neck supple.      Right lower leg: No edema.      Left lower leg: No edema.   Skin:     General: Skin is warm and dry.      Findings: No bruising, lesion or rash.   Neurological:      Mental Status: She is alert and oriented to person, place, and time.      Motor: No weakness.      Gait: Gait normal.   Psychiatric:         Mood and Affect: Mood normal.         Behavior: Behavior normal. Behavior is cooperative.         Thought Content: Thought content does not include suicidal ideation.       .  Results Review:   I reviewed the patient's new clinical results.      Assessment / Plan:     1. Syncope and collapse (Primary)  --Loss of consciousness with most recent episode  --10/22, Normal echo, but no saline test was done  --10/21, Outpatient cardiac monitor showed no PAF  --No recurrent symptoms  --Denies orthostatic " symptoms  --Plan for 30-day MCOT to rule out PAF  --Follow-up with Dr. Jacobs in 6 weeks or sooner if needed          Preventative Cardiology:   Tobacco Cessation: N/A   Advance Care Planning: ACP discussion was declined by the patient. Patient does not have an advance directive, declines further assistance.       Follow Up:   Return in about 6 weeks (around 12/27/2022) for Follow-up with Dr. Jacobs.      Thank you for allowing me to participate in the care of your patient. Please to not hesitate to contact me with additional questions or concerns.     Whit Barajas APRN

## 2022-11-15 NOTE — PROGRESS NOTES
Patient presented to office for Pill Cam Endoscopy. Received consent. Discussed risks and benefits. Patient hooked to sensor belt monitor that was paired to capsule. Patient swallowed capsule without difficulty at 0815. Patient returned to office at [id number] to return equipment. Patient did not have any complaints of abdominal pain, nausea or vomiting. Pictures downloaded for review by Dr. Marrero.

## 2022-11-17 ENCOUNTER — TELEPHONE (OUTPATIENT)
Dept: CARDIOLOGY | Facility: CLINIC | Age: 67
End: 2022-11-17

## 2022-11-17 ENCOUNTER — OFFICE VISIT (OUTPATIENT)
Dept: INTERNAL MEDICINE | Facility: CLINIC | Age: 67
End: 2022-11-17

## 2022-11-17 VITALS
BODY MASS INDEX: 26.28 KG/M2 | HEIGHT: 62 IN | WEIGHT: 142.8 LBS | OXYGEN SATURATION: 99 % | HEART RATE: 78 BPM | SYSTOLIC BLOOD PRESSURE: 120 MMHG | DIASTOLIC BLOOD PRESSURE: 80 MMHG | TEMPERATURE: 98 F

## 2022-11-17 DIAGNOSIS — Z00.00 WELCOME TO MEDICARE PREVENTIVE VISIT: Primary | ICD-10-CM

## 2022-11-17 DIAGNOSIS — D64.9 ANEMIA, UNSPECIFIED TYPE: ICD-10-CM

## 2022-11-17 DIAGNOSIS — Z13.220 LIPID SCREENING: ICD-10-CM

## 2022-11-17 PROCEDURE — 93005 ELECTROCARDIOGRAM TRACING: CPT | Performed by: FAMILY MEDICINE

## 2022-11-17 PROCEDURE — 1125F AMNT PAIN NOTED PAIN PRSNT: CPT | Performed by: FAMILY MEDICINE

## 2022-11-17 PROCEDURE — 1159F MED LIST DOCD IN RCRD: CPT | Performed by: FAMILY MEDICINE

## 2022-11-17 PROCEDURE — G0402 INITIAL PREVENTIVE EXAM: HCPCS | Performed by: FAMILY MEDICINE

## 2022-11-17 PROCEDURE — 1170F FXNL STATUS ASSESSED: CPT | Performed by: FAMILY MEDICINE

## 2022-11-17 RX ORDER — CYCLOBENZAPRINE HCL 10 MG
10 TABLET ORAL 3 TIMES DAILY
Qty: 270 TABLET | Refills: 3 | Status: SHIPPED | OUTPATIENT
Start: 2022-11-17

## 2022-11-17 RX ORDER — ASPIRIN 81 MG/1
81 TABLET ORAL
COMMUNITY

## 2022-11-17 RX ORDER — ATORVASTATIN CALCIUM 20 MG/1
20 TABLET, FILM COATED ORAL NIGHTLY
Qty: 90 TABLET | Refills: 3 | Status: SHIPPED | OUTPATIENT
Start: 2022-11-17

## 2022-11-17 NOTE — TELEPHONE ENCOUNTER
"Patient called office stating that she does not want to continue wearing the heart monitor due to the possible unexpected cost. She had contacted billing, her insurance, and [employer] an was unable to find out an exact cost. Patient states she is going to send it back today. I had contacted [employer] to try and find out a cost. Patient's estimated cost is $241.77. I spoke with Dilma COSTA in customer service to request a \"no charge\" since patient wore this >than 24 hours. [employer] is supposed to follow up with office to notify of approval. LVM for patient with this information  "

## 2022-11-17 NOTE — PROGRESS NOTES
The ABCs of the Annual Wellness Visit  [place] to Medicare Visit    Chief Complaint   Patient presents with   • Welcome To Medicare     Subjective {   History of Present Illness:  Stephanie Jean is a 67 y.o. female who presents for a  Welcome to Medicare Visit.  Patient noted to be anemic on recent labs.      The following portions of the patient's history were reviewed and   updated as appropriate: allergies, current medications, past family history, past medical history, past social history, past surgical history and problem list.     Compared to one year ago, the patient feels her physical   health is better.    Compared to one year ago, the patient feels her mental   health is better.    Recent Hospitalizations:  This patient has had a [facility] admission record on file within the last 365 days.    Current Medical Providers:  Patient Care Team:  Nhung Shepard DO as PCP - General (Family Medicine)    Outpatient Medications Prior to Visit   Medication Sig Dispense Refill   • Ascorbic Acid (VITAMIN C PO) Take 500 mg by mouth As Needed.     • busPIRone (BUSPAR) 10 MG tablet Take 1 tablet by mouth 2 (Two) Times a Day As Needed (anxiety). 180 tablet 3   • Diclofenac Sodium (VOLTAREN) 1 % gel gel diclofenac 1 % topical gel   APPLY 1 GRAM TOPICALLY TO THE AFFECTED AREA TWICE DAILY AS NEEDED FOR PAIN     • docusate sodium (COLACE) 100 MG capsule Take 1 capsule by mouth 2 (Two) Times a Day. 180 capsule 3   • DULoxetine (CYMBALTA) 60 MG capsule Take 1 capsule by mouth Daily. 90 capsule 3   • fluticasone (FLONASE) 50 MCG/ACT nasal spray SHAKE LIQUID AND USE 1 TO 2 SPRAYS IN EACH NOSTRIL EVERY DAY AS NEEDED     • gabapentin (NEURONTIN) 600 MG tablet Take 600 mg by mouth 3 (Three) Times a Day.     • hydroxychloroquine (PLAQUENIL) 200 MG tablet      • lisinopril (PRINIVIL,ZESTRIL) 10 MG tablet Take 10 mg by mouth Daily.     • loratadine (CLARITIN) 10 MG tablet Take 10 mg by mouth Daily.     • meclizine  (ANTIVERT) 12.5 MG tablet Take 1 tablet by mouth 3 (Three) Times a Day As Needed for Dizziness. 30 tablet 0   • nitroglycerin (NITROSTAT) 0.4 MG SL tablet Place 0.4 mg under the tongue Every 5 (Five) Minutes As Needed.     • ondansetron ODT (ZOFRAN-ODT) 4 MG disintegrating tablet      • oxyCODONE-acetaminophen (PERCOCET) 7.5-325 MG per tablet Take 1.5 tablets by mouth Daily.     • pantoprazole (PROTONIX) 20 MG EC tablet Take 2 tablets by mouth Daily. 90 tablet 3   • polyethylene glycol (MiraLax) 17 GM/SCOOP powder Take 17 g by mouth Daily. 510 g 2   • promethazine (PHENERGAN) 12.5 MG tablet Take 1 tablet by mouth Every 8 (Eight) Hours As Needed for Nausea or Vomiting. 30 tablet 1   • rOPINIRole (REQUIP) 2 MG tablet TAKE 1 TABLET BY MOUTH EVERY DAY 1-3 HOURS BEFORE BEDTIME     • VITAMIN E PO Take  by mouth.     • atorvastatin (LIPITOR) 20 MG tablet Take 1 tablet by mouth Every Night. 90 tablet 0   • cyclobenzaprine (FLEXERIL) 10 MG tablet Take 10 mg by mouth 3 (Three) Times a Day.     • aspirin 81 MG EC tablet Take 81 mg by mouth Daily.       No facility-administered medications prior to visit.       Opioid medication/s are on active medication list.  and I have evaluated her active treatment plan and pain score trends (see table).  Vitals:    11/17/22 0946   PainSc: 4  Comment: baseline arthiritis pain.     I have reviewed the chart for potential of high risk medication and harmful drug interactions in the elderly.            Aspirin is not on active medication list.  Aspirin use is indicated based on review of current medical condition/s. Pros and cons of this therapy have been discussed with this patient. Benefits of this medication outweigh potential harm.  Patient has been instructed to start taking this medication..    Patient Active Problem List   Diagnosis   • Gastroesophageal reflux disease without esophagitis   • Esophageal dysphagia   • Chest pain   • Abnormal ECG   • Dizziness   • Transient ischemic  "attack (TIA)   • Nuclear sclerotic cataract of left eye   • Bloody stool   • Infective colitis   • Acute blood loss anemia   • Primary hypertension   • Mixed hyperlipidemia   • Fibromyalgia   • SLE (systemic lupus erythematosus related syndrome) (HCC)   • Anxiety     Advance Care Planning  Advance Directive is not on file.  ACP discussion was held with the patient during this visit. Patient does not have an advance directive, information provided.    Review of Systems   Constitutional: Positive for fatigue. Negative for chills and fever.   HENT: Positive for congestion and postnasal drip.    Eyes: Negative for visual disturbance.   Respiratory: Positive for cough and shortness of breath (on exertion). Negative for wheezing.    Cardiovascular: Negative for chest pain.   Gastrointestinal: Negative for constipation, diarrhea, nausea and vomiting.   Genitourinary: Negative for difficulty urinating.   Musculoskeletal: Positive for arthralgias.   Skin: Negative for rash.   Hematological: Does not bruise/bleed easily.   Psychiatric/Behavioral: Negative for dysphoric mood. The patient is not nervous/anxious.         Objective      Vitals:    11/17/22 0946   BP: 120/80   BP Location: Right arm   Patient Position: Sitting   Cuff Size: Adult   Pulse: 78   Temp: 98 °F (36.7 °C)   SpO2: 99%   Weight: 64.8 kg (142 lb 12.8 oz)   Height: 157.5 cm (62\")   PainSc: 4  Comment: baseline arthiritis pain.     Estimated body mass index is 26.12 kg/m² as calculated from the following:    Height as of this encounter: 157.5 cm (62\").    Weight as of this encounter: 64.8 kg (142 lb 12.8 oz).    BMI is >= 25 and <30. (Overweight) The following options were offered after discussion;: weight loss educational material (shared in after visit summary)      Does the patient have evidence of cognitive impairment? No    Physical Exam  Constitutional:       General: She is not in acute distress.     Appearance: She is well-developed.   HENT:      Head: " Normocephalic and atraumatic.      Right Ear: Tympanic membrane and external ear normal.      Left Ear: External ear normal.   Eyes:      Extraocular Movements: Extraocular movements intact.      Conjunctiva/sclera: Conjunctivae normal.      Pupils: Pupils are equal, round, and reactive to light.   Neck:      Vascular: No carotid bruit.   Cardiovascular:      Rate and Rhythm: Normal rate and regular rhythm.      Heart sounds: No murmur heard.  Pulmonary:      Effort: Pulmonary effort is normal. No respiratory distress.      Breath sounds: Normal breath sounds. No wheezing.   Abdominal:      General: Bowel sounds are normal. There is no distension.      Palpations: Abdomen is soft.      Tenderness: There is no abdominal tenderness.   Musculoskeletal:      Cervical back: Normal range of motion and neck supple.      Right lower leg: No edema.      Left lower leg: No edema.   Lymphadenopathy:      Cervical: No cervical adenopathy.   Skin:     General: Skin is warm and dry.   Neurological:      Mental Status: She is alert and oriented to person, place, and time.      Cranial Nerves: No cranial nerve deficit.      Deep Tendon Reflexes: Reflexes normal.   Psychiatric:         Mood and Affect: Mood normal.         Behavior: Behavior normal.         Lab Results   Component Value Date    TRIG 149 10/28/2022         ECG 12 Lead    Date/Time: 11/17/2022 10:01 AM  Performed by: Nhung Shepard DO  Authorized by: Nhung Shepard DO   Interpreted by ED physician: PCP.  Comparison: compared with previous ECG from 8/8/2022  Rhythm: sinus rhythm  Rate: normal  Conduction: conduction normal  ST Segments: ST segments normal  T Waves: T waves normal  QRS axis: normal  Other: no other findings    Clinical impression: normal ECG               HEALTH RISK ASSESSMENT    Smoking Status:  Social History     Tobacco Use   Smoking Status Former   • Packs/day: 1.00   • Years: 40.00   • Pack years: 40.00   • Types: Cigarettes   •  Quit date: 2018   • Years since quittin.8   Smokeless Tobacco Never     Alcohol Consumption:  Social History     Substance and Sexual Activity   Alcohol Use No       Fall Risk Screen:    STEADI Fall Risk Assessment was completed, and patient is at MODERATE risk for falls. Assessment completed on:2022    Depression Screen:   PHQ-2/PHQ-9 Depression Screening 2022   Little Interest or Pleasure in Doing Things 0-->not at all   Feeling Down, Depressed or Hopeless 0-->not at all   PHQ-9: Brief Depression Severity Measure Score 0       Health Habits and Functional and Cognitive Screening:  Functional & Cognitive Status 2022   Do you have difficulty preparing food and eating? No   Do you have difficulty bathing yourself, getting dressed or grooming yourself? No   Do you have difficulty using the toilet? No   Do you have difficulty moving around from place to place? No   Do you have trouble with steps or getting out of a bed or a chair? No   Current Diet Well Balanced Diet   Dental Exam Up to date   Eye Exam Up to date   Exercise (times per week) 3 times per week   Current Exercises Include House Cleaning;Yard Work;Treadmill   Do you need help using the phone?  No   Are you deaf or do you have serious difficulty hearing?  No   Do you need help with transportation? No   Do you need help shopping? No   Do you need help preparing meals?  No   Do you need help with housework?  No   Do you need help with laundry? No   Do you need help taking your medications? No   Do you need help managing money? No   Do you ever drive or ride in a car without wearing a seat belt? No   Have you felt unusual stress, anger or loneliness in the last month? No   Who do you live with? Spouse   If you need help, do you have trouble finding someone available to you? No   Have you been bothered in the last four weeks by sexual problems? No   Do you have difficulty concentrating, remembering or making decisions? Yes       Visual  Acuity:    No results found.    Age-appropriate Screening Schedule:  Refer to the list below for future screening recommendations based on patient's age, sex and/or medical conditions. Orders for these recommended tests are listed in the plan section. The patient has been provided with a written plan.    Health Maintenance   Topic Date Due   • LIPID PANEL  11/22/2022   • DXA SCAN  11/29/2023   • MAMMOGRAM  10/12/2024   • TDAP/TD VACCINES (3 - Td or Tdap) 12/29/2026   • INFLUENZA VACCINE  Completed          Assessment & Plan   CMS Preventative Services Quick Reference  Risk Factors Identified During Encounter  Cardiovascular Disease  Chronic Pain   Fall Risk-High or Moderate  Immunizations Discussed/Encouraged (specific Immunizations; COVID19  Obesity/Overweight   The above risks/problems have been discussed with the patient.  Pertinent information has been shared with the patient in the After Visit Summary.  Follow up plans and orders are seen below in the Assessment/Plan Section.    Diagnoses and all orders for this visit:    1. Welcome to Medicare preventive visit (Primary)  -     ECG 12 Lead    2. Lipid screening  -     Cancel: Lipid Panel  -     Lipid Panel    3. Anemia, unspecified type  -     CBC & Differential  -     Ferritin  -     Folate  -     Iron Profile  -     Vitamin B12    Other orders  -     cyclobenzaprine (FLEXERIL) 10 MG tablet; Take 1 tablet by mouth 3 (Three) Times a Day.  Dispense: 270 tablet; Refill: 3  -     atorvastatin (LIPITOR) 20 MG tablet; Take 1 tablet by mouth Every Night.  Dispense: 90 tablet; Refill: 3        Follow Up:   Return in about 4 months (around 3/17/2023) for anxiety, HTN, HPL.     An After Visit Summary and PPPS were made available to the patient.

## 2022-11-17 NOTE — TELEPHONE ENCOUNTER
"They all will have an \"estimated cost\" and wouldn't be able to give her an exact cost. The patient just doesn't want any added extra expenses right now.  "

## 2022-11-17 NOTE — TELEPHONE ENCOUNTER
Would a short term monitor (like a Holter or Zio) be more affordable?  Just trying to rule out asymptomatic PAF as a cause of her two syncopal episodes.

## 2022-11-21 NOTE — TELEPHONE ENCOUNTER
This patient is still on your schedule for January for f/u after outpatient cardiac monitor study.  Will you read through my last note and/or your last note and determine if you would like to see her for follow-up?

## 2022-11-28 ENCOUNTER — HOSPITAL ENCOUNTER (OUTPATIENT)
Facility: HOSPITAL | Age: 67
Discharge: HOME | End: 2022-11-28
Payer: MEDICARE

## 2022-11-28 LAB
BASOPHILS ABSOLUTE: 0 K/UL (ref 0–0.1)
BASOPHILS RELATIVE PERCENT: 0.7 %
CHOLESTEROL, TOTAL: 168 MG/DL (ref 0–200)
EOSINOPHILS ABSOLUTE: 0.1 K/UL (ref 0–0.4)
EOSINOPHILS RELATIVE PERCENT: 3.4 %
FERRITIN: 56.4 NG/ML (ref 22–322)
FOLATE: 16.4 NG/ML
HCT VFR BLD CALC: 34.6 % (ref 37–47)
HDLC SERPL-MCNC: 68 MG/DL (ref 40–60)
HEMOGLOBIN: 11.1 G/DL (ref 11.5–16.5)
IMMATURE GRANULOCYTES #: 0 K/UL
IMMATURE GRANULOCYTES %: 0.2 % (ref 0–5)
IRON: 44 UG/DL (ref 37–145)
LDL CHOLESTEROL CALCULATED: 87 MG/DL
LYMPHOCYTES ABSOLUTE: 1.6 K/UL (ref 1.5–4)
LYMPHOCYTES RELATIVE PERCENT: 38.3 %
MCH RBC QN AUTO: 30.1 PG (ref 27–32)
MCHC RBC AUTO-ENTMCNC: 32.1 G/DL (ref 31–35)
MCV RBC AUTO: 93.8 FL (ref 80–100)
MONOCYTES ABSOLUTE: 0.4 K/UL (ref 0.2–0.8)
MONOCYTES RELATIVE PERCENT: 9 %
NEUTROPHILS ABSOLUTE: 2 K/UL (ref 2–7.5)
NEUTROPHILS RELATIVE PERCENT: 48.4 %
PDW BLD-RTO: 13.2 % (ref 11–16)
PLATELET # BLD: 186 K/UL (ref 150–400)
PMV BLD AUTO: 9.6 FL (ref 6–10)
RBC # BLD: 3.69 M/UL (ref 3.8–5.8)
TRIGL SERPL-MCNC: 63 MG/DL (ref 0–249)
VITAMIN B-12: 444 PG/ML (ref 211–911)
VLDLC SERPL CALC-MCNC: 13 MG/DL
WBC # BLD: 4.1 K/UL (ref 4–11)

## 2022-11-28 PROCEDURE — 36415 COLL VENOUS BLD VENIPUNCTURE: CPT

## 2022-11-28 PROCEDURE — 85025 COMPLETE CBC W/AUTO DIFF WBC: CPT

## 2022-11-28 PROCEDURE — 82746 ASSAY OF FOLIC ACID SERUM: CPT

## 2022-11-28 PROCEDURE — 82728 ASSAY OF FERRITIN: CPT

## 2022-11-28 PROCEDURE — 82607 VITAMIN B-12: CPT

## 2022-11-28 PROCEDURE — 83540 ASSAY OF IRON: CPT

## 2022-11-28 PROCEDURE — 80061 LIPID PANEL: CPT

## 2022-12-05 ENCOUNTER — TELEPHONE (OUTPATIENT)
Dept: GASTROENTEROLOGY | Facility: CLINIC | Age: 67
End: 2022-12-05

## 2022-12-05 DIAGNOSIS — D50.9 IRON DEFICIENCY ANEMIA, UNSPECIFIED IRON DEFICIENCY ANEMIA TYPE: Primary | ICD-10-CM

## 2022-12-05 DIAGNOSIS — R10.30 LOWER ABDOMINAL PAIN: ICD-10-CM

## 2022-12-05 RX ORDER — BISACODYL 5 MG
TABLET, DELAYED RELEASE (ENTERIC COATED) ORAL
Qty: 4 TABLET | Refills: 0 | Status: SHIPPED | OUTPATIENT
Start: 2022-12-05 | End: 2022-12-12 | Stop reason: HOSPADM

## 2022-12-05 RX ORDER — SODIUM CHLORIDE 9 MG/ML
30 INJECTION, SOLUTION INTRAVENOUS CONTINUOUS PRN
Status: CANCELLED | OUTPATIENT
Start: 2022-12-05

## 2022-12-05 NOTE — TELEPHONE ENCOUNTER
----- Message from Adrianna Ramirez MA sent at 12/5/2022  9:21 AM EST -----    ----- Message -----  From: Chaparro Marrero MD  Sent: 12/4/2022  12:40 PM EST  To: Adrianna Ramirez MA    Let her know that pillcam study is more suggestive of Crohns disease.   We need a repeat colonoscopy with colon and TI biopsies.   We can schedule that before the next visit.

## 2022-12-05 NOTE — TELEPHONE ENCOUNTER
Patient has been notified of results. Scheduled procedure for 02/08/23 and will send prep instructions once it has been called in. Also will call her for a sooner procedure if we get a cancellation

## 2022-12-06 PROBLEM — D50.9 IRON DEFICIENCY ANEMIA: Status: ACTIVE | Noted: 2022-12-06

## 2022-12-06 PROBLEM — R10.30 LOWER ABDOMINAL PAIN: Status: ACTIVE | Noted: 2022-12-06

## 2022-12-08 ENCOUNTER — HOSPITAL ENCOUNTER (OUTPATIENT)
Dept: ULTRASOUND IMAGING | Facility: HOSPITAL | Age: 67
Discharge: HOME | End: 2022-12-08
Admitting: INTERNAL MEDICINE

## 2022-12-08 DIAGNOSIS — R79.89 ELEVATED LFTS: ICD-10-CM

## 2022-12-08 DIAGNOSIS — D68.9 COAGULOPATHY: ICD-10-CM

## 2022-12-08 DIAGNOSIS — K76.0 FATTY (CHANGE OF) LIVER, NOT ELSEWHERE CLASSIFIED: ICD-10-CM

## 2022-12-08 PROCEDURE — 76700 US EXAM ABDOM COMPLETE: CPT

## 2022-12-09 NOTE — PRE-PROCEDURE INSTRUCTIONS
PAT phone history completed with pt for upcoming procedure      PAT PASS GIVEN/REVIEWED WITH PT.  VERBALIZED UNDERSTANDING OF THE FOLLOWING:  DO NOT EAT, DRINK, SMOKE, USE SMOKELESS TOBACCO OR CHEW GUM AFTER MIDNIGHT THE NIGHT BEFORE SURGERY.  THIS ALSO INCLUDES HARD CANDIES AND MINTS.    DO NOT SHAVE THE AREA TO BE OPERATED ON AT LEAST 48 HOURS PRIOR TO THE PROCEDURE.  DO NOT WEAR MAKE UP OR NAIL POLISH.  DO NOT LEAVE IN ANY PIERCING OR WEAR JEWELRY THE DAY OF SURGERY.      DO NOT USE ADHESIVES IF YOU WEAR DENTURES.    DO NOT WEAR EYE CONTACTS; BRING IN YOUR GLASSES.    ONLY TAKE MEDICATION THE MORNING OF YOUR PROCEDURE IF INSTRUCTED BY YOUR SURGEON WITH ENOUGH WATER TO SWALLOW THE MEDICATION.  IF YOUR SURGEON DID NOT SPECIFY WHICH MEDICATIONS TO TAKE, YOU WILL NEED TO CALL THEIR OFFICE FOR FURTHER INSTRUCTIONS AND DO AS THEY INSTRUCT.    LEAVE ANYTHING YOU CONSIDER VALUABLE AT HOME.    YOU WILL NEED TO ARRANGE FOR SOMEONE TO DRIVE YOU HOME AFTER SURGERY.  IT IS RECOMMENDED THAT YOU DO NOT DRIVE, WORK, DRINK ALCOHOL OR MAKE MAJOR DECISIONS FOR AT LEAST 24 HOURS AFTER YOUR PROCEDURE IS COMPLETE.      THE DAY OF YOUR PROCEDURE, BRING IN THE FOLLOWING IF APPLICABLE:   PICTURE ID AND INSURANCE/MEDICARE OR MEDICAID CARDS/ANY CO-PAY THAT MAY BE DUE   COPY OF ADVANCED DIRECTIVE/LIVING WILL/POWER OR    CPAP/BIPAP/INHALERS   SKIN PREP SHEET   YOUR PREADMISSION TESTING PASS (IF NOT A PHONE HISTORY)

## 2022-12-12 ENCOUNTER — ANESTHESIA (OUTPATIENT)
Dept: GASTROENTEROLOGY | Facility: HOSPITAL | Age: 67
End: 2022-12-12

## 2022-12-12 ENCOUNTER — HOSPITAL ENCOUNTER (OUTPATIENT)
Facility: HOSPITAL | Age: 67
Setting detail: HOSPITAL OUTPATIENT SURGERY
Discharge: HOME | End: 2022-12-12
Attending: INTERNAL MEDICINE | Admitting: INTERNAL MEDICINE

## 2022-12-12 ENCOUNTER — ANESTHESIA EVENT (OUTPATIENT)
Dept: GASTROENTEROLOGY | Facility: HOSPITAL | Age: 67
End: 2022-12-12

## 2022-12-12 VITALS
WEIGHT: 140 LBS | RESPIRATION RATE: 20 BRPM | SYSTOLIC BLOOD PRESSURE: 126 MMHG | TEMPERATURE: 98.1 F | OXYGEN SATURATION: 98 % | DIASTOLIC BLOOD PRESSURE: 82 MMHG | HEART RATE: 77 BPM | HEIGHT: 62 IN | BODY MASS INDEX: 25.76 KG/M2

## 2022-12-12 DIAGNOSIS — R10.30 LOWER ABDOMINAL PAIN: ICD-10-CM

## 2022-12-12 DIAGNOSIS — D50.9 IRON DEFICIENCY ANEMIA, UNSPECIFIED IRON DEFICIENCY ANEMIA TYPE: ICD-10-CM

## 2022-12-12 PROCEDURE — 25010000002 PROPOFOL 200 MG/20ML EMULSION: Performed by: NURSE ANESTHETIST, CERTIFIED REGISTERED

## 2022-12-12 PROCEDURE — 45380 COLONOSCOPY AND BIOPSY: CPT | Performed by: INTERNAL MEDICINE

## 2022-12-12 PROCEDURE — 88305 TISSUE EXAM BY PATHOLOGIST: CPT

## 2022-12-12 PROCEDURE — 45385 COLONOSCOPY W/LESION REMOVAL: CPT | Performed by: INTERNAL MEDICINE

## 2022-12-12 RX ORDER — SIMETHICONE 20 MG/.3ML
EMULSION ORAL AS NEEDED
Status: DISCONTINUED | OUTPATIENT
Start: 2022-12-12 | End: 2022-12-12 | Stop reason: HOSPADM

## 2022-12-12 RX ORDER — KETAMINE HCL IN NACL, ISO-OSM 100MG/10ML
SYRINGE (ML) INJECTION AS NEEDED
Status: DISCONTINUED | OUTPATIENT
Start: 2022-12-12 | End: 2022-12-12 | Stop reason: SURG

## 2022-12-12 RX ORDER — PREDNISONE 10 MG/1
TABLET ORAL
Qty: 74 TABLET | Refills: 0 | Status: SHIPPED | OUTPATIENT
Start: 2022-12-12 | End: 2022-12-29 | Stop reason: SDUPTHER

## 2022-12-12 RX ORDER — LIDOCAINE HYDROCHLORIDE 20 MG/ML
INJECTION, SOLUTION INTRAVENOUS AS NEEDED
Status: DISCONTINUED | OUTPATIENT
Start: 2022-12-12 | End: 2022-12-12 | Stop reason: SURG

## 2022-12-12 RX ORDER — SODIUM CHLORIDE 9 MG/ML
30 INJECTION, SOLUTION INTRAVENOUS CONTINUOUS PRN
Status: DISCONTINUED | OUTPATIENT
Start: 2022-12-12 | End: 2022-12-12 | Stop reason: HOSPADM

## 2022-12-12 RX ORDER — PROPOFOL 10 MG/ML
INJECTION, EMULSION INTRAVENOUS AS NEEDED
Status: DISCONTINUED | OUTPATIENT
Start: 2022-12-12 | End: 2022-12-12 | Stop reason: SURG

## 2022-12-12 RX ADMIN — PROPOFOL 100 MG: 10 INJECTION, EMULSION INTRAVENOUS at 10:46

## 2022-12-12 RX ADMIN — SODIUM CHLORIDE 30 ML/HR: 9 INJECTION, SOLUTION INTRAVENOUS at 09:21

## 2022-12-12 RX ADMIN — Medication 25 MG: at 10:35

## 2022-12-12 RX ADMIN — LIDOCAINE HYDROCHLORIDE 60 MG: 20 INJECTION, SOLUTION INTRAVENOUS at 10:33

## 2022-12-12 RX ADMIN — PROPOFOL 100 MG: 10 INJECTION, EMULSION INTRAVENOUS at 10:33

## 2022-12-12 RX ADMIN — PROPOFOL 100 MG: 10 INJECTION, EMULSION INTRAVENOUS at 10:39

## 2022-12-12 RX ADMIN — Medication 25 MG: at 10:42

## 2022-12-12 NOTE — H&P
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\"   • Dysphagia    • Elevated cholesterol    • Family history of colon cancer    • Fibromyalgia    • Fibromyalgia, primary    • GERD (gastroesophageal reflux disease)    • GI (gastrointestinal bleed) 2022   • Heart murmur    • Hoarseness    • Hyperlipidemia    • Hypertension    • Inflammatory bowel disease     recent hospitalization for colitis   • Irritable bowel syndrome    • Low back pain     treated by [facility]   • Lupus (HCC)    • Marijuana use     Daily    • Osteopenia     Osteopenia   • Pulmonary nodule    • Sjogren's disease (HCC)    • Urinary tract infection    • Vasovagal syncope        Past Surgical History:   Procedure Laterality Date   • ABDOMINAL SURGERY      [facility]   • APPENDECTOMY     • BREAST SURGERY      lumpectomy    • CATARACT EXTRACTION W/ INTRAOCULAR LENS IMPLANT Left 2021    Procedure: CATARACT PHACO EXTRACTION WITH INTRAOCULAR LENS IMPLANT LEFT COMPLICATED WITH MALYUGIN RING;  Surgeon: Zachary Landers MD;  Location: [facility];  Service: Ophthalmology;  Laterality: Left;   • CATARACT EXTRACTION W/ INTRAOCULAR LENS IMPLANT Right 2022    Procedure: CATARACT PHACO EXTRACTION WITH INTRAOCULAR LENS IMPLANT RIGHT;  Surgeon: Zachary Landers MD;  Location: [facility];  Service: Ophthalmology;  Laterality: Right;   • COLONOSCOPY     • ENDOSCOPY     • ENDOSCOPY N/A 2020    Procedure: ESOPHAGOGASTRODUODENOSCOPY;  Surgeon: Chaparro Marrero " MD MYKEL;  Location: [facility] ENDOSCOPY;  Service: Gastroenterology;  Laterality: N/A;   • EYE SURGERY      cataract removal   • HYSTERECTOMY     • TOTAL ABDOMINAL HYSTERECTOMY WITH SALPINGO OOPHORECTOMY     • TUBAL ABDOMINAL LIGATION     • UPPER GASTROINTESTINAL ENDOSCOPY         Social History     Socioeconomic History   • Marital status:    Tobacco Use   • Smoking status: Former     Packs/day: 1.00     Years: 40.00     Pack years: 40.00     Types: Cigarettes     Quit date: 2018     Years since quittin.9   • Smokeless tobacco: Never   Vaping Use   • Vaping Use: Never used   Substance and Sexual Activity   • Alcohol use: No   • Drug use: Not Currently     Types: Marijuana     Comment: OCCASIONALLY   • Sexual activity: Yes     Partners: Male     Birth control/protection: Hysterectomy     Comment: WALE       Family History   Problem Relation Age of Onset   • Hypertension Mother    • Aneurysm Mother    • Heart disease Mother         passed away at 54 yr old after 5 vessel bypass   • Hyperlipidemia Mother    • Stroke Mother         aneurysm ruptured   • Emphysema Father    • COPD Father    • Ulcerative colitis Brother    • Alcohol abuse Brother    • Cancer Brother         stomach cancer   • COPD Brother    • Hyperlipidemia Brother    • Liver disease Brother    • Cirrhosis Brother    • Colon polyps Brother    • Stomach cancer Brother    • Colon cancer Maternal Uncle    • Arthritis Maternal Uncle    • Cancer Maternal Aunt         breast cancer   • Cancer Sister         breast   • COPD Paternal Uncle    • Early death Brother         ulcerative colitis   • Learning disabilities Brother         mental retardation   • Mental illness Brother         schizophrenia   • Ulcerative colitis Brother    • Kidney disease Paternal Aunt         nephrectomy   • Liver cancer Neg Hx    • Crohn's disease Neg Hx        Prior to Admission Medications:  Medications Prior to Admission   Medication Sig Dispense Refill  Last Dose   • Ascorbic Acid (VITAMIN C PO) Take 500 mg by mouth As Needed.   12/11/2022 at 0900   • aspirin 81 MG EC tablet Take 81 mg by mouth Daily.   12/5/2022   • atorvastatin (LIPITOR) 20 MG tablet Take 1 tablet by mouth Every Night. 90 tablet 3 Past Week   • bisacodyl (Dulcolax) 5 MG EC tablet Follow instructions given at office 4 tablet 0 12/11/2022 at 1900   • busPIRone (BUSPAR) 10 MG tablet Take 1 tablet by mouth 2 (Two) Times a Day As Needed (anxiety). 180 tablet 3 12/11/2022 at 0900   • cyclobenzaprine (FLEXERIL) 10 MG tablet Take 1 tablet by mouth 3 (Three) Times a Day. 270 tablet 3 Past Week   • Diclofenac Sodium (VOLTAREN) 1 % gel gel diclofenac 1 % topical gel   APPLY 1 GRAM TOPICALLY TO THE AFFECTED AREA TWICE DAILY AS NEEDED FOR PAIN   Past Week   • docusate sodium (COLACE) 100 MG capsule Take 1 capsule by mouth 2 (Two) Times a Day. 180 capsule 3 Past Week   • DULoxetine (CYMBALTA) 60 MG capsule Take 1 capsule by mouth Daily. 90 capsule 3 12/11/2022 at 0900   • fluticasone (FLONASE) 50 MCG/ACT nasal spray SHAKE LIQUID AND USE 1 TO 2 SPRAYS IN EACH NOSTRIL EVERY DAY AS NEEDED   12/11/2022 at 0900   • gabapentin (NEURONTIN) 600 MG tablet Take 600 mg by mouth 3 (Three) Times a Day.   12/11/2022 at 0900   • hydroxychloroquine (PLAQUENIL) 200 MG tablet Take 200 mg by mouth 2 (Two) Times a Day.   12/11/2022 at 0900   • lisinopril (PRINIVIL,ZESTRIL) 10 MG tablet Take 10 mg by mouth Every Night.   12/10/2022   • loratadine (CLARITIN) 10 MG tablet Take 10 mg by mouth Daily.   12/11/2022 at 0900   • meclizine (ANTIVERT) 12.5 MG tablet Take 1 tablet by mouth 3 (Three) Times a Day As Needed for Dizziness. 30 tablet 0 Past Week   • ondansetron ODT (ZOFRAN-ODT) 4 MG disintegrating tablet    Past Month   • oxyCODONE-acetaminophen (PERCOCET) 7.5-325 MG per tablet Take 1.5 tablets by mouth Daily.   12/11/2022 at 0900   • pantoprazole (PROTONIX) 20 MG EC tablet Take 2 tablets by mouth Daily. 90 tablet 3 12/11/2022 at  0900   • polyethylene glycol (GoLYTELY) 236 g solution Follow instructions given at office 4000 mL 0 12/12/2022 at 0600   • polyethylene glycol (MiraLax) 17 GM/SCOOP powder Take 17 g by mouth Daily. 510 g 2 12/12/2022 at 0600   • promethazine (PHENERGAN) 12.5 MG tablet Take 1 tablet by mouth Every 8 (Eight) Hours As Needed for Nausea or Vomiting. 30 tablet 1 Past Month   • rOPINIRole (REQUIP) 2 MG tablet TAKE 1 TABLET BY MOUTH EVERY DAY 1-3 HOURS BEFORE BEDTIME   12/10/2022   • VITAMIN E PO Take  by mouth.   12/11/2022 at 0900   • nitroglycerin (NITROSTAT) 0.4 MG SL tablet Place 0.4 mg under the tongue Every 5 (Five) Minutes As Needed.   Unknown       Allergies:  Allergies   Allergen Reactions   • Penicillins Rash        Vitals: Temp:  [97 °F (36.1 °C)] 97 °F (36.1 °C)  Heart Rate:  [97] 97  Resp:  [18] 18  BP: (120)/(76) 120/76    Review Of Systems:  Constitutional:  Negative for chills, fever, and unexpected weight change.  Respiratory:  Negative for cough, chest tightness, shortness of breath, and wheezing.  Cardiovascular:  Negative for chest pain, palpitations, and leg swelling.  Gastrointestinal:  Negative for abdominal distention, abdominal pain, Nausea, vomiting.  Neurological:  Negative for Weakness, numbness, and headaches.     Physical Exam:    General Appearance:  Alert, cooperative, in no acute distress.   Lungs:   Clear to auscultation, respirations regular, even and                 unlabored.   Heart:  Regular rhythm and normal rate.   Abdomen:   Normal bowel sounds, no masses, no organomegaly. Soft, non-tender, non-distended   Neurologic: Alert and oriented x 3. Moves all four limbs equally       Plan: COLONOSCOPY CPT CODE: 04752 (N/A)     Chaparro Marrero MD  12/12/2022

## 2022-12-12 NOTE — ANESTHESIA POSTPROCEDURE EVALUATION
Patient: Stephanie Jean    Procedure Summary     Date: 12/12/22 Room / Location: [facility] ENDOSCOPY 1 / [facility] ENDOSCOPY    Anesthesia Start: 1031 Anesthesia Stop: 1100    Procedure: COLONOSCOPY WITH BIOPSIES (Anus) Diagnosis:       Iron deficiency anemia, unspecified iron deficiency anemia type      Lower abdominal pain      (Iron deficiency anemia, unspecified iron deficiency anemia type [D50.9])      (Lower abdominal pain [R10.30])    Surgeons: Chaparro Marrero MD Provider: Reyes He CRNA    Anesthesia Type: MAC ASA Status: 3          Anesthesia Type: MAC    Vitals  No vitals data found for the desired time range.          Post Anesthesia Care and Evaluation    Patient location during evaluation: bedside  Patient participation: complete - patient participated  Level of consciousness: awake and alert  Pain score: 0  Pain management: adequate    Airway patency: patent  Anesthetic complications: No anesthetic complications  PONV Status: none  Cardiovascular status: acceptable  Respiratory status: acceptable  Hydration status: acceptable

## 2022-12-12 NOTE — DISCHARGE INSTRUCTIONS
Rest today  No pushing,pulling,tugging,heavy lifting, or strenuous activity   No major decision making,driving,or drinking alcoholic beverages for 24 hours due to the sedation you received  Always use good hand hygiene/washing technique  No driving on pain medication.     To assist you in voiding:  Drink plenty of fluids  Listen to running water while attempting to void.    If you are unable to urinate and you have an uncomfortable urge to void or it has been   6 hours since you were discharged, return to the Emergency Room.    - Discharge patient to home (ambulatory).   - Resume previous diet.   - Avoid NSAIDS  - Continue present medications.   - prednosone tapering  - biologic as op   - Await pathology results.   - Return to my office in 2 weeks.

## 2022-12-12 NOTE — ANESTHESIA PREPROCEDURE EVALUATION
Anesthesia Evaluation     Patient summary reviewed and Nursing notes reviewed   no history of anesthetic complications:  NPO Solid Status: > 8 hours  NPO Liquid Status: > 8 hours           Airway   Mallampati: II  TM distance: >3 FB  Neck ROM: full  No difficulty expected  Dental - normal exam     Pulmonary - normal exam   (+) a smoker Former,   Cardiovascular - normal exam    PT is on anticoagulation therapy    (+) hypertension, valvular problems/murmurs murmur, CAD, angina, hyperlipidemia,     ROS comment: ? Estimated left ventricular EF = 57% Left ventricular ejection fraction appears to be 56 - 60%. Left ventricular systolic function is normal.  ? Left ventricular diastolic function was normal.  ? Estimated right ventricular systolic pressure from tricuspid regurgitation is normal (<35 mmHg).      Neuro/Psych  (+) TIA, dizziness/light headedness, syncope, psychiatric history,    GI/Hepatic/Renal/Endo    (+) obesity,  GERD well controlled, GI bleeding ,     Musculoskeletal     (+) arthralgias, back pain, chronic pain, myalgias,   Abdominal   (+) obese,    Substance History   (+) drug use (marijuana use daily )     OB/GYN          Other   arthritis, autoimmune disease lupus and Sjogren syndrome,                        Anesthesia Plan    ASA 3     MAC     (Risks and benefits discussed including risk of aspiration, recall and dental damage. All patient questions answered.    Will continue with plan of care.)  intravenous induction     Anesthetic plan, risks, benefits, and alternatives have been provided, discussed and informed consent has been obtained with: patient.    Plan discussed with CRNA.

## 2022-12-14 LAB — REF LAB TEST METHOD: NORMAL

## 2022-12-28 ENCOUNTER — OFFICE VISIT (OUTPATIENT)
Dept: NEUROLOGY | Facility: CLINIC | Age: 67
End: 2022-12-28

## 2022-12-28 VITALS
WEIGHT: 149.4 LBS | TEMPERATURE: 98 F | HEIGHT: 62 IN | HEART RATE: 92 BPM | BODY MASS INDEX: 27.49 KG/M2 | DIASTOLIC BLOOD PRESSURE: 60 MMHG | OXYGEN SATURATION: 96 % | SYSTOLIC BLOOD PRESSURE: 110 MMHG

## 2022-12-28 DIAGNOSIS — G31.84 MILD NEUROCOGNITIVE DISORDER: Primary | ICD-10-CM

## 2022-12-28 DIAGNOSIS — R90.82 WHITE MATTER ABNORMALITY ON MRI OF BRAIN: ICD-10-CM

## 2022-12-28 PROCEDURE — 99214 OFFICE O/P EST MOD 30 MIN: CPT | Performed by: NURSE PRACTITIONER

## 2022-12-28 RX ORDER — POLYETHYLENE GLYCOL-3350 AND ELECTROLYTES 236; 6.74; 5.86; 2.97; 22.74 G/274.31G; G/274.31G; G/274.31G; G/274.31G; G/274.31G
POWDER, FOR SOLUTION ORAL
COMMUNITY
End: 2022-12-28

## 2022-12-28 RX ORDER — OXYCODONE AND ACETAMINOPHEN 7.5; 325 MG/1; MG/1
TABLET ORAL EVERY 12 HOURS SCHEDULED
COMMUNITY
End: 2022-12-28

## 2022-12-28 RX ORDER — BISACODYL 5 MG/1
TABLET, DELAYED RELEASE ORAL
COMMUNITY
End: 2022-12-29
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
## 2022-12-29 ENCOUNTER — LAB (OUTPATIENT)
Dept: LAB | Facility: HOSPITAL | Age: 67
End: 2022-12-29
Payer: MEDICARE

## 2022-12-29 ENCOUNTER — OFFICE VISIT (OUTPATIENT)
Dept: GASTROENTEROLOGY | Facility: CLINIC | Age: 67
End: 2022-12-29

## 2022-12-29 VITALS
WEIGHT: 145 LBS | BODY MASS INDEX: 26.68 KG/M2 | HEART RATE: 85 BPM | SYSTOLIC BLOOD PRESSURE: 124 MMHG | OXYGEN SATURATION: 95 % | HEIGHT: 62 IN | DIASTOLIC BLOOD PRESSURE: 60 MMHG

## 2022-12-29 DIAGNOSIS — D64.9 NORMOCYTIC ANEMIA: ICD-10-CM

## 2022-12-29 DIAGNOSIS — R10.84 GENERALIZED ABDOMINAL PAIN: ICD-10-CM

## 2022-12-29 DIAGNOSIS — K50.811 CROHN'S DISEASE OF BOTH SMALL AND LARGE INTESTINE WITH RECTAL BLEEDING: Chronic | ICD-10-CM

## 2022-12-29 DIAGNOSIS — K63.5 HYPERPLASTIC POLYP OF SIGMOID COLON: ICD-10-CM

## 2022-12-29 DIAGNOSIS — Z11.59 ENCOUNTER FOR SCREENING FOR OTHER VIRAL DISEASES: ICD-10-CM

## 2022-12-29 DIAGNOSIS — D12.6 TUBULAR ADENOMA OF COLON: ICD-10-CM

## 2022-12-29 DIAGNOSIS — R14.0 BLOATING: ICD-10-CM

## 2022-12-29 DIAGNOSIS — K50.811 CROHN'S DISEASE OF BOTH SMALL AND LARGE INTESTINE WITH RECTAL BLEEDING: Primary | Chronic | ICD-10-CM

## 2022-12-29 DIAGNOSIS — K50.80 CROHN'S DISEASE OF BOTH SMALL AND LARGE INTESTINE WITHOUT COMPLICATION: ICD-10-CM

## 2022-12-29 PROBLEM — K92.1 BLOODY STOOL: Status: RESOLVED | Noted: 2022-08-08 | Resolved: 2022-12-29

## 2022-12-29 PROBLEM — A09 INFECTIVE COLITIS: Status: RESOLVED | Noted: 2022-08-08 | Resolved: 2022-12-29

## 2022-12-29 PROBLEM — D62 ACUTE BLOOD LOSS ANEMIA: Status: RESOLVED | Noted: 2022-08-08 | Resolved: 2022-12-29

## 2022-12-29 PROBLEM — R10.30 LOWER ABDOMINAL PAIN: Status: RESOLVED | Noted: 2022-12-06 | Resolved: 2022-12-29

## 2022-12-29 LAB
ALBUMIN SERPL-MCNC: 4.5 G/DL (ref 3.5–5.2)
ALBUMIN/GLOB SERPL: 2 G/DL
ALP SERPL-CCNC: 64 U/L (ref 39–117)
ALT SERPL W P-5'-P-CCNC: 16 U/L (ref 1–33)
ANION GAP SERPL CALCULATED.3IONS-SCNC: 6 MMOL/L (ref 5–15)
AST SERPL-CCNC: 21 U/L (ref 1–32)
BASOPHILS # BLD AUTO: 0.02 10*3/MM3 (ref 0–0.2)
BASOPHILS NFR BLD AUTO: 0.3 % (ref 0–1.5)
BILIRUB SERPL-MCNC: 0.2 MG/DL (ref 0–1.2)
BUN SERPL-MCNC: 25 MG/DL (ref 8–23)
BUN/CREAT SERPL: 22.9 (ref 7–25)
CALCIUM SPEC-SCNC: 9.1 MG/DL (ref 8.6–10.5)
CHLORIDE SERPL-SCNC: 103 MMOL/L (ref 98–107)
CO2 SERPL-SCNC: 29 MMOL/L (ref 22–29)
CREAT SERPL-MCNC: 1.09 MG/DL (ref 0.57–1)
CRP SERPL-MCNC: <0.3 MG/DL (ref 0–0.5)
DEPRECATED RDW RBC AUTO: 44.6 FL (ref 37–54)
EGFRCR SERPLBLD CKD-EPI 2021: 56.5 ML/MIN/1.73
EOSINOPHIL # BLD AUTO: 0.02 10*3/MM3 (ref 0–0.4)
EOSINOPHIL NFR BLD AUTO: 0.3 % (ref 0.3–6.2)
ERYTHROCYTE [DISTWIDTH] IN BLOOD BY AUTOMATED COUNT: 13.3 % (ref 12.3–15.4)
GLOBULIN UR ELPH-MCNC: 2.2 GM/DL
GLUCOSE SERPL-MCNC: 104 MG/DL (ref 65–99)
HBV SURFACE AB SER RIA-ACNC: REACTIVE
HBV SURFACE AG SERPL QL IA: NORMAL
HCT VFR BLD AUTO: 36.7 % (ref 34–46.6)
HCV AB SER DONR QL: NORMAL
HGB BLD-MCNC: 12.3 G/DL (ref 12–15.9)
IMM GRANULOCYTES # BLD AUTO: 0.03 10*3/MM3 (ref 0–0.05)
IMM GRANULOCYTES NFR BLD AUTO: 0.4 % (ref 0–0.5)
LYMPHOCYTES # BLD AUTO: 1.6 10*3/MM3 (ref 0.7–3.1)
LYMPHOCYTES NFR BLD AUTO: 20.9 % (ref 19.6–45.3)
MCH RBC QN AUTO: 30.8 PG (ref 26.6–33)
MCHC RBC AUTO-ENTMCNC: 33.5 G/DL (ref 31.5–35.7)
MCV RBC AUTO: 92 FL (ref 79–97)
MONOCYTES # BLD AUTO: 0.37 10*3/MM3 (ref 0.1–0.9)
MONOCYTES NFR BLD AUTO: 4.8 % (ref 5–12)
NEUTROPHILS NFR BLD AUTO: 5.63 10*3/MM3 (ref 1.7–7)
NEUTROPHILS NFR BLD AUTO: 73.3 % (ref 42.7–76)
NRBC BLD AUTO-RTO: 0 /100 WBC (ref 0–0.2)
PLATELET # BLD AUTO: 179 10*3/MM3 (ref 140–450)
PMV BLD AUTO: 9.8 FL (ref 6–12)
POTASSIUM SERPL-SCNC: 4.4 MMOL/L (ref 3.5–5.2)
PROT SERPL-MCNC: 6.7 G/DL (ref 6–8.5)
RBC # BLD AUTO: 3.99 10*6/MM3 (ref 3.77–5.28)
SODIUM SERPL-SCNC: 138 MMOL/L (ref 136–145)
WBC NRBC COR # BLD: 7.67 10*3/MM3 (ref 3.4–10.8)

## 2022-12-29 PROCEDURE — 86480 TB TEST CELL IMMUN MEASURE: CPT

## 2022-12-29 PROCEDURE — 80053 COMPREHEN METABOLIC PANEL: CPT

## 2022-12-29 PROCEDURE — 85025 COMPLETE CBC W/AUTO DIFF WBC: CPT

## 2022-12-29 PROCEDURE — 86140 C-REACTIVE PROTEIN: CPT

## 2022-12-29 PROCEDURE — 86803 HEPATITIS C AB TEST: CPT

## 2022-12-29 PROCEDURE — 86706 HEP B SURFACE ANTIBODY: CPT

## 2022-12-29 PROCEDURE — 36415 COLL VENOUS BLD VENIPUNCTURE: CPT

## 2022-12-29 PROCEDURE — 86704 HEP B CORE ANTIBODY TOTAL: CPT

## 2022-12-29 PROCEDURE — 86708 HEPATITIS A ANTIBODY: CPT

## 2022-12-29 PROCEDURE — 87340 HEPATITIS B SURFACE AG IA: CPT

## 2022-12-29 PROCEDURE — 99214 OFFICE O/P EST MOD 30 MIN: CPT | Performed by: PHYSICIAN ASSISTANT

## 2022-12-29 PROCEDURE — 82652 VIT D 1 25-DIHYDROXY: CPT

## 2022-12-29 RX ORDER — PREDNISONE 10 MG/1
TABLET ORAL
Qty: 46 TABLET | Refills: 0 | Status: SHIPPED | OUTPATIENT
Start: 2022-12-29 | End: 2023-02-01

## 2022-12-29 NOTE — PROGRESS NOTES
Follow Up Note     Date: 2022   Patient Name: Stephanie Jean  MRN: [id number]  : 1955     Primary Care Provider: Nhung Shepard DO     Chief Complaint   Patient presents with   • Results     Colonoscopy and PillCam   • Abdominal Pain   • Diarrhea   • Rectal Bleeding     History of present illness:   2022  Stephanie Jean is a 67 y.o. female who is here today for follow up regarding Results (Colonoscopy and PillCam), Abdominal Pain, Diarrhea, and Rectal Bleeding.    She had pillcam back in 2022 then had colonoscopy a few weeks after and would like to discuss those results. She has been taking prednisone taper as directed. She is now on 20 mg prednisone daily and over the last few days noticed previous symptoms of bloating, generalized abdominal discomfort, diarrhea and rectal bleeding with mucous in stools returning. She does have RA and her rheumatologist has mentioned previously she may benefit from a medication such as humira.  Denies any recent skip days between stools. No vomiting.     Interval History:  2020  Stephanie Jean is a 64 y.o. female who is here today to establish care with Gastroenterology for evaluation of heartburn.  History of upper abdominal pain mainly in the epigastric region since about 2-3 months. Pain started gradually, intermittent, aching pain lasting for about few minutes and occasionally longer.  She was given dexilant and sucralfate which did not help initially and later on changed to pantoprazole.  She is on pantoprazole now with reasonable control of pain and reflux symptoms with once in few days but for the last 2 weeks she did not take any PPI. Associated significant heart burn.   She was on PPI before after she was noted to have gastritis on EGD about 10 years ago. Stopped herself year ago. Occasional problem with swallowing to solid food. She also has sjogrens syndrome.   She was mostly constipated now. Bowel movements every once in 2-3 days  "and occasionally once in 3-5 days. No lower abdominal pain.      There is no history of  hematochezia or melena. There is no history of anemia. Prior history of EGD about 10 yrs ago. Last colonoscopy in Jan 2020 about 4 polyps removed and advised to repeat in 3 years time. Brother had UC and uncle from mother's side had colon Ca. No history of any abdominal surgery except hysterectomy. Denies alcohol abuse or cigarette smoking.  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\"   • Dysphagia    • Elevated cholesterol    • Family history of colon cancer    • Fibromyalgia    • Fibromyalgia, primary 2003   • GERD (gastroesophageal reflux disease)    • GI (gastrointestinal bleed) 8/8/2022   • Heart murmur    • Hoarseness    • Hyperlipidemia    • Hypertension 2021   • Inflammatory bowel disease 2022    recent hospitalization for colitis   • Irritable bowel syndrome    • Low back pain 2003    treated by [facility]   • Lupus (HCC)    • Marijuana use     Daily    • Osteopenia 2021    Osteopenia   • Pulmonary nodule    • Sjogren's disease (HCC)    • Urinary tract infection 2022   • Vasovagal syncope      Past Surgical History:   Procedure Laterality Date   • ABDOMINAL SURGERY  1998    [facility]   • APPENDECTOMY     • BREAST SURGERY  1975    lumpectomy 1975   • CATARACT EXTRACTION W/ INTRAOCULAR LENS IMPLANT Left 12/20/2021    Procedure: CATARACT PHACO EXTRACTION WITH INTRAOCULAR LENS IMPLANT LEFT COMPLICATED WITH MALYUGIN RING;  Surgeon: Zachary Landers MD;  Location: [facility];  Service: Ophthalmology;  Laterality: Left;   • CATARACT EXTRACTION W/ INTRAOCULAR LENS IMPLANT Right 01/03/2022    Procedure: CATARACT PHACO EXTRACTION WITH INTRAOCULAR LENS IMPLANT RIGHT;  " Surgeon: Zachary Landers MD;  Location: [facility] OR;  Service: Ophthalmology;  Laterality: Right;   • COLONOSCOPY     • COLONOSCOPY N/A 12/12/2022    Procedure: COLONOSCOPY WITH BIOPSIES AND POLYPECTOMY;  Surgeon: Chaparro Marrero MD;  Location: [facility];  Service: Gastroenterology;  Laterality: N/A;   • ENDOSCOPY     • ENDOSCOPY N/A 06/01/2020    Procedure: ESOPHAGOGASTRODUODENOSCOPY;  Surgeon: Chaparro Marrero MD;  Location: [facility];  Service: Gastroenterology;  Laterality: N/A;   • EYE SURGERY  2021-22    cataract removal   • HYSTERECTOMY     • TOTAL ABDOMINAL HYSTERECTOMY WITH SALPINGO OOPHORECTOMY  1998   • TUBAL ABDOMINAL LIGATION  1984   • UPPER GASTROINTESTINAL ENDOSCOPY       Family History   Problem Relation Age of Onset   • Hypertension Mother    • Aneurysm Mother    • Heart disease Mother         passed away at 54 yr old after 5 vessel bypass   • Hyperlipidemia Mother    • Stroke Mother         aneurysm ruptured   • Emphysema Father    • COPD Father    • Ulcerative colitis Brother    • Alcohol abuse Brother    • Cancer Brother         stomach cancer   • COPD Brother    • Hyperlipidemia Brother    • Liver disease Brother    • Cirrhosis Brother    • Colon polyps Brother    • Stomach cancer Brother    • Colon cancer Maternal Uncle    • Arthritis Maternal Uncle    • Cancer Maternal Aunt         breast cancer   • Cancer Sister         breast   • COPD Paternal Uncle    • Early death Brother         ulcerative colitis   • Learning disabilities Brother         mental retardation   • Mental illness Brother         schizophrenia   • Ulcerative colitis Brother    • Kidney disease Paternal Aunt         nephrectomy   • Liver cancer Neg Hx    • Crohn's disease Neg Hx      Social History     Socioeconomic History   • Marital status:    Tobacco Use   • Smoking status: Former     Packs/day: 1.00     Years: 40.00     Pack years: 40.00     Types: Cigarettes     Quit date: 1/1/2018      Years since quittin.9   • Smokeless tobacco: Never   Vaping Use   • Vaping Use: Never used   Substance and Sexual Activity   • Alcohol use: No   • Drug use: Never     Types: Marijuana     Comment: OCCASIONALLY   • Sexual activity: Yes     Partners: Male     Birth control/protection: Hysterectomy     Comment: [facility]       Current Outpatient Medications:   •  Ascorbic Acid (VITAMIN C PO), Take 500 mg by mouth As Needed., Disp: , Rfl:   •  aspirin 81 MG EC tablet, Take 81 mg by mouth. Every other day, Disp: , Rfl:   •  atorvastatin (LIPITOR) 20 MG tablet, Take 1 tablet by mouth Every Night., Disp: 90 tablet, Rfl: 3  •  busPIRone (BUSPAR) 10 MG tablet, Take 1 tablet by mouth 2 (Two) Times a Day As Needed (anxiety)., Disp: 180 tablet, Rfl: 3  •  cyclobenzaprine (FLEXERIL) 10 MG tablet, Take 1 tablet by mouth 3 (Three) Times a Day., Disp: 270 tablet, Rfl: 3  •  Diclofenac Sodium (VOLTAREN) 1 % gel gel, diclofenac 1 % topical gel  APPLY 1 GRAM TOPICALLY TO THE AFFECTED AREA TWICE DAILY AS NEEDED FOR PAIN, Disp: , Rfl:   •  docusate sodium (COLACE) 100 MG capsule, Take 1 capsule by mouth 2 (Two) Times a Day., Disp: 180 capsule, Rfl: 3  •  DULoxetine (CYMBALTA) 60 MG capsule, Take 1 capsule by mouth Daily., Disp: 90 capsule, Rfl: 3  •  fluticasone (FLONASE) 50 MCG/ACT nasal spray, SHAKE LIQUID AND USE 1 TO 2 SPRAYS IN EACH NOSTRIL EVERY DAY AS NEEDED, Disp: , Rfl:   •  gabapentin (NEURONTIN) 600 MG tablet, Take 600 mg by mouth 3 (Three) Times a Day., Disp: , Rfl:   •  hydroxychloroquine (PLAQUENIL) 200 MG tablet, Take 200 mg by mouth 2 (Two) Times a Day., Disp: , Rfl:   •  lisinopril (PRINIVIL,ZESTRIL) 10 MG tablet, Take 10 mg by mouth Every Night., Disp: , Rfl:   •  loratadine (CLARITIN) 10 MG tablet, Take 10 mg by mouth As Needed., Disp: , Rfl:   •  meclizine (ANTIVERT) 12.5 MG tablet, Take 1 tablet by mouth 3 (Three) Times a Day As Needed for Dizziness., Disp: 30 tablet, Rfl: 0  •  nitroglycerin (NITROSTAT) 0.4 MG SL  tablet, Place 0.4 mg under the tongue Every 5 (Five) Minutes As Needed., Disp: , Rfl:   •  ondansetron ODT (ZOFRAN-ODT) 4 MG disintegrating tablet, , Disp: , Rfl:   •  oxyCODONE-acetaminophen (PERCOCET) 7.5-325 MG per tablet, Take 1.5 tablets by mouth Daily., Disp: , Rfl:   •  pantoprazole (PROTONIX) 20 MG EC tablet, Take 2 tablets by mouth Daily., Disp: 90 tablet, Rfl: 3  •  predniSONE (DELTASONE) 10 MG tablet, Take 20 mg once daily for 1 week, 10 mg once daily for 1 week then 0.5 mg once daily for 1 week, Disp: 46 tablet, Rfl: 0  •  promethazine (PHENERGAN) 12.5 MG tablet, Take 1 tablet by mouth Every 8 (Eight) Hours As Needed for Nausea or Vomiting., Disp: 30 tablet, Rfl: 1  •  rOPINIRole (REQUIP) 2 MG tablet, TAKE 1 TABLET BY MOUTH EVERY DAY 1-3 HOURS BEFORE BEDTIME, Disp: , Rfl:   •  VITAMIN E PO, Take  by mouth., Disp: , Rfl:     Allergies   Allergen Reactions   • Penicillins Rash     The following portions of the patient's history were reviewed and updated as appropriate: allergies, current medications, past family history, past medical history, past social history, past surgical history and problem list.    Objective     Physical Exam  Vitals reviewed.   Constitutional:       General: She is not in acute distress.     Appearance: Normal appearance. She is well-developed. She is not ill-appearing or diaphoretic.   HENT:      Head: Normocephalic and atraumatic.      Right Ear: External ear normal.      Left Ear: External ear normal.      Nose: Nose normal.      Mouth/Throat:      Comments: Wearing a mask  Eyes:      General: No scleral icterus.        Right eye: No discharge.         Left eye: No discharge.      Conjunctiva/sclera: Conjunctivae normal.   Neck:      Vascular: No JVD.   Cardiovascular:      Rate and Rhythm: Normal rate and regular rhythm.      Heart sounds: Normal heart sounds. No murmur heard.    No friction rub. No gallop.   Pulmonary:      Effort: Pulmonary effort is normal. No respiratory  "distress.      Breath sounds: Normal breath sounds. No wheezing or rales.   Chest:      Chest wall: No tenderness.   Abdominal:      General: Bowel sounds are normal. There is no distension.      Palpations: Abdomen is soft. There is no mass.      Tenderness: There is abdominal tenderness (generalized mild but moderate in bilateral lower quadrants and right abdomen). There is no guarding.   Musculoskeletal:         General: Deformity (bilateral hands, joint enlargement of all fingers) present. Normal range of motion.      Cervical back: Normal range of motion.   Skin:     General: Skin is warm and dry.      Findings: No erythema or rash.   Neurological:      Mental Status: She is alert and oriented to person, place, and time.      Coordination: Coordination normal.   Psychiatric:         Mood and Affect: Mood normal.         Behavior: Behavior normal.         Thought Content: Thought content normal.         Judgment: Judgment normal.       Vitals:    12/29/22 0958   BP: 124/60   Pulse: 85   SpO2: 95%   Weight: 65.8 kg (145 lb)   Height: 157.5 cm (62\")     Results Review:   I reviewed the patient's new clinical results.    Labs 11/2022: iron 44, folate 16.4, Vit B12 444.    US Abdomen Complete  Result Date: 12/8/2022  Unremarkable abdominal ultrasound.    CT ABDOMEN PELVIS WO CONTRAST Additional Contrast? None  Result Date: 10/17/2022  1. No acute abnormality. 2. 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 3. Severe vascular calcifications. 4. L5 pars defects with grade 1 anterolisthesis and severe bilateral L5 foraminal stenosis.    11/15/2022 PillCam: Few scattered gastric erosions. Multiple small and medium size healing erosions and superficial ulcerations identified throughout the ileum. No deep ulcerations except one small proximal ileal ulceration. These findings with anemia and recent colitis is more suggestive of ileocolonic crohn's disease.    Colonoscopy by Dr. Marrero 12/12/2022:  - The " perianal and digital rectal examinations were normal.  - A 3 mm polyp was found in the cecum. The polyp was sessile. The polyp was removed with a cold snare. Resection and retrieval were complete.  - A 4 mm polyp was found in the sigmoid colon. The polyp was flat. The polyp was removed with a cold snare. Resection and retrieval were complete.  - Diffuse moderate inflammation characterized by altered vascularity, congestion (edema), erosions, erythema, friability and granularity was found in the rectum, in the recto-sigmoid colon, in the sigmoid colon and in the descending colon. Biopsies were taken with a cold forceps for histology.  - A medium healed ulcer was found in the mid descending colon. The scar tissue was healthy in appearance.  - The transverse colon, hepatic flexure, ascending colon, cecum, appendiceal orifice and ileocecal valve appeared normal. Biopsies were taken with a cold forceps for histology.  - The terminal ileum appeared normal. Biopsies were taken with a cold forceps for histology.  Pathology DIAGNOSIS:   A.   TERMINAL ILEUM BIOPSY:   Benign small bowel, consistent with terminal ileum   Negative for significant architectural distortion, inflammatory infiltrate,   neoplasia, malignancy   B.   CECUM BIOPSY:   Benign colonic mucosa, negative for significant architectural distortion,   inflammatory infiltrate, neoplasia, dysplasia, malignancy   C.   ASCENDING COLON BIOPSY:   Benign colonic mucosa, negative for significant architectural distortion,   inflammatory infiltrate, neoplasia, dysplasia, malignancy   D.   TRANSVERSE COLON BIOPSY:   Benign colonic mucosa, negative for significant architectural distortion,   inflammatory infiltrate, neoplasia, dysplasia, malignancy   E.   DESCENDING COLON BIOPSY:   Minimal active colitis without significant accompanying chronicity   Negative for dysplasia, neoplasia, malignancy   See comment   F.   SIGMOID COLON BIOPSY:   Minimal active colitis without  significant chronicity   Negative for dysplasia, neoplasia,malignancy   See comment   G.   RECTAL BIOPSY:   Minimal active colitis without significant chronicity   Negative for dysplasia, neoplasia, malignancy   See comment   H.   CECUM POLYP:   Tubular adenoma   Negative for high-grade dysplasia/malignancy   I.   SIGMOID COLON POLYP:   Hyperplastic polyp   Accompanying inflammatory changes   COMMENT:  E-G.  There is minimal active colitis with only rare neutrophils   identified within the mucosal surfaces.  There is no evidence of viral cytopathic effect, significant chronic alteration, dysplasia, neoplasia, malignancy, or granulomatous inflammation.  These findings are not specific and could be seen with an infectious process, acute self-limited colitis, or even potentially inflammatory bowel disease in the appropriate clinical/endoscopic setting. Clinical correlation is needed.     Assessment / Plan      1. Crohn's disease of both small and large intestine with rectal bleeding   2. Bloating  3. Generalized abdominal pain  4. Normocytic anemia  After review of recent pillcam and colonoscopy, it seems that she has chronic inflammation which may have been contributing to her anemia throughout the years. Some element of anemia of chronic disease suspected with her rheumatoid arthritis. She had erosions and ulceration in the small intestine on PillCam. Colonoscopy and colon biopsies show active colitis. History and endoscopic findings consistent with a new diagnosis of Crohn's disease. She is currently complaining of bloating, mucous in stools, mild rectal bleeding and loose stools. This is a change in bowel habits from her previous constipation.     Initially diagnosed with colitis in August 2022 with bloody diarrhea.  CT abdomen pelvis done with contrast in August 2022 revealed a left-sided colon colitis.  Subsequently she had a colonoscopy done by Dr. Morel on 8/17/2022 which did not reveal any endoscopic signs  of colitis.  However TI was not visualized.  Random colon biopsies done were all normal without any signs of colitis.  Prior EGD done in 2020 did not reveal any upper GI source of bleeding.     Her brother has ulcerative colitis. Previously with reports of constipation at baseline. Now having stools daily. In the past, she took MiraLAX, senna with relief. Had taken Linzess and Movantik before which did not help her. Serum porphyrin normal.     Stool testing  Labs today  Avoid NSAIDs  Will extend prednisone taper as directed below  After results reviewed, will prescribe biologic, likely Hugh  Will discuss case in detail with Dr. Marrero  Start daily multi vitamin with iron    - Calprotectin, Fecal - Stool, Per Rectum; Future    - Comprehensive Metabolic Panel; Future  - CBC Auto Differential; Future  - C-reactive Protein; Future  - Hepatitis C Antibody; Future  - Hepatitis B Surface Antigen; Future  - Hepatitis B Surface Antibody; Future  - Hepatitis B Core Antibody, Total; Future  - Hepatitis A Antibody, Total; Future  - TPMT Genetics; Future  - Calcitriol (1,25 di-OH Vitamin D); Future    - predniSONE (DELTASONE) 10 MG tablet; Take 30 mg PO once daily for 1 week, 20 mg once daily for 1 week, 10 mg once daily for 1 week then 0.5 mg once daily for 1 week  Dispense: 46 tablet; Refill: 0    5. Tubular adenoma of colon  6. Hyperplastic polyp of sigmoid colon  Colonoscopy 12/2022 had 2 small polyps, 1 was tubular adenoma without dysplasia and other was hyperplastic. Colonoscopy in January 2018 [facility].  Per patient for benign polyps removed less than 1 cm in size. Colonoscopy by Dr. Morel in August 2022 and no polyps removed. Uncle had a colon cancer.  No family history of any colon cancer with a first-degree relatives.  Due to new diagnosis of IBD, she will likely need repeat colonoscopy in 1-2 years depending on clinical course.                    Prior history:  Gastroesophageal reflux disease  without esophagitis  Was previously taking Protonix 40 mg p.o. daily due to worse reflux symptoms but this has been decreased to 20 mg once daily. Now with good symptom control.   EGD done on 6/1/2020 which revealed a mild erythematous gastric mucosa,  biopsies were suggestive for reactive gastropathy without any signs of H. pylori infection. There was a single polyp identified in the duodenal bulb which was resected and pathology is more consistent with a chronic peptic duodenitis.     Thrombocytopenia  Elevated liver enzymes/coagulopathy  Patient had brief mildly elevated ALT and alk phos in the past but none recently. Bili has been normal. PT/INR done in August 2022 revealed INR of 1.2 but recently normal. CT scan done in August 2022 revealed mild ascites. Has borderline thrombocytopenia with platelets 186,000 11/2022. US liver showed no signs of cirrhosis. HERNANDEZ fibroSURE F0, S0-1, N0.       Follow Up:   Keep next scheduled appt in Feb 2023 with Dr. Marrero to re-check Crohn's disease.       Nicole Mirza PA-C  [facility]  12/30/2022  10:06 EST    Dictated Utilizing Dragon Dictation: Part of this note may be an electronic transcription/translation of spoken language to printed text using the Dragon Dictation System.

## 2022-12-29 NOTE — PATIENT INSTRUCTIONS
Multi-vitamin with iron suggested  Labs   Stool testing, return to lab within 4 hours of stool, lab closes at 6pm  Will consider humira if insurance approves after lab results are reviewed

## 2022-12-30 ENCOUNTER — TRANSCRIBE ORDERS (OUTPATIENT)
Dept: ADMINISTRATIVE | Facility: HOSPITAL | Age: 67
End: 2022-12-30
Payer: MEDICARE

## 2022-12-30 DIAGNOSIS — M54.12 RADICULOPATHY, CERVICAL: Primary | ICD-10-CM

## 2022-12-30 LAB
HAV AB SER QL IA: NEGATIVE
HBV CORE AB SERPL QL IA: NEGATIVE

## 2022-12-31 LAB
1,25(OH)2D SERPL-MCNC: 18.3 PG/ML (ref 24.8–81.5)
GAMMA INTERFERON BACKGROUND BLD IA-ACNC: 0.02 IU/ML
M TB IFN-G BLD-IMP: NEGATIVE
M TB IFN-G CD4+ BCKGRND COR BLD-ACNC: 0.02 IU/ML
M TB IFN-G CD4+CD8+ BCKGRND COR BLD-ACNC: 0.02 IU/ML
MITOGEN IGNF BLD-ACNC: >10 IU/ML
QUANTIFERON INCUBATION: NORMAL
SERVICE CMNT-IMP: NORMAL

## 2023-01-02 PROCEDURE — 83993 ASSAY FOR CALPROTECTIN FECAL: CPT

## 2023-01-03 ENCOUNTER — PATIENT ROUNDING (BHMG ONLY) (OUTPATIENT)
Dept: NEUROLOGY | Facility: CLINIC | Age: 68
End: 2023-01-03
Payer: MEDICARE

## 2023-01-05 LAB — CALPROTECTIN STL-MCNT: 25 UG/G (ref 0–120)

## 2023-01-06 ENCOUNTER — TELEPHONE (OUTPATIENT)
Dept: GASTROENTEROLOGY | Facility: CLINIC | Age: 68
End: 2023-01-06
Payer: MEDICARE

## 2023-01-06 DIAGNOSIS — K50.811 CROHN'S DISEASE OF BOTH SMALL AND LARGE INTESTINE WITH RECTAL BLEEDING: Primary | ICD-10-CM

## 2023-01-06 RX ORDER — ADALIMUMAB 80MG/0.8ML
80 KIT SUBCUTANEOUS TAKE AS DIRECTED
Qty: 2.4 ML | Refills: 0
Start: 2023-01-06 | End: 2023-03-17 | Stop reason: SDUPTHER

## 2023-01-06 NOTE — TELEPHONE ENCOUNTER
Please let pt know we will be starting process for approval for Humira bi-weekly injection for treatment of her Crohn's disease. I spoke with Dr. Marrero regarding her case and he recommends that she go ahead and get her first zoster (shingles) vaccine if she has not gotten it already.     I have ordered Humira, please ck on approval. (Rx placed but was not sent).

## 2023-01-10 LAB
DIAGNOSTIC IMP SPEC-IMP: NORMAL
LABORATORY COMMENT REPORT: NORMAL
TPMT GENE MUT ANL BLD/T: NORMAL
TPMT GENETIC TEST: NORMAL

## 2023-01-11 ENCOUNTER — TELEPHONE (OUTPATIENT)
Dept: INTERNAL MEDICINE | Facility: CLINIC | Age: 68
End: 2023-01-11
Payer: MEDICARE

## 2023-01-11 NOTE — TELEPHONE ENCOUNTER
Please contact patient regarding labs ordered by GI.  Please see if anyone has discussed her Vitamin D level.  It appears to be pretty low.  I would recommend taking 50,000IU daily.

## 2023-01-12 RX ORDER — ERGOCALCIFEROL 1.25 MG/1
50000 CAPSULE ORAL WEEKLY
Qty: 15 CAPSULE | Refills: 3 | Status: SHIPPED | OUTPATIENT
Start: 2023-01-12

## 2023-01-12 NOTE — TELEPHONE ENCOUNTER
Spoke with patient, she had NOT been notified of the low D level.  Is agreeable to starting 50,000 IU.

## 2023-01-16 DIAGNOSIS — K50.811 CROHN'S DISEASE OF BOTH SMALL AND LARGE INTESTINE WITH RECTAL BLEEDING: Primary | ICD-10-CM

## 2023-01-23 RX ORDER — ATORVASTATIN CALCIUM 20 MG/1
20 TABLET, FILM COATED ORAL NIGHTLY
Qty: 90 TABLET | Refills: 3 | OUTPATIENT
Start: 2023-01-23

## 2023-01-23 NOTE — TELEPHONE ENCOUNTER
Rx Refill Note  Requested Prescriptions     Pending Prescriptions Disp Refills   • atorvastatin (LIPITOR) 20 MG tablet [Pharmacy Med Name: ATORVASTATIN 20MG TABLETS] 90 tablet 3     Sig: TAKE 1 TABLET BY MOUTH EVERY NIGHT      Last office visit with prescribing clinician: 11/17/2022   Last telemedicine visit with prescribing clinician:   Next office visit with prescribing clinician: 3/17/2023     Called pt pharmacy to make sure pharmacy had medication. They do have refills. Called pt left vm letting her know pharmacy has medication on hand      Bonnie Grubbs MA  01/23/23, 11:56 EST

## 2023-01-30 ENCOUNTER — HOSPITAL ENCOUNTER (OUTPATIENT)
Dept: MRI IMAGING | Facility: HOSPITAL | Age: 68
Discharge: HOME | End: 2023-01-30
Payer: MEDICARE

## 2023-01-30 DIAGNOSIS — M54.12 RADICULOPATHY, CERVICAL: ICD-10-CM

## 2023-01-30 PROCEDURE — 72141 MRI NECK SPINE W/O DYE: CPT

## 2023-02-01 ENCOUNTER — OFFICE VISIT (OUTPATIENT)
Dept: GASTROENTEROLOGY | Facility: CLINIC | Age: 68
End: 2023-02-01
Payer: MEDICARE

## 2023-02-01 VITALS
TEMPERATURE: 98.4 F | DIASTOLIC BLOOD PRESSURE: 67 MMHG | HEART RATE: 91 BPM | SYSTOLIC BLOOD PRESSURE: 114 MMHG | WEIGHT: 148 LBS | BODY MASS INDEX: 26.22 KG/M2 | RESPIRATION RATE: 16 BRPM | HEIGHT: 63 IN

## 2023-02-01 DIAGNOSIS — R11.0 NAUSEA: ICD-10-CM

## 2023-02-01 DIAGNOSIS — K21.9 GASTROESOPHAGEAL REFLUX DISEASE WITHOUT ESOPHAGITIS: ICD-10-CM

## 2023-02-01 DIAGNOSIS — K52.9 ARTHROPATHY ASSOCIATED WITH INFLAMMATORY BOWEL DISEASE: ICD-10-CM

## 2023-02-01 DIAGNOSIS — K59.03 DRUG-INDUCED CONSTIPATION: ICD-10-CM

## 2023-02-01 DIAGNOSIS — K50.811 CROHN'S DISEASE OF BOTH SMALL AND LARGE INTESTINE WITH RECTAL BLEEDING: Primary | ICD-10-CM

## 2023-02-01 DIAGNOSIS — Z86.2 HISTORY OF IRON DEFICIENCY ANEMIA: ICD-10-CM

## 2023-02-01 DIAGNOSIS — M12.9 ARTHROPATHY ASSOCIATED WITH INFLAMMATORY BOWEL DISEASE: ICD-10-CM

## 2023-02-01 PROCEDURE — 99214 OFFICE O/P EST MOD 30 MIN: CPT | Performed by: INTERNAL MEDICINE

## 2023-02-01 RX ORDER — AZATHIOPRINE 50 MG/1
100 TABLET ORAL DAILY
Qty: 180 TABLET | Refills: 1 | Status: SHIPPED | OUTPATIENT
Start: 2023-02-01

## 2023-02-01 NOTE — PROGRESS NOTES
Follow Up Note     Date: 2023   Patient Name: Stephanie Jean  MRN: [id number]  : 1955     Referring Physician: Nhnug Shepard DO    Chief Complaint:    Chief Complaint   Patient presents with   • Crohn's Disease   • Abdominal Pain   • Anemia   • Bloated       Interval History:   2023  Stephanie Jean is a 67 y.o. female who is here today for follow up for Crohn's disease, abdominal pain constipation.  She states that after she was treated with a steroid her symptoms of markedly improved.  No significant abdominal pain.  She has been having bowel movement once in 2 to 3 days firm.  Stool mixed with mucus and tinge of red blood sometimes.  Arrangements covered Humira however her co-pay was over $3500 and she could not afford.  She still gets significant reflux symptoms with regurgitation and nausea.    2020  Stephanie Jean is a 64 y.o. female who is here today to establish care with Gastroenterology for evaluation of heartburn.  History of upper abdominal pain mainly in the epigastric region since about 2-3 months. Pain started gradually, intermittent, aching pain lasting for about few minutes and occasionally longer.  She was given dexilant and sucralfate which did not help initially and later on changed to pantoprazole.  She is on pantoprazole now with reasonable control of pain and reflux symptoms with once in few days but for the last 2 weeks she did not take any PPI. Associated significant heart burn.   She was on PPI before after she was noted to have gastritis on EGD about 10 years ago. Stopped herself year ago. Occasional problem with swallowing to solid food. She also has sjogrens syndrome.   She was mostly constipated now. Bowel movements every once in 2-3 days and occasionally once in 3-5 days. No lower abdominal pain.      There is no history of  hematochezia or melena. There is no history of anemia. Prior history of EGD about 10 yrs ago. Last colonoscopy in 2020 about  "4 polyps removed and advised to repeat in 3 years time. Brother had UC and uncle from mother's side had colon Ca. No history of any abdominal surgery except hysterectomy. Denies alcohol abuse or cigarette smoking.  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\"   • Dysphagia    • Elevated cholesterol    • Family history of colon cancer    • Fibromyalgia    • Fibromyalgia, primary 2003   • GERD (gastroesophageal reflux disease)    • GI (gastrointestinal bleed) 8/8/2022   • Heart murmur    • Hoarseness    • Hyperlipidemia    • Hypertension 2021   • Inflammatory bowel disease 2022    recent hospitalization for colitis   • Irritable bowel syndrome    • Low back pain 2003    [facility] by [facility]   • Lupus (HCC)    • Marijuana use     Daily    • Osteopenia 2021    Osteopenia   • Pulmonary nodule    • Sjogren's disease (HCC)    • Urinary tract infection 2022   • Vasovagal syncope      Past Surgical History:   Past Surgical History:   Procedure Laterality Date   • ABDOMINAL SURGERY  1998    [facility]   • APPENDECTOMY     • BREAST SURGERY  1975    lumpectomy 1975   • CATARACT EXTRACTION W/ INTRAOCULAR LENS IMPLANT Left 12/20/2021    Procedure: CATARACT PHACO EXTRACTION WITH INTRAOCULAR LENS IMPLANT LEFT COMPLICATED WITH MALYUGIN RING;  Surgeon: Zachary Landers MD;  Location: [facility];  Service: Ophthalmology;  Laterality: Left;   • CATARACT EXTRACTION W/ INTRAOCULAR LENS IMPLANT Right 01/03/2022    Procedure: CATARACT PHACO EXTRACTION WITH INTRAOCULAR LENS IMPLANT RIGHT;  Surgeon: Zachary Landers MD;  Location: [facility];  Service: Ophthalmology;  Laterality: Right;   • COLONOSCOPY     • COLONOSCOPY N/A 12/12/2022    Procedure: " COLONOSCOPY WITH BIOPSIES AND POLYPECTOMY;  Surgeon: Chaparro Marrero MD;  Location: [facility] ENDOSCOPY;  Service: Gastroenterology;  Laterality: N/A;   • ENDOSCOPY     • ENDOSCOPY N/A 2020    Procedure: ESOPHAGOGASTRODUODENOSCOPY;  Surgeon: Chaparro Marrero MD;  Location: [facility] ENDOSCOPY;  Service: Gastroenterology;  Laterality: N/A;   • EYE SURGERY      cataract removal   • HYSTERECTOMY     • TOTAL ABDOMINAL HYSTERECTOMY WITH SALPINGO OOPHORECTOMY     • TUBAL ABDOMINAL LIGATION     • UPPER GASTROINTESTINAL ENDOSCOPY         Family History:   Family History   Problem Relation Age of Onset   • Hypertension Mother    • Aneurysm Mother    • Heart disease Mother         passed away at 54 yr old after 5 vessel bypass   • Hyperlipidemia Mother    • Stroke Mother         aneurysm ruptured   • Emphysema Father    • COPD Father    • Ulcerative colitis Brother    • Alcohol abuse Brother    • Cancer Brother         stomach cancer   • COPD Brother    • Hyperlipidemia Brother    • Liver disease Brother    • Cirrhosis Brother    • Colon polyps Brother    • Stomach cancer Brother    • Colon cancer Maternal Uncle    • Arthritis Maternal Uncle    • Cancer Maternal Aunt         breast cancer   • Cancer Sister         breast   • COPD Paternal Uncle    • Early death Brother         ulcerative colitis   • Learning disabilities Brother         mental retardation   • Mental illness Brother         schizophrenia   • Ulcerative colitis Brother    • Kidney disease Paternal Aunt         nephrectomy   • Liver cancer Neg Hx    • Crohn's disease Neg Hx        Social History:   Social History     Socioeconomic History   • Marital status:    Tobacco Use   • Smoking status: Former     Packs/day: 1.00     Years: 40.00     Pack years: 40.00     Types: Cigarettes     Quit date: 2018     Years since quittin.0   • Smokeless tobacco: Never   Vaping Use   • Vaping Use: Never used   Substance and Sexual  Activity   • Alcohol use: No   • Drug use: Never     Types: Marijuana     Comment: OCCASIONALLY   • Sexual activity: Yes     Partners: Male     Birth control/protection: Hysterectomy     Comment: [facility]       Medications:     Current Outpatient Medications:   •  Ascorbic Acid (VITAMIN C PO), Take 500 mg by mouth As Needed., Disp: , Rfl:   •  aspirin 81 MG EC tablet, Take 81 mg by mouth. Every other day, Disp: , Rfl:   •  atorvastatin (LIPITOR) 20 MG tablet, Take 1 tablet by mouth Every Night., Disp: 90 tablet, Rfl: 3  •  busPIRone (BUSPAR) 10 MG tablet, Take 1 tablet by mouth 2 (Two) Times a Day As Needed (anxiety)., Disp: 180 tablet, Rfl: 3  •  cyclobenzaprine (FLEXERIL) 10 MG tablet, Take 1 tablet by mouth 3 (Three) Times a Day., Disp: 270 tablet, Rfl: 3  •  Diclofenac Sodium (VOLTAREN) 1 % gel gel, diclofenac 1 % topical gel  APPLY 1 GRAM TOPICALLY TO THE AFFECTED AREA TWICE DAILY AS NEEDED FOR PAIN, Disp: , Rfl:   •  docusate sodium (COLACE) 100 MG capsule, Take 1 capsule by mouth 2 (Two) Times a Day., Disp: 180 capsule, Rfl: 3  •  DULoxetine (CYMBALTA) 60 MG capsule, Take 1 capsule by mouth Daily., Disp: 90 capsule, Rfl: 3  •  fluticasone (FLONASE) 50 MCG/ACT nasal spray, SHAKE LIQUID AND USE 1 TO 2 SPRAYS IN EACH NOSTRIL EVERY DAY AS NEEDED, Disp: , Rfl:   •  gabapentin (NEURONTIN) 800 MG tablet, Take 800 mg by mouth 3 (Three) Times a Day., Disp: , Rfl:   •  hydroxychloroquine (PLAQUENIL) 200 MG tablet, Take 200 mg by mouth 2 (Two) Times a Day., Disp: , Rfl:   •  lisinopril (PRINIVIL,ZESTRIL) 10 MG tablet, Take 10 mg by mouth Every Night., Disp: , Rfl:   •  loratadine (CLARITIN) 10 MG tablet, Take 10 mg by mouth As Needed., Disp: , Rfl:   •  meclizine (ANTIVERT) 12.5 MG tablet, Take 1 tablet by mouth 3 (Three) Times a Day As Needed for Dizziness., Disp: 30 tablet, Rfl: 0  •  nitroglycerin (NITROSTAT) 0.4 MG SL tablet, Place 0.4 mg under the tongue Every 5 (Five) Minutes As Needed., Disp: , Rfl:   •  ondansetron  ODT (ZOFRAN-ODT) 4 MG disintegrating tablet, , Disp: , Rfl:   •  oxyCODONE-acetaminophen (PERCOCET) 7.5-325 MG per tablet, Take 2 tablets by mouth Daily., Disp: , Rfl:   •  pantoprazole (PROTONIX) 20 MG EC tablet, Take 2 tablets by mouth Daily., Disp: 90 tablet, Rfl: 3  •  promethazine (PHENERGAN) 12.5 MG tablet, Take 1 tablet by mouth Every 8 (Eight) Hours As Needed for Nausea or Vomiting., Disp: 30 tablet, Rfl: 1  •  rOPINIRole (REQUIP) 2 MG tablet, TAKE 1 TABLET BY MOUTH EVERY DAY 1-3 HOURS BEFORE BEDTIME, Disp: , Rfl:   •  vitamin D (ERGOCALCIFEROL) 1.25 MG (14707 UT) capsule capsule, Take 1 capsule by mouth 1 (One) Time Per Week., Disp: 15 capsule, Rfl: 3  •  VITAMIN E PO, Take  by mouth., Disp: , Rfl:   •  Adalimumab (Humira Pen) 80 MG/0.8ML injection, Inject 0.8 mL under the skin into the appropriate area as directed Take As Directed. Initial dosing: Day 1 inject 80 mg, Day 2 inject 80 mg, Day 15 inject 80 mg., Disp: 2.4 mL, Rfl: 0  •  predniSONE (DELTASONE) 10 MG tablet, Take 30 mg PO once daily for 1 week, 20 mg once daily for 1 week, 10 mg once daily for 1 week then 0.5 mg once daily for 1 week, Disp: 46 tablet, Rfl: 0    Allergies:   Allergies   Allergen Reactions   • Penicillins Rash       Review of Systems:   Review of Systems   Constitutional: Positive for fatigue. Negative for appetite change, fever and unexpected weight loss.   HENT: Negative for trouble swallowing.    Gastrointestinal: Positive for abdominal pain (cramping, occasional), blood in stool (bloody mucus on top of the stools), constipation, GERD and indigestion. Negative for abdominal distention, anal bleeding, diarrhea, nausea, rectal pain and vomiting.       The following portions of the patient's history were reviewed and updated as appropriate: allergies, current medications, past family history, past medical history, past social history, past surgical history and problem list.    Objective     Physical Exam:  Vital Signs:   Vitals:  "   02/01/23 1529   BP: 114/67   Pulse: 91   Resp: 16   Temp: 98.4 °F (36.9 °C)   TempSrc: Infrared   Weight: 67.1 kg (148 lb)   Height: 158.8 cm (62.5\")       Physical Exam  Constitutional:       Appearance: Normal appearance.   HENT:      Head: Normocephalic and atraumatic.   Eyes:      Conjunctiva/sclera: Conjunctivae normal.   Abdominal:      General: Abdomen is flat. There is no distension.      Palpations: There is no mass.      Tenderness: There is no abdominal tenderness. There is no guarding or rebound.      Hernia: No hernia is present.   Musculoskeletal:      Cervical back: Normal range of motion and neck supple.   Neurological:      Mental Status: She is alert.         Results Review:   I reviewed the patient's new clinical results.    Lab on 12/29/2022   Component Date Value Ref Range Status   • 1,25-Dihydroxy, Vitamin D 12/29/2022 18.3 (L)  24.8 - 81.5 pg/mL Final   • Glucose 12/29/2022 104 (H)  65 - 99 mg/dL Final   • BUN 12/29/2022 25 (H)  8 - 23 mg/dL Final   • Creatinine 12/29/2022 1.09 (H)  0.57 - 1.00 mg/dL Final   • Sodium 12/29/2022 138  136 - 145 mmol/L Final   • Potassium 12/29/2022 4.4  3.5 - 5.2 mmol/L Final   • Chloride 12/29/2022 103  98 - 107 mmol/L Final   • CO2 12/29/2022 29.0  22.0 - 29.0 mmol/L Final   • Calcium 12/29/2022 9.1  8.6 - 10.5 mg/dL Final   • Total Protein 12/29/2022 6.7  6.0 - 8.5 g/dL Final   • Albumin 12/29/2022 4.5  3.5 - 5.2 g/dL Final   • ALT (SGPT) 12/29/2022 16  1 - 33 U/L Final   • AST (SGOT) 12/29/2022 21  1 - 32 U/L Final   • Alkaline Phosphatase 12/29/2022 64  39 - 117 U/L Final   • Total Bilirubin 12/29/2022 0.2  0.0 - 1.2 mg/dL Final   • Globulin 12/29/2022 2.2  gm/dL Final   • A/G Ratio 12/29/2022 2.0  g/dL Final   • BUN/Creatinine Ratio 12/29/2022 22.9  7.0 - 25.0 Final   • Anion Gap 12/29/2022 6.0  5.0 - 15.0 mmol/L Final   • eGFR 12/29/2022 56.5 (L)  >60.0 mL/min/1.73 Final    National Kidney Foundation and American Society of Nephrology (ASN) Task Force " recommended calculation based on the Chronic Kidney Disease Epidemiology Collaboration (CKD-EPI) equation refit without adjustment for race.   • WBC 12/29/2022 7.67  3.40 - 10.80 10*3/mm3 Final   • RBC 12/29/2022 3.99  3.77 - 5.28 10*6/mm3 Final   • Hemoglobin 12/29/2022 12.3  12.0 - 15.9 g/dL Final   • Hematocrit 12/29/2022 36.7  34.0 - 46.6 % Final   • MCV 12/29/2022 92.0  79.0 - 97.0 fL Final   • MCH 12/29/2022 30.8  26.6 - 33.0 pg Final   • MCHC 12/29/2022 33.5  31.5 - 35.7 g/dL Final   • RDW 12/29/2022 13.3  12.3 - 15.4 % Final   • RDW-SD 12/29/2022 44.6  37.0 - 54.0 fl Final   • MPV 12/29/2022 9.8  6.0 - 12.0 fL Final   • Platelets 12/29/2022 179  140 - 450 10*3/mm3 Final   • Neutrophil % 12/29/2022 73.3  42.7 - 76.0 % Final   • Lymphocyte % 12/29/2022 20.9  19.6 - 45.3 % Final   • Monocyte % 12/29/2022 4.8 (L)  5.0 - 12.0 % Final   • Eosinophil % 12/29/2022 0.3  0.3 - 6.2 % Final   • Basophil % 12/29/2022 0.3  0.0 - 1.5 % Final   • Immature Grans % 12/29/2022 0.4  0.0 - 0.5 % Final   • Neutrophils, Absolute 12/29/2022 5.63  1.70 - 7.00 10*3/mm3 Final   • Lymphocytes, Absolute 12/29/2022 1.60  0.70 - 3.10 10*3/mm3 Final   • Monocytes, Absolute 12/29/2022 0.37  0.10 - 0.90 10*3/mm3 Final   • Eosinophils, Absolute 12/29/2022 0.02  0.00 - 0.40 10*3/mm3 Final   • Basophils, Absolute 12/29/2022 0.02  0.00 - 0.20 10*3/mm3 Final   • Immature Grans, Absolute 12/29/2022 0.03  0.00 - 0.05 10*3/mm3 Final   • nRBC 12/29/2022 0.0  0.0 - 0.2 /100 WBC Final   • C-Reactive Protein 12/29/2022 <0.30  0.00 - 0.50 mg/dL Final   • Hepatitis C Ab 12/29/2022 Non-Reactive  Non-Reactive Final   • Hepatitis B Surface Ag 12/29/2022 Non-Reactive  Non-Reactive Final   • Hep B S Ab 12/29/2022 Reactive (A)  Non-Reactive Final   • Hep B Core Total Ab 12/29/2022 Negative  Negative Final   • Hep A Total Ab 12/29/2022 Negative  Negative Final   • TPMT Genotype 12/29/2022 *1/*1   Final   • TPMT Interpretation 12/29/2022 Comment   Final    The  TPMT mutations *2, *3A, *3B, and *3C were not detected. This  individual most likely has the wild type *1 alleles for these  variant positions. This patient is expected to have normal TPMT  activity, but has a residual risk of having a mutation not detected  by this assay.   • TPMT Comment 12/29/2022 Comment   Final    As with all PCR tests, the possibility cannot be ruled out that  a rare polymorphism or unusual mutation alters amplification and  leads to a false negative result. Donor DNA from transplants and  recent transfusions can lead to inaccurate results. Only three  variants are detected by this assay; the TPMT Enzyme Activity in  Erythrocytes test [399949] might detect the phenotype associated  with other rare TPMT deficiency alleles. These results should be  interpreted in the context of this individual's clinical history.  Adverse reactions to thiopurines may be affested by additional  genetic or environmental causes. Therapeutic drug monitoring, for  example the Thiopurine Metabolites test [304734], is recommended.   • TPMT GENETIC TEST 12/29/2022 Comment   Final    Comment: The TPMT gene, reference sequence [id number], on chromosome 6p22.3  encodes thiopurine S-methyltransferase, TPMT, an enzyme that  metabolizes and inactivates thiopurine drugs such as azathioprine,  6-mercaptopurine, and 6-thioguanine. TPMT deficiency variants lead to  increased levels of the activated form of thiopurine drugs, causing  an increased risk for bone marrow toxicity. Approximately 95% of  patients with low TPMT activity have one or more of the following  common mutations: TPMT*2 (c.238G>C), TPMT*3B (c.460G>A), TPMT*3C  (c.719A>G), and TPMT*3A(both c.460G>A and c.719A>G, which usually  occur together on the same chromosome). This laboratory developed  test detects the *2, *3A, *3B, *3C, and corresponding *1 wild type  allelesin the TPMT gene by PCR and multiplex mini-sequencing. Results  are reported using nomenclature  recommended by the Clinical Pharmaco-  genetics Implementation Consortium (CPIC). The IC thiopurine dosing  guidelines can be found at the [employer]                            website:  <http://www.pharmgkb.org/gene/>.  This test was developed and its performance characteristics  determined by [facility]. It has not been cleared or approved  by the Food and Drug Administration.   • QuantiFERON Incubation 12/29/2022 Incubation performed.   Final   • QUANTIFERON-TB GOLD PLUS 12/29/2022 Negative  Negative Final    No response to M tuberculosis antigens detected.  Infection with M tuberculosis is unlikely, but high risk  individuals should be considered for additional testing  (ATS/IDSA/CDC Clinical Practice Guidelines, 2017). The  reference range is an Antigen minus Nil result of <0.35 IU/mL.  Chemiluminescence immunoassay methodology   • QuantiFERON Criteria 12/29/2022 Comment   Final    QuantiFERON-TB Gold Plus is a qualitative indirect test for  M tuberculosis infection (including disease) and is intended for use  in conjunction with risk assessment, radiography, and other medical  and diagnostic evaluations. The QuantiFERON-TB Gold Plus result is  determined by subtracting the Nil value from either TB antigen (Ag)  value. The Mitogen tube serves as a control for the test.   • QUANTIFERON TB1 AG VALUE 12/29/2022 0.02  IU/mL Final   • QUANTIFERON TB2 AG VALUE 12/29/2022 0.02  IU/mL Final   • QuantiFERON Nil Value 12/29/2022 0.02  IU/mL Final   • QuantiFERON Mitogen Value 12/29/2022 >10.00  IU/mL Final   • Calprotectin, Fecal 01/02/2023 25  0 - 120 ug/g Final    Concentration     Interpretation   Follow-Up  <16 - 50 ug/g     Normal           None  >50 -120 ug/g     Borderline       Re-evaluate in 4-6 weeks      >120 ug/g     Abnormal         Repeat as clinically                                     indicated   Admission on 12/12/2022, Discharged on 12/12/2022   Component Date Value Ref Range Status   • Reference Lab  Report 2022    Final                    Value:[facility]  [address] [place] [address]  Phone: [phone] or [phone]  Fax: [phone]  Finn Yusuf M.D., Medical Director    PATIENT NAME                           LABORATORY NO.  [id number]  SHELBI NIEVES                       [id number]  [id number]                         AGE              SEX  N           CLIENT REF #  [place] [id number]      1955  F    [id number]   [id number]    [address]                REQUESTING MDANG     ATTENDING MDANG     COPY TO.  [address]                        [facility]           [place] [id number]                 NESTOR ESPINO  DATE COLLECTED      DATE RECEIVED      DATE REPORTED  2022    DIAGNOSIS:  A.   TERMINAL ILEUM BIOPSY:  Benign small bowel, consistent with terminal ileum  Negative for significant architectural distortion, inflammatory infiltrate,  neoplasia, malignancy  t  B.   CECUM                           BIOPSY:  Benign colonic mucosa, negative for significant architectural distortion,  inflammatory infiltrate, neoplasia, dysplasia, malignancy    C.   ASCENDING COLON BIOPSY:  Benign colonic mucosa, negative for significant architectural distortion,  inflammatory infiltrate, neoplasia, dysplasia, malignancy    D.   TRANSVERSE COLON BIOPSY:  Benign colonic mucosa, negative for significant architectural distortion,  inflammatory infiltrate, neoplasia, dysplasia, malignancy    E.   DESCENDING COLON BIOPSY:  Minimal active colitis without significant accompanying chronicity  Negative for dysplasia, neoplasia, malignancy  See comment    F.   SIGMOID COLON BIOPSY:  Minimal active colitis without significant chronicity  Negative for dysplasia, neoplasia,malignancy  See comment    G.   RECTAL BIOPSY:  Minimal active colitis without significant chronicity  Negative for  "dysplasia, neoplasia, malignancy  See comment    H.   CECUM POLYP:  Tubular adenoma  Negative for high-grade                           dysplasia/malignancy    I.   SIGMOID COLON POLYP:  Hyperplastic polyp  Accompanying inflammatory changes      COMMENT:  E-G.  There is minimal active colitis with only rare neutrophils  identified within the mucosal surfaces.  There is no evidence of viral cytopathic  effect, significant chronic alteration, dysplasia, neoplasia, malignancy, or  granulomatous inflammation.  These findings are not specific and could be seen  with an infectious process, acute self-limited colitis, or even potentially  inflammatory bowel disease in the appropriate clinical/endoscopic setting.  Clinical correlation is needed.    CLINICAL HISTORY:  Iron deficiency anemia, unspecified iron deficiency anemia type, lower  abdominal pain      SPECIMENS RECEIVED:  A.  TERMINAL ILEUM BIOPSY  B.  CECUM BIOPSY  C.  ASCENDING COLON BIOPSY  D.  TRANSVERSE COLON BIOPSY  E.  DESCENDING COLON BIOPSY  F.  SIGMOID COLON BIOPSY  G.  RECTAL BIOPSY  H.  CECUM POLYP  I.  SIGMOID COLON POLYP    MICROSCOPIC DESCRIPTION:  Tissue                           blocks are prepared and slides are examined microscopically on all  specimens. See diagnosis for details.    Professional interpretation rendered by Barbara Collier M.D., F.C.A.P. at  [employer], [employer], [address] [place] [address].    GROSS DESCRIPTION:  A.  Specimen is received in 1 formalin filled container labeled \"terminal ileum  biopsy\" and consists of multiple pieces of tan soft tissue measuring 0.3 x  0.2 x 0.3 cm in aggregate.  Specimen is submitted entirely in 1 cassette.  RANJAN  B.  The specimen is received in 1 formalin filled container labeled \"cecum  biopsy\" and consists of 3 pieces of tan soft tissue measuring 0.4 x 0.3 x 0.1  cm in aggregate.  Specimen is submitted entirely in 1 cassette.  C.  Specimen is received in 1 formalin filled container labeled " "\"large intestine,  right/ascending colon\" and consists of multiple pieces of tan soft tissue  measuring 0.4 x 0.2 x 0.1 cm in aggregate.  The specimen is submitted  entirely in 1 cassette.  D.  Specimen is received                           in 1 formalin filled container labeled \"large intestine,  transverse colon\" and consists of multiple pieces of tan soft tissue  measuring 0.3 x 0.2 x 0.2 cm in aggregate.  Specimen is submitted entirely  in 1 cassette.  E.  Specimen is received in 1 formalin filled container labeled \"large intestine,  left/descending colon\" and consists of multiple pieces of tan soft tissue  measuring 0.5 x 0.3 x 0.2 cm in aggregate.  Specimen is submitted entirely  in 1 cassette.  F.  The specimen is received in 1 formalin filled container labeled \"large  intestine, sigmoid colon\" and consists of multiple pieces of tan soft tissue  measuring 0.4 x 0.2 x 0.2 cm in aggregate.  Specimen is submitted entirely  in 1 cassette.  G.  Specimen is received in 1 formalin filled container labeled \"large intestine,  rectum\" and consists of 3 pieces of tan soft tissue measuring 0.3 x 0.3 x 0.1  cm in aggregate.  The specimen is submitted entirely in 1 cassette.  H.  Specimen is received in 1 formalin filled                           container labeled \"large intestine,  cecum cold snare polypectomy\" and consists of multiple pieces of tan soft  tissue measuring 0.8 x 0.5 x 0.2 cm in aggregate.  The specimen is  submitted entirely in 1 cassette.  I.  Specimen is received in 1 formalin filled container labeled \"large intestine,  sigmoid colon cold snare polypectomy\" and consists of 1 piece of tan soft  tissue measuring 0.4 x 0.3 x 0.2 cm.  Specimen is submitted entirely in 1  cassette.    REVIEWED, DIAGNOSED AND ELECTRONICALLY  SIGNED BY:    Barbara Collier M.D., F.C.A.P.  CPT CODES:  88305x9     Ancillary Procedure on 11/15/2022   Component Date Value Ref Range Status   • Target HR (85%) 11/15/2022 132  bpm " In process   • Max. Pred. HR (100%) 11/15/2022 155  bpm In process      MRI Cervical Spine Without Contrast    Result Date: 1/31/2023  Multilevel degenerative disc disease and spondylosis with multilevel neural foraminal narrowing as described, greatest on the right at C5-6.  No focal disc protrusion identified. Authenticated and Electronically Signed by Satish Bear III, MD on 01/31/2023 07:40:20 AM    US Abdomen Complete    Result Date: 12/8/2022  Unremarkable abdominal ultrasound.  This report was signed and finalized on 12/8/2022 3:54 PM by Joey Nam MD.    CT ABDOMEN PELVIS WO CONTRAST Additional Contrast? None  Result Date: 10/17/2022  1. No acute abnormality. 2. 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 3. Severe vascular calcifications. 4. L5 pars defects with grade 1 anterolisthesis and severe bilateral L5 foraminal stenosis.     11/15/2022 PillCam: Few scattered gastric erosions. Multiple small and medium size healing erosions and superficial ulcerations identified throughout the ileum. No deep ulcerations except one small proximal ileal ulceration. These findings with anemia and recent colitis is more suggestive of ileocolonic crohn's disease.     Colonoscopy by Dr. Marrero 12/12/2022:  - The perianal and digital rectal examinations were normal.  - A 3 mm polyp was found in the cecum. The polyp was sessile. The polyp was removed with a cold snare. Resection and retrieval were complete.  - A 4 mm polyp was found in the sigmoid colon. The polyp was flat. The polyp was removed with a cold snare. Resection and retrieval were complete.  - Diffuse moderate inflammation characterized by altered vascularity, congestion (edema), erosions, erythema, friability and granularity was found in the rectum, in the recto-sigmoid colon, in the sigmoid colon and in the descending colon. Biopsies were taken with a cold forceps for histology.  - A medium healed ulcer was found in the mid descending  colon. The scar tissue was healthy in appearance.  - The transverse colon, hepatic flexure, ascending colon, cecum, appendiceal orifice and ileocecal valve appeared normal. Biopsies were taken with a cold forceps for histology.  - The terminal ileum appeared normal. Biopsies were taken with a cold forceps for histology.  Pathology DIAGNOSIS:   A.   TERMINAL ILEUM BIOPSY:   Benign small bowel, consistent with terminal ileum   Negative for significant architectural distortion, inflammatory infiltrate,   neoplasia, malignancy   B.   CECUM BIOPSY:   Benign colonic mucosa, negative for significant architectural distortion,   inflammatory infiltrate, neoplasia, dysplasia, malignancy   C.   ASCENDING COLON BIOPSY:   Benign colonic mucosa, negative for significant architectural distortion,   inflammatory infiltrate, neoplasia, dysplasia, malignancy   D.   TRANSVERSE COLON BIOPSY:   Benign colonic mucosa, negative for significant architectural distortion,   inflammatory infiltrate, neoplasia, dysplasia, malignancy   E.   DESCENDING COLON BIOPSY:   Minimal active colitis without significant accompanying chronicity   Negative for dysplasia, neoplasia, malignancy   See comment   F.   SIGMOID COLON BIOPSY:   Minimal active colitis without significant chronicity   Negative for dysplasia, neoplasia,malignancy   See comment   G.   RECTAL BIOPSY:   Minimal active colitis without significant chronicity   Negative for dysplasia, neoplasia, malignancy   See comment   H.   CECUM POLYP:   Tubular adenoma   Negative for high-grade dysplasia/malignancy   I.   SIGMOID COLON POLYP:   Hyperplastic polyp   Accompanying inflammatory changes   COMMENT:  E-G.  There is minimal active colitis with only rare neutrophils   identified within the mucosal surfaces.  There is no evidence of viral cytopathic effect, significant chronic alteration, dysplasia, neoplasia, malignancy, or granulomatous inflammation.  These findings are not specific and could  be seen with an infectious process, acute self-limited colitis, or even potentially inflammatory bowel disease in the appropriate clinical/endoscopic setting. Clinical correlation is needed    Assessment / Plan      1. Crohn's disease of both small and large intestine without complication  2.  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  History of iron deficiency anemia  2/1/2023  Patient may have a arthropathy associated with IBD.  No clear diagnosis of rheumatoid arthritis in the past.  Her symptoms improved significantly after she had a course of steroid with tapering.  She has a constipation now bowel movement once in 2 to 3 days.     Lab work done on 12/29/2022 reviewed.  CMP unremarkable except borderline creatinine.  Vitamin D level was low at 18.  CBC was unremarkable with a normal hemoglobin of 12.3 g/dL fecal calprotectin was 25.  She is immune to hepatitis B but not immune to hepatitis A.     She was been prescribed Humira however she had a $3500 co-pay that she could not afford.   Advised hepatitis A vaccine with booster at PCPs office.  Patient already had a Shingrix vaccine and booster pending for last 2023    Given her arthritis issues she would benefit with anti-TNF Biologics.  We will try getting a Humira through the financial assistance program from the company  Meantime I am going to start her on Imuran 100 mg p.o. daily.  We discussed on adverse reactions including slight chance of getting pancreatitis and skin cancers.  Given her ileocolonic disease mesalamine may not help much.   If we are able to get the Biologics we will discontinue her Imuran after 1 to 2 years.  Will get a dermatology evaluation in 1 year time for skin cancer surveillance  She is currently on Plaquenil we will continue the same  We will get a CBC CMP in 8 weeks time with a follow-up  Advised to avoid marijuana use    12/29/2022  After review of recent pillcam and colonoscopy, it seems that she has  chronic inflammation which may have been contributing to her anemia throughout the years. Some element of anemia of chronic disease suspected with her rheumatoid arthritis. She had erosions and ulceration in the small intestine on PillCam. Colonoscopy and colon biopsies show active colitis. History and endoscopic findings consistent with a new diagnosis of Crohn's disease. She is currently complaining of bloating, mucous in stools, mild rectal bleeding and loose stools. This is a change in bowel habits from her previous constipation.   Initially diagnosed with colitis in August 2022 with bloody diarrhea.  CT abdomen pelvis done with contrast in August 2022 revealed a left-sided colon colitis.  Subsequently she had a colonoscopy done by Dr. Morel on 8/17/2022 which did not reveal any endoscopic signs of colitis.  However TI was not visualized.  Random colon biopsies done were all normal without any signs of colitis.  Prior EGD done in 2020 did not reveal any upper GI source of bleeding.     Her brother has ulcerative colitis. Previously with reports of constipation at baseline. Now having stools daily. In the past, she took MiraLAX, senna with relief. Had taken Linzess and Movantik before which did not help her. Serum porphyrin normal.      4.  Drug-induced constipation  5.  Drug-induced nausea and suspected opioid induced gastroparesis  6.  Gastroesophageal reflux disease  Patient has a opioid-induced constipation.  Some element of constipation could be related with Crohn's disease itself.  She also has a intermittent nausea or regurgitation that may indicate opioid induced gastroparesis.  Her reflux symptoms worse recently because of possible opioid induced worsening of reflux symptoms.     Advised to try to cut down the opioid use  Advance to low fiber small meals  Continue Protonix 20 m p.o. twice daily.  Okay to take as needed Tums for now  Continue stool softeners to twice daily as before  To start on a  Dulcolax 1 tablets p.o. daily or every other day  Antiemetics as needed    Prior history  7. Tubular adenoma of colon  Colonoscopy 12/2022 had 2 small polyps, 1 was tubular adenoma without dysplasia and other was hyperplastic.  No family history of any colon cancer with a first-degree relatives.  Due to new diagnosis of IBD, she will likely need repeat colonoscopy in 1-3 years depending on clinical course.            Follow Up:   No follow-ups on file.    Chaparro Marrero MD  [facility]  2/1/2023  15:31 EST     Please note that portions of this note may have been completed with a voice recognition program.

## 2023-02-06 DIAGNOSIS — K50.80 CROHN'S DISEASE OF BOTH SMALL AND LARGE INTESTINE WITHOUT COMPLICATION: Primary | ICD-10-CM

## 2023-02-06 RX ORDER — SODIUM CHLORIDE 9 MG/ML
250 INJECTION, SOLUTION INTRAVENOUS ONCE
Status: CANCELLED | OUTPATIENT
Start: 2023-02-20 | End: 2023-02-20

## 2023-02-06 RX ORDER — DIPHENHYDRAMINE HCL 25 MG
25 CAPSULE ORAL ONCE
Status: CANCELLED | OUTPATIENT
Start: 2023-02-20

## 2023-02-06 RX ORDER — SODIUM CHLORIDE 9 MG/ML
250 INJECTION, SOLUTION INTRAVENOUS ONCE
Status: CANCELLED | OUTPATIENT
Start: 2023-02-20

## 2023-02-06 RX ORDER — ACETAMINOPHEN 325 MG/1
650 TABLET ORAL ONCE
Status: CANCELLED | OUTPATIENT
Start: 2023-02-20

## 2023-03-01 RX ORDER — ROPINIROLE 2 MG/1
TABLET, FILM COATED ORAL
Qty: 90 TABLET | Refills: 1 | Status: SHIPPED | OUTPATIENT
Start: 2023-03-01

## 2023-03-01 NOTE — TELEPHONE ENCOUNTER
Rx Refill Note  Requested Prescriptions     Pending Prescriptions Disp Refills   • rOPINIRole (REQUIP) 2 MG tablet [Pharmacy Med Name: ROPINIROLE 2MG TABLETS] 90 tablet      Sig: TAKE 1 TABLET BY MOUTH EVERY DAY 1-3 HOURS BEFORE BEDTIME      Last office visit with prescribing clinician: 11/17/2022   Last telemedicine visit with prescribing clinician:  Next office visit with prescribing clinician: 3/17/2023    Please advise, comes from a historical provider    Bonnie Grubbs MA  03/01/23, 10:58 EST

## 2023-03-02 ENCOUNTER — LAB (OUTPATIENT)
Dept: LAB | Facility: HOSPITAL | Age: 68
End: 2023-03-02
Payer: MEDICARE

## 2023-03-02 DIAGNOSIS — K50.80 CROHN'S DISEASE OF BOTH SMALL AND LARGE INTESTINE WITHOUT COMPLICATION: ICD-10-CM

## 2023-03-02 DIAGNOSIS — K50.811 CROHN'S DISEASE OF BOTH SMALL AND LARGE INTESTINE WITH RECTAL BLEEDING: ICD-10-CM

## 2023-03-02 LAB
ALBUMIN SERPL-MCNC: 4.1 G/DL (ref 3.5–5.2)
ALBUMIN/GLOB SERPL: 1.5 G/DL
ALP SERPL-CCNC: 62 U/L (ref 39–117)
ALT SERPL W P-5'-P-CCNC: 13 U/L (ref 1–33)
ANION GAP SERPL CALCULATED.3IONS-SCNC: 7.5 MMOL/L (ref 5–15)
AST SERPL-CCNC: 24 U/L (ref 1–32)
BILIRUB SERPL-MCNC: 0.2 MG/DL (ref 0–1.2)
BUN SERPL-MCNC: 13 MG/DL (ref 8–23)
BUN/CREAT SERPL: 12.6 (ref 7–25)
CALCIUM SPEC-SCNC: 9.2 MG/DL (ref 8.6–10.5)
CHLORIDE SERPL-SCNC: 107 MMOL/L (ref 98–107)
CO2 SERPL-SCNC: 26.5 MMOL/L (ref 22–29)
CREAT SERPL-MCNC: 1.03 MG/DL (ref 0.57–1)
EGFRCR SERPLBLD CKD-EPI 2021: 60.5 ML/MIN/1.73
GLOBULIN UR ELPH-MCNC: 2.8 GM/DL
GLUCOSE SERPL-MCNC: 92 MG/DL (ref 65–99)
HBV SURFACE AB SER RIA-ACNC: REACTIVE
HBV SURFACE AG SERPL QL IA: NORMAL
POTASSIUM SERPL-SCNC: 4.2 MMOL/L (ref 3.5–5.2)
PROT SERPL-MCNC: 6.9 G/DL (ref 6–8.5)
SODIUM SERPL-SCNC: 141 MMOL/L (ref 136–145)

## 2023-03-02 PROCEDURE — 36415 COLL VENOUS BLD VENIPUNCTURE: CPT

## 2023-03-02 PROCEDURE — 86706 HEP B SURFACE ANTIBODY: CPT

## 2023-03-02 PROCEDURE — 86704 HEP B CORE ANTIBODY TOTAL: CPT

## 2023-03-02 PROCEDURE — 87340 HEPATITIS B SURFACE AG IA: CPT

## 2023-03-02 PROCEDURE — 80053 COMPREHEN METABOLIC PANEL: CPT

## 2023-03-02 PROCEDURE — 85025 COMPLETE CBC W/AUTO DIFF WBC: CPT

## 2023-03-03 LAB
BASOPHILS # BLD AUTO: 0.03 10*3/MM3 (ref 0–0.2)
BASOPHILS NFR BLD AUTO: 0.6 % (ref 0–1.5)
DEPRECATED RDW RBC AUTO: 43.2 FL (ref 37–54)
EOSINOPHIL # BLD AUTO: 0.08 10*3/MM3 (ref 0–0.4)
EOSINOPHIL NFR BLD AUTO: 1.6 % (ref 0.3–6.2)
ERYTHROCYTE [DISTWIDTH] IN BLOOD BY AUTOMATED COUNT: 13 % (ref 12.3–15.4)
HBV CORE AB SERPL QL IA: NEGATIVE
HCT VFR BLD AUTO: 32.9 % (ref 34–46.6)
HGB BLD-MCNC: 10.8 G/DL (ref 12–15.9)
IMM GRANULOCYTES # BLD AUTO: 0.01 10*3/MM3 (ref 0–0.05)
IMM GRANULOCYTES NFR BLD AUTO: 0.2 % (ref 0–0.5)
LYMPHOCYTES # BLD AUTO: 1.92 10*3/MM3 (ref 0.7–3.1)
LYMPHOCYTES NFR BLD AUTO: 39.5 % (ref 19.6–45.3)
MCH RBC QN AUTO: 30 PG (ref 26.6–33)
MCHC RBC AUTO-ENTMCNC: 32.8 G/DL (ref 31.5–35.7)
MCV RBC AUTO: 91.4 FL (ref 79–97)
MONOCYTES # BLD AUTO: 0.47 10*3/MM3 (ref 0.1–0.9)
MONOCYTES NFR BLD AUTO: 9.7 % (ref 5–12)
NEUTROPHILS NFR BLD AUTO: 2.35 10*3/MM3 (ref 1.7–7)
NEUTROPHILS NFR BLD AUTO: 48.4 % (ref 42.7–76)
NRBC BLD AUTO-RTO: 0 /100 WBC (ref 0–0.2)
PLATELET # BLD AUTO: 140 10*3/MM3 (ref 140–450)
PMV BLD AUTO: 11.5 FL (ref 6–12)
RBC # BLD AUTO: 3.6 10*6/MM3 (ref 3.77–5.28)
WBC NRBC COR # BLD: 4.86 10*3/MM3 (ref 3.4–10.8)

## 2023-03-09 ENCOUNTER — HOSPITAL ENCOUNTER (OUTPATIENT)
Dept: INFUSION THERAPY | Facility: HOSPITAL | Age: 68
Discharge: HOME | End: 2023-03-09
Admitting: INTERNAL MEDICINE
Payer: MEDICARE

## 2023-03-09 VITALS
SYSTOLIC BLOOD PRESSURE: 107 MMHG | WEIGHT: 145 LBS | TEMPERATURE: 97.5 F | RESPIRATION RATE: 18 BRPM | HEART RATE: 80 BPM | OXYGEN SATURATION: 94 % | BODY MASS INDEX: 26.68 KG/M2 | HEIGHT: 62 IN | DIASTOLIC BLOOD PRESSURE: 61 MMHG

## 2023-03-09 DIAGNOSIS — K50.80 CROHN'S DISEASE OF BOTH SMALL AND LARGE INTESTINE WITHOUT COMPLICATION: Primary | ICD-10-CM

## 2023-03-09 PROCEDURE — 25010000002 INFLIXIMAB PER 10 MG: Performed by: INTERNAL MEDICINE

## 2023-03-09 PROCEDURE — 96413 CHEMO IV INFUSION 1 HR: CPT

## 2023-03-09 PROCEDURE — 96415 CHEMO IV INFUSION ADDL HR: CPT

## 2023-03-09 PROCEDURE — 63710000001 DIPHENHYDRAMINE PER 50 MG: Performed by: INTERNAL MEDICINE

## 2023-03-09 RX ORDER — ACETAMINOPHEN 325 MG/1
650 TABLET ORAL ONCE
Status: CANCELLED | OUTPATIENT
Start: 2023-03-23

## 2023-03-09 RX ORDER — SODIUM CHLORIDE 9 MG/ML
250 INJECTION, SOLUTION INTRAVENOUS ONCE
Status: DISCONTINUED | OUTPATIENT
Start: 2023-03-09 | End: 2023-03-11 | Stop reason: HOSPADM

## 2023-03-09 RX ORDER — DIPHENHYDRAMINE HCL 25 MG
25 CAPSULE ORAL ONCE
Status: COMPLETED | OUTPATIENT
Start: 2023-03-09 | End: 2023-03-09

## 2023-03-09 RX ORDER — DIPHENHYDRAMINE HCL 25 MG
25 CAPSULE ORAL ONCE
Status: CANCELLED | OUTPATIENT
Start: 2023-03-23

## 2023-03-09 RX ORDER — SODIUM CHLORIDE 9 MG/ML
250 INJECTION, SOLUTION INTRAVENOUS ONCE
Status: CANCELLED | OUTPATIENT
Start: 2023-03-23 | End: 2023-03-23

## 2023-03-09 RX ORDER — ACETAMINOPHEN 325 MG/1
650 TABLET ORAL ONCE
Status: COMPLETED | OUTPATIENT
Start: 2023-03-09 | End: 2023-03-09

## 2023-03-09 RX ORDER — SODIUM CHLORIDE 9 MG/ML
250 INJECTION, SOLUTION INTRAVENOUS ONCE
Status: CANCELLED | OUTPATIENT
Start: 2023-03-23

## 2023-03-09 RX ADMIN — ACETAMINOPHEN 650 MG: 325 TABLET, FILM COATED ORAL at 13:07

## 2023-03-09 RX ADMIN — DIPHENHYDRAMINE HYDROCHLORIDE 25 MG: 25 CAPSULE ORAL at 13:07

## 2023-03-09 RX ADMIN — INFLIXIMAB 329 MG: 100 INJECTION, POWDER, LYOPHILIZED, FOR SOLUTION INTRAVENOUS at 13:29

## 2023-03-17 ENCOUNTER — PRIOR AUTHORIZATION (OUTPATIENT)
Dept: INTERNAL MEDICINE | Facility: CLINIC | Age: 68
End: 2023-03-17
Payer: MEDICARE

## 2023-03-17 ENCOUNTER — OFFICE VISIT (OUTPATIENT)
Dept: INTERNAL MEDICINE | Facility: CLINIC | Age: 68
End: 2023-03-17
Payer: MEDICARE

## 2023-03-17 VITALS
WEIGHT: 145 LBS | OXYGEN SATURATION: 97 % | HEIGHT: 62 IN | DIASTOLIC BLOOD PRESSURE: 82 MMHG | SYSTOLIC BLOOD PRESSURE: 124 MMHG | HEART RATE: 90 BPM | BODY MASS INDEX: 26.68 KG/M2 | TEMPERATURE: 97 F

## 2023-03-17 DIAGNOSIS — E78.2 MIXED HYPERLIPIDEMIA: ICD-10-CM

## 2023-03-17 DIAGNOSIS — I10 PRIMARY HYPERTENSION: ICD-10-CM

## 2023-03-17 DIAGNOSIS — D64.9 ANEMIA, UNSPECIFIED TYPE: ICD-10-CM

## 2023-03-17 DIAGNOSIS — R60.0 LOWER EXTREMITY EDEMA: ICD-10-CM

## 2023-03-17 DIAGNOSIS — R06.02 SHORTNESS OF BREATH: ICD-10-CM

## 2023-03-17 DIAGNOSIS — F41.9 ANXIETY: Primary | ICD-10-CM

## 2023-03-17 DIAGNOSIS — K50.80 CROHN'S DISEASE OF BOTH SMALL AND LARGE INTESTINE WITHOUT COMPLICATION: Chronic | ICD-10-CM

## 2023-03-17 PROCEDURE — 99214 OFFICE O/P EST MOD 30 MIN: CPT | Performed by: FAMILY MEDICINE

## 2023-03-17 PROCEDURE — 3079F DIAST BP 80-89 MM HG: CPT | Performed by: FAMILY MEDICINE

## 2023-03-17 PROCEDURE — 3074F SYST BP LT 130 MM HG: CPT | Performed by: FAMILY MEDICINE

## 2023-03-17 RX ORDER — FUROSEMIDE 20 MG/1
20 TABLET ORAL DAILY PRN
Qty: 30 TABLET | Refills: 1 | Status: SHIPPED | OUTPATIENT
Start: 2023-03-17

## 2023-03-17 RX ORDER — HYDROXYZINE HYDROCHLORIDE 25 MG/1
25 TABLET, FILM COATED ORAL 3 TIMES DAILY PRN
Qty: 90 TABLET | Refills: 3 | Status: SHIPPED | OUTPATIENT
Start: 2023-03-17 | End: 2023-03-29 | Stop reason: ALTCHOICE

## 2023-03-17 RX ORDER — FUROSEMIDE 20 MG/1
TABLET ORAL
Qty: 90 TABLET | OUTPATIENT
Start: 2023-03-17

## 2023-03-17 NOTE — ASSESSMENT & PLAN NOTE
Improved on current medications, however, not well controlled. She does still have anxiety and flares from time to time. She will continue Cymbalta and BuSpar. We will add in hydroxyzine as needed for bouts of extreme anxiety.

## 2023-03-17 NOTE — TELEPHONE ENCOUNTER
Rx Refill Note  Requested Prescriptions     Pending Prescriptions Disp Refills   • furosemide (LASIX) 20 MG tablet [Pharmacy Med Name: FUROSEMIDE 20MG TABLETS] 90 tablet      Sig: TAKE 1 TABLET BY MOUTH DAILY AS NEEDED FOR SWELLING      Will deny for reason:DUPLICATE   Signed today 3/17/23        Bonnie Grubbs MA  03/17/23, 15:08 EDT

## 2023-03-17 NOTE — TELEPHONE ENCOUNTER
SHELBI NIEVES (Key: [id number]) - [id number]  Cyclobenzaprine HCl 10MG tablets      APPROEVED FROM 02/15/2023 - 03/16/2024

## 2023-03-17 NOTE — PROGRESS NOTES
Stephanie Jean is a 67 y.o. female.    Chief Complaint   Patient presents with   • Anxiety   • Hypertension   • Hyperlipidemia       HPI   Stephanie Jean presents today to follow up on anxiety, hypertension, and hyperlipidemia.    The patient reports a recent diagnosis of Chron's. She started Remicade infusions on 03/09/2023. She denies diarrhea. She affirms occasional abdominal pain with bloating.     She was told by Dr. Rosado that her cognitive changes could be due to microvascular disease. She was advised to treat with neuro supportive medications or neurotherapy.     Patient has hypertension.  They are taking lisinopril (Prinivil).  They have been compliant with medications.  The patient denies any side effects to the medication.  Blood pressure is controlled in the office today. They are following a low salt diet.  They are active. The patient reports taking lisinopril nightly, which is working better for her. She reports swelling in her legs in the evening. She is short of breath with exertion. She has been taking an old prescription of hydrochlorothiazide 12.5 mg. She has been taking 2 tablets. She notes that she has not noticed an improvement. The patient reports that this prescription is likely a couple years old.     The patient reports that her arthritis is increasingly painful.    The patient states that she is feeling anxious on occasion with shortness of breath and hyperventilating. She has been taking Cymbalta for a long time and is not sure if it is helping. She takes Buspar 2 times daily.    She reports an increase in fatigue. She affirms wheezing. She does have a h/o anemia as well.     The following portions of the patient's history were reviewed and updated as appropriate: allergies, current medications, past family history, past medical history, past social history, past surgical history and problem list.     Allergies   Allergen Reactions   • Penicillins Rash         Current Outpatient  Medications:   •  Ascorbic Acid (VITAMIN C PO), Take 500 mg by mouth As Needed., Disp: , Rfl:   •  aspirin 81 MG EC tablet, Take 1 tablet by mouth. Every other day, Disp: , Rfl:   •  atorvastatin (LIPITOR) 20 MG tablet, Take 1 tablet by mouth Every Night., Disp: 90 tablet, Rfl: 3  •  azaTHIOprine (IMURAN) 50 MG tablet, Take 2 tablets by mouth Daily., Disp: 180 tablet, Rfl: 1  •  busPIRone (BUSPAR) 10 MG tablet, Take 1 tablet by mouth 2 (Two) Times a Day As Needed (anxiety)., Disp: 180 tablet, Rfl: 3  •  cyclobenzaprine (FLEXERIL) 10 MG tablet, Take 1 tablet by mouth 3 (Three) Times a Day., Disp: 270 tablet, Rfl: 3  •  Diclofenac Sodium (VOLTAREN) 1 % gel gel, diclofenac 1 % topical gel  APPLY 1 GRAM TOPICALLY TO THE AFFECTED AREA TWICE DAILY AS NEEDED FOR PAIN, Disp: , Rfl:   •  DULoxetine (CYMBALTA) 60 MG capsule, Take 1 capsule by mouth Daily., Disp: 90 capsule, Rfl: 3  •  fluticasone (FLONASE) 50 MCG/ACT nasal spray, SHAKE LIQUID AND USE 1 TO 2 SPRAYS IN EACH NOSTRIL EVERY DAY AS NEEDED, Disp: , Rfl:   •  gabapentin (NEURONTIN) 800 MG tablet, Take 1 tablet by mouth 3 (Three) Times a Day., Disp: , Rfl:   •  hydroxychloroquine (PLAQUENIL) 200 MG tablet, Take 1 tablet by mouth 2 (Two) Times a Day., Disp: , Rfl:   •  lisinopril (PRINIVIL,ZESTRIL) 10 MG tablet, Take 1 tablet by mouth Every Night., Disp: , Rfl:   •  loratadine (CLARITIN) 10 MG tablet, Take 1 tablet by mouth As Needed., Disp: , Rfl:   •  meclizine (ANTIVERT) 12.5 MG tablet, Take 1 tablet by mouth 3 (Three) Times a Day As Needed for Dizziness., Disp: 30 tablet, Rfl: 0  •  nitroglycerin (NITROSTAT) 0.4 MG SL tablet, Place 1 tablet under the tongue Every 5 (Five) Minutes As Needed., Disp: , Rfl:   •  oxyCODONE-acetaminophen (PERCOCET) 7.5-325 MG per tablet, Take 2 tablets by mouth Daily., Disp: , Rfl:   •  pantoprazole (PROTONIX) 20 MG EC tablet, Take 2 tablets by mouth Daily., Disp: 90 tablet, Rfl: 3  •  rOPINIRole (REQUIP) 2 MG tablet, TAKE 1 TABLET BY  "MOUTH EVERY DAY 1-3 HOURS BEFORE BEDTIME, Disp: 90 tablet, Rfl: 1  •  vitamin D (ERGOCALCIFEROL) 1.25 MG (92717 UT) capsule capsule, Take 1 capsule by mouth 1 (One) Time Per Week., Disp: 15 capsule, Rfl: 3  •  docusate sodium (COLACE) 100 MG capsule, TAKE 1 CAPSULE BY MOUTH TWICE DAILY, Disp: 60 capsule, Rfl: 2  •  furosemide (Lasix) 20 MG tablet, Take 1 tablet by mouth Daily As Needed (swelling)., Disp: 30 tablet, Rfl: 1  •  hydrOXYzine pamoate (VISTARIL) 25 MG capsule, , Disp: , Rfl:   •  inFLIXimab (REMICADE IV), Infuse  into a venous catheter Take As Directed., Disp: , Rfl:   •  lactulose (CHRONULAC) 10 GM/15ML solution, Take 15 mL by mouth Daily., Disp: 450 mL, Rfl: 2  •  promethazine (PHENERGAN) 12.5 MG tablet, Take 1 tablet by mouth Every 8 (Eight) Hours As Needed for Nausea or Vomiting., Disp: 30 tablet, Rfl: 2  •  vitamin B-12 (CYANOCOBALAMIN) 1000 MCG tablet, Take 1 tablet by mouth Daily., Disp: 90 tablet, Rfl: 3    ROS    Review of Systems   Constitutional: Positive for fatigue. Negative for chills and fever.   HENT: Negative for congestion, postnasal drip and sore throat.    Respiratory: Positive for shortness of breath and wheezing. Negative for cough.    Cardiovascular: Negative for chest pain and leg swelling.   Gastrointestinal: Negative for abdominal pain, constipation, diarrhea, nausea and vomiting.   Musculoskeletal: Positive for arthralgias.   Skin: Negative for color change and rash.   Neurological: Positive for memory problem. Negative for weakness.   Hematological: Does not bruise/bleed easily.   Psychiatric/Behavioral: Negative for depressed mood. The patient is nervous/anxious.        Vitals:    03/17/23 1028   BP: 124/82   BP Location: Right arm   Patient Position: Sitting   Cuff Size: Adult   Pulse: 90   Temp: 97 °F (36.1 °C)   SpO2: 97%   Weight: 65.8 kg (145 lb)   Height: 157.5 cm (62\")   PainSc: 7  Comment: arthritis is acting up.     Body mass index is 26.52 kg/m².    Physical Exam "     Physical Exam  Constitutional:       General: She is not in acute distress.     Appearance: Normal appearance. She is well-developed.   HENT:      Head: Normocephalic and atraumatic.      Right Ear: External ear normal.      Left Ear: External ear normal.   Eyes:      Extraocular Movements: Extraocular movements intact.      Conjunctiva/sclera: Conjunctivae normal.   Cardiovascular:      Rate and Rhythm: Normal rate and regular rhythm.      Heart sounds: No murmur heard.  Pulmonary:      Effort: Pulmonary effort is normal. No respiratory distress.      Breath sounds: Normal breath sounds. No wheezing.   Abdominal:      General: Bowel sounds are normal. There is no distension.      Palpations: Abdomen is rigid.      Tenderness: There is no abdominal tenderness.      Comments: Abdominal tightness with bloating.   Musculoskeletal:      Cervical back: Neck supple.   Lymphadenopathy:      Cervical: No cervical adenopathy.   Skin:     General: Skin is warm and dry.   Neurological:      Mental Status: She is alert and oriented to person, place, and time.      Cranial Nerves: No cranial nerve deficit.   Psychiatric:         Mood and Affect: Mood normal.         Behavior: Behavior normal.         Assessment/Plan    Diagnoses and all orders for this visit:    1. Anxiety (Primary)  Assessment & Plan:  Improved on current medications, however, not well controlled. She does still have anxiety and flares from time to time. She will continue Cymbalta and BuSpar. We will add in hydroxyzine as needed for bouts of extreme anxiety.       2. Crohn's disease of both small and large intestine without complication  Assessment & Plan:  Follow with GI regularly      3. Lower extremity edema  Assessment & Plan:  May be secondary to remicaide infusion.  Will rule out CHF with BNP.     Orders:  -     BNP    4. Primary hypertension  Assessment & Plan:  Stable on current medications. She will continue lisinopril.      5. Mixed  hyperlipidemia  Assessment & Plan:  Stable on previous labs. She will continue Lipitor regularly.       6. Anemia, unspecified type  -     CBC & Differential  -     Ferritin  -     Folate  -     Iron Profile  -     Vitamin B12    7. Shortness of breath  -     BNP    Other orders  -     Discontinue: hydrOXYzine (ATARAX) 25 MG tablet; Take 1 tablet by mouth 3 (Three) Times a Day As Needed for Anxiety.  Dispense: 90 tablet; Refill: 3  -     furosemide (Lasix) 20 MG tablet; Take 1 tablet by mouth Daily As Needed (swelling).  Dispense: 30 tablet; Refill: 1      New Medications Ordered This Visit   Medications   • furosemide (Lasix) 20 MG tablet     Sig: Take 1 tablet by mouth Daily As Needed (swelling).     Dispense:  30 tablet     Refill:  1       No orders of the defined types were placed in this encounter.      Return in about 4 months (around 7/17/2023) for HTN, HPL.    Nhung Shepard DO     Transcribed from ambient dictation for Nhung Shepard DO by Lisset Beebe.  03/17/23   14:08 EDT    Patient or patient representative verbalized consent to the visit recording.  I have personally performed the services described in this document as transcribed by the above individual, and it is both accurate and complete.

## 2023-03-18 LAB
BASOPHILS # BLD AUTO: 0 X10E3/UL (ref 0–0.2)
BASOPHILS NFR BLD AUTO: 1 %
BNP SERPL-MCNC: 7.2 PG/ML (ref 0–100)
EOSINOPHIL # BLD AUTO: 0.1 X10E3/UL (ref 0–0.4)
EOSINOPHIL NFR BLD AUTO: 2 %
ERYTHROCYTE [DISTWIDTH] IN BLOOD BY AUTOMATED COUNT: 13.2 % (ref 11.7–15.4)
FERRITIN SERPL-MCNC: 44 NG/ML (ref 15–150)
FOLATE SERPL-MCNC: 12.9 NG/ML
HCT VFR BLD AUTO: 33.2 % (ref 34–46.6)
HGB BLD-MCNC: 11 G/DL (ref 11.1–15.9)
IMM GRANULOCYTES # BLD AUTO: 0 X10E3/UL (ref 0–0.1)
IMM GRANULOCYTES NFR BLD AUTO: 0 %
IRON SATN MFR SERPL: 15 % (ref 15–55)
IRON SERPL-MCNC: 46 UG/DL (ref 27–139)
LYMPHOCYTES # BLD AUTO: 1.8 X10E3/UL (ref 0.7–3.1)
LYMPHOCYTES NFR BLD AUTO: 47 %
MCH RBC QN AUTO: 30.3 PG (ref 26.6–33)
MCHC RBC AUTO-ENTMCNC: 33.1 G/DL (ref 31.5–35.7)
MCV RBC AUTO: 92 FL (ref 79–97)
MONOCYTES # BLD AUTO: 0.3 X10E3/UL (ref 0.1–0.9)
MONOCYTES NFR BLD AUTO: 6 %
MORPHOLOGY BLD-IMP: ABNORMAL
NEUTROPHILS # BLD AUTO: 1.7 X10E3/UL (ref 1.4–7)
NEUTROPHILS NFR BLD AUTO: 44 %
PLATELET # BLD AUTO: ABNORMAL X10E3/UL
RBC # BLD AUTO: 3.63 X10E6/UL (ref 3.77–5.28)
TIBC SERPL-MCNC: 301 UG/DL (ref 250–450)
UIBC SERPL-MCNC: 255 UG/DL (ref 118–369)
VIT B12 SERPL-MCNC: 299 PG/ML (ref 232–1245)
WBC # BLD AUTO: 3.9 X10E3/UL (ref 3.4–10.8)

## 2023-03-23 ENCOUNTER — HOSPITAL ENCOUNTER (OUTPATIENT)
Dept: INFUSION THERAPY | Facility: HOSPITAL | Age: 68
Discharge: HOME | End: 2023-03-23
Admitting: INTERNAL MEDICINE
Payer: MEDICARE

## 2023-03-23 VITALS
OXYGEN SATURATION: 96 % | DIASTOLIC BLOOD PRESSURE: 65 MMHG | SYSTOLIC BLOOD PRESSURE: 117 MMHG | TEMPERATURE: 97.8 F | HEART RATE: 72 BPM | RESPIRATION RATE: 20 BRPM

## 2023-03-23 DIAGNOSIS — K50.80 CROHN'S DISEASE OF BOTH SMALL AND LARGE INTESTINE WITHOUT COMPLICATION: Primary | ICD-10-CM

## 2023-03-23 LAB
ALBUMIN SERPL-MCNC: 3.7 G/DL (ref 3.5–5.2)
ALBUMIN/GLOB SERPL: 1.5 G/DL
ALP SERPL-CCNC: 56 U/L (ref 39–117)
ALT SERPL W P-5'-P-CCNC: 15 U/L (ref 1–33)
ANION GAP SERPL CALCULATED.3IONS-SCNC: 7.5 MMOL/L (ref 5–15)
AST SERPL-CCNC: 22 U/L (ref 1–32)
BILIRUB SERPL-MCNC: 0.2 MG/DL (ref 0–1.2)
BUN SERPL-MCNC: 17 MG/DL (ref 8–23)
BUN/CREAT SERPL: 20.2 (ref 7–25)
CALCIUM SPEC-SCNC: 9.1 MG/DL (ref 8.6–10.5)
CHLORIDE SERPL-SCNC: 104 MMOL/L (ref 98–107)
CO2 SERPL-SCNC: 24.5 MMOL/L (ref 22–29)
CREAT SERPL-MCNC: 0.84 MG/DL (ref 0.57–1)
EGFRCR SERPLBLD CKD-EPI 2021: 77.2 ML/MIN/1.73
GLOBULIN UR ELPH-MCNC: 2.5 GM/DL
GLUCOSE SERPL-MCNC: 88 MG/DL (ref 65–99)
POTASSIUM SERPL-SCNC: 4.2 MMOL/L (ref 3.5–5.2)
PROT SERPL-MCNC: 6.2 G/DL (ref 6–8.5)
SODIUM SERPL-SCNC: 136 MMOL/L (ref 136–145)

## 2023-03-23 PROCEDURE — 96366 THER/PROPH/DIAG IV INF ADDON: CPT

## 2023-03-23 PROCEDURE — 96365 THER/PROPH/DIAG IV INF INIT: CPT

## 2023-03-23 PROCEDURE — 63710000001 DIPHENHYDRAMINE PER 50 MG: Performed by: INTERNAL MEDICINE

## 2023-03-23 PROCEDURE — 96415 CHEMO IV INFUSION ADDL HR: CPT

## 2023-03-23 PROCEDURE — 80053 COMPREHEN METABOLIC PANEL: CPT

## 2023-03-23 PROCEDURE — 25010000002 INFLIXIMAB PER 10 MG: Performed by: INTERNAL MEDICINE

## 2023-03-23 PROCEDURE — 96413 CHEMO IV INFUSION 1 HR: CPT

## 2023-03-23 RX ORDER — SODIUM CHLORIDE 9 MG/ML
250 INJECTION, SOLUTION INTRAVENOUS ONCE
Status: DISCONTINUED | OUTPATIENT
Start: 2023-03-23 | End: 2023-03-25 | Stop reason: HOSPADM

## 2023-03-23 RX ORDER — ACETAMINOPHEN 325 MG/1
650 TABLET ORAL ONCE
Status: CANCELLED | OUTPATIENT
Start: 2023-04-20

## 2023-03-23 RX ORDER — DIPHENHYDRAMINE HCL 25 MG
25 CAPSULE ORAL ONCE
Status: CANCELLED | OUTPATIENT
Start: 2023-04-20

## 2023-03-23 RX ORDER — ACETAMINOPHEN 325 MG/1
650 TABLET ORAL ONCE
Status: COMPLETED | OUTPATIENT
Start: 2023-03-23 | End: 2023-03-23

## 2023-03-23 RX ORDER — DIPHENHYDRAMINE HCL 25 MG
25 CAPSULE ORAL ONCE
Status: COMPLETED | OUTPATIENT
Start: 2023-03-23 | End: 2023-03-23

## 2023-03-23 RX ORDER — SODIUM CHLORIDE 9 MG/ML
250 INJECTION, SOLUTION INTRAVENOUS ONCE
OUTPATIENT
Start: 2023-04-20 | End: 2023-04-20

## 2023-03-23 RX ORDER — SODIUM CHLORIDE 9 MG/ML
250 INJECTION, SOLUTION INTRAVENOUS ONCE
Status: CANCELLED | OUTPATIENT
Start: 2023-04-20

## 2023-03-23 RX ADMIN — DIPHENHYDRAMINE HYDROCHLORIDE 25 MG: 25 CAPSULE ORAL at 10:23

## 2023-03-23 RX ADMIN — ACETAMINOPHEN 650 MG: 325 TABLET, FILM COATED ORAL at 10:23

## 2023-03-23 RX ADMIN — INFLIXIMAB 330 MG: 100 INJECTION, POWDER, LYOPHILIZED, FOR SOLUTION INTRAVENOUS at 10:43

## 2023-03-29 ENCOUNTER — OFFICE VISIT (OUTPATIENT)
Dept: GASTROENTEROLOGY | Facility: CLINIC | Age: 68
End: 2023-03-29
Payer: MEDICARE

## 2023-03-29 VITALS
HEART RATE: 93 BPM | WEIGHT: 147 LBS | TEMPERATURE: 98 F | SYSTOLIC BLOOD PRESSURE: 130 MMHG | BODY MASS INDEX: 26.05 KG/M2 | HEIGHT: 63 IN | DIASTOLIC BLOOD PRESSURE: 66 MMHG

## 2023-03-29 DIAGNOSIS — K21.9 GASTROESOPHAGEAL REFLUX DISEASE WITHOUT ESOPHAGITIS: ICD-10-CM

## 2023-03-29 DIAGNOSIS — K59.03 DRUG-INDUCED CONSTIPATION: ICD-10-CM

## 2023-03-29 DIAGNOSIS — K50.80 CROHN'S DISEASE OF BOTH SMALL AND LARGE INTESTINE WITHOUT COMPLICATION: Primary | ICD-10-CM

## 2023-03-29 DIAGNOSIS — R11.0 NAUSEA: ICD-10-CM

## 2023-03-29 DIAGNOSIS — E53.8 VITAMIN B12 DEFICIENCY: ICD-10-CM

## 2023-03-29 PROCEDURE — 1160F RVW MEDS BY RX/DR IN RCRD: CPT | Performed by: INTERNAL MEDICINE

## 2023-03-29 PROCEDURE — 3078F DIAST BP <80 MM HG: CPT | Performed by: INTERNAL MEDICINE

## 2023-03-29 PROCEDURE — 3075F SYST BP GE 130 - 139MM HG: CPT | Performed by: INTERNAL MEDICINE

## 2023-03-29 PROCEDURE — 1159F MED LIST DOCD IN RCRD: CPT | Performed by: INTERNAL MEDICINE

## 2023-03-29 PROCEDURE — 99214 OFFICE O/P EST MOD 30 MIN: CPT | Performed by: INTERNAL MEDICINE

## 2023-03-29 RX ORDER — LACTULOSE 10 G/15ML
10 SOLUTION ORAL DAILY
Qty: 450 ML | Refills: 2 | Status: SHIPPED | OUTPATIENT
Start: 2023-03-29

## 2023-03-29 RX ORDER — LANOLIN ALCOHOL/MO/W.PET/CERES
1000 CREAM (GRAM) TOPICAL DAILY
Qty: 90 TABLET | Refills: 3 | Status: SHIPPED | OUTPATIENT
Start: 2023-03-29

## 2023-03-29 RX ORDER — PROMETHAZINE HYDROCHLORIDE 12.5 MG/1
12.5 TABLET ORAL EVERY 8 HOURS PRN
Qty: 30 TABLET | Refills: 2 | Status: SHIPPED | OUTPATIENT
Start: 2023-03-29

## 2023-03-29 RX ORDER — HYDROXYZINE PAMOATE 25 MG/1
CAPSULE ORAL
COMMUNITY
Start: 2023-03-21

## 2023-03-29 NOTE — PROGRESS NOTES
Follow Up Note     Date: 2023   Patient Name: Stephanie Jean  MRN: [id number]  : 1955     Referring Physician: Nhung Shepard DO    Chief Complaint:    Chief Complaint   Patient presents with   • Crohn's Disease          • Anemia     HX of    • Constipation   • Nausea   • Heartburn       Interval History:   3/29/2023  Stephanie Jean is a 67 y.o. female who is here today for follow up for her Crohn's disease.  She still has a significant constipation.  MiraLAX is not helping.  Abdominal bloating.  Minimal intermittent nausea persists.  She is currently receiving the Remicade infusion.     2020  Stephanie Jean is a 64 y.o. female who is here today to establish care with Gastroenterology for evaluation of heartburn.  History of upper abdominal pain mainly in the epigastric region since about 2-3 months. Pain started gradually, intermittent, aching pain lasting for about few minutes and occasionally longer.  She was given dexilant and sucralfate which did not help initially and later on changed to pantoprazole.  She is on pantoprazole now with reasonable control of pain and reflux symptoms with once in few days but for the last 2 weeks she did not take any PPI. Associated significant heart burn.   She was on PPI before after she was noted to have gastritis on EGD about 10 years ago. Stopped herself year ago. Occasional problem with swallowing to solid food. She also has sjogrens syndrome.   She was mostly constipated now. Bowel movements every once in 2-3 days and occasionally once in 3-5 days. No lower abdominal pain.      There is no history of  hematochezia or melena. There is no history of anemia. Prior history of EGD about 10 yrs ago. Last colonoscopy in 2020 about 4 polyps removed and advised to repeat in 3 years time. Brother had UC and uncle from mother's side had colon Ca. No history of any abdominal surgery except hysterectomy. Denies alcohol abuse or cigarette smoking.  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\"   • Dysphagia    • Elevated cholesterol    • Family history of colon cancer    • Fibromyalgia    • Fibromyalgia, primary 2003   • GERD (gastroesophageal reflux disease)    • GI (gastrointestinal bleed) 8/8/2022   • Heart murmur    • Hoarseness    • Hyperlipidemia    • Hypertension 2021   • Inflammatory bowel disease 2022    recent hospitalization for colitis   • Irritable bowel syndrome    • Low back pain 2003    treated by [facility]   • Lupus (HCC)    • Marijuana use     Daily    • Osteopenia 2021    Osteopenia   • Pulmonary nodule    • Sjogren's disease (HCC)    • Urinary tract infection 2022   • Vasovagal syncope      Past Surgical History:   Past Surgical History:   Procedure Laterality Date   • ABDOMINAL SURGERY  1998    [facility]   • APPENDECTOMY     • BREAST SURGERY  1975    lumpectomy 1975   • CATARACT EXTRACTION W/ INTRAOCULAR LENS IMPLANT Left 12/20/2021    Procedure: CATARACT PHACO EXTRACTION WITH INTRAOCULAR LENS IMPLANT LEFT COMPLICATED WITH MALYUGIN RING;  Surgeon: Zachary Landers MD;  Location: [facility];  Service: Ophthalmology;  Laterality: Left;   • CATARACT EXTRACTION W/ INTRAOCULAR LENS IMPLANT Right 01/03/2022    Procedure: CATARACT PHACO EXTRACTION WITH INTRAOCULAR LENS IMPLANT RIGHT;  Surgeon: Zachary Landers MD;  Location: [facility];  Service: Ophthalmology;  Laterality: Right;   • COLONOSCOPY     • COLONOSCOPY N/A 12/12/2022    Procedure: COLONOSCOPY WITH BIOPSIES AND POLYPECTOMY;  Surgeon: Chaparro Marrero MD;  Location: [facility] ENDOSCOPY;  Service: Gastroenterology;  " Laterality: N/A;   • ENDOSCOPY     • ENDOSCOPY N/A 2020    Procedure: ESOPHAGOGASTRODUODENOSCOPY;  Surgeon: Chaparro Marrero MD;  Location: [facility] ENDOSCOPY;  Service: Gastroenterology;  Laterality: N/A;   • EYE SURGERY      cataract removal   • HYSTERECTOMY     • TOTAL ABDOMINAL HYSTERECTOMY WITH SALPINGO OOPHORECTOMY     • TUBAL ABDOMINAL LIGATION     • UPPER GASTROINTESTINAL ENDOSCOPY         Family History:   Family History   Problem Relation Age of Onset   • Hypertension Mother    • Aneurysm Mother    • Heart disease Mother         passed away at 54 yr old after 5 vessel bypass   • Hyperlipidemia Mother    • Stroke Mother         aneurysm ruptured   • Emphysema Father    • COPD Father    • Ulcerative colitis Brother    • Alcohol abuse Brother    • Cancer Brother         stomach cancer   • COPD Brother    • Hyperlipidemia Brother    • Liver disease Brother    • Cirrhosis Brother    • Colon polyps Brother    • Stomach cancer Brother    • Colon cancer Maternal Uncle    • Arthritis Maternal Uncle    • Cancer Maternal Aunt         breast cancer   • Cancer Sister         breast   • COPD Paternal Uncle    • Early death Brother         ulcerative colitis   • Learning disabilities Brother         mental retardation   • Mental illness Brother         schizophrenia   • Ulcerative colitis Brother    • Kidney disease Paternal Aunt         nephrectomy   • Liver cancer Neg Hx    • Crohn's disease Neg Hx        Social History:   Social History     Socioeconomic History   • Marital status:    Tobacco Use   • Smoking status: Former     Packs/day: 1.00     Years: 40.00     Pack years: 40.00     Types: Cigarettes     Quit date: 2018     Years since quittin.2   • Smokeless tobacco: Never   Vaping Use   • Vaping Use: Never used   Substance and Sexual Activity   • Alcohol use: No   • Drug use: Yes     Types: Marijuana     Comment: OCCASIONALLY   • Sexual activity: Defer     Birth  control/protection: Hysterectomy       Medications:     Current Outpatient Medications:   •  Ascorbic Acid (VITAMIN C PO), Take 500 mg by mouth As Needed., Disp: , Rfl:   •  aspirin 81 MG EC tablet, Take 1 tablet by mouth. Every other day, Disp: , Rfl:   •  atorvastatin (LIPITOR) 20 MG tablet, Take 1 tablet by mouth Every Night., Disp: 90 tablet, Rfl: 3  •  azaTHIOprine (IMURAN) 50 MG tablet, Take 2 tablets by mouth Daily., Disp: 180 tablet, Rfl: 1  •  busPIRone (BUSPAR) 10 MG tablet, Take 1 tablet by mouth 2 (Two) Times a Day As Needed (anxiety)., Disp: 180 tablet, Rfl: 3  •  cyclobenzaprine (FLEXERIL) 10 MG tablet, Take 1 tablet by mouth 3 (Three) Times a Day., Disp: 270 tablet, Rfl: 3  •  Diclofenac Sodium (VOLTAREN) 1 % gel gel, diclofenac 1 % topical gel  APPLY 1 GRAM TOPICALLY TO THE AFFECTED AREA TWICE DAILY AS NEEDED FOR PAIN, Disp: , Rfl:   •  docusate sodium (COLACE) 100 MG capsule, Take 1 capsule by mouth 2 (Two) Times a Day., Disp: 180 capsule, Rfl: 3  •  DULoxetine (CYMBALTA) 60 MG capsule, Take 1 capsule by mouth Daily., Disp: 90 capsule, Rfl: 3  •  fluticasone (FLONASE) 50 MCG/ACT nasal spray, SHAKE LIQUID AND USE 1 TO 2 SPRAYS IN EACH NOSTRIL EVERY DAY AS NEEDED, Disp: , Rfl:   •  furosemide (Lasix) 20 MG tablet, Take 1 tablet by mouth Daily As Needed (swelling)., Disp: 30 tablet, Rfl: 1  •  gabapentin (NEURONTIN) 800 MG tablet, Take 1 tablet by mouth 3 (Three) Times a Day., Disp: , Rfl:   •  hydroxychloroquine (PLAQUENIL) 200 MG tablet, Take 1 tablet by mouth 2 (Two) Times a Day., Disp: , Rfl:   •  hydrOXYzine (ATARAX) 25 MG tablet, Take 1 tablet by mouth 3 (Three) Times a Day As Needed for Anxiety., Disp: 90 tablet, Rfl: 3  •  hydrOXYzine pamoate (VISTARIL) 25 MG capsule, , Disp: , Rfl:   •  inFLIXimab (REMICADE IV), Infuse  into a venous catheter Take As Directed., Disp: , Rfl:   •  lisinopril (PRINIVIL,ZESTRIL) 10 MG tablet, Take 1 tablet by mouth Every Night., Disp: , Rfl:   •  loratadine  (CLARITIN) 10 MG tablet, Take 1 tablet by mouth As Needed., Disp: , Rfl:   •  meclizine (ANTIVERT) 12.5 MG tablet, Take 1 tablet by mouth 3 (Three) Times a Day As Needed for Dizziness., Disp: 30 tablet, Rfl: 0  •  nitroglycerin (NITROSTAT) 0.4 MG SL tablet, Place 1 tablet under the tongue Every 5 (Five) Minutes As Needed., Disp: , Rfl:   •  oxyCODONE-acetaminophen (PERCOCET) 7.5-325 MG per tablet, Take 2 tablets by mouth Daily., Disp: , Rfl:   •  pantoprazole (PROTONIX) 20 MG EC tablet, Take 2 tablets by mouth Daily., Disp: 90 tablet, Rfl: 3  •  promethazine (PHENERGAN) 12.5 MG tablet, Take 1 tablet by mouth Every 8 (Eight) Hours As Needed for Nausea or Vomiting., Disp: 30 tablet, Rfl: 1  •  rOPINIRole (REQUIP) 2 MG tablet, TAKE 1 TABLET BY MOUTH EVERY DAY 1-3 HOURS BEFORE BEDTIME, Disp: 90 tablet, Rfl: 1  •  vitamin D (ERGOCALCIFEROL) 1.25 MG (32748 UT) capsule capsule, Take 1 capsule by mouth 1 (One) Time Per Week., Disp: 15 capsule, Rfl: 3  •  ondansetron ODT (ZOFRAN-ODT) 4 MG disintegrating tablet, , Disp: , Rfl:   •  VITAMIN E PO, Take  by mouth. (Patient not taking: Reported on 3/29/2023), Disp: , Rfl:     Allergies:   Allergies   Allergen Reactions   • Penicillins Rash       Review of Systems:   Review of Systems   Constitutional: Positive for fatigue. Negative for appetite change, fever and unexpected weight loss.   HENT: Negative for trouble swallowing.    Gastrointestinal: Positive for abdominal pain, blood in stool (occasionally), constipation, nausea, GERD and indigestion. Negative for abdominal distention, anal bleeding, diarrhea, rectal pain and vomiting.        Bloating         The following portions of the patient's history were reviewed and updated as appropriate: allergies, current medications, past family history, past medical history, past social history, past surgical history and problem list.    Objective     Physical Exam:  Vital Signs:   Vitals:    03/29/23 1547   BP: 130/66   Pulse: 93  "  Temp: 98 °F (36.7 °C)   TempSrc: Infrared   Weight: 66.7 kg (147 lb)   Height: 158.8 cm (62.5\")  Comment: patient states       Physical Exam  Constitutional:       Appearance: Normal appearance.   HENT:      Head: Normocephalic and atraumatic.   Eyes:      Conjunctiva/sclera: Conjunctivae normal.   Abdominal:      General: Abdomen is flat. There is no distension.      Palpations: There is no mass.      Tenderness: There is no abdominal tenderness. There is no guarding or rebound.      Hernia: No hernia is present.   Musculoskeletal:      Cervical back: Normal range of motion and neck supple.   Neurological:      Mental Status: She is alert.         Results Review:   I reviewed the patient's new clinical results.    Hospital Outpatient Visit on 03/23/2023   Component Date Value Ref Range Status   • Glucose 03/23/2023 88  65 - 99 mg/dL Final   • BUN 03/23/2023 17  8 - 23 mg/dL Final   • Creatinine 03/23/2023 0.84  0.57 - 1.00 mg/dL Final   • Sodium 03/23/2023 136  136 - 145 mmol/L Final   • Potassium 03/23/2023 4.2  3.5 - 5.2 mmol/L Final   • Chloride 03/23/2023 104  98 - 107 mmol/L Final   • CO2 03/23/2023 24.5  22.0 - 29.0 mmol/L Final   • Calcium 03/23/2023 9.1  8.6 - 10.5 mg/dL Final   • Total Protein 03/23/2023 6.2  6.0 - 8.5 g/dL Final   • Albumin 03/23/2023 3.7  3.5 - 5.2 g/dL Final   • ALT (SGPT) 03/23/2023 15  1 - 33 U/L Final   • AST (SGOT) 03/23/2023 22  1 - 32 U/L Final   • Alkaline Phosphatase 03/23/2023 56  39 - 117 U/L Final   • Total Bilirubin 03/23/2023 0.2  0.0 - 1.2 mg/dL Final   • Globulin 03/23/2023 2.5  gm/dL Final   • A/G Ratio 03/23/2023 1.5  g/dL Final   • BUN/Creatinine Ratio 03/23/2023 20.2  7.0 - 25.0 Final   • Anion Gap 03/23/2023 7.5  5.0 - 15.0 mmol/L Final   • eGFR 03/23/2023 77.2  >60.0 mL/min/1.73 Final   Office Visit on 03/17/2023   Component Date Value Ref Range Status   • WBC 03/17/2023 3.9  3.4 - 10.8 x10E3/uL Final   • RBC 03/17/2023 3.63 (L)  3.77 - 5.28 x10E6/uL Final   • " Hemoglobin 03/17/2023 11.0 (L)  11.1 - 15.9 g/dL Final   • Hematocrit 03/17/2023 33.2 (L)  34.0 - 46.6 % Final   • MCV 03/17/2023 92  79 - 97 fL Final   • MCH 03/17/2023 30.3  26.6 - 33.0 pg Final   • MCHC 03/17/2023 33.1  31.5 - 35.7 g/dL Final   • RDW 03/17/2023 13.2  11.7 - 15.4 % Final   • Platelets 03/17/2023 CANCELED  x10E3/uL Final-Edited    Comment: Unable to perform accurate platelet count due to platelet clumps.  Platelets appear adequate on slide.    Result canceled by the ancillary.     • Neutrophil Rel % 03/17/2023 44  Not Estab. % Final   • Lymphocyte Rel % 03/17/2023 47  Not Estab. % Final   • Monocyte Rel % 03/17/2023 6  Not Estab. % Final   • Eosinophil Rel % 03/17/2023 2  Not Estab. % Final   • Basophil Rel % 03/17/2023 1  Not Estab. % Final   • Neutrophils Absolute 03/17/2023 1.7  1.4 - 7.0 x10E3/uL Final   • Lymphocytes Absolute 03/17/2023 1.8  0.7 - 3.1 x10E3/uL Final   • Monocytes Absolute 03/17/2023 0.3  0.1 - 0.9 x10E3/uL Final   • Eosinophils Absolute 03/17/2023 0.1  0.0 - 0.4 x10E3/uL Final   • Basophils Absolute 03/17/2023 0.0  0.0 - 0.2 x10E3/uL Final   • Immature Granulocyte Rel % 03/17/2023 0  Not Estab. % Final   • Immature Grans Absolute 03/17/2023 0.0  0.0 - 0.1 x10E3/uL Final   • Hematology Comments: 03/17/2023 Note:   Final    Verified by microscopic examination.   • Ferritin 03/17/2023 44  15 - 150 ng/mL Final   • Folate 03/17/2023 12.9  >3.0 ng/mL Final    Comment: A serum folate concentration of less than 3.1 ng/mL is  considered to represent clinical deficiency.     • TIBC 03/17/2023 301  250 - 450 ug/dL Final   • UIBC 03/17/2023 255  118 - 369 ug/dL Final   • Iron 03/17/2023 46  27 - 139 ug/dL Final   • Iron Saturation 03/17/2023 15  15 - 55 % Final   • Vitamin B-12 03/17/2023 299  232 - 1,245 pg/mL Final   • BNP 03/17/2023 7.2  0.0 - 100.0 pg/mL Final    [employer] methodology   Lab on 03/02/2023   Component Date Value Ref Range Status   • WBC 03/02/2023 4.86   3.40 - 10.80 10*3/mm3 Final   • RBC 03/02/2023 3.60 (L)  3.77 - 5.28 10*6/mm3 Final   • Hemoglobin 03/02/2023 10.8 (L)  12.0 - 15.9 g/dL Final   • Hematocrit 03/02/2023 32.9 (L)  34.0 - 46.6 % Final   • MCV 03/02/2023 91.4  79.0 - 97.0 fL Final   • MCH 03/02/2023 30.0  26.6 - 33.0 pg Final   • MCHC 03/02/2023 32.8  31.5 - 35.7 g/dL Final   • RDW 03/02/2023 13.0  12.3 - 15.4 % Final   • RDW-SD 03/02/2023 43.2  37.0 - 54.0 fl Final   • MPV 03/02/2023 11.5  6.0 - 12.0 fL Final   • Platelets 03/02/2023 140  140 - 450 10*3/mm3 Final   • Neutrophil % 03/02/2023 48.4  42.7 - 76.0 % Final   • Lymphocyte % 03/02/2023 39.5  19.6 - 45.3 % Final   • Monocyte % 03/02/2023 9.7  5.0 - 12.0 % Final   • Eosinophil % 03/02/2023 1.6  0.3 - 6.2 % Final   • Basophil % 03/02/2023 0.6  0.0 - 1.5 % Final   • Immature Grans % 03/02/2023 0.2  0.0 - 0.5 % Final   • Neutrophils, Absolute 03/02/2023 2.35  1.70 - 7.00 10*3/mm3 Final   • Lymphocytes, Absolute 03/02/2023 1.92  0.70 - 3.10 10*3/mm3 Final   • Monocytes, Absolute 03/02/2023 0.47  0.10 - 0.90 10*3/mm3 Final   • Eosinophils, Absolute 03/02/2023 0.08  0.00 - 0.40 10*3/mm3 Final   • Basophils, Absolute 03/02/2023 0.03  0.00 - 0.20 10*3/mm3 Final   • Immature Grans, Absolute 03/02/2023 0.01  0.00 - 0.05 10*3/mm3 Final   • nRBC 03/02/2023 0.0  0.0 - 0.2 /100 WBC Final   • Glucose 03/02/2023 92  65 - 99 mg/dL Final   • BUN 03/02/2023 13  8 - 23 mg/dL Final   • Creatinine 03/02/2023 1.03 (H)  0.57 - 1.00 mg/dL Final   • Sodium 03/02/2023 141  136 - 145 mmol/L Final   • Potassium 03/02/2023 4.2  3.5 - 5.2 mmol/L Final   • Chloride 03/02/2023 107  98 - 107 mmol/L Final   • CO2 03/02/2023 26.5  22.0 - 29.0 mmol/L Final   • Calcium 03/02/2023 9.2  8.6 - 10.5 mg/dL Final   • Total Protein 03/02/2023 6.9  6.0 - 8.5 g/dL Final   • Albumin 03/02/2023 4.1  3.5 - 5.2 g/dL Final   • ALT (SGPT) 03/02/2023 13  1 - 33 U/L Final   • AST (SGOT) 03/02/2023 24  1 - 32 U/L Final   • Alkaline Phosphatase  03/02/2023 62  39 - 117 U/L Final   • Total Bilirubin 03/02/2023 0.2  0.0 - 1.2 mg/dL Final   • Globulin 03/02/2023 2.8  gm/dL Final   • A/G Ratio 03/02/2023 1.5  g/dL Final   • BUN/Creatinine Ratio 03/02/2023 12.6  7.0 - 25.0 Final   • Anion Gap 03/02/2023 7.5  5.0 - 15.0 mmol/L Final   • eGFR 03/02/2023 60.5  >60.0 mL/min/1.73 Final   • Hep B Core Total Ab 03/02/2023 Negative  Negative Final   • Hepatitis B Surface Ag 03/02/2023 Non-Reactive  Non-Reactive Final   • Hep B S Ab 03/02/2023 Reactive (A)  Non-Reactive Final      MRI Cervical Spine Without Contrast    Result Date: 1/31/2023  Multilevel degenerative disc disease and spondylosis with multilevel neural foraminal narrowing as described, greatest on the right at C5-6.  No focal disc protrusion identified. Authenticated and Electronically Signed by Satish Bear III, MD on 01/31/2023 07:40:20 AM    XR Hand and Wrist Bilateral 2 Views    Result Date: 3/15/2023  Bilateral osteoarthritis, as described above. CRITICAL RESULT:   No. COMMUNICATION: Per this written report. ATTESTATION: Not applicable. Dictated by Etta Landon MD on 3/15/2023 3:15 PM Signed by Etta Landon MD on 3/15/2023 3:21 PM      US Abdomen Complete     Result Date: 12/8/2022  Unremarkable abdominal ultrasound.  This report was signed and finalized on 12/8/2022 3:54 PM by Joey Nam MD.     CT ABDOMEN PELVIS WO CONTRAST Additional Contrast? None  Result Date: 10/17/2022  1. No acute abnormality. 2. 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 3. Severe vascular calcifications. 4. L5 pars defects with grade 1 anterolisthesis and severe bilateral L5 foraminal stenosis.     11/15/2022 PillCam: Few scattered gastric erosions. Multiple small and medium size healing erosions and superficial ulcerations identified throughout the ileum. No deep ulcerations except one small proximal ileal ulceration. These findings with anemia and recent colitis is more suggestive of ileocolonic  crohn's disease.     Colonoscopy by Dr. Marrero 12/12/2022:  - The perianal and digital rectal examinations were normal.  - A 3 mm polyp was found in the cecum. The polyp was sessile. The polyp was removed with a cold snare. Resection and retrieval were complete.  - A 4 mm polyp was found in the sigmoid colon. The polyp was flat. The polyp was removed with a cold snare. Resection and retrieval were complete.  - Diffuse moderate inflammation characterized by altered vascularity, congestion (edema), erosions, erythema, friability and granularity was found in the rectum, in the recto-sigmoid colon, in the sigmoid colon and in the descending colon. Biopsies were taken with a cold forceps for histology.  - A medium healed ulcer was found in the mid descending colon. The scar tissue was healthy in appearance.  - The transverse colon, hepatic flexure, ascending colon, cecum, appendiceal orifice and ileocecal valve appeared normal. Biopsies were taken with a cold forceps for histology.  - The terminal ileum appeared normal. Biopsies were taken with a cold forceps for histology.  Pathology DIAGNOSIS:   A.   TERMINAL ILEUM BIOPSY:   Benign small bowel, consistent with terminal ileum   Negative for significant architectural distortion, inflammatory infiltrate,   neoplasia, malignancy   B.   CECUM BIOPSY:   Benign colonic mucosa, negative for significant architectural distortion,   inflammatory infiltrate, neoplasia, dysplasia, malignancy   C.   ASCENDING COLON BIOPSY:   Benign colonic mucosa, negative for significant architectural distortion,   inflammatory infiltrate, neoplasia, dysplasia, malignancy   D.   TRANSVERSE COLON BIOPSY:   Benign colonic mucosa, negative for significant architectural distortion,   inflammatory infiltrate, neoplasia, dysplasia, malignancy   E.   DESCENDING COLON BIOPSY:   Minimal active colitis without significant accompanying chronicity   Negative for dysplasia, neoplasia, malignancy   See comment    F.   SIGMOID COLON BIOPSY:   Minimal active colitis without significant chronicity   Negative for dysplasia, neoplasia,malignancy   See comment   G.   RECTAL BIOPSY:   Minimal active colitis without significant chronicity   Negative for dysplasia, neoplasia, malignancy   See comment   H.   CECUM POLYP:   Tubular adenoma   Negative for high-grade dysplasia/malignancy   I.   SIGMOID COLON POLYP:   Hyperplastic polyp   Accompanying inflammatory changes   COMMENT:  E-G.  There is minimal active colitis with only rare neutrophils   identified within the mucosal surfaces.  There is no evidence of viral cytopathic effect, significant chronic alteration, dysplasia, neoplasia, malignancy, or granulomatous inflammation.  These findings are not specific and could be seen with an infectious process, acute self-limited colitis, or even potentially inflammatory bowel disease in the appropriate clinical/endoscopic setting. Clinical correlation is needed    Assessment / Plan      1. Crohn's disease of both small and large intestine without complication  2. 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 Chronic iron deficiency anemia  4. Vitamin B12 deficiency  5.  Rheumatoid arthritis  3/29/2023  Recent lab work done in January 2023 reviewed.  Her stool calprotectin was 25 within normal limits.  Hemoglobin is still low at 11 g/dL.  Vitamin B12 is low at 199.  She had a 2 induction dose of Remicade now third 1 is pending.  Initially prescribed Humira however she could not afford co-pay.  She has a significant constipation now MiraLAX is not helping  Vitamin B12 1000 mcg p.o. daily  We will start her on lactulose 15 mill p.o. daily and titrate the dose up to twice daily  We will get her Remicade level after first maintenance  We will continue her Imuran 100 mg p.o. day    12/29/2022  After review of recent pillcam and colonoscopy, it seems that she has chronic inflammation which may have been contributing to  her anemia throughout the years. Some element of anemia of chronic disease suspected with her rheumatoid arthritis. She had erosions and ulceration in the small intestine on PillCam. Colonoscopy and colon biopsies show active colitis. History and endoscopic findings consistent with a new diagnosis of Crohn's disease. She is currently complaining of bloating, mucous in stools, mild rectal bleeding and loose stools. This is a change in bowel habits from her previous constipation.   Initially diagnosed with colitis in August 2022 with bloody diarrhea.  CT abdomen pelvis done with contrast in August 2022 revealed a left-sided colon colitis.  Subsequently she had a colonoscopy done by Dr. Morel on 8/17/2022 which did not reveal any endoscopic signs of colitis.  However TI was not visualized.  Random colon biopsies done were all normal without any signs of colitis.  Prior EGD done in 2020 did not reveal any upper GI source of bleeding.     Her brother has ulcerative colitis. Previously with reports of constipation at baseline. Now having stools daily. In the past, she took MiraLAX, senna with relief. Had taken Linzess and Movantik before which did not help her. Serum porphyrin normal.      6.  Drug-induced constipation8  7.  Drug-induced nausea and suspected opioid induced gastroparesis  8.  Gastroesophageal reflux disease  Patient has a opioid-induced constipation and nausea.    Her reflux symptoms well controlled with low-dose PPI.  MiraLAX is not working for her constipation  Started on a lactulose 15 mill p.o. daily and titrate to twice daily    Prior history  9. Tubular adenoma of colon  Colonoscopy 12/2022 had 2 small polyps, 1 was tubular adenoma without dysplasia and other was hyperplastic.  No family history of any colon cancer with a first-degree relatives. Due to new diagnosis of IBD, she will likely need repeat colonoscopy in 1-3 years depending on clinical course.           Follow Up:   No follow-ups on  file.    Chaparro Marrero MD  [facility]  3/29/2023  15:49 EDT     Please note that portions of this note may have been completed with a voice recognition program.

## 2023-03-30 ENCOUNTER — TELEPHONE (OUTPATIENT)
Dept: GASTROENTEROLOGY | Facility: CLINIC | Age: 68
End: 2023-03-30
Payer: MEDICARE

## 2023-03-30 RX ORDER — DOCUSATE SODIUM 100 MG/1
CAPSULE, LIQUID FILLED ORAL
Qty: 60 CAPSULE | Refills: 2 | Status: SHIPPED | OUTPATIENT
Start: 2023-03-30

## 2023-03-30 NOTE — TELEPHONE ENCOUNTER
----- Message from Chaparro Marrero MD sent at 3/29/2023  6:53 PM EDT -----  Let her know that I have sent a prescription for vitamin B 1000 mcg p.o. daily  Insurance may not cover in that case let her take from over-the-counter

## 2023-04-03 ENCOUNTER — PRIOR AUTHORIZATION (OUTPATIENT)
Dept: GASTROENTEROLOGY | Facility: CLINIC | Age: 68
End: 2023-04-03
Payer: MEDICARE

## 2023-04-03 NOTE — TELEPHONE ENCOUNTER
PRIOR AUTHORIZATION HAS BEEN SUBMITTED AND APPROVED FOR PROMETHAZINE 12.5 MG.     PATIENT'S RX BENEFITS ARE THROUGH [employer]    BIN# [id number]  PCN# [id number]  ID# [id number]  GRP# [id number]

## 2023-04-27 NOTE — THERAPY DISCHARGE NOTE
Airway    Performed by: Akhil Leblanc CRNA  Authorized by: Akhil Leblanc Spoke with RN, Pt is discharging home and has been up ambulating with nursing, all symptoms resolved. No skilled PT needs at this time. PT order discontinued.

## 2023-05-03 ENCOUNTER — HOSPITAL ENCOUNTER (OUTPATIENT)
Dept: INFUSION THERAPY | Facility: HOSPITAL | Age: 68
Discharge: HOME | End: 2023-05-03
Admitting: INTERNAL MEDICINE
Payer: MEDICARE

## 2023-05-03 VITALS
DIASTOLIC BLOOD PRESSURE: 73 MMHG | WEIGHT: 148 LBS | SYSTOLIC BLOOD PRESSURE: 122 MMHG | BODY MASS INDEX: 26.64 KG/M2 | TEMPERATURE: 98 F | RESPIRATION RATE: 18 BRPM | OXYGEN SATURATION: 93 % | HEART RATE: 78 BPM

## 2023-05-03 DIAGNOSIS — K50.80 CROHN'S DISEASE OF BOTH SMALL AND LARGE INTESTINE WITHOUT COMPLICATION: Primary | ICD-10-CM

## 2023-05-03 PROCEDURE — 63710000001 DIPHENHYDRAMINE PER 50 MG: Performed by: INTERNAL MEDICINE

## 2023-05-03 PROCEDURE — 96413 CHEMO IV INFUSION 1 HR: CPT

## 2023-05-03 PROCEDURE — 96415 CHEMO IV INFUSION ADDL HR: CPT

## 2023-05-03 PROCEDURE — 25010000002 INFLIXIMAB PER 10 MG: Performed by: INTERNAL MEDICINE

## 2023-05-03 RX ORDER — ACETAMINOPHEN 325 MG/1
650 TABLET ORAL ONCE
Start: 2023-05-03 | End: 2023-05-03

## 2023-05-03 RX ORDER — SODIUM CHLORIDE 9 MG/ML
250 INJECTION, SOLUTION INTRAVENOUS ONCE
OUTPATIENT
Start: 2023-07-03

## 2023-05-03 RX ORDER — DIPHENHYDRAMINE HCL 25 MG
25 CAPSULE ORAL ONCE
Status: COMPLETED | OUTPATIENT
Start: 2023-05-03 | End: 2023-05-03

## 2023-05-03 RX ORDER — DIPHENHYDRAMINE HCL 25 MG
25 CAPSULE ORAL ONCE
Start: 2023-05-03 | End: 2023-05-03

## 2023-05-03 RX ORDER — ACETAMINOPHEN 325 MG/1
650 TABLET ORAL ONCE
Status: COMPLETED | OUTPATIENT
Start: 2023-05-03 | End: 2023-05-03

## 2023-05-03 RX ORDER — ACETAMINOPHEN 325 MG/1
650 TABLET ORAL ONCE
OUTPATIENT
Start: 2023-07-03

## 2023-05-03 RX ORDER — DIPHENHYDRAMINE HCL 25 MG
25 CAPSULE ORAL ONCE
OUTPATIENT
Start: 2023-07-03

## 2023-05-03 RX ORDER — SODIUM CHLORIDE 9 MG/ML
250 INJECTION, SOLUTION INTRAVENOUS ONCE
Status: DISCONTINUED | OUTPATIENT
Start: 2023-05-03 | End: 2023-05-05 | Stop reason: HOSPADM

## 2023-05-03 RX ORDER — SODIUM CHLORIDE 9 MG/ML
250 INJECTION, SOLUTION INTRAVENOUS ONCE
Status: CANCELLED | OUTPATIENT
Start: 2023-05-03 | End: 2023-05-03

## 2023-05-03 RX ADMIN — ACETAMINOPHEN 650 MG: 325 TABLET, FILM COATED ORAL at 08:36

## 2023-05-03 RX ADMIN — INFLIXIMAB 335.5 MG: 100 INJECTION, POWDER, LYOPHILIZED, FOR SOLUTION INTRAVENOUS at 09:10

## 2023-05-03 RX ADMIN — DIPHENHYDRAMINE HYDROCHLORIDE 25 MG: 25 CAPSULE ORAL at 08:36

## 2023-05-20 RX ORDER — FUROSEMIDE 20 MG/1
TABLET ORAL
Qty: 30 TABLET | Refills: 1 | Status: SHIPPED | OUTPATIENT
Start: 2023-05-20

## 2023-06-19 RX ORDER — HYDROXYZINE PAMOATE 25 MG/1
CAPSULE ORAL
Qty: 90 CAPSULE | Refills: 1 | Status: SHIPPED | OUTPATIENT
Start: 2023-06-19

## 2023-06-19 NOTE — TELEPHONE ENCOUNTER
Rx Refill Note  Requested Prescriptions     Pending Prescriptions Disp Refills    hydrOXYzine pamoate (VISTARIL) 25 MG capsule [Pharmacy Med Name: HYDROXYZINE PAMOATE 25MG CAPSULES] 90 capsule 1     Sig: TAKE 1 CAPSULE UP TO 3 TIMES A DAY AS DIRECTED      Last office visit with prescribing clinician: 3/17/2023   Last telemedicine visit with prescribing clinician: Visit date not found   Next office visit with prescribing clinician: 7/18/2023                         Would you like a call back once the refill request has been completed: [] Yes [] No    If the office needs to give you a call back, can they leave a voicemail: [] Yes [] No    Trang Medellin MA  06/19/23, 15:50 EDT

## 2023-07-18 PROBLEM — E55.9 VITAMIN D DEFICIENCY: Status: ACTIVE | Noted: 2023-07-18

## 2023-08-11 ENCOUNTER — APPOINTMENT (OUTPATIENT)
Dept: CT IMAGING | Facility: HOSPITAL | Age: 68
End: 2023-08-11
Payer: MEDICARE

## 2023-08-11 ENCOUNTER — HOSPITAL ENCOUNTER (EMERGENCY)
Facility: HOSPITAL | Age: 68
Discharge: OTHER INSTITUTION | End: 2023-08-11
Attending: STUDENT IN AN ORGANIZED HEALTH CARE EDUCATION/TRAINING PROGRAM
Payer: MEDICARE

## 2023-08-11 VITALS
HEIGHT: 62 IN | HEART RATE: 96 BPM | WEIGHT: 145 LBS | SYSTOLIC BLOOD PRESSURE: 127 MMHG | DIASTOLIC BLOOD PRESSURE: 65 MMHG | RESPIRATION RATE: 18 BRPM | TEMPERATURE: 98.2 F | BODY MASS INDEX: 26.68 KG/M2 | OXYGEN SATURATION: 95 %

## 2023-08-11 DIAGNOSIS — K92.2 LOWER GI BLEED: Primary | ICD-10-CM

## 2023-08-11 DIAGNOSIS — N17.9 ACUTE KIDNEY INJURY: ICD-10-CM

## 2023-08-11 DIAGNOSIS — K50.111 CROHN'S COLITIS, WITH RECTAL BLEEDING: ICD-10-CM

## 2023-08-11 LAB
ALBUMIN SERPL-MCNC: 4.5 G/DL (ref 3.5–5.2)
ALBUMIN/GLOB SERPL: 1.8 G/DL
ALP SERPL-CCNC: 216 U/L (ref 39–117)
ALT SERPL W P-5'-P-CCNC: 13 U/L (ref 1–33)
ANION GAP SERPL CALCULATED.3IONS-SCNC: 15.1 MMOL/L (ref 5–15)
AST SERPL-CCNC: 25 U/L (ref 1–32)
BACTERIA UR QL AUTO: ABNORMAL /HPF
BASOPHILS # BLD AUTO: 0 10*3/MM3 (ref 0–0.2)
BASOPHILS # BLD AUTO: 0.03 10*3/MM3 (ref 0–0.2)
BASOPHILS NFR BLD AUTO: 0 % (ref 0–1.5)
BASOPHILS NFR BLD AUTO: 0.2 % (ref 0–1.5)
BILIRUB SERPL-MCNC: 0.4 MG/DL (ref 0–1.2)
BILIRUB UR QL STRIP: ABNORMAL
BUN SERPL-MCNC: 32 MG/DL (ref 8–23)
BUN/CREAT SERPL: 25 (ref 7–25)
CALCIUM SPEC-SCNC: 9.4 MG/DL (ref 8.6–10.5)
CHLORIDE SERPL-SCNC: 102 MMOL/L (ref 98–107)
CLARITY UR: ABNORMAL
CO2 SERPL-SCNC: 22.9 MMOL/L (ref 22–29)
COLOR UR: ABNORMAL
CREAT SERPL-MCNC: 1.28 MG/DL (ref 0.57–1)
CRP SERPL-MCNC: 1.77 MG/DL (ref 0–0.5)
D-LACTATE SERPL-SCNC: 1.4 MMOL/L (ref 0.5–2)
D-LACTATE SERPL-SCNC: 3.5 MMOL/L (ref 0.5–2)
DEPRECATED RDW RBC AUTO: 45.6 FL (ref 37–54)
DEPRECATED RDW RBC AUTO: 47.1 FL (ref 37–54)
EGFRCR SERPLBLD CKD-EPI 2021: 46.6 ML/MIN/1.73
EOSINOPHIL # BLD AUTO: 0 10*3/MM3 (ref 0–0.4)
EOSINOPHIL # BLD AUTO: 0 10*3/MM3 (ref 0–0.4)
EOSINOPHIL NFR BLD AUTO: 0 % (ref 0.3–6.2)
EOSINOPHIL NFR BLD AUTO: 0 % (ref 0.3–6.2)
ERYTHROCYTE [DISTWIDTH] IN BLOOD BY AUTOMATED COUNT: 13.9 % (ref 12.3–15.4)
ERYTHROCYTE [DISTWIDTH] IN BLOOD BY AUTOMATED COUNT: 14.1 % (ref 12.3–15.4)
ERYTHROCYTE [SEDIMENTATION RATE] IN BLOOD: 1 MM/HR (ref 0–30)
GLOBULIN UR ELPH-MCNC: 2.5 GM/DL
GLUCOSE SERPL-MCNC: 140 MG/DL (ref 65–99)
GLUCOSE UR STRIP-MCNC: NEGATIVE MG/DL
HCT VFR BLD AUTO: 31.8 % (ref 34–46.6)
HCT VFR BLD AUTO: 39.3 % (ref 34–46.6)
HGB BLD-MCNC: 10.9 G/DL (ref 12–15.9)
HGB BLD-MCNC: 13.7 G/DL (ref 12–15.9)
HGB UR QL STRIP.AUTO: NEGATIVE
HOLD SPECIMEN: NORMAL
HOLD SPECIMEN: NORMAL
HYALINE CASTS UR QL AUTO: ABNORMAL /LPF
IMM GRANULOCYTES # BLD AUTO: 0.06 10*3/MM3 (ref 0–0.05)
IMM GRANULOCYTES # BLD AUTO: 0.09 10*3/MM3 (ref 0–0.05)
IMM GRANULOCYTES NFR BLD AUTO: 0.6 % (ref 0–0.5)
IMM GRANULOCYTES NFR BLD AUTO: 0.6 % (ref 0–0.5)
KETONES UR QL STRIP: ABNORMAL
LEUKOCYTE ESTERASE UR QL STRIP.AUTO: ABNORMAL
LIPASE SERPL-CCNC: 21 U/L (ref 13–60)
LYMPHOCYTES # BLD AUTO: 0.65 10*3/MM3 (ref 0.7–3.1)
LYMPHOCYTES # BLD AUTO: 0.75 10*3/MM3 (ref 0.7–3.1)
LYMPHOCYTES NFR BLD AUTO: 4.3 % (ref 19.6–45.3)
LYMPHOCYTES NFR BLD AUTO: 7.2 % (ref 19.6–45.3)
MCH RBC QN AUTO: 31.3 PG (ref 26.6–33)
MCH RBC QN AUTO: 31.3 PG (ref 26.6–33)
MCHC RBC AUTO-ENTMCNC: 34.3 G/DL (ref 31.5–35.7)
MCHC RBC AUTO-ENTMCNC: 34.9 G/DL (ref 31.5–35.7)
MCV RBC AUTO: 89.7 FL (ref 79–97)
MCV RBC AUTO: 91.4 FL (ref 79–97)
MONOCYTES # BLD AUTO: 0.59 10*3/MM3 (ref 0.1–0.9)
MONOCYTES # BLD AUTO: 0.73 10*3/MM3 (ref 0.1–0.9)
MONOCYTES NFR BLD AUTO: 4.8 % (ref 5–12)
MONOCYTES NFR BLD AUTO: 5.7 % (ref 5–12)
MUCOUS THREADS URNS QL MICRO: ABNORMAL /HPF
NEUTROPHILS NFR BLD AUTO: 13.57 10*3/MM3 (ref 1.7–7)
NEUTROPHILS NFR BLD AUTO: 86.5 % (ref 42.7–76)
NEUTROPHILS NFR BLD AUTO: 9 10*3/MM3 (ref 1.7–7)
NEUTROPHILS NFR BLD AUTO: 90.1 % (ref 42.7–76)
NITRITE UR QL STRIP: NEGATIVE
NRBC BLD AUTO-RTO: 0 /100 WBC (ref 0–0.2)
NRBC BLD AUTO-RTO: 0 /100 WBC (ref 0–0.2)
PH UR STRIP.AUTO: 5.5 [PH] (ref 5–8)
PLATELET # BLD AUTO: 194 10*3/MM3 (ref 140–450)
PLATELET # BLD AUTO: 254 10*3/MM3 (ref 140–450)
PMV BLD AUTO: 10 FL (ref 6–12)
PMV BLD AUTO: 9.9 FL (ref 6–12)
POTASSIUM SERPL-SCNC: 4.1 MMOL/L (ref 3.5–5.2)
PROT SERPL-MCNC: 7 G/DL (ref 6–8.5)
PROT UR QL STRIP: ABNORMAL
RBC # BLD AUTO: 3.48 10*6/MM3 (ref 3.77–5.28)
RBC # BLD AUTO: 4.38 10*6/MM3 (ref 3.77–5.28)
RBC # UR STRIP: ABNORMAL /HPF
REF LAB TEST METHOD: ABNORMAL
SODIUM SERPL-SCNC: 140 MMOL/L (ref 136–145)
SP GR UR STRIP: 1.02 (ref 1–1.03)
SQUAMOUS #/AREA URNS HPF: ABNORMAL /HPF
UROBILINOGEN UR QL STRIP: ABNORMAL
WBC # UR STRIP: ABNORMAL /HPF
WBC NRBC COR # BLD: 10.4 10*3/MM3 (ref 3.4–10.8)
WBC NRBC COR # BLD: 15.07 10*3/MM3 (ref 3.4–10.8)
WHOLE BLOOD HOLD COAG: NORMAL
WHOLE BLOOD HOLD SPECIMEN: NORMAL

## 2023-08-11 PROCEDURE — 25010000002 MORPHINE PER 10 MG: Performed by: EMERGENCY MEDICINE

## 2023-08-11 PROCEDURE — 25010000002 ONDANSETRON PER 1 MG: Performed by: EMERGENCY MEDICINE

## 2023-08-11 PROCEDURE — 81001 URINALYSIS AUTO W/SCOPE: CPT

## 2023-08-11 PROCEDURE — 83690 ASSAY OF LIPASE: CPT

## 2023-08-11 PROCEDURE — 25010000002 HYDROMORPHONE 1 MG/ML SOLUTION: Performed by: STUDENT IN AN ORGANIZED HEALTH CARE EDUCATION/TRAINING PROGRAM

## 2023-08-11 PROCEDURE — 96376 TX/PRO/DX INJ SAME DRUG ADON: CPT

## 2023-08-11 PROCEDURE — 80053 COMPREHEN METABOLIC PANEL: CPT

## 2023-08-11 PROCEDURE — 74177 CT ABD & PELVIS W/CONTRAST: CPT

## 2023-08-11 PROCEDURE — 99285 EMERGENCY DEPT VISIT HI MDM: CPT

## 2023-08-11 PROCEDURE — 85025 COMPLETE CBC W/AUTO DIFF WBC: CPT

## 2023-08-11 PROCEDURE — 86140 C-REACTIVE PROTEIN: CPT | Performed by: PHYSICIAN ASSISTANT

## 2023-08-11 PROCEDURE — 96361 HYDRATE IV INFUSION ADD-ON: CPT

## 2023-08-11 PROCEDURE — 25010000002 LEVOFLOXACIN PER 250 MG: Performed by: PHYSICIAN ASSISTANT

## 2023-08-11 PROCEDURE — 85025 COMPLETE CBC W/AUTO DIFF WBC: CPT | Performed by: PHYSICIAN ASSISTANT

## 2023-08-11 PROCEDURE — 25010000002 MORPHINE PER 10 MG: Performed by: STUDENT IN AN ORGANIZED HEALTH CARE EDUCATION/TRAINING PROGRAM

## 2023-08-11 PROCEDURE — 25510000001 IOPAMIDOL 61 % SOLUTION: Performed by: STUDENT IN AN ORGANIZED HEALTH CARE EDUCATION/TRAINING PROGRAM

## 2023-08-11 PROCEDURE — 87040 BLOOD CULTURE FOR BACTERIA: CPT | Performed by: PHYSICIAN ASSISTANT

## 2023-08-11 PROCEDURE — 96365 THER/PROPH/DIAG IV INF INIT: CPT

## 2023-08-11 PROCEDURE — 25010000002 ONDANSETRON PER 1 MG: Performed by: STUDENT IN AN ORGANIZED HEALTH CARE EDUCATION/TRAINING PROGRAM

## 2023-08-11 PROCEDURE — 85652 RBC SED RATE AUTOMATED: CPT | Performed by: PHYSICIAN ASSISTANT

## 2023-08-11 PROCEDURE — 96375 TX/PRO/DX INJ NEW DRUG ADDON: CPT

## 2023-08-11 PROCEDURE — 83605 ASSAY OF LACTIC ACID: CPT

## 2023-08-11 PROCEDURE — 25010000002 METHYLPREDNISOLONE PER 40 MG: Performed by: PHYSICIAN ASSISTANT

## 2023-08-11 PROCEDURE — 36415 COLL VENOUS BLD VENIPUNCTURE: CPT

## 2023-08-11 RX ORDER — PANTOPRAZOLE SODIUM 40 MG/10ML
80 INJECTION, POWDER, LYOPHILIZED, FOR SOLUTION INTRAVENOUS ONCE
Status: COMPLETED | OUTPATIENT
Start: 2023-08-11 | End: 2023-08-11

## 2023-08-11 RX ORDER — ONDANSETRON 2 MG/ML
4 INJECTION INTRAMUSCULAR; INTRAVENOUS ONCE
Status: COMPLETED | OUTPATIENT
Start: 2023-08-11 | End: 2023-08-11

## 2023-08-11 RX ORDER — SODIUM CHLORIDE 0.9 % (FLUSH) 0.9 %
10 SYRINGE (ML) INJECTION AS NEEDED
Status: DISCONTINUED | OUTPATIENT
Start: 2023-08-11 | End: 2023-08-11 | Stop reason: HOSPADM

## 2023-08-11 RX ORDER — METHYLPREDNISOLONE SODIUM SUCCINATE 40 MG/ML
80 INJECTION, POWDER, LYOPHILIZED, FOR SOLUTION INTRAMUSCULAR; INTRAVENOUS ONCE
Status: COMPLETED | OUTPATIENT
Start: 2023-08-11 | End: 2023-08-11

## 2023-08-11 RX ORDER — LEVOFLOXACIN 5 MG/ML
750 INJECTION, SOLUTION INTRAVENOUS ONCE
Status: COMPLETED | OUTPATIENT
Start: 2023-08-11 | End: 2023-08-11

## 2023-08-11 RX ADMIN — MORPHINE SULFATE 4 MG: 4 INJECTION, SOLUTION INTRAMUSCULAR; INTRAVENOUS at 14:42

## 2023-08-11 RX ADMIN — ONDANSETRON 4 MG: 2 INJECTION INTRAMUSCULAR; INTRAVENOUS at 14:43

## 2023-08-11 RX ADMIN — HYDROMORPHONE HYDROCHLORIDE 0.5 MG: 1 INJECTION, SOLUTION INTRAMUSCULAR; INTRAVENOUS; SUBCUTANEOUS at 16:12

## 2023-08-11 RX ADMIN — METHYLPREDNISOLONE SODIUM SUCCINATE 80 MG: 40 INJECTION, POWDER, FOR SOLUTION INTRAMUSCULAR; INTRAVENOUS at 17:59

## 2023-08-11 RX ADMIN — MORPHINE SULFATE 4 MG: 4 INJECTION, SOLUTION INTRAMUSCULAR; INTRAVENOUS at 20:32

## 2023-08-11 RX ADMIN — IOPAMIDOL 100 ML: 612 INJECTION, SOLUTION INTRAVENOUS at 15:31

## 2023-08-11 RX ADMIN — SODIUM CHLORIDE 1000 ML: 9 INJECTION, SOLUTION INTRAVENOUS at 14:44

## 2023-08-11 RX ADMIN — HYDROMORPHONE HYDROCHLORIDE 0.25 MG: 1 INJECTION, SOLUTION INTRAMUSCULAR; INTRAVENOUS; SUBCUTANEOUS at 18:00

## 2023-08-11 RX ADMIN — SODIUM CHLORIDE 974 ML: 9 INJECTION, SOLUTION INTRAVENOUS at 16:07

## 2023-08-11 RX ADMIN — PANTOPRAZOLE SODIUM 80 MG: 40 INJECTION, POWDER, FOR SOLUTION INTRAVENOUS at 16:07

## 2023-08-11 RX ADMIN — ONDANSETRON 4 MG: 2 INJECTION INTRAMUSCULAR; INTRAVENOUS at 20:31

## 2023-08-11 RX ADMIN — LEVOFLOXACIN 750 MG: 5 INJECTION, SOLUTION INTRAVENOUS at 17:11

## 2023-08-11 NOTE — ED NOTES
[place], [facility] called at this time to update us on bed status. She stated that there is no bed available at this time, but there could be availability later this evening. Treatment team notified.

## 2023-08-11 NOTE — ED PROVIDER NOTES
"Subjective   History of Present Illness  Chief Complaint: Abdominal pain, nausea vomiting and rectal bleeding  History of Present Illness: This is a 67-year-old female history of Crohn's disease diagnosed last year currently following with Dr. Marrero presents today with less than 24 hours of lower abdominal pain and cramping worse left lower quadrant with bright red hematochezia since last night as well, has had vomiting but no hematemesis.  Onset: Last night  Duration: Continuous  Exacerbating / Alleviating factors: None  Associated symptoms: None    Nurses Notes reviewed and agree, including vitals, allergies, social history and prior medical history.    REVIEW OF SYSTEMS: All systems reviewed and not pertinent unless noted.    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\"    Dysphagia     Elevated cholesterol     Family history of colon cancer     Fibromyalgia     Fibromyalgia, primary 2003    GERD (gastroesophageal reflux disease)     GI (gastrointestinal bleed) 8/8/2022    Heart murmur     Hoarseness     Hyperlipidemia     Hypertension 2021    Inflammatory bowel disease 2022    recent hospitalization for colitis    Irritable bowel syndrome     Low back pain 2003    treated by [facility]    Lupus     Marijuana use     Daily     Osteopenia 2021    Osteopenia    Pulmonary nodule     Sjogren's disease     Urinary tract infection 2022    Vasovagal syncope        Allergies   Allergen Reactions    Penicillins Rash       Past Surgical History:   Procedure Laterality " Date    ABDOMINAL SURGERY  1998    [facility]    APPENDECTOMY      BREAST SURGERY  1975    lumpectomy 1975    CATARACT EXTRACTION W/ INTRAOCULAR LENS IMPLANT Left 12/20/2021    Procedure: CATARACT PHACO EXTRACTION WITH INTRAOCULAR LENS IMPLANT LEFT COMPLICATED WITH MALYUGIN RING;  Surgeon: Zachary Landers MD;  Location: [facility] OR;  Service: Ophthalmology;  Laterality: Left;    CATARACT EXTRACTION W/ INTRAOCULAR LENS IMPLANT Right 01/03/2022    Procedure: CATARACT PHACO EXTRACTION WITH INTRAOCULAR LENS IMPLANT RIGHT;  Surgeon: Zachary Landers MD;  Location: [facility] OR;  Service: Ophthalmology;  Laterality: Right;    COLONOSCOPY      COLONOSCOPY N/A 12/12/2022    Procedure: COLONOSCOPY WITH BIOPSIES AND POLYPECTOMY;  Surgeon: Chaparro Marrero MD;  Location: [facility] ENDOSCOPY;  Service: Gastroenterology;  Laterality: N/A;    ENDOSCOPY      ENDOSCOPY N/A 06/01/2020    Procedure: ESOPHAGOGASTRODUODENOSCOPY;  Surgeon: Chaparro Marrero MD;  Location: [facility] ENDOSCOPY;  Service: Gastroenterology;  Laterality: N/A;    EYE SURGERY  2021-22    cataract removal    HYSTERECTOMY      TOTAL ABDOMINAL HYSTERECTOMY WITH SALPINGO OOPHORECTOMY  1998    TUBAL ABDOMINAL LIGATION  1984    UPPER GASTROINTESTINAL ENDOSCOPY         Family History   Problem Relation Age of Onset    Hypertension Mother     Aneurysm Mother     Heart disease Mother         passed away at 54 yr old after 5 vessel bypass    Hyperlipidemia Mother     Stroke Mother         aneurysm ruptured    Emphysema Father     COPD Father     Ulcerative colitis Brother     Alcohol abuse Brother     Cancer Brother         stomach cancer    COPD Brother     Hyperlipidemia Brother     Liver disease Brother     Cirrhosis Brother     Colon polyps Brother     Stomach cancer Brother     Colon cancer Maternal Uncle     Arthritis Maternal Uncle     Cancer Maternal Aunt         breast cancer    Cancer Sister         breast    COPD Paternal Uncle     Early death Brother          ulcerative colitis    Learning disabilities Brother         mental retardation    Mental illness Brother         schizophrenia    Ulcerative colitis Brother     Kidney disease Paternal Aunt         nephrectomy    Liver cancer Neg Hx     Crohn's disease Neg Hx        Social History     Socioeconomic History    Marital status:    Tobacco Use    Smoking status: Former     Packs/day: 1.00     Years: 40.00     Pack years: 40.00     Types: Cigarettes     Quit date: 2018     Years since quittin.6    Smokeless tobacco: Never   Vaping Use    Vaping Use: Never used   Substance and Sexual Activity    Alcohol use: No    Drug use: Yes     Types: Marijuana     Comment: OCCASIONALLY    Sexual activity: Defer     Birth control/protection: Hysterectomy           Objective   Physical Exam  Constitutional:       Appearance: She is well-developed.   HENT:      Head: Normocephalic and atraumatic.   Eyes:      Extraocular Movements: Extraocular movements intact.      Pupils: Pupils are equal, round, and reactive to light.   Cardiovascular:      Rate and Rhythm: Normal rate and regular rhythm.      Heart sounds: Normal heart sounds.   Pulmonary:      Effort: Pulmonary effort is normal.      Breath sounds: Normal breath sounds.   Abdominal:      General: Abdomen is flat. Bowel sounds are normal.      Palpations: Abdomen is soft.      Tenderness: There is abdominal tenderness in the right lower quadrant, suprapubic area and left lower quadrant. There is no right CVA tenderness, left CVA tenderness, guarding or rebound. Negative signs include Arzate's sign, Rovsing's sign and McBurney's sign.      Hernia: No hernia is present.   Skin:     General: Skin is warm and dry.      Capillary Refill: Capillary refill takes less than 2 seconds.   Neurological:      General: No focal deficit present.      Mental Status: She is alert and oriented to person, place, and time.   Psychiatric:         Mood and Affect: Mood normal.          Behavior: Behavior normal.       Procedures           ED Course  ED Course as of 08/11/23 1859   Fri Aug 11, 2023   1636 Spoke with hospitalist, says we cannot admit here due to no GI coverage as she is having hematochezia.  Did inform the patient of this, her recommendation will be  or [facility], so we will contact them, also contact [facility] for possible transfer. [CC]   1654 I spoke with the hospitalist, Dr. Gonzalez at [facility], she has accepted the patient but patient is currently on a wait list, there is no guarantee they would even have a bed tonight, still looking at [facility] at [facility]. [CC]   1710 With Dr. Hartmann at [facility], they are on multisystem trauma diversion so they cannot accept the patient and they cannot even put her on a wait list. [CC]   1730 I spoke with Dr. Cochran who is a hospitalist at [facility] in [place], he has also accepted the patient, waiting return call from [facility] to see if they have a bed.  We will recheck CBC as she did have 1 episode of hematochezia here. [CC]   1745 Went to inform patient that [facility] has accepted and would likely have a bed tonight, she adamantly says she will not go to [facility], will stay on the wait list for [facility] in [place].  I did inform her it could be multiple days before [facility] could have a bed and if she were to decline we do not have anyone here that can do an emergent colonoscopy which could result in death, she is aware and still does not want to go to [facility]. [CC]   1807 I spoke with the patient once again, she is now willing to transfer to [facility] if they have a bed first so she is back on the wait list for [facility]. [CC]   1855 Patient has a bed at [facility], she is willing to go there, still awaiting repeat CBC prior to transfer to ensure stability of transfer. [CC]      ED Course User Index  [CC]  Allen Medina PA                                           Medical Decision Making  Initial impression of presenting illness: 67-year-old female history of Crohn's disease presents today with diffuse abdominal pain worse left lower and suprapubic region with hematochezia and nausea and vomiting thought hematemesis.  Last Crohn's flare and initial diagnosis was last year, she does currently see Dr. Marrero.     DDX includes but is not limited to Crohn's flare, diverticulitis, ischemic bowel    Patient arrives hemodynamically stable, afebrile without respiratory distress, with vitals interpreted by me. Pertinent features from physical exam: Patient does have diffuse but worse left lower quadrant abdominal pain, but no peritoneal signs, no active vomiting.    Initial diagnostic plan: Abdominal pain labs including sed rate and CRP, CT scan abdomen pelvis, IV fluids, pain medicine and nausea medicine.    Results from initial plan were reviewed and interpreted by me revealing patient has no anemia, but does appear to be dehydrated, lactate was elevated that did improve with septic fluids, patient with mild leukocytosis and mild JASVIR, CT scan did show findings of her Crohn's disease.    Diagnostic information from other sources: Prior admission    Interventions / Re-evaluation: Pain control, septic fluids with improvement in symptoms    Results/clinical rationale were discussed with the patient    Consultations/Discussion of results with other physicians: Spoke with [facility] who cannot accept the patient and cannot put her on a wait list, also spoke with [facility] who accepted but put her on a wait list and could be days, spoke with [facility] who has accepted and does have a bed at this time so patient will be transferred to [facility] for higher level of care.    Disposition plan: Patient will be transferred to [facility] via EMS.    Amount and/or Complexity of Data  Reviewed  Radiology: ordered.    Risk  Prescription drug management.        Final diagnoses:   Lower GI bleed   Acute kidney injury   Crohn's colitis, with rectal bleeding       ED Disposition  ED Disposition       ED Disposition   Transfer to Another Facility     Condition   --    Comment   [facility]               No follow-up provider specified.       Medication List      No changes were made to your prescriptions during this visit.            Allen Medina, PA  08/11/23 5550

## 2023-08-11 NOTE — ED NOTES
Per [facility] pt has changed her mind and now willing to go to [facility]. Called [facility] and notified them. She stated that they will put her back on the list. There are no beds at this time, but should be some soon.

## 2023-08-11 NOTE — ED NOTES
[facility] informed this PCT/Unit secretary that pt is refusing to go to [facility]. Called [facility] and notified them of pt's decision. RN notiifed

## 2023-08-11 NOTE — ED NOTES
Called [facility] at this time per [facility] requesting tele transfer. [facility] stated they currently do not have beds available, but should have beds soon. Awaiting call back from [facility].

## 2023-08-11 NOTE — ED NOTES
Called [facility] at this time per [facility] requesting transfer. Mal[place], [facility] stated they had a couple calls ahead of us, but would call us back as soon as possible. Treatment team notified.

## 2023-08-11 NOTE — ED NOTES
[facility] Hospitalist [facility] called for [facility] at this time. Call transferred.

## 2023-08-11 NOTE — ED NOTES
Called [facility] at this time per [facility] requesting tele transfer. [place] [facility] stated they would call us back.

## 2023-08-16 LAB
BACTERIA SPEC AEROBE CULT: NORMAL
BACTERIA SPEC AEROBE CULT: NORMAL

## 2023-08-22 ENCOUNTER — OFFICE VISIT (OUTPATIENT)
Dept: INTERNAL MEDICINE | Facility: CLINIC | Age: 68
End: 2023-08-22
Payer: MEDICARE

## 2023-08-22 VITALS
BODY MASS INDEX: 25.95 KG/M2 | HEART RATE: 82 BPM | OXYGEN SATURATION: 97 % | TEMPERATURE: 99 F | WEIGHT: 141 LBS | DIASTOLIC BLOOD PRESSURE: 74 MMHG | HEIGHT: 62 IN | SYSTOLIC BLOOD PRESSURE: 118 MMHG

## 2023-08-22 DIAGNOSIS — K21.9 GASTROESOPHAGEAL REFLUX DISEASE WITHOUT ESOPHAGITIS: ICD-10-CM

## 2023-08-22 DIAGNOSIS — K50.811 CROHN'S DISEASE OF BOTH SMALL AND LARGE INTESTINE WITH RECTAL BLEEDING: Primary | ICD-10-CM

## 2023-08-22 DIAGNOSIS — D64.9 ANEMIA, UNSPECIFIED TYPE: ICD-10-CM

## 2023-08-22 PROCEDURE — 99214 OFFICE O/P EST MOD 30 MIN: CPT | Performed by: NURSE PRACTITIONER

## 2023-08-22 PROCEDURE — 1160F RVW MEDS BY RX/DR IN RCRD: CPT | Performed by: NURSE PRACTITIONER

## 2023-08-22 PROCEDURE — 3078F DIAST BP <80 MM HG: CPT | Performed by: NURSE PRACTITIONER

## 2023-08-22 PROCEDURE — 1159F MED LIST DOCD IN RCRD: CPT | Performed by: NURSE PRACTITIONER

## 2023-08-22 PROCEDURE — 3074F SYST BP LT 130 MM HG: CPT | Performed by: NURSE PRACTITIONER

## 2023-08-22 NOTE — PROGRESS NOTES
"  Office Visit      Patient Name: Stephanie Jean  : 1955   MRN: [id number]   Care Team: Patient Care Team:  Nhung Shepard DO as PCP - General (Family Medicine)    Chief Complaint  Hospital Follow Up Visit (Went in on the  and discharged on the . [facility] )    Subjective     Subjective      Stephanie Jean presents to [facility] for hospital follow-up.   Admitted to [facility]  with Crohns exacerbation and acute GI bleed. She underwent colonoscopy and was fluid repleted. She was also given oral steroids and protonix was also increased during her hospitalization.Her H&H was trended in the hospital and she did not require blood transfusion, this is needing repeated today. She also suffered from mild hypokalemia during h her hospitalization which she was not discharged home with any replacement. She was discharged home in stable condition on 8/15 with oral steroid taper. Colonoscopy showed crohns colitis with possible ischemic colitis on left side.    Last dose of 20 mg prednisone tonight. Small amount of bright red bleeding when she wipes, only on toilet tissue. She is doing the recommended miralax, laxative, and fiber daily. She has multiple small soft bowel movements per day. She continues on her protonix as prescribed. Overall she is improving, is able to tolerate a diet and trying to increase her water intake.. She does feel dizzy at times and fatigued but she is not worsening. She has had no fever or severe pain since her discharge. She has a follow-up with Dr. Marrero in 1 week. No acute complaints or needs from a primary care standpoint today.     Objective     Objective   Vital Signs:   /74   Pulse 82   Temp 99 øF (37.2 øC)   Ht 157.5 cm (62.01\")   Wt 64 kg (141 lb)   SpO2 97%   BMI 25.78 kg/mý     Physical Exam  Vitals and nursing note reviewed.   Constitutional:       Appearance: Normal appearance. She is normal weight. She is not " ill-appearing.   Cardiovascular:      Rate and Rhythm: Normal rate and regular rhythm.      Heart sounds: Murmur heard.   Pulmonary:      Effort: Pulmonary effort is normal.      Breath sounds: Normal breath sounds. No wheezing.   Abdominal:      General: Abdomen is flat. Bowel sounds are normal. There is no distension.      Palpations: Abdomen is soft.      Tenderness: There is abdominal tenderness (generalized, worse on the right). There is no right CVA tenderness or left CVA tenderness.      Hernia: No hernia is present.   Skin:     General: Skin is warm and dry.      Findings: No rash.   Neurological:      General: No focal deficit present.      Mental Status: She is alert.   Psychiatric:         Mood and Affect: Mood normal.         Behavior: Behavior normal.        Assessment / Plan      Assessment & Plan   Problem List Items Addressed This Visit          Gastrointestinal Abdominal     Crohn's disease of both small and large intestine without complication - Primary (Chronic)    Keep scheduled follow-up with GI, clinically improved from recent exacerbation. Recommend finishing prednisone taper and keep scheduled follow-up. Would continue daily miralax for now.       Gastroesophageal reflux disease without esophagitis    Overview     Added automatically from request for surgery [id number]    Avoid eating spicy foods, caffeinated and carbonated beverages, alcohol, and chocolate. Try not to eat or drink within 3 hours of going to bed at night. May elevate the head of the bed. Eat smaller portions. Adhere to medication regimen as prescribed.          Hematology and Neoplasia    Anemia    Relevant Orders    CBC No Differential    Comprehensive metabolic panel    Recheck labs today, should be improving.        Follow Up   Return if symptoms worsen or fail to improve.  Patient was given instructions and counseling regarding her condition or for health maintenance advice. Please see specific information pulled into the  AVS if appropriate.     ANNEMARIE Sheriff  [facility]

## 2023-08-23 ENCOUNTER — OFFICE VISIT (OUTPATIENT)
Dept: NEUROLOGY | Facility: CLINIC | Age: 68
End: 2023-08-23
Payer: MEDICARE

## 2023-08-23 VITALS
HEART RATE: 80 BPM | TEMPERATURE: 98.4 F | OXYGEN SATURATION: 98 % | BODY MASS INDEX: 27.23 KG/M2 | DIASTOLIC BLOOD PRESSURE: 78 MMHG | WEIGHT: 148 LBS | SYSTOLIC BLOOD PRESSURE: 130 MMHG | HEIGHT: 62 IN

## 2023-08-23 DIAGNOSIS — G31.84 MILD NEUROCOGNITIVE DISORDER: Primary | ICD-10-CM

## 2023-08-23 LAB
ALBUMIN SERPL-MCNC: 4.2 G/DL (ref 3.5–5.2)
ALBUMIN/GLOB SERPL: 2.1 G/DL
ALP SERPL-CCNC: 66 U/L (ref 39–117)
ALT SERPL-CCNC: 12 U/L (ref 1–33)
AST SERPL-CCNC: 11 U/L (ref 1–32)
BILIRUB SERPL-MCNC: 0.4 MG/DL (ref 0–1.2)
BUN SERPL-MCNC: 12 MG/DL (ref 8–23)
BUN/CREAT SERPL: 15.6 (ref 7–25)
CALCIUM SERPL-MCNC: 9.2 MG/DL (ref 8.6–10.5)
CHLORIDE SERPL-SCNC: 101 MMOL/L (ref 98–107)
CO2 SERPL-SCNC: 27.8 MMOL/L (ref 22–29)
CREAT SERPL-MCNC: 0.77 MG/DL (ref 0.57–1)
EGFRCR SERPLBLD CKD-EPI 2021: 85.7 ML/MIN/1.73
ERYTHROCYTE [DISTWIDTH] IN BLOOD BY AUTOMATED COUNT: 13.6 % (ref 12.3–15.4)
GLOBULIN SER CALC-MCNC: 2 GM/DL
GLUCOSE SERPL-MCNC: 102 MG/DL (ref 65–99)
HCT VFR BLD AUTO: 28.9 % (ref 34–46.6)
HGB BLD-MCNC: 9.8 G/DL (ref 12–15.9)
MCH RBC QN AUTO: 30.9 PG (ref 26.6–33)
MCHC RBC AUTO-ENTMCNC: 33.9 G/DL (ref 31.5–35.7)
MCV RBC AUTO: 91.2 FL (ref 79–97)
PLATELET # BLD AUTO: 285 10*3/MM3 (ref 140–450)
POTASSIUM SERPL-SCNC: 4.6 MMOL/L (ref 3.5–5.2)
PROT SERPL-MCNC: 6.2 G/DL (ref 6–8.5)
RBC # BLD AUTO: 3.17 10*6/MM3 (ref 3.77–5.28)
SODIUM SERPL-SCNC: 138 MMOL/L (ref 136–145)
WBC # BLD AUTO: 10.77 10*3/MM3 (ref 3.4–10.8)

## 2023-08-23 RX ORDER — DONEPEZIL HYDROCHLORIDE 5 MG/1
5 TABLET, FILM COATED ORAL NIGHTLY
Qty: 30 TABLET | Refills: 6 | Status: SHIPPED | OUTPATIENT
Start: 2023-08-23

## 2023-08-23 NOTE — PROGRESS NOTES
Follow Up Office Visit      Patient Name: Stephanie Jean  : 1955   MRN: [id number]     Chief Complaint:    Chief Complaint   Patient presents with    Follow-up     Memory Issues       History of Present Illness: Stephanie Jean is a 67 y.o. female who is here today to follow up with memory complaints. She says she has a lot of brain fog and problems finding words.  This worsens as she becomes stressed or upset.  [facility]  She was told a lot of her problems are due to her vascular disease.  She is a retired nurse.  She lives at home with her .  She is still highly functioning and able to manage her home, finances, able to drive without difficulty.  She is able to operate her smart phone.  She also has kids and grandkids and says she does do running with the grandkids and helps care for them at times.  She is reluctant to start on any medication for memory but is considering it.  We discussed total lifestyle changes due to her chronic medical conditions.  She is already working on this and she does understand this being that she has been a nurse.   I reviewed her prior MMSE score which was 26.  Today her MMSE score is 28.            Subjective      Review of Systems:   Review of Systems   Gastrointestinal:  Positive for blood in stool.   Neurological:  Positive for memory problem.     I have reviewed and the following portions of the patient's history were updated as appropriate: past family history, past medical history, past social history, past surgical history and problem list.    Medications:     Current Outpatient Medications:     aspirin 81 MG EC tablet, Take 1 tablet by mouth. Every other day, Disp: , Rfl:     atorvastatin (LIPITOR) 20 MG tablet, Take 1 tablet by mouth Every Night., Disp: 90 tablet, Rfl: 3    azaTHIOprine (IMURAN) 50 MG tablet, Take 2 tablets by mouth Daily., Disp: 180 tablet, Rfl: 1    busPIRone (BUSPAR) 10 MG tablet, Take 1  tablet by mouth 2 (Two) Times a Day As Needed (anxiety)., Disp: 180 tablet, Rfl: 3    cyclobenzaprine (FLEXERIL) 10 MG tablet, Take 1 tablet by mouth 3 (Three) Times a Day., Disp: 270 tablet, Rfl: 3    Diclofenac Sodium (VOLTAREN) 1 % gel gel, diclofenac 1 % topical gel  APPLY 1 GRAM TOPICALLY TO THE AFFECTED AREA TWICE DAILY AS NEEDED FOR PAIN, Disp: , Rfl:     docusate sodium (COLACE) 100 MG capsule, TAKE 1 CAPSULE BY MOUTH TWICE DAILY, Disp: 60 capsule, Rfl: 2    DULoxetine (CYMBALTA) 60 MG capsule, Take 1 capsule by mouth Daily., Disp: 90 capsule, Rfl: 3    fluticasone (FLONASE) 50 MCG/ACT nasal spray, SHAKE LIQUID AND USE 1 TO 2 SPRAYS IN EACH NOSTRIL EVERY DAY AS NEEDED, Disp: , Rfl:     furosemide (LASIX) 20 MG tablet, TAKE 1 TABLET BY MOUTH DAILY AS NEEDED FOR SWELLING, Disp: 30 tablet, Rfl: 1    gabapentin (NEURONTIN) 800 MG tablet, Take 1 tablet by mouth 3 (Three) Times a Day., Disp: , Rfl:     hydrOXYzine pamoate (VISTARIL) 25 MG capsule, TAKE 1 CAPSULE UP TO 3 TIMES A DAY AS DIRECTED, Disp: 90 capsule, Rfl: 1    inFLIXimab (REMICADE IV), Infuse  into a venous catheter Take As Directed., Disp: , Rfl:     lactulose (CHRONULAC) 10 GM/15ML solution, Take 15 mL by mouth Daily., Disp: 450 mL, Rfl: 2    lisinopril (PRINIVIL,ZESTRIL) 10 MG tablet, Take 1 tablet by mouth Every Night., Disp: 90 tablet, Rfl: 3    loratadine (CLARITIN) 10 MG tablet, Take 1 tablet by mouth As Needed., Disp: , Rfl:     meclizine (ANTIVERT) 12.5 MG tablet, Take 1 tablet by mouth 3 (Three) Times a Day As Needed for Dizziness., Disp: 30 tablet, Rfl: 0    nitroglycerin (NITROSTAT) 0.4 MG SL tablet, Place 1 tablet under the tongue Every 5 (Five) Minutes As Needed., Disp: , Rfl:     oxyCODONE-acetaminophen (PERCOCET) 7.5-325 MG per tablet, Take 2 tablets by mouth Daily., Disp: , Rfl:     pantoprazole (PROTONIX) 20 MG EC tablet, Take 2 tablets by mouth Daily. (Patient taking differently: Take  by mouth Daily.), Disp: 90 tablet, Rfl: 3     "promethazine (PHENERGAN) 12.5 MG tablet, Take 1 tablet by mouth Every 8 (Eight) Hours As Needed for Nausea or Vomiting., Disp: 30 tablet, Rfl: 2    rOPINIRole (REQUIP) 2 MG tablet, TAKE 1 TABLET BY MOUTH EVERY DAY 1-3 HOURS BEFORE BEDTIME, Disp: 90 tablet, Rfl: 1    vitamin B-12 (CYANOCOBALAMIN) 1000 MCG tablet, Take 1 tablet by mouth Daily., Disp: 90 tablet, Rfl: 3    donepezil (Aricept) 5 MG tablet, Take 1 tablet by mouth Every Night., Disp: 30 tablet, Rfl: 6    vitamin D (ERGOCALCIFEROL) 1.25 MG (29672 UT) capsule capsule, Take 1 capsule by mouth 1 (One) Time Per Week., Disp: 15 capsule, Rfl: 3    Allergies:   Allergies   Allergen Reactions    Penicillins Rash       Objective     Physical Exam:  Vital Signs:   Vitals:    08/23/23 0933   BP: 130/78   Pulse: 80   Temp: 98.4 øF (36.9 øC)   SpO2: 98%   Weight: 67.1 kg (148 lb)   Height: 157.5 cm (62\")   PainSc:   5   PainLoc: Neck     Body mass index is 27.07 kg/mý.    Physical Exam  Constitutional:       Appearance: Normal appearance.   HENT:      Head: Normocephalic.   Pulmonary:      Effort: Pulmonary effort is normal.   Skin:     General: Skin is warm and dry.   Neurological:      General: No focal deficit present.      Mental Status: She is alert and oriented to person, place, and time.      Cranial Nerves: No dysarthria.      Motor: Motor function is intact.      Gait: Gait is intact.      Deep Tendon Reflexes:      Reflex Scores:       Bicep reflexes are 2+ on the right side and 2+ on the left side.       Brachioradialis reflexes are 2+ on the right side and 2+ on the left side.       Patellar reflexes are 2+ on the right side and 2+ on the left side.  Psychiatric:         Attention and Perception: Attention normal.         Mood and Affect: Mood and affect normal.         Speech: Speech normal.         Behavior: Behavior normal. Behavior is cooperative.         Thought Content: Thought content normal.         Cognition and Memory: Cognition normal.         " Judgment: Judgment normal.     Neurologic Exam     Mental Status   Oriented to person, place, and time.   Oriented to person.   Oriented to place. Oriented to city and area.   Oriented to year, month, date, day and season.   Registration: recalls 3 of 3 objects. Recall at 5 minutes: recalls 2 of 3 objects. Follows 3 step commands.   Attention: normal. Concentration: normal.   Speech: speech is normal   Level of consciousness: alert  Knowledge: good.   Able to name object. Able to read. Able to repeat. Able to write. Normal comprehension.     Gait, Coordination, and Reflexes     Gait  Gait: normal    Reflexes   Right brachioradialis: 2+  Left brachioradialis: 2+  Right biceps: 2+  Left biceps: 2+  Right patellar: 2+  Left patellar: 2+     Assessment / Plan      Assessment/Plan:   Diagnoses and all orders for this visit:    1. Mild neurocognitive disorder (Primary)  -     donepezil (Aricept) 5 MG tablet; Take 1 tablet by mouth Every Night.  Dispense: 30 tablet; Refill: 6       Long discussion with patient today regarding self-care, health, safety, memory/brain exercises.  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  She wants to do all that she can to slow any progression of her memory complaints.  She denies any safety concerns.  She is unaccompanied to the office visit today.  She is still very active and able to drive without any accidents or problems with getting lost.  She is willing to try low-dose donepezil.  I encouraged her to do her own research on medications and therapies as well.  She understands that preventative measures are best and there is no way to determine the speed or progression of the disease process.  Indications and side effects of medication discussed with patient she verbalizes understanding.  Patient will call in the interim if she has any questions or concerns or changes.    Follow Up:   Return in about 6 months (around 2/23/2024).    ANNEMARIE Harvey, FNP-[facility]  [facility]

## 2023-08-30 RX ORDER — ROPINIROLE 2 MG/1
TABLET, FILM COATED ORAL
Qty: 90 TABLET | Refills: 1 | Status: SHIPPED | OUTPATIENT
Start: 2023-08-30

## 2023-08-31 ENCOUNTER — OFFICE VISIT (OUTPATIENT)
Dept: GASTROENTEROLOGY | Facility: CLINIC | Age: 68
End: 2023-08-31
Payer: MEDICARE

## 2023-08-31 VITALS
HEIGHT: 62 IN | SYSTOLIC BLOOD PRESSURE: 136 MMHG | DIASTOLIC BLOOD PRESSURE: 77 MMHG | TEMPERATURE: 98.4 F | BODY MASS INDEX: 25.58 KG/M2 | HEART RATE: 109 BPM | WEIGHT: 139 LBS

## 2023-08-31 DIAGNOSIS — D50.0 IRON DEFICIENCY ANEMIA DUE TO CHRONIC BLOOD LOSS: ICD-10-CM

## 2023-08-31 DIAGNOSIS — R11.0 NAUSEA: Primary | ICD-10-CM

## 2023-08-31 DIAGNOSIS — K50.811 CROHN'S DISEASE OF BOTH SMALL AND LARGE INTESTINE WITH RECTAL BLEEDING: ICD-10-CM

## 2023-08-31 PROCEDURE — 99214 OFFICE O/P EST MOD 30 MIN: CPT | Performed by: INTERNAL MEDICINE

## 2023-08-31 PROCEDURE — 3078F DIAST BP <80 MM HG: CPT | Performed by: INTERNAL MEDICINE

## 2023-08-31 PROCEDURE — 3075F SYST BP GE 130 - 139MM HG: CPT | Performed by: INTERNAL MEDICINE

## 2023-08-31 RX ORDER — PANTOPRAZOLE SODIUM 40 MG/1
40 TABLET, DELAYED RELEASE ORAL DAILY
COMMUNITY

## 2023-08-31 RX ORDER — PROGESTERONE 100 MG/1
100 CAPSULE ORAL
COMMUNITY
Start: 2023-08-28

## 2023-08-31 RX ORDER — PREDNISONE 10 MG/1
TABLET ORAL
Qty: 74 TABLET | Refills: 0 | Status: SHIPPED | OUTPATIENT
Start: 2023-08-31

## 2023-08-31 RX ORDER — PROMETHAZINE HYDROCHLORIDE 12.5 MG/1
12.5 TABLET ORAL EVERY 8 HOURS PRN
Qty: 30 TABLET | Refills: 2 | Status: SHIPPED | OUTPATIENT
Start: 2023-08-31

## 2023-08-31 NOTE — PROGRESS NOTES
Follow Up Note     Date: 2023   Patient Name: Stephanie Jean  MRN: [id number]  : 1955     Referring Physician: Nhung Shepard DO    Chief Complaint:    Chief Complaint   Patient presents with    Hospital Follow Up Visit     At [facility]    Crohn's Disease    Nausea    Constipation    Heartburn       Interval History:   2023  Stephanie Jean is a 67 y.o. female who is here today for follow up after recent discharge from the hospital from [facility].  He states that her bloody diarrhea improved however she still has a bowel movement soft for management of 3 to 5/day.  She still has significant abdomen bloating and nausea.    2020  Stephanie Jean is a 64 y.o. female who is here today to establish care with Gastroenterology for evaluation of heartburn.  History of upper abdominal pain mainly in the epigastric region since about 2-3 months. Pain started gradually, intermittent, aching pain lasting for about few minutes and occasionally longer.  She was given dexilant and sucralfate which did not help initially and later on changed to pantoprazole.  She is on pantoprazole now with reasonable control of pain and reflux symptoms with once in few days but for the last 2 weeks she did not take any PPI. Associated significant heart burn.   She was on PPI before after she was noted to have gastritis on EGD about 10 years ago. Stopped herself year ago. Occasional problem with swallowing to solid food. She also has sjogrens syndrome.   She was mostly constipated now. Bowel movements every once in 2-3 days and occasionally once in 3-5 days. No lower abdominal pain.      There is no history of  hematochezia or melena. There is no history of anemia. Prior history of EGD about 10 yrs ago. Last colonoscopy in 2020 about 4 polyps removed and advised to repeat in 3 years time. Brother had UC and uncle from mother's side had colon Ca. No history of any abdominal surgery except hysterectomy.  "Denies alcohol abuse or cigarette smoking.  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\"    Dysphagia     Elevated cholesterol     Family history of colon cancer     Fibromyalgia     Fibromyalgia, primary 2003    GERD (gastroesophageal reflux disease)     GI (gastrointestinal bleed) 8/8/2022    Heart murmur     Hoarseness     Hyperlipidemia     Hypertension 2021    Inflammatory bowel disease 2022    recent hospitalization for colitis    Irritable bowel syndrome     Low back pain 2003    treated by [facility]    Lupus     Marijuana use     Daily     Osteopenia 2021    Osteopenia    Pulmonary nodule     Sjogren's disease     Urinary tract infection 2022    Vasovagal syncope      Past Surgical History:   Past Surgical History:   Procedure Laterality Date    ABDOMINAL SURGERY  1998    [facility]    APPENDECTOMY      BREAST SURGERY  1975    lumpectomy 1975    CATARACT EXTRACTION W/ INTRAOCULAR LENS IMPLANT Left 12/20/2021    Procedure: CATARACT PHACO EXTRACTION WITH INTRAOCULAR LENS IMPLANT LEFT COMPLICATED WITH MALYUGIN RING;  Surgeon: Zachary Landers MD;  Location: [facility] OR;  Service: Ophthalmology;  Laterality: Left;    CATARACT EXTRACTION W/ INTRAOCULAR LENS IMPLANT Right 01/03/2022    Procedure: CATARACT PHACO EXTRACTION WITH INTRAOCULAR LENS IMPLANT RIGHT;  Surgeon: Zachayr Landers MD;  Location: [facility] OR;  Service: Ophthalmology;  Laterality: Right;    COLONOSCOPY      COLONOSCOPY N/A 12/12/2022    Procedure: COLONOSCOPY WITH BIOPSIES AND POLYPECTOMY;  Surgeon: Chaparro Marrero MD;  Location: [facility] ENDOSCOPY;  Service: " Gastroenterology;  Laterality: N/A;    ENDOSCOPY      ENDOSCOPY N/A 2020    Procedure: ESOPHAGOGASTRODUODENOSCOPY;  Surgeon: Chaparro Marrero MD;  Location: [facility] ENDOSCOPY;  Service: Gastroenterology;  Laterality: N/A;    EYE SURGERY      cataract removal    HYSTERECTOMY      TOTAL ABDOMINAL HYSTERECTOMY WITH SALPINGO OOPHORECTOMY      TUBAL ABDOMINAL LIGATION      UPPER GASTROINTESTINAL ENDOSCOPY         Family History:   Family History   Problem Relation Age of Onset    Hypertension Mother     Aneurysm Mother     Heart disease Mother         passed away at 54 yr old after 5 vessel bypass    Hyperlipidemia Mother     Stroke Mother         aneurysm ruptured    Emphysema Father     COPD Father     Ulcerative colitis Brother     Alcohol abuse Brother     Cancer Brother         stomach cancer    COPD Brother     Hyperlipidemia Brother     Liver disease Brother     Cirrhosis Brother     Colon polyps Brother     Stomach cancer Brother     Colon cancer Maternal Uncle     Arthritis Maternal Uncle     Cancer Maternal Aunt         breast cancer    Cancer Sister         breast    COPD Paternal Uncle     Early death Brother         ulcerative colitis    Learning disabilities Brother         mental retardation    Mental illness Brother         schizophrenia    Ulcerative colitis Brother     Kidney disease Paternal Aunt         nephrectomy    Liver cancer Neg Hx     Crohn's disease Neg Hx        Social History:   Social History     Socioeconomic History    Marital status:    Tobacco Use    Smoking status: Former     Packs/day: 1.00     Years: 40.00     Pack years: 40.00     Types: Cigarettes     Quit date: 2018     Years since quittin.6    Smokeless tobacco: Never   Vaping Use    Vaping Use: Never used   Substance and Sexual Activity    Alcohol use: No    Drug use: Yes     Types: Marijuana     Comment: OCCASIONALLY    Sexual activity: Defer     Birth control/protection: Hysterectomy        Medications:     Current Outpatient Medications:     aspirin 81 MG EC tablet, Take 1 tablet by mouth. Every other day, Disp: , Rfl:     atorvastatin (LIPITOR) 20 MG tablet, Take 1 tablet by mouth Every Night., Disp: 90 tablet, Rfl: 3    azaTHIOprine (IMURAN) 50 MG tablet, Take 2 tablets by mouth Daily., Disp: 180 tablet, Rfl: 1    busPIRone (BUSPAR) 10 MG tablet, Take 1 tablet by mouth 2 (Two) Times a Day As Needed (anxiety)., Disp: 180 tablet, Rfl: 3    cyclobenzaprine (FLEXERIL) 10 MG tablet, Take 1 tablet by mouth 3 (Three) Times a Day., Disp: 270 tablet, Rfl: 3    Diclofenac Sodium (VOLTAREN) 1 % gel gel, diclofenac 1 % topical gel  APPLY 1 GRAM TOPICALLY TO THE AFFECTED AREA TWICE DAILY AS NEEDED FOR PAIN, Disp: , Rfl:     docusate sodium (COLACE) 100 MG capsule, TAKE 1 CAPSULE BY MOUTH TWICE DAILY (Patient taking differently: Take 2 capsules by mouth 2 (Two) Times a Day.), Disp: 60 capsule, Rfl: 2    donepezil (Aricept) 5 MG tablet, Take 1 tablet by mouth Every Night., Disp: 30 tablet, Rfl: 6    DULoxetine (CYMBALTA) 60 MG capsule, Take 1 capsule by mouth Daily., Disp: 90 capsule, Rfl: 3    fluticasone (FLONASE) 50 MCG/ACT nasal spray, SHAKE LIQUID AND USE 1 TO 2 SPRAYS IN EACH NOSTRIL EVERY DAY AS NEEDED, Disp: , Rfl:     furosemide (LASIX) 20 MG tablet, TAKE 1 TABLET BY MOUTH DAILY AS NEEDED FOR SWELLING, Disp: 30 tablet, Rfl: 1    gabapentin (NEURONTIN) 800 MG tablet, Take 1 tablet by mouth 3 (Three) Times a Day., Disp: , Rfl:     hydrOXYzine pamoate (VISTARIL) 25 MG capsule, TAKE 1 CAPSULE UP TO 3 TIMES A DAY AS DIRECTED, Disp: 90 capsule, Rfl: 1    inFLIXimab (REMICADE IV), Infuse  into a venous catheter Take As Directed., Disp: , Rfl:     lactulose (CHRONULAC) 10 GM/15ML solution, Take 15 mL by mouth Daily., Disp: 450 mL, Rfl: 2    lisinopril (PRINIVIL,ZESTRIL) 10 MG tablet, Take 1 tablet by mouth Every Night., Disp: 90 tablet, Rfl: 3    loratadine (CLARITIN) 10 MG tablet, Take 1 tablet by mouth As  Needed., Disp: , Rfl:     meclizine (ANTIVERT) 12.5 MG tablet, Take 1 tablet by mouth 3 (Three) Times a Day As Needed for Dizziness., Disp: 30 tablet, Rfl: 0    nitroglycerin (NITROSTAT) 0.4 MG SL tablet, Place 1 tablet under the tongue Every 5 (Five) Minutes As Needed., Disp: , Rfl:     oxyCODONE-acetaminophen (PERCOCET) 7.5-325 MG per tablet, Take 2 tablets by mouth Daily., Disp: , Rfl:     pantoprazole (PROTONIX) 40 MG EC tablet, Take 1 tablet by mouth Daily., Disp: , Rfl:     Progesterone (PROMETRIUM) 100 MG capsule, Take 1 capsule by mouth every night at bedtime., Disp: , Rfl:     promethazine (PHENERGAN) 12.5 MG tablet, Take 1 tablet by mouth Every 8 (Eight) Hours As Needed for Nausea or Vomiting., Disp: 30 tablet, Rfl: 2    rOPINIRole (REQUIP) 2 MG tablet, TAKE 1 TABLET BY MOUTH EVERY DAY(1 TO 3 HOURS BEFORE BEDTIME), Disp: 90 tablet, Rfl: 1    vitamin B-12 (CYANOCOBALAMIN) 1000 MCG tablet, Take 1 tablet by mouth Daily., Disp: 90 tablet, Rfl: 3    vitamin D (ERGOCALCIFEROL) 1.25 MG (12563 UT) capsule capsule, Take 1 capsule by mouth 1 (One) Time Per Week., Disp: 15 capsule, Rfl: 3    Allergies:   Allergies   Allergen Reactions    Penicillins Rash       Review of Systems:   Review of Systems   Constitutional:  Positive for appetite change and fatigue. Negative for fever and unexpected weight loss.   HENT:  Negative for trouble swallowing.    Gastrointestinal:  Positive for abdominal pain, nausea, vomiting, GERD and indigestion. Negative for abdominal distention, anal bleeding, blood in stool, constipation, diarrhea and rectal pain.        Frequent stools      The following portions of the patient's history were reviewed and updated as appropriate: allergies, current medications, past family history, past medical history, past social history, past surgical history and problem list.    Objective     Physical Exam:  Vital Signs:   Vitals:    08/31/23 1452   BP: 136/77   Pulse: 109   Temp: 98.4 øF (36.9 øC)  "  [facility]   Weight: 63 kg (139 lb)   Height: 157.5 cm (62\")  Comment: per patient       Physical Exam    Results Review:   I reviewed the patient's new clinical results.    Office Visit on 08/22/2023   Component Date Value Ref Range Status    WBC 08/22/2023 10.77  3.40 - 10.80 10*3/mm3 Final    RBC 08/22/2023 3.17 (L)  3.77 - 5.28 10*6/mm3 Final    Hemoglobin 08/22/2023 9.8 (L)  12.0 - 15.9 g/dL Final    Hematocrit 08/22/2023 28.9 (L)  34.0 - 46.6 % Final    MCV 08/22/2023 91.2  79.0 - 97.0 fL Final    MCH 08/22/2023 30.9  26.6 - 33.0 pg Final    MCHC 08/22/2023 33.9  31.5 - 35.7 g/dL Final    RDW 08/22/2023 13.6  12.3 - 15.4 % Final    Platelets 08/22/2023 285  140 - 450 10*3/mm3 Final    Glucose 08/22/2023 102 (H)  65 - 99 mg/dL Final    BUN 08/22/2023 12  8 - 23 mg/dL Final    Creatinine 08/22/2023 0.77  0.57 - 1.00 mg/dL Final    EGFR Result 08/22/2023 85.7  >60.0 mL/min/1.73 Final    Comment: GFR Normal >60  Chronic Kidney Disease <60  Kidney Failure <15      BUN/Creatinine Ratio 08/22/2023 15.6  7.0 - 25.0 Final    Sodium 08/22/2023 138  136 - 145 mmol/L Final    Potassium 08/22/2023 4.6  3.5 - 5.2 mmol/L Final    Chloride 08/22/2023 101  98 - 107 mmol/L Final    Total CO2 08/22/2023 27.8  22.0 - 29.0 mmol/L Final    Calcium 08/22/2023 9.2  8.6 - 10.5 mg/dL Final    Total Protein 08/22/2023 6.2  6.0 - 8.5 g/dL Final    Albumin 08/22/2023 4.2  3.5 - 5.2 g/dL Final    Globulin 08/22/2023 2.0  gm/dL Final    A/G Ratio 08/22/2023 2.1  g/dL Final    Total Bilirubin 08/22/2023 0.4  0.0 - 1.2 mg/dL Final    Alkaline Phosphatase 08/22/2023 66  39 - 117 U/L Final    AST (SGOT) 08/22/2023 11  1 - 32 U/L Final    ALT (SGPT) 08/22/2023 12  1 - 33 U/L Final   Admission on 08/11/2023, Discharged on 08/11/2023   Component Date Value Ref Range Status    Glucose 08/11/2023 140 (H)  65 - 99 mg/dL Final    BUN 08/11/2023 32 (H)  8 - 23 mg/dL Final    Creatinine 08/11/2023 1.28 (H)  0.57 - 1.00 mg/dL Final    Sodium " 08/11/2023 140  136 - 145 mmol/L Final    Potassium 08/11/2023 4.1  3.5 - 5.2 mmol/L Final    Chloride 08/11/2023 102  98 - 107 mmol/L Final    CO2 08/11/2023 22.9  22.0 - 29.0 mmol/L Final    Calcium 08/11/2023 9.4  8.6 - 10.5 mg/dL Final    Total Protein 08/11/2023 7.0  6.0 - 8.5 g/dL Final    Albumin 08/11/2023 4.5  3.5 - 5.2 g/dL Final    ALT (SGPT) 08/11/2023 13  1 - 33 U/L Final    AST (SGOT) 08/11/2023 25  1 - 32 U/L Final    Alkaline Phosphatase 08/11/2023 216 (H)  39 - 117 U/L Final    Total Bilirubin 08/11/2023 0.4  0.0 - 1.2 mg/dL Final    Globulin 08/11/2023 2.5  gm/dL Final    A/G Ratio 08/11/2023 1.8  g/dL Final    BUN/Creatinine Ratio 08/11/2023 25.0  7.0 - 25.0 Final    Anion Gap 08/11/2023 15.1 (H)  5.0 - 15.0 mmol/L Final    eGFR 08/11/2023 46.6 (L)  >60.0 mL/min/1.73 Final    Lipase 08/11/2023 21  13 - 60 U/L Final    Color, UA 08/11/2023 Dark Yellow (A)  Yellow, Straw Final    Appearance, UA 08/11/2023 Cloudy (A)  Clear Final    pH, UA 08/11/2023 5.5  5.0 - 8.0 Final    Specific [place], UA 08/11/2023 1.021  1.005 - 1.030 Final    Glucose, UA 08/11/2023 Negative  Negative Final    Ketones, UA 08/11/2023 Trace (A)  Negative Final    Bilirubin, UA 08/11/2023 Moderate (2+) (A)  Negative Final    Blood, UA 08/11/2023 Negative  Negative Final    Protein, UA 08/11/2023 30 mg/dL (1+) (A)  Negative Final    Leuk Esterase, UA 08/11/2023 Trace (A)  Negative Final    Nitrite, UA 08/11/2023 Negative  Negative Final    Urobilinogen, UA 08/11/2023 1.0 E.U./dL  0.2 - 1.0 E.U./dL Final    Lactate 08/11/2023 3.5 (C)  0.5 - 2.0 mmol/L Final    Extra Tube 08/11/2023 Hold for add-ons.   Final    Auto resulted.    Extra Tube 08/11/2023 hold for add-on   Final    Auto resulted    Extra Tube 08/11/2023 Hold for add-ons.   Final    Auto resulted.    Extra Tube 08/11/2023 Hold for add-ons.   Final    Auto resulted    WBC 08/11/2023 15.07 (H)  3.40 - 10.80 10*3/mm3 Final    RBC 08/11/2023 4.38  3.77 - 5.28 10*6/mm3 Final     Hemoglobin 08/11/2023 13.7  12.0 - 15.9 g/dL Final    Hematocrit 08/11/2023 39.3  34.0 - 46.6 % Final    MCV 08/11/2023 89.7  79.0 - 97.0 fL Final    MCH 08/11/2023 31.3  26.6 - 33.0 pg Final    MCHC 08/11/2023 34.9  31.5 - 35.7 g/dL Final    RDW 08/11/2023 13.9  12.3 - 15.4 % Final    RDW-SD 08/11/2023 45.6  37.0 - 54.0 fl Final    MPV 08/11/2023 10.0  6.0 - 12.0 fL Final    Platelets 08/11/2023 254  140 - 450 10*3/mm3 Final    Neutrophil % 08/11/2023 90.1 (H)  42.7 - 76.0 % Final    Lymphocyte % 08/11/2023 4.3 (L)  19.6 - 45.3 % Final    Monocyte % 08/11/2023 4.8 (L)  5.0 - 12.0 % Final    Eosinophil % 08/11/2023 0.0 (L)  0.3 - 6.2 % Final    Basophil % 08/11/2023 0.2  0.0 - 1.5 % Final    Immature Grans % 08/11/2023 0.6 (H)  0.0 - 0.5 % Final    Neutrophils, Absolute 08/11/2023 13.57 (H)  1.70 - 7.00 10*3/mm3 Final    Lymphocytes, Absolute 08/11/2023 0.65 (L)  0.70 - 3.10 10*3/mm3 Final    Monocytes, Absolute 08/11/2023 0.73  0.10 - 0.90 10*3/mm3 Final    Eosinophils, Absolute 08/11/2023 0.00  0.00 - 0.40 10*3/mm3 Final    Basophils, Absolute 08/11/2023 0.03  0.00 - 0.20 10*3/mm3 Final    Immature Grans, Absolute 08/11/2023 0.09 (H)  0.00 - 0.05 10*3/mm3 Final    nRBC 08/11/2023 0.0  0.0 - 0.2 /100 WBC Final    Sed Rate 08/11/2023 1  0 - 30 mm/hr Final    C-Reactive Protein 08/11/2023 1.77 (H)  0.00 - 0.50 mg/dL Final    RBC, UA 08/11/2023 None Seen  None Seen /HPF Final    WBC, UA 08/11/2023 3-5 (A)  None Seen /HPF Final    Bacteria, UA 08/11/2023 2+ (A)  None Seen /HPF Final    Squamous Epithelial Cells, UA 08/11/2023 3-6 (A)  None Seen, 0-2 /HPF Final    Hyaline Casts, UA 08/11/2023 3-6  None Seen /LPF Final    Mucus, UA 08/11/2023 Moderate/2+ (A)  None Seen, Trace /HPF Final    Methodology 08/11/2023 Manual Light Microscopy   Final    Lactate 08/11/2023 1.4  0.5 - 2.0 mmol/L Final    Blood Culture 08/11/2023 No growth at 5 days   Final    Blood Culture 08/11/2023 No growth at 5 days   Final    WBC  08/11/2023 10.40  3.40 - 10.80 10*3/mm3 Final    RBC 08/11/2023 3.48 (L)  3.77 - 5.28 10*6/mm3 Final    Hemoglobin 08/11/2023 10.9 (L)  12.0 - 15.9 g/dL Final    Hematocrit 08/11/2023 31.8 (L)  34.0 - 46.6 % Final    MCV 08/11/2023 91.4  79.0 - 97.0 fL Final    MCH 08/11/2023 31.3  26.6 - 33.0 pg Final    MCHC 08/11/2023 34.3  31.5 - 35.7 g/dL Final    RDW 08/11/2023 14.1  12.3 - 15.4 % Final    RDW-SD 08/11/2023 47.1  37.0 - 54.0 fl Final    MPV 08/11/2023 9.9  6.0 - 12.0 fL Final    Platelets 08/11/2023 194  140 - 450 10*3/mm3 Final    Neutrophil % 08/11/2023 86.5 (H)  42.7 - 76.0 % Final    Lymphocyte % 08/11/2023 7.2 (L)  19.6 - 45.3 % Final    Monocyte % 08/11/2023 5.7  5.0 - 12.0 % Final    Eosinophil % 08/11/2023 0.0 (L)  0.3 - 6.2 % Final    Basophil % 08/11/2023 0.0  0.0 - 1.5 % Final    Immature Grans % 08/11/2023 0.6 (H)  0.0 - 0.5 % Final    Neutrophils, Absolute 08/11/2023 9.00 (H)  1.70 - 7.00 10*3/mm3 Final    Lymphocytes, Absolute 08/11/2023 0.75  0.70 - 3.10 10*3/mm3 Final    Monocytes, Absolute 08/11/2023 0.59  0.10 - 0.90 10*3/mm3 Final    Eosinophils, Absolute 08/11/2023 0.00  0.00 - 0.40 10*3/mm3 Final    Basophils, Absolute 08/11/2023 0.00  0.00 - 0.20 10*3/mm3 Final    Immature Grans, Absolute 08/11/2023 0.06 (H)  0.00 - 0.05 10*3/mm3 Final    nRBC 08/11/2023 0.0  0.0 - 0.2 /100 WBC Final      CT Abdomen Pelvis With Contrast    Result Date: 8/11/2023  Wall thickening, submucosal edema and mucosal enhancement from the distal transverse colon to the rectum in this patient with known Crohn's disease; findings slightly improved compared to 1 year prior.  Air-fluid levels in nondistended small bowel, suspect ileus.  This report was signed and finalized on 8/11/2023 4:01 PM by Samantha Allen MD.           11/15/2022 PillCam: Few scattered gastric erosions. Multiple small and medium size healing erosions and superficial ulcerations identified throughout the ileum. No deep ulcerations except one small  proximal ileal ulceration. These findings with anemia and recent colitis is more suggestive of ileocolonic crohn's disease.     Colonoscopy by Dr. Marrero 12/12/2022:  - The perianal and digital rectal examinations were normal.  - A 3 mm polyp was found in the cecum. The polyp was sessile. The polyp was removed with a cold snare. Resection and retrieval were complete.  - A 4 mm polyp was found in the sigmoid colon. The polyp was flat. The polyp was removed with a cold snare. Resection and retrieval were complete.  - Diffuse moderate inflammation characterized by altered vascularity, congestion (edema), erosions, erythema, friability and granularity was found in the rectum, in the recto-sigmoid colon, in the sigmoid colon and in the descending colon. Biopsies were taken with a cold forceps for histology.  - A medium healed ulcer was found in the mid descending colon. The scar tissue was healthy in appearance.  - The transverse colon, hepatic flexure, ascending colon, cecum, appendiceal orifice and ileocecal valve appeared normal. Biopsies were taken with a cold forceps for histology.  - The terminal ileum appeared normal. Biopsies were taken with a cold forceps for histology.  Pathology DIAGNOSIS:   A.   TERMINAL ILEUM BIOPSY:   Benign small bowel, consistent with terminal ileum   Negative for significant architectural distortion, inflammatory infiltrate,   neoplasia, malignancy   B.   CECUM BIOPSY:   Benign colonic mucosa, negative for significant architectural distortion,   inflammatory infiltrate, neoplasia, dysplasia, malignancy   C.   ASCENDING COLON BIOPSY:   Benign colonic mucosa, negative for significant architectural distortion,   inflammatory infiltrate, neoplasia, dysplasia, malignancy   D.   TRANSVERSE COLON BIOPSY:   Benign colonic mucosa, negative for significant architectural distortion,   inflammatory infiltrate, neoplasia, dysplasia, malignancy   E.   DESCENDING COLON BIOPSY:   Minimal active  colitis without significant accompanying chronicity   Negative for dysplasia, neoplasia, malignancy   See comment   F.   SIGMOID COLON BIOPSY:   Minimal active colitis without significant chronicity   Negative for dysplasia, neoplasia,malignancy   See comment   G.   RECTAL BIOPSY:   Minimal active colitis without significant chronicity   Negative for dysplasia, neoplasia, malignancy   See comment   H.   CECUM POLYP:   Tubular adenoma   Negative for high-grade dysplasia/malignancy   I.   SIGMOID COLON POLYP:   Hyperplastic polyp   Accompanying inflammatory changes   COMMENT:  E-G.  There is minimal active colitis with only rare neutrophils   identified within the mucosal surfaces.  There is no evidence of viral cytopathic effect, significant chronic alteration, dysplasia, neoplasia, malignancy, or granulomatous inflammation.  These findings are not specific and could be seen with an infectious process, acute self-limited colitis, or even potentially inflammatory bowel disease in the appropriate clinical/endoscopic setting. Clinical correlation is needed     Assessment / Plan      1. Crohn's disease of both small and large intestine with acute flare  2. 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 Chronic iron deficiency anemia  4. Vitamin B12 deficiency  5.  Rheumatoid arthritis  6.  Family history of inflammatory bowel disease-UC with brother  8/31/2023  Record from [facility] reviewed  Patient did not receive her first Remicade maintenance infusion on time and had to wait for about 6 weeks.  First maintenance dose was in July 12.  And next dose is pending for September 12.     Patient developed bloody diarrhea with abdominal pain and came to ED on 8/11/2023 and later on was transferred to [facility] due to no coverage from GI here.  Her CT abdomen pelvis done on 8/11/2023 revealed revealed a circumferential wall thickening from distal transverse colon to the rectum findings  were in fact less pronounced than the previous findings with the prior CT scans.  There was also fluid-filled small bowel loops nondilated with air-fluid levels from ileus.    Stool C. difficile and GI panel was negative.  Her thiopurine metabolites sixth gone and level was only 55 indicating subtherapeutic level and 6-mercaptopurine level was 850 within limit.  Her random infliximab level was 19.28 without any antibodies.  She had a colonoscopy done by Dr. Valencia at [facility] on 8/15/2023 and reported as having severe inflammation with ulcerations on the left side colon that may indicate ischemic colitis.  Full colonoscopy and pathology report not available to review.    She was treated conservatively and has been discharged with p.o. steroids tapering dose.  However for some reason her steroid was discontinued by PCP week after discharge from the hospital.    Patient continued to complain of abdominal bloating however her bowel movements improved.  She will have 3-5 soft formed bowel movement now without any blood.  Her lab work done on 8/22/2023 revealed a normal CMP.  Hemoglobin is 9.8 g/dL.   CRP 1.77.    Although colonoscopy findings suggested possible left-sided ischemic colitis.  These findings were similar to prior colitis findings are chronic , was present with a CT scan a year ago, and persistent findings noted with the colonoscopy done in December 2022, suggesting chronic inflammation. These findings suggest Crohn's flare.  Given her gap in her maintenance Remicade dose infusion for 6 weeks, played a role on this flare of unclear.  However her random Remicade level done was therapeutic and very high level midway through her treatment.     We will continue her current therapy with Imuran 100 mg along with Remicade infusion.  Patient likely due to increase in her Imuran dose however patient herself not keen on taking the Imuran anymore.    We will reassess after next 1 or 2 Remicade infusions and  possibly may consider changing the medicine if there is no response    For now we will treat her with a steroid tapering for 6 weeks  FD guard 1 capsule along with major meals  OTC iron pills p.o. daily  Okay to continue Bentyl as needed  We will get a colonoscopy and pathology report from [facility]  Given her significant use of opioids patient she will have issues with the bowel movement.  Reducing the opioid medication use still helpful.  Also recommend avoid marijuana use    3/29/2023   Her stool calprotectin was 25 within normal limits.  Hemoglobin is still low at 11 g/dL.  Vitamin B12 is low at 199.She had a 2 induction dose of Remicade now third 1 is pending.  Initially prescribed Humira however she could not afford co-pay.    After review of recent pillcam and colonoscopy, it seems that she has chronic inflammation which may have been contributing to her anemia throughout the years. Some element of anemia of chronic disease suspected with her rheumatoid arthritis. She had erosions and ulceration in the small intestine on PillCam. Colonoscopy and colon biopsies show active colitis. History and endoscopic findings consistent with a new diagnosis of Crohn's disease. She is currently complaining of bloating, mucous in stools, mild rectal bleeding and loose stools. This is a change in bowel habits from her previous constipation.   Initially diagnosed with colitis in August 2022 with bloody diarrhea.  CT abdomen pelvis done with contrast in August 2022 revealed a left-sided colon colitis.  Subsequently she had a colonoscopy done by Dr. Morel on 8/17/2022 which did not reveal any endoscopic signs of colitis.  However TI was not visualized.  Random colon biopsies done were all normal without any signs of colitis.  Prior EGD done in 2020 did not reveal any upper GI source of bleeding. Her brother has ulcerative colitis. Previously with reports of constipation at baseline. Now having stools daily. In the past, she  took MiraLAX, senna with relief. Had taken Linzess and Movantik before which did not help her. Serum porphyrin normal.     Prior history  7.  Drug-induced constipation8  8.  Drug-induced nausea and suspected opioid induced gastroparesis  9.  Gastroesophageal reflux disease  Patient has a opioid-induced constipation and nausea.    Her reflux symptoms well controlled with low-dose PPI.  MiraLAX is not working for her constipation  Started on a lactulose 15 mill p.o. daily and titrate to twice daily     10. Tubular adenoma of colon  Colonoscopy 12/2022 had 2 small polyps, 1 was tubular adenoma without dysplasia and other was hyperplastic.  No family history of any colon cancer with a first-degree relatives. Due to new diagnosis of IBD, she will likely need repeat colonoscopy in 1-3 years depending on clinical course.           Follow Up:   No follow-ups on file.    Chaparro Marrero MD  [facility] [place]  8/31/2023  14:54 EDT     Please note that portions of this note may have been completed with a voice recognition program.

## 2023-09-01 RX ORDER — AZATHIOPRINE 50 MG/1
100 TABLET ORAL DAILY
Qty: 180 TABLET | Refills: 3 | Status: SHIPPED | OUTPATIENT
Start: 2023-09-01

## 2023-09-12 ENCOUNTER — HOSPITAL ENCOUNTER (OUTPATIENT)
Dept: INFUSION THERAPY | Facility: HOSPITAL | Age: 68
Discharge: HOME | End: 2023-09-12
Admitting: PHYSICIAN ASSISTANT
Payer: MEDICARE

## 2023-09-12 VITALS
DIASTOLIC BLOOD PRESSURE: 78 MMHG | HEART RATE: 82 BPM | RESPIRATION RATE: 20 BRPM | SYSTOLIC BLOOD PRESSURE: 144 MMHG | WEIGHT: 145 LBS | HEIGHT: 62 IN | BODY MASS INDEX: 26.68 KG/M2 | TEMPERATURE: 98.3 F | OXYGEN SATURATION: 93 %

## 2023-09-12 DIAGNOSIS — K50.80 CROHN'S DISEASE OF BOTH SMALL AND LARGE INTESTINE WITHOUT COMPLICATION: Primary | ICD-10-CM

## 2023-09-12 PROCEDURE — 96413 CHEMO IV INFUSION 1 HR: CPT

## 2023-09-12 PROCEDURE — 96415 CHEMO IV INFUSION ADDL HR: CPT

## 2023-09-12 PROCEDURE — 63710000001 DIPHENHYDRAMINE PER 50 MG: Performed by: PHYSICIAN ASSISTANT

## 2023-09-12 PROCEDURE — 25010000002 INFLIXIMAB PER 10 MG: Performed by: PHYSICIAN ASSISTANT

## 2023-09-12 RX ORDER — SODIUM CHLORIDE 9 MG/ML
250 INJECTION, SOLUTION INTRAVENOUS ONCE
OUTPATIENT
Start: 2023-11-07

## 2023-09-12 RX ORDER — DIPHENHYDRAMINE HCL 25 MG
25 CAPSULE ORAL ONCE
Status: COMPLETED | OUTPATIENT
Start: 2023-09-12 | End: 2023-09-12

## 2023-09-12 RX ORDER — ACETAMINOPHEN 325 MG/1
650 TABLET ORAL ONCE
Status: COMPLETED | OUTPATIENT
Start: 2023-09-12 | End: 2023-09-12

## 2023-09-12 RX ORDER — ACETAMINOPHEN 325 MG/1
650 TABLET ORAL ONCE
Start: 2023-11-07 | End: 2023-11-07

## 2023-09-12 RX ORDER — DIPHENHYDRAMINE HCL 25 MG
25 CAPSULE ORAL ONCE
Start: 2023-11-07 | End: 2023-11-07

## 2023-09-12 RX ORDER — SODIUM CHLORIDE 9 MG/ML
250 INJECTION, SOLUTION INTRAVENOUS ONCE
Status: DISCONTINUED | OUTPATIENT
Start: 2023-09-12 | End: 2023-09-14 | Stop reason: HOSPADM

## 2023-09-12 RX ADMIN — DIPHENHYDRAMINE HYDROCHLORIDE 25 MG: 25 CAPSULE ORAL at 09:57

## 2023-09-12 RX ADMIN — ACETAMINOPHEN 650 MG: 325 TABLET, FILM COATED ORAL at 09:57

## 2023-09-12 RX ADMIN — INFLIXIMAB 329 MG: 100 INJECTION, POWDER, LYOPHILIZED, FOR SOLUTION INTRAVENOUS at 10:22

## 2023-09-21 RX ORDER — PANTOPRAZOLE SODIUM 40 MG/1
40 TABLET, DELAYED RELEASE ORAL DAILY
Qty: 90 TABLET | Refills: 3 | Status: SHIPPED | OUTPATIENT
Start: 2023-09-21

## 2023-09-21 NOTE — TELEPHONE ENCOUNTER
Rx Refill Note  Requested Prescriptions     Pending Prescriptions Disp Refills    pantoprazole (PROTONIX) 40 MG EC tablet       Sig: Take 1 tablet by mouth Daily.      Last office visit with prescribing clinician: 8/22/23  Last telemedicine visit with prescribing clinician: Visit date not found   Next office visit with prescribing clinician: 11/20/2023                         Would you like a call back once the refill request has been completed: [] Yes [] No    If the office needs to give you a call back, can they leave a voicemail: [] Yes [] No    Lio Carvalho MA  09/21/23, 08:53 EDT

## 2023-09-27 ENCOUNTER — LAB (OUTPATIENT)
Dept: LAB | Facility: HOSPITAL | Age: 68
End: 2023-09-27
Payer: MEDICARE

## 2023-09-27 DIAGNOSIS — K50.811 CROHN'S DISEASE OF BOTH SMALL AND LARGE INTESTINE WITH RECTAL BLEEDING: ICD-10-CM

## 2023-09-27 LAB
ALBUMIN SERPL-MCNC: 4.4 G/DL (ref 3.5–5.2)
ALBUMIN/GLOB SERPL: 1.9 G/DL
ALP SERPL-CCNC: 74 U/L (ref 39–117)
ALT SERPL W P-5'-P-CCNC: 15 U/L (ref 1–33)
ANION GAP SERPL CALCULATED.3IONS-SCNC: 10.8 MMOL/L (ref 5–15)
AST SERPL-CCNC: 18 U/L (ref 1–32)
BILIRUB SERPL-MCNC: <0.2 MG/DL (ref 0–1.2)
BUN SERPL-MCNC: 17 MG/DL (ref 8–23)
BUN/CREAT SERPL: 18.1 (ref 7–25)
CALCIUM SPEC-SCNC: 9.3 MG/DL (ref 8.6–10.5)
CHLORIDE SERPL-SCNC: 101 MMOL/L (ref 98–107)
CO2 SERPL-SCNC: 27.2 MMOL/L (ref 22–29)
CREAT SERPL-MCNC: 0.94 MG/DL (ref 0.57–1)
EGFRCR SERPLBLD CKD-EPI 2021: 67.1 ML/MIN/1.73
GLOBULIN UR ELPH-MCNC: 2.3 GM/DL
GLUCOSE SERPL-MCNC: 83 MG/DL (ref 65–99)
POTASSIUM SERPL-SCNC: 3.5 MMOL/L (ref 3.5–5.2)
PROT SERPL-MCNC: 6.7 G/DL (ref 6–8.5)
SODIUM SERPL-SCNC: 139 MMOL/L (ref 136–145)

## 2023-09-27 PROCEDURE — 85007 BL SMEAR W/DIFF WBC COUNT: CPT

## 2023-09-27 PROCEDURE — 80053 COMPREHEN METABOLIC PANEL: CPT

## 2023-09-27 PROCEDURE — 80061 LIPID PANEL: CPT | Performed by: FAMILY MEDICINE

## 2023-09-27 PROCEDURE — 85025 COMPLETE CBC W/AUTO DIFF WBC: CPT

## 2023-09-27 PROCEDURE — 82306 VITAMIN D 25 HYDROXY: CPT | Performed by: FAMILY MEDICINE

## 2023-09-27 PROCEDURE — 82607 VITAMIN B-12: CPT | Performed by: FAMILY MEDICINE

## 2023-09-28 LAB
BASOPHILS # BLD AUTO: 0.03 10*3/MM3 (ref 0–0.2)
BASOPHILS NFR BLD AUTO: 0.5 % (ref 0–1.5)
CLUMPED PLATELETS: PRESENT
DEPRECATED RDW RBC AUTO: 50 FL (ref 37–54)
EOSINOPHIL # BLD AUTO: 0.06 10*3/MM3 (ref 0–0.4)
EOSINOPHIL NFR BLD AUTO: 1 % (ref 0.3–6.2)
ERYTHROCYTE [DISTWIDTH] IN BLOOD BY AUTOMATED COUNT: 14.6 % (ref 12.3–15.4)
HCT VFR BLD AUTO: 33.8 % (ref 34–46.6)
HGB BLD-MCNC: 11.5 G/DL (ref 12–15.9)
LYMPHOCYTES # BLD AUTO: 2 10*3/MM3 (ref 0.7–3.1)
LYMPHOCYTES NFR BLD AUTO: 32.4 % (ref 19.6–45.3)
MCH RBC QN AUTO: 31.8 PG (ref 26.6–33)
MCHC RBC AUTO-ENTMCNC: 34 G/DL (ref 31.5–35.7)
MCV RBC AUTO: 93.4 FL (ref 79–97)
MONOCYTES # BLD AUTO: 0.44 10*3/MM3 (ref 0.1–0.9)
MONOCYTES NFR BLD AUTO: 7.1 % (ref 5–12)
NEUTROPHILS NFR BLD AUTO: 3.61 10*3/MM3 (ref 1.7–7)
NEUTROPHILS NFR BLD AUTO: 58.5 % (ref 42.7–76)
PLATELET # BLD AUTO: ABNORMAL 10*3/UL
PMV BLD AUTO: 11 FL (ref 6–12)
RBC # BLD AUTO: 3.62 10*6/MM3 (ref 3.77–5.28)
WBC NRBC COR # BLD: 6.17 10*3/MM3 (ref 3.4–10.8)

## 2023-10-05 ENCOUNTER — OFFICE VISIT (OUTPATIENT)
Dept: GASTROENTEROLOGY | Facility: CLINIC | Age: 68
End: 2023-10-05
Payer: MEDICARE

## 2023-10-05 VITALS
BODY MASS INDEX: 26.68 KG/M2 | HEART RATE: 98 BPM | WEIGHT: 145 LBS | TEMPERATURE: 98.2 F | SYSTOLIC BLOOD PRESSURE: 113 MMHG | DIASTOLIC BLOOD PRESSURE: 69 MMHG | HEIGHT: 62 IN

## 2023-10-05 DIAGNOSIS — K21.9 GASTROESOPHAGEAL REFLUX DISEASE WITHOUT ESOPHAGITIS: Primary | ICD-10-CM

## 2023-10-05 DIAGNOSIS — R10.84 GENERALIZED ABDOMINAL PAIN: ICD-10-CM

## 2023-10-05 DIAGNOSIS — Z87.19 HISTORY OF COLITIS: ICD-10-CM

## 2023-10-05 DIAGNOSIS — K50.811 CROHN'S DISEASE OF BOTH SMALL AND LARGE INTESTINE WITH RECTAL BLEEDING: ICD-10-CM

## 2023-10-05 PROCEDURE — 3078F DIAST BP <80 MM HG: CPT | Performed by: INTERNAL MEDICINE

## 2023-10-05 PROCEDURE — 99214 OFFICE O/P EST MOD 30 MIN: CPT | Performed by: INTERNAL MEDICINE

## 2023-10-05 PROCEDURE — 3074F SYST BP LT 130 MM HG: CPT | Performed by: INTERNAL MEDICINE

## 2023-10-05 PROCEDURE — 1160F RVW MEDS BY RX/DR IN RCRD: CPT | Performed by: INTERNAL MEDICINE

## 2023-10-05 PROCEDURE — 1159F MED LIST DOCD IN RCRD: CPT | Performed by: INTERNAL MEDICINE

## 2023-10-05 RX ORDER — CYCLOSPORINE 0.5 MG/ML
1 EMULSION OPHTHALMIC 2 TIMES DAILY
COMMUNITY
Start: 2023-09-14

## 2023-10-05 RX ORDER — PANTOPRAZOLE SODIUM 20 MG/1
20 TABLET, DELAYED RELEASE ORAL DAILY
Qty: 90 TABLET | Refills: 3 | Status: SHIPPED | OUTPATIENT
Start: 2023-10-05

## 2023-10-05 NOTE — PROGRESS NOTES
Follow Up Note     Date: 10/05/2023   Patient Name: Stephanie Jean  MRN: [id number]  : 1955     Referring Physician: Nhung Shepard DO    Chief Complaint:    Chief Complaint   Patient presents with    Crohn's Disease    Nausea    Anemia     ÁNGEL         Interval History:   10/5/2023  Stephanie Jean is a 68 y.o. female who is here today for follow up for her Crohn's disease.  States that she is feeling much better now.  She has been having the 2 soft bowel movement while on the stool softeners without any significant constipation.  Denies any significant reflux symptoms on a PPI.  No significant abdominal bloating or pain    2020  Stephanie Jean is a 64 y.o. female who is here today to establish care with Gastroenterology for evaluation of heartburn. History of upper abdominal pain mainly in the epigastric region since about 2-3 months. Pain started gradually, intermittent, aching pain lasting for about few minutes and occasionally longer.She was given dexilant and sucralfate which did not help initially and later on changed to pantoprazole.  She is on pantoprazole now with reasonable control of pain and reflux symptoms with once in few days but for the last 2 weeks she did not take any PPI. Associated significant heart burn.     She was on PPI before after she was noted to have gastritis on EGD about 10 years ago. Stopped herself year ago. Occasional problem with swallowing to solid food. She also has sjogrens syndrome.   She was mostly constipated now. Bowel movements every once in 2-3 days and occasionally once in 3-5 days. No lower abdominal pain.      There is no history of  hematochezia or melena. There is no history of anemia. Prior history of EGD about 10 yrs ago. Last colonoscopy in 2020 about 4 polyps removed and advised to repeat in 3 years time. Brother had UC and uncle from mother's side had colon Ca. No history of any abdominal surgery except hysterectomy. Denies alcohol abuse  "or cigarette smoking.  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\"    Dysphagia     Elevated cholesterol     Family history of colon cancer     Fibromyalgia     Fibromyalgia, primary 2003    GERD (gastroesophageal reflux disease)     GI (gastrointestinal bleed) 8/8/2022    Heart murmur     Hoarseness     Hyperlipidemia     Hypertension 2021    Inflammatory bowel disease 2022    recent hospitalization for colitis    Irritable bowel syndrome     Low back pain 2003    treated by [facility]    Lupus     Marijuana use     Daily     Osteopenia 2021    Osteopenia    Pulmonary nodule     Sjogren's disease     Urinary tract infection 2022    Vasovagal syncope      Past Surgical History:   Past Surgical History:   Procedure Laterality Date    ABDOMINAL SURGERY  1998    [facility]    APPENDECTOMY      BREAST SURGERY  1975    lumpectomy 1975    CATARACT EXTRACTION W/ INTRAOCULAR LENS IMPLANT Left 12/20/2021    Procedure: CATARACT PHACO EXTRACTION WITH INTRAOCULAR LENS IMPLANT LEFT COMPLICATED WITH MALYUGIN RING;  Surgeon: Zachary Landers MD;  Location: [facility] OR;  Service: Ophthalmology;  Laterality: Left;    CATARACT EXTRACTION W/ INTRAOCULAR LENS IMPLANT Right 01/03/2022    Procedure: CATARACT PHACO EXTRACTION WITH INTRAOCULAR LENS IMPLANT RIGHT;  Surgeon: Zachary Landers MD;  Location: [facility] OR;  Service: Ophthalmology;  Laterality: Right;    COLONOSCOPY      COLONOSCOPY N/A 12/12/2022    Procedure: COLONOSCOPY WITH BIOPSIES AND POLYPECTOMY;  Surgeon: Chaparro Marrero MD;  Location: [facility] ENDOSCOPY;  Service: Gastroenterology;  Laterality: N/A; "    ENDOSCOPY      ENDOSCOPY N/A 2020    Procedure: ESOPHAGOGASTRODUODENOSCOPY;  Surgeon: Chaparro Marrero MD;  Location: [facility] ENDOSCOPY;  Service: Gastroenterology;  Laterality: N/A;    EYE SURGERY      cataract removal    HYSTERECTOMY      TOTAL ABDOMINAL HYSTERECTOMY WITH SALPINGO OOPHORECTOMY      TUBAL ABDOMINAL LIGATION      UPPER GASTROINTESTINAL ENDOSCOPY         Family History:   Family History   Problem Relation Age of Onset    Hypertension Mother     Aneurysm Mother     Heart disease Mother         passed away at 54 yr old after 5 vessel bypass    Hyperlipidemia Mother     Stroke Mother         aneurysm ruptured    Emphysema Father     COPD Father     Ulcerative colitis Brother     Alcohol abuse Brother     Cancer Brother         stomach cancer    COPD Brother     Hyperlipidemia Brother     Liver disease Brother     Cirrhosis Brother     Colon polyps Brother     Stomach cancer Brother     Colon cancer Maternal Uncle     Arthritis Maternal Uncle     Cancer Maternal Aunt         breast cancer    Cancer Sister         breast    COPD Paternal Uncle     Early death Brother         ulcerative colitis    Learning disabilities Brother         mental retardation    Mental illness Brother         schizophrenia    Ulcerative colitis Brother     Kidney disease Paternal Aunt         nephrectomy    Liver cancer Neg Hx     Crohn's disease Neg Hx        Social History:   Social History     Socioeconomic History    Marital status:    Tobacco Use    Smoking status: Former     Packs/day: 1.00     Years: 40.00     Pack years: 40.00     Types: Cigarettes     Quit date: 2018     Years since quittin.7    Smokeless tobacco: Never   Vaping Use    Vaping Use: Never used   Substance and Sexual Activity    Alcohol use: No    Drug use: Yes     Types: Marijuana     Comment: OCCASIONALLY    Sexual activity: Defer     Birth control/protection: Hysterectomy       Medications:     Current  Outpatient Medications:     aspirin 81 MG EC tablet, Take 1 tablet by mouth. Every other day, Disp: , Rfl:     atorvastatin (LIPITOR) 20 MG tablet, Take 1 tablet by mouth Every Night., Disp: 90 tablet, Rfl: 3    azaTHIOprine (IMURAN) 50 MG tablet, TAKE 2 TABLETS BY MOUTH DAILY, Disp: 180 tablet, Rfl: 3    busPIRone (BUSPAR) 10 MG tablet, Take 1 tablet by mouth 2 (Two) Times a Day As Needed (anxiety)., Disp: 180 tablet, Rfl: 3    cyclobenzaprine (FLEXERIL) 10 MG tablet, Take 1 tablet by mouth 3 (Three) Times a Day., Disp: 270 tablet, Rfl: 3    Diclofenac Sodium (VOLTAREN) 1 % gel gel, diclofenac 1 % topical gel  APPLY 1 GRAM TOPICALLY TO THE AFFECTED AREA TWICE DAILY AS NEEDED FOR PAIN, Disp: , Rfl:     docusate sodium (COLACE) 100 MG capsule, TAKE 1 CAPSULE BY MOUTH TWICE DAILY (Patient taking differently: Take 2 capsules by mouth 2 (Two) Times a Day.), Disp: 60 capsule, Rfl: 2    donepezil (Aricept) 5 MG tablet, Take 1 tablet by mouth Every Night., Disp: 30 tablet, Rfl: 6    DULoxetine (CYMBALTA) 60 MG capsule, Take 1 capsule by mouth Daily., Disp: 90 capsule, Rfl: 3    fluticasone (FLONASE) 50 MCG/ACT nasal spray, SHAKE LIQUID AND USE 1 TO 2 SPRAYS IN EACH NOSTRIL EVERY DAY AS NEEDED, Disp: , Rfl:     furosemide (LASIX) 20 MG tablet, TAKE 1 TABLET BY MOUTH DAILY AS NEEDED FOR SWELLING, Disp: 30 tablet, Rfl: 1    gabapentin (NEURONTIN) 800 MG tablet, Take 1 tablet by mouth 3 (Three) Times a Day., Disp: , Rfl:     hydrOXYzine pamoate (VISTARIL) 25 MG capsule, TAKE 1 CAPSULE UP TO 3 TIMES A DAY AS DIRECTED, Disp: 90 capsule, Rfl: 1    inFLIXimab (REMICADE IV), Infuse  into a venous catheter Take As Directed., Disp: , Rfl:     lactulose (CHRONULAC) 10 GM/15ML solution, Take 15 mL by mouth Daily. (Patient taking differently: Take 15 mL by mouth As Needed.), Disp: 450 mL, Rfl: 2    lisinopril (PRINIVIL,ZESTRIL) 10 MG tablet, Take 1 tablet by mouth Every Night., Disp: 90 tablet, Rfl: 3    loratadine (CLARITIN) 10 MG  tablet, Take 1 tablet by mouth As Needed., Disp: , Rfl:     nitroglycerin (NITROSTAT) 0.4 MG SL tablet, Place 1 tablet under the tongue Every 5 (Five) Minutes As Needed., Disp: , Rfl:     oxyCODONE-acetaminophen (PERCOCET) 7.5-325 MG per tablet, Take 2 tablets by mouth Daily., Disp: , Rfl:     pantoprazole (PROTONIX) 40 MG EC tablet, Take 1 tablet by mouth Daily., Disp: 90 tablet, Rfl: 3    promethazine (PHENERGAN) 12.5 MG tablet, Take 1 tablet by mouth Every 8 (Eight) Hours As Needed for Nausea or Vomiting., Disp: 30 tablet, Rfl: 2    Restasis 0.05 % ophthalmic emulsion, Administer 1 drop to both eyes 2 (Two) Times a Day., Disp: , Rfl:     rOPINIRole (REQUIP) 2 MG tablet, TAKE 1 TABLET BY MOUTH EVERY DAY(1 TO 3 HOURS BEFORE BEDTIME), Disp: 90 tablet, Rfl: 1    vitamin B-12 (CYANOCOBALAMIN) 1000 MCG tablet, Take 1 tablet by mouth Daily. (Patient taking differently: Take 1 tablet by mouth Every Other Day.), Disp: 90 tablet, Rfl: 3    vitamin D (ERGOCALCIFEROL) 1.25 MG (63971 UT) capsule capsule, Take 1 capsule by mouth 1 (One) Time Per Week. (Patient taking differently: Take 1 capsule by mouth Every 14 (Fourteen) Days.), Disp: 15 capsule, Rfl: 3    Allergies:   Allergies   Allergen Reactions    Penicillins Rash       Review of Systems:   Review of Systems   Constitutional:  Positive for fatigue. Negative for appetite change, fever and unexpected weight loss.   HENT:  Negative for trouble swallowing.    Gastrointestinal:  Positive for abdominal pain, nausea, GERD and indigestion. Negative for abdominal distention, anal bleeding, blood in stool, constipation, diarrhea, rectal pain and vomiting.   Neurological:  Positive for dizziness.     The following portions of the patient's history were reviewed and updated as appropriate: allergies, current medications, past family history, past medical history, past social history, past surgical history and problem list.    Objective     Physical Exam:  Vital Signs:   Vitals:     "10/05/23 1454   BP: 113/69   Pulse: 98   Temp: 98.2 °F (36.8 °C)   TempSrc: Infrared   Weight: 65.8 kg (145 lb)   Height: 157.5 cm (62\")  Comment: patient states       Physical Exam  Constitutional:       Appearance: Normal appearance.   HENT:      Head: Normocephalic and atraumatic.   Eyes:      Conjunctiva/sclera: Conjunctivae normal.   Abdominal:      General: Abdomen is flat. There is no distension.      Palpations: There is no mass.      Tenderness: There is no abdominal tenderness. There is no guarding or rebound.      Hernia: No hernia is present.   Musculoskeletal:      Cervical back: Normal range of motion and neck supple.   Neurological:      Mental Status: She is alert.       Results Review:   I reviewed the patient's new clinical results.    Lab on 09/27/2023   Component Date Value Ref Range Status    Glucose 09/27/2023 83  65 - 99 mg/dL Final    BUN 09/27/2023 17  8 - 23 mg/dL Final    Creatinine 09/27/2023 0.94  0.57 - 1.00 mg/dL Final    Sodium 09/27/2023 139  136 - 145 mmol/L Final    Potassium 09/27/2023 3.5  3.5 - 5.2 mmol/L Final    Chloride 09/27/2023 101  98 - 107 mmol/L Final    CO2 09/27/2023 27.2  22.0 - 29.0 mmol/L Final    Calcium 09/27/2023 9.3  8.6 - 10.5 mg/dL Final    Total Protein 09/27/2023 6.7  6.0 - 8.5 g/dL Final    Albumin 09/27/2023 4.4  3.5 - 5.2 g/dL Final    ALT (SGPT) 09/27/2023 15  1 - 33 U/L Final    AST (SGOT) 09/27/2023 18  1 - 32 U/L Final    Alkaline Phosphatase 09/27/2023 74  39 - 117 U/L Final    Total Bilirubin 09/27/2023 <0.2  0.0 - 1.2 mg/dL Final    Globulin 09/27/2023 2.3  gm/dL Final    A/G Ratio 09/27/2023 1.9  g/dL Final    BUN/Creatinine Ratio 09/27/2023 18.1  7.0 - 25.0 Final    Anion Gap 09/27/2023 10.8  5.0 - 15.0 mmol/L Final    eGFR 09/27/2023 67.1  >60.0 mL/min/1.73 Final    WBC 09/27/2023 6.17  3.40 - 10.80 10*3/mm3 Final    RBC 09/27/2023 3.62 (L)  3.77 - 5.28 10*6/mm3 Final    Hemoglobin 09/27/2023 11.5 (L)  12.0 - 15.9 g/dL Final    Hematocrit " 09/27/2023 33.8 (L)  34.0 - 46.6 % Final    MCV 09/27/2023 93.4  79.0 - 97.0 fL Final    MCH 09/27/2023 31.8  26.6 - 33.0 pg Final    MCHC 09/27/2023 34.0  31.5 - 35.7 g/dL Final    RDW 09/27/2023 14.6  12.3 - 15.4 % Final    RDW-SD 09/27/2023 50.0  37.0 - 54.0 fl Final    MPV 09/27/2023 11.0  6.0 - 12.0 fL Final    Platelets 09/27/2023    Final    Unable to determine platelet count. Platelet clumping present.  Recollect specimen in EDTA and sodium citrate tubes.      Neutrophil % 09/27/2023 58.5  42.7 - 76.0 % Final    Lymphocyte % 09/27/2023 32.4  19.6 - 45.3 % Final    Monocyte % 09/27/2023 7.1  5.0 - 12.0 % Final    Eosinophil % 09/27/2023 1.0  0.3 - 6.2 % Final    Basophil % 09/27/2023 0.5  0.0 - 1.5 % Final    Neutrophils, Absolute 09/27/2023 3.61  1.70 - 7.00 10*3/mm3 Final    Lymphocytes, Absolute 09/27/2023 2.00  0.70 - 3.10 10*3/mm3 Final    Monocytes, Absolute 09/27/2023 0.44  0.10 - 0.90 10*3/mm3 Final    Eosinophils, Absolute 09/27/2023 0.06  0.00 - 0.40 10*3/mm3 Final    Basophils, Absolute 09/27/2023 0.03  0.00 - 0.20 10*3/mm3 Final    Clumped Platelets 09/27/2023 Present  None Seen Final   Office Visit on 08/22/2023   Component Date Value Ref Range Status    WBC 08/22/2023 10.77  3.40 - 10.80 10*3/mm3 Final    RBC 08/22/2023 3.17 (L)  3.77 - 5.28 10*6/mm3 Final    Hemoglobin 08/22/2023 9.8 (L)  12.0 - 15.9 g/dL Final    Hematocrit 08/22/2023 28.9 (L)  34.0 - 46.6 % Final    MCV 08/22/2023 91.2  79.0 - 97.0 fL Final    MCH 08/22/2023 30.9  26.6 - 33.0 pg Final    MCHC 08/22/2023 33.9  31.5 - 35.7 g/dL Final    RDW 08/22/2023 13.6  12.3 - 15.4 % Final    Platelets 08/22/2023 285  140 - 450 10*3/mm3 Final    Glucose 08/22/2023 102 (H)  65 - 99 mg/dL Final    BUN 08/22/2023 12  8 - 23 mg/dL Final    Creatinine 08/22/2023 0.77  0.57 - 1.00 mg/dL Final    EGFR Result 08/22/2023 85.7  >60.0 mL/min/1.73 Final    Comment: GFR Normal >60  Chronic Kidney Disease <60  Kidney Failure <15      BUN/Creatinine Ratio  08/22/2023 15.6  7.0 - 25.0 Final    Sodium 08/22/2023 138  136 - 145 mmol/L Final    Potassium 08/22/2023 4.6  3.5 - 5.2 mmol/L Final    Chloride 08/22/2023 101  98 - 107 mmol/L Final    Total CO2 08/22/2023 27.8  22.0 - 29.0 mmol/L Final    Calcium 08/22/2023 9.2  8.6 - 10.5 mg/dL Final    Total Protein 08/22/2023 6.2  6.0 - 8.5 g/dL Final    Albumin 08/22/2023 4.2  3.5 - 5.2 g/dL Final    Globulin 08/22/2023 2.0  gm/dL Final    A/G Ratio 08/22/2023 2.1  g/dL Final    Total Bilirubin 08/22/2023 0.4  0.0 - 1.2 mg/dL Final    Alkaline Phosphatase 08/22/2023 66  39 - 117 U/L Final    AST (SGOT) 08/22/2023 11  1 - 32 U/L Final    ALT (SGPT) 08/22/2023 12  1 - 33 U/L Final   Admission on 08/11/2023, Discharged on 08/11/2023   Component Date Value Ref Range Status    Glucose 08/11/2023 140 (H)  65 - 99 mg/dL Final    BUN 08/11/2023 32 (H)  8 - 23 mg/dL Final    Creatinine 08/11/2023 1.28 (H)  0.57 - 1.00 mg/dL Final    Sodium 08/11/2023 140  136 - 145 mmol/L Final    Potassium 08/11/2023 4.1  3.5 - 5.2 mmol/L Final    Chloride 08/11/2023 102  98 - 107 mmol/L Final    CO2 08/11/2023 22.9  22.0 - 29.0 mmol/L Final    Calcium 08/11/2023 9.4  8.6 - 10.5 mg/dL Final    Total Protein 08/11/2023 7.0  6.0 - 8.5 g/dL Final    Albumin 08/11/2023 4.5  3.5 - 5.2 g/dL Final    ALT (SGPT) 08/11/2023 13  1 - 33 U/L Final    AST (SGOT) 08/11/2023 25  1 - 32 U/L Final    Alkaline Phosphatase 08/11/2023 216 (H)  39 - 117 U/L Final    Total Bilirubin 08/11/2023 0.4  0.0 - 1.2 mg/dL Final    Globulin 08/11/2023 2.5  gm/dL Final    A/G Ratio 08/11/2023 1.8  g/dL Final    BUN/Creatinine Ratio 08/11/2023 25.0  7.0 - 25.0 Final    Anion Gap 08/11/2023 15.1 (H)  5.0 - 15.0 mmol/L Final    eGFR 08/11/2023 46.6 (L)  >60.0 mL/min/1.73 Final    Lipase 08/11/2023 21  13 - 60 U/L Final    Color, UA 08/11/2023 Dark Yellow (A)  Yellow, Straw Final    Appearance, UA 08/11/2023 Cloudy (A)  Clear Final    pH, UA 08/11/2023 5.5  5.0 - 8.0 Final    Specific  [place], UA 08/11/2023 1.021  1.005 - 1.030 Final    Glucose, UA 08/11/2023 Negative  Negative Final    Ketones, UA 08/11/2023 Trace (A)  Negative Final    Bilirubin, UA 08/11/2023 Moderate (2+) (A)  Negative Final    Blood, UA 08/11/2023 Negative  Negative Final    Protein, UA 08/11/2023 30 mg/dL (1+) (A)  Negative Final    Leuk Esterase, UA 08/11/2023 Trace (A)  Negative Final    Nitrite, UA 08/11/2023 Negative  Negative Final    Urobilinogen, UA 08/11/2023 1.0 E.U./dL  0.2 - 1.0 E.U./dL Final    Lactate 08/11/2023 3.5 (C)  0.5 - 2.0 mmol/L Final    Extra Tube 08/11/2023 Hold for add-ons.   Final    Auto resulted.    Extra Tube 08/11/2023 hold for add-on   Final    Auto resulted    Extra Tube 08/11/2023 Hold for add-ons.   Final    Auto resulted.    Extra Tube 08/11/2023 Hold for add-ons.   Final    Auto resulted    WBC 08/11/2023 15.07 (H)  3.40 - 10.80 10*3/mm3 Final    RBC 08/11/2023 4.38  3.77 - 5.28 10*6/mm3 Final    Hemoglobin 08/11/2023 13.7  12.0 - 15.9 g/dL Final    Hematocrit 08/11/2023 39.3  34.0 - 46.6 % Final    MCV 08/11/2023 89.7  79.0 - 97.0 fL Final    MCH 08/11/2023 31.3  26.6 - 33.0 pg Final    MCHC 08/11/2023 34.9  31.5 - 35.7 g/dL Final    RDW 08/11/2023 13.9  12.3 - 15.4 % Final    RDW-SD 08/11/2023 45.6  37.0 - 54.0 fl Final    MPV 08/11/2023 10.0  6.0 - 12.0 fL Final    Platelets 08/11/2023 254  140 - 450 10*3/mm3 Final    Neutrophil % 08/11/2023 90.1 (H)  42.7 - 76.0 % Final    Lymphocyte % 08/11/2023 4.3 (L)  19.6 - 45.3 % Final    Monocyte % 08/11/2023 4.8 (L)  5.0 - 12.0 % Final    Eosinophil % 08/11/2023 0.0 (L)  0.3 - 6.2 % Final    Basophil % 08/11/2023 0.2  0.0 - 1.5 % Final    Immature Grans % 08/11/2023 0.6 (H)  0.0 - 0.5 % Final    Neutrophils, Absolute 08/11/2023 13.57 (H)  1.70 - 7.00 10*3/mm3 Final    Lymphocytes, Absolute 08/11/2023 0.65 (L)  0.70 - 3.10 10*3/mm3 Final    Monocytes, Absolute 08/11/2023 0.73  0.10 - 0.90 10*3/mm3 Final    Eosinophils, Absolute 08/11/2023 0.00   0.00 - 0.40 10*3/mm3 Final    Basophils, Absolute 08/11/2023 0.03  0.00 - 0.20 10*3/mm3 Final    Immature Grans, Absolute 08/11/2023 0.09 (H)  0.00 - 0.05 10*3/mm3 Final    nRBC 08/11/2023 0.0  0.0 - 0.2 /100 WBC Final    Sed Rate 08/11/2023 1  0 - 30 mm/hr Final    C-Reactive Protein 08/11/2023 1.77 (H)  0.00 - 0.50 mg/dL Final    RBC, UA 08/11/2023 None Seen  None Seen /HPF Final    WBC, UA 08/11/2023 3-5 (A)  None Seen /HPF Final    Bacteria, UA 08/11/2023 2+ (A)  None Seen /HPF Final    Squamous Epithelial Cells, UA 08/11/2023 3-6 (A)  None Seen, 0-2 /HPF Final    Hyaline Casts, UA 08/11/2023 3-6  None Seen /LPF Final    Mucus, UA 08/11/2023 Moderate/2+ (A)  None Seen, Trace /HPF Final    Methodology 08/11/2023 Manual Light Microscopy   Final    Lactate 08/11/2023 1.4  0.5 - 2.0 mmol/L Final    Blood Culture 08/11/2023 No growth at 5 days   Final    Blood Culture 08/11/2023 No growth at 5 days   Final    WBC 08/11/2023 10.40  3.40 - 10.80 10*3/mm3 Final    RBC 08/11/2023 3.48 (L)  3.77 - 5.28 10*6/mm3 Final    Hemoglobin 08/11/2023 10.9 (L)  12.0 - 15.9 g/dL Final    Hematocrit 08/11/2023 31.8 (L)  34.0 - 46.6 % Final    MCV 08/11/2023 91.4  79.0 - 97.0 fL Final    MCH 08/11/2023 31.3  26.6 - 33.0 pg Final    MCHC 08/11/2023 34.3  31.5 - 35.7 g/dL Final    RDW 08/11/2023 14.1  12.3 - 15.4 % Final    RDW-SD 08/11/2023 47.1  37.0 - 54.0 fl Final    MPV 08/11/2023 9.9  6.0 - 12.0 fL Final    Platelets 08/11/2023 194  140 - 450 10*3/mm3 Final    Neutrophil % 08/11/2023 86.5 (H)  42.7 - 76.0 % Final    Lymphocyte % 08/11/2023 7.2 (L)  19.6 - 45.3 % Final    Monocyte % 08/11/2023 5.7  5.0 - 12.0 % Final    Eosinophil % 08/11/2023 0.0 (L)  0.3 - 6.2 % Final    Basophil % 08/11/2023 0.0  0.0 - 1.5 % Final    Immature Grans % 08/11/2023 0.6 (H)  0.0 - 0.5 % Final    Neutrophils, Absolute 08/11/2023 9.00 (H)  1.70 - 7.00 10*3/mm3 Final    Lymphocytes, Absolute 08/11/2023 0.75  0.70 - 3.10 10*3/mm3 Final    Monocytes,  Absolute 08/11/2023 0.59  0.10 - 0.90 10*3/mm3 Final    Eosinophils, Absolute 08/11/2023 0.00  0.00 - 0.40 10*3/mm3 Final    Basophils, Absolute 08/11/2023 0.00  0.00 - 0.20 10*3/mm3 Final    Immature Grans, Absolute 08/11/2023 0.06 (H)  0.00 - 0.05 10*3/mm3 Final    nRBC 08/11/2023 0.0  0.0 - 0.2 /100 WBC Final   Office Visit on 07/18/2023   Component Date Value Ref Range Status    Total Cholesterol 09/27/2023 169  0 - 200 mg/dL Final    Triglycerides 09/27/2023 124  0 - 150 mg/dL Final    HDL Cholesterol 09/27/2023 75 (H)  40 - 60 mg/dL Final    LDL Cholesterol  09/27/2023 73  0 - 100 mg/dL Final    VLDL Cholesterol 09/27/2023 21  5 - 40 mg/dL Final    LDL/HDL Ratio 09/27/2023 0.92   Final    Vitamin B-12 09/27/2023 >2,000 (H)  211 - 946 pg/mL Final    25 Hydroxy, Vitamin D 09/27/2023 78.2  30.0 - 100.0 ng/ml Final      CT Abdomen Pelvis With Contrast    Result Date: 8/11/2023  Wall thickening, submucosal edema and mucosal enhancement from the distal transverse colon to the rectum in this patient with known Crohn's disease; findings slightly improved compared to 1 year prior.  Air-fluid levels in nondistended small bowel, suspect ileus.  This report was signed and finalized on 8/11/2023 4:01 PM by Samantha Allen MD.       11/15/2022 PillCam: Few scattered gastric erosions. Multiple small and medium size healing erosions and superficial ulcerations identified throughout the ileum. No deep ulcerations except one small proximal ileal ulceration. These findings with anemia and recent colitis is more suggestive of ileocolonic crohn's disease.     Colonoscopy by Dr. Marrero 12/12/2022:  - The perianal and digital rectal examinations were normal.  - A 3 mm polyp was found in the cecum. The polyp was sessile. The polyp was removed with a cold snare. Resection and retrieval were complete.  - A 4 mm polyp was found in the sigmoid colon. The polyp was flat. The polyp was removed with a cold snare. Resection and retrieval were  complete.  - Diffuse moderate inflammation characterized by altered vascularity, congestion (edema), erosions, erythema, friability and granularity was found in the rectum, in the recto-sigmoid colon, in the sigmoid colon and in the descending colon. Biopsies were taken with a cold forceps for histology.  - A medium healed ulcer was found in the mid descending colon. The scar tissue was healthy in appearance.  - The transverse colon, hepatic flexure, ascending colon, cecum, appendiceal orifice and ileocecal valve appeared normal. Biopsies were taken with a cold forceps for histology.  - The terminal ileum appeared normal. Biopsies were taken with a cold forceps for histology.  Pathology DIAGNOSIS:   A.   TERMINAL ILEUM BIOPSY:   Benign small bowel, consistent with terminal ileum   Negative for significant architectural distortion, inflammatory infiltrate,   neoplasia, malignancy   B.   CECUM BIOPSY:   Benign colonic mucosa, negative for significant architectural distortion,   inflammatory infiltrate, neoplasia, dysplasia, malignancy   C.   ASCENDING COLON BIOPSY:   Benign colonic mucosa, negative for significant architectural distortion,   inflammatory infiltrate, neoplasia, dysplasia, malignancy   D.   TRANSVERSE COLON BIOPSY:   Benign colonic mucosa, negative for significant architectural distortion,   inflammatory infiltrate, neoplasia, dysplasia, malignancy   E.   DESCENDING COLON BIOPSY:   Minimal active colitis without significant accompanying chronicity   Negative for dysplasia, neoplasia, malignancy   See comment   F.   SIGMOID COLON BIOPSY:   Minimal active colitis without significant chronicity   Negative for dysplasia, neoplasia,malignancy   See comment   G.   RECTAL BIOPSY:   Minimal active colitis without significant chronicity   Negative for dysplasia, neoplasia, malignancy   See comment   H.   CECUM POLYP:   Tubular adenoma   Negative for high-grade dysplasia/malignancy   I.   SIGMOID COLON POLYP:    Hyperplastic polyp   Accompanying inflammatory changes   COMMENT:  E-G.  There is minimal active colitis with only rare neutrophils   identified within the mucosal surfaces.  There is no evidence of viral cytopathic effect, significant chronic alteration, dysplasia, neoplasia, malignancy, or granulomatous inflammation.  These findings are not specific and could be seen with an infectious process, acute self-limited colitis, or even potentially inflammatory bowel disease in the appropriate clinical/endoscopic setting. Clinical correlation is needed  Assessment / Plan      1. Crohn's disease of both small and large intestine with acute flare  2. Suspected arthropathy associated with inflammatory bowel disease / RA   3. Chronic iron deficiency anemia  4. Vitamin B12 deficiency  5.  Rheumatoid arthritis  6.  Family history of inflammatory bowel disease-UC with brother  7.  Gastroesophageal reflux disease without esophagitis  10/5/2023  Patient is clinically doing very well.  She has been having 1 or 2 soft bowel movement on stool softener.  Still on opioids.  Has cut down smoking but still smokes. Lab work on 09/27/2023 reviewed and reveals a normal CMP within normal renal functions and LFTs.  Vitamin B12 over 2000.  Hemoglobin improved 11.5 from 9.8-month ago.      Her last dose of Remicade was on September 12th.   We will get Remicade trough level with antibodies before the next maintenance  We will also get her lab work along with the drug level  Continue azathioprine 100 mg p.o. daily  Oral iron pills p.o. daily    Given her persistent abdominal symptoms and significant history of smoking and family history of vascular disease and some suspicion of ischemic colitis recently -we will get a CT angio mesenteric vessels for further evaluation.    As no significant reflux symptoms now we will reduce her PPI to Protonix 20 mg p.o. daily  We will consider colonoscopy later next year to assess the response to  treatment  Her vitamin B12 has been reduced by PCP for every 2 days    8/31/2023  Record from [facility] reviewed. Patient did not receive her first Remicade maintenance infusion on time and had to wait for about 6 weeks.  First maintenance dose was in July 12.  And next dose is pending for September 12. Patient developed bloody diarrhea with abdominal pain and came to ED on 8/11/2023 and later on was transferred to [facility] due to no coverage from GI here.  Her CT abdomen pelvis done on 8/11/2023 revealed revealed a circumferential wall thickening from distal transverse colon to the rectum findings were in fact less pronounced than the previous findings with the prior CT scans.  There was also fluid-filled small bowel loops nondilated with air-fluid levels from ileus.     Stool C. difficile and GI panel was negative.  Her thiopurine metabolites level was only 55 indicating subtherapeutic level and 6-mercaptopurine level was 850 within limit.  Her random infliximab level was 19.28 without any antibodies.  She had a colonoscopy done by Dr. Valencia at [facility] on 8/15/2023 and reported as having severe inflammation with ulcerations on the left side colon that may indicate ischemic colitis.  Full colonoscopy and pathology report not available to review.  Her lab work done on 8/22/2023 revealed a normal CMP.  Hemoglobin is 9.8 g/dL.  CRP 1.77.     Although colonoscopy findings suggested possible left-sided ischemic colitis.  These findings were similar to prior colitis findings are chronic , was present with a CT scan a year ago, and persistent findings noted with the colonoscopy done in December 2022, suggesting chronic inflammation. These findings suggest Crohn's flare.  Given her gap in her maintenance Remicade dose infusion for 6 weeks, played a role on this flare of unclear.  However her random Remicade level done was therapeutic and very high level midway through her treatment.       3/29/2023   Her stool calprotectin was 25 within normal limits.  Hemoglobin is still low at 11 g/dL.  Vitamin B12 is low at 199.She had a 2 induction dose of Remicade now third 1 is pending.  Initially prescribed Humira however she could not afford co-pay.     After review of recent pillcam and colonoscopy, it seems that she has chronic inflammation which may have been contributing to her anemia throughout the years. Some element of anemia of chronic disease suspected with her rheumatoid arthritis. She had erosions and ulceration in the small intestine on PillCam. Colonoscopy and colon biopsies show active colitis. History and endoscopic findings consistent with a new diagnosis of Crohn's disease. She is currently complaining of bloating, mucous in stools, mild rectal bleeding and loose stools. This is a change in bowel habits from her previous constipation. Initially diagnosed with colitis in August 2022 with bloody diarrhea.  CT abdomen pelvis done with contrast in August 2022 revealed a left-sided colon colitis.  Subsequently she had a colonoscopy done by Dr. Morel on 8/17/2022 which did not reveal any endoscopic signs of colitis.  However TI was not visualized.  Random colon biopsies done were all normal without any signs of colitis.  Prior EGD done in 2020 did not reveal any upper GI source of bleeding. Her brother has ulcerative colitis. Previously with reports of constipation at baseline. Now having stools daily. In the past, she took MiraLAX, senna with relief. Had taken Linzess and Movantik before which did not help her. Serum porphyrin normal.      Prior history  8.  Suspected irritable bowel syndrome with constipation   9.  Drug-induced nausea and suspected opioid induced constipation  Patient appears to have a underlying irritable bowel syndrome with constipation and possible  opioid-induced constipation and nausea.    Her reflux symptoms well controlled with low-dose PPI.  MiraLAX is not working for  her constipation  Started on a lactulose 15 mill p.o. daily and titrate to twice daily     10. Tubular adenoma of colon  Colonoscopy 12/2022 had 2 small polyps, 1 was tubular adenoma without dysplasia and other was hyperplastic.  No family history of any colon cancer with a first-degree relatives. Due to new diagnosis of IBD, she will likely need repeat colonoscopy in 1-3 years depending on clinical course        Follow Up:   No follow-ups on file.    Chaparro Marrero MD  [facility] [place]  10/5/2023  14:56 EDT     Please note that portions of this note may have been completed with a voice recognition program.

## 2023-10-31 ENCOUNTER — HOSPITAL ENCOUNTER (OUTPATIENT)
Dept: CT IMAGING | Facility: HOSPITAL | Age: 68
Discharge: HOME | End: 2023-10-31
Admitting: INTERNAL MEDICINE
Payer: MEDICARE

## 2023-10-31 DIAGNOSIS — R10.84 GENERALIZED ABDOMINAL PAIN: ICD-10-CM

## 2023-10-31 DIAGNOSIS — Z87.19 HISTORY OF COLITIS: ICD-10-CM

## 2023-10-31 PROCEDURE — 25510000001 IOPAMIDOL 61 % SOLUTION: Performed by: INTERNAL MEDICINE

## 2023-10-31 PROCEDURE — 74175 CTA ABDOMEN W/CONTRAST: CPT

## 2023-10-31 RX ADMIN — IOPAMIDOL 100 ML: 612 INJECTION, SOLUTION INTRAVENOUS at 09:23

## 2023-11-01 ENCOUNTER — TELEPHONE (OUTPATIENT)
Dept: GASTROENTEROLOGY | Facility: CLINIC | Age: 68
End: 2023-11-01
Payer: MEDICARE

## 2023-11-01 NOTE — TELEPHONE ENCOUNTER
----- Message from Etta Araujo MA sent at 11/1/2023  8:27 AM EDT -----    ----- Message -----  From: Chaparro Marrero MD  Sent: 10/31/2023   7:40 PM EDT  To: [facility]      Let her know that her abdominal vessels are patent   No blockage or ischemia    ----- Message -----  From: Dion Rad Results [place] In  Sent: 10/31/2023  10:55 AM EDT  To: Chaparro Marrero MD

## 2023-11-03 RX ORDER — DULOXETIN HYDROCHLORIDE 60 MG/1
60 CAPSULE, DELAYED RELEASE ORAL DAILY
Qty: 90 CAPSULE | Refills: 3 | Status: SHIPPED | OUTPATIENT
Start: 2023-11-03

## 2023-11-07 ENCOUNTER — HOSPITAL ENCOUNTER (OUTPATIENT)
Dept: INFUSION THERAPY | Facility: HOSPITAL | Age: 68
Discharge: HOME | End: 2023-11-07
Admitting: PHYSICIAN ASSISTANT
Payer: MEDICARE

## 2023-11-07 VITALS
HEIGHT: 62 IN | WEIGHT: 140 LBS | RESPIRATION RATE: 18 BRPM | TEMPERATURE: 98 F | BODY MASS INDEX: 25.76 KG/M2 | OXYGEN SATURATION: 97 % | DIASTOLIC BLOOD PRESSURE: 51 MMHG | HEART RATE: 85 BPM | SYSTOLIC BLOOD PRESSURE: 113 MMHG

## 2023-11-07 DIAGNOSIS — K50.80 CROHN'S DISEASE OF BOTH SMALL AND LARGE INTESTINE WITHOUT COMPLICATION: Primary | ICD-10-CM

## 2023-11-07 PROBLEM — K55.9 ISCHEMIC COLITIS: Status: ACTIVE | Noted: 2023-11-07

## 2023-11-07 PROCEDURE — 96415 CHEMO IV INFUSION ADDL HR: CPT

## 2023-11-07 PROCEDURE — 96413 CHEMO IV INFUSION 1 HR: CPT

## 2023-11-07 PROCEDURE — 63710000001 DIPHENHYDRAMINE PER 50 MG: Performed by: PHYSICIAN ASSISTANT

## 2023-11-07 PROCEDURE — 25010000002 INFLIXIMAB PER 10 MG: Performed by: PHYSICIAN ASSISTANT

## 2023-11-07 PROCEDURE — 25810000003 SODIUM CHLORIDE 0.9 % SOLUTION 250 ML FLEX CONT: Performed by: PHYSICIAN ASSISTANT

## 2023-11-07 RX ORDER — SODIUM CHLORIDE 9 MG/ML
250 INJECTION, SOLUTION INTRAVENOUS ONCE
OUTPATIENT
Start: 2024-01-02

## 2023-11-07 RX ORDER — SODIUM CHLORIDE 9 MG/ML
250 INJECTION, SOLUTION INTRAVENOUS ONCE
Status: DISCONTINUED | OUTPATIENT
Start: 2023-11-07 | End: 2023-11-09 | Stop reason: HOSPADM

## 2023-11-07 RX ORDER — ACETAMINOPHEN 325 MG/1
650 TABLET ORAL ONCE
Status: COMPLETED | OUTPATIENT
Start: 2023-11-07 | End: 2023-11-07

## 2023-11-07 RX ORDER — DIPHENHYDRAMINE HCL 25 MG
25 CAPSULE ORAL ONCE
Status: COMPLETED | OUTPATIENT
Start: 2023-11-07 | End: 2023-11-07

## 2023-11-07 RX ORDER — ACETAMINOPHEN 325 MG/1
650 TABLET ORAL ONCE
Start: 2024-01-02 | End: 2024-01-02

## 2023-11-07 RX ORDER — DIPHENHYDRAMINE HCL 25 MG
25 CAPSULE ORAL ONCE
Start: 2024-01-02 | End: 2024-01-02

## 2023-11-07 RX ADMIN — ACETAMINOPHEN 650 MG: 325 TABLET, FILM COATED ORAL at 09:57

## 2023-11-07 RX ADMIN — INFLIXIMAB 317.5 MG: 100 INJECTION, POWDER, LYOPHILIZED, FOR SOLUTION INTRAVENOUS at 10:30

## 2023-11-07 RX ADMIN — DIPHENHYDRAMINE HYDROCHLORIDE 25 MG: 25 CAPSULE ORAL at 09:57

## 2023-11-14 RX ORDER — CYCLOBENZAPRINE HCL 10 MG
10 TABLET ORAL 3 TIMES DAILY
Qty: 270 TABLET | Refills: 3 | Status: SHIPPED | OUTPATIENT
Start: 2023-11-14

## 2023-11-14 RX ORDER — CYCLOBENZAPRINE HCL 10 MG
10 TABLET ORAL 3 TIMES DAILY
Qty: 270 TABLET | Refills: 3 | Status: CANCELLED | OUTPATIENT
Start: 2023-11-14

## 2023-11-20 ENCOUNTER — OFFICE VISIT (OUTPATIENT)
Dept: INTERNAL MEDICINE | Facility: CLINIC | Age: 68
End: 2023-11-20
Payer: MEDICARE

## 2023-11-20 VITALS
WEIGHT: 138 LBS | HEIGHT: 62 IN | DIASTOLIC BLOOD PRESSURE: 82 MMHG | TEMPERATURE: 97 F | HEART RATE: 103 BPM | OXYGEN SATURATION: 97 % | SYSTOLIC BLOOD PRESSURE: 128 MMHG | BODY MASS INDEX: 25.4 KG/M2

## 2023-11-20 DIAGNOSIS — Z00.00 MEDICARE ANNUAL WELLNESS VISIT, SUBSEQUENT: Primary | ICD-10-CM

## 2023-11-20 DIAGNOSIS — F17.210 NICOTINE DEPENDENCE, CIGARETTES, UNCOMPLICATED: ICD-10-CM

## 2023-11-20 NOTE — LETTER
[facility]  Vaccine Consent Form    Patient Name:  Stephanie Jean  Patient :  1955     Vaccine(s) Ordered    Pneumococcal Conjugate Vaccine 20-Valent (PCV20)  Fluzone High-Dose 65+yrs ([id number])        Screening Checklist  The following questions should be completed prior to vaccination. If you answer “yes” to any question, it does not necessarily mean you should not be vaccinated. It just means we may need to clarify or ask more questions. If a question is unclear, please ask your healthcare provider to explain it.    Yes No   Any fever or moderate to severe illness today (mild illness and/or antibiotic treatment are not contraindications)?     Do you have a history of a serious reaction to any previous vaccinations, such as anaphylaxis, encephalopathy within 7 days, Guillain-Glidden syndrome within 6 weeks, seizure?     Have you received any live vaccine(s) (e.g MMR, MARLEY) or any other vaccines in the last month (to ensure duplicate doses aren't given)?     Do you have an anaphylactic allergy to latex (DTaP, DTaP-IPV, Hep A, Hep B, MenB, RV, Td, Tdap), baker’s yeast (Hep B, HPV), polysorbates (RSV, nirsevimab, PCV 20, Rotavirrus, Tdap, Shingrix), or gelatin (MARLEY, MMR)?     Do you have an anaphylactic allergy to neomycin (Rabies, MARLEY, MMR, IPV, Hep A), polymyxin B (IPV), or streptomycin (IPV)?      Any cancer, leukemia, AIDS, or other immune system disorder? (MARLEY, MMR, RV)     Do you have a parent, brother, or sister with an immune system problem (if immune competence of vaccine recipient clinically verified, can proceed)? (MMR, MARLEY)     Any recent steroid treatments for >2 weeks, chemotherapy, or radiation treatment? (MARLEY, MMR)     Have you received antibody-containing blood transfusions or IVIG in the past 11 months (recommended interval is dependent on product)? (MMR, MARLEY)     Have you taken antiviral drugs (acyclovir, famciclovir, valacyclovir for MARLEY) in the last 24 or 48 hours, respectively?      Are  you pregnant or planning to become pregnant within 1 month? (MARLEY, MMR, HPV, IPV, MenB, Abrexvy; For Hep B- refer to Engerix-B; For RSV - Abrysvo is indicated for 32-36 weeks of pregnancy from September to January)     For infants, have you ever been told your child has had intussusception or a medical emergency involving obstruction of the intestine (Rotavirus)? If not for an infant, can skip this question.         *Ordering Physician/APC should be consulted if “yes” is checked by the patient or guardian above.      I have received, read, and understand the Vaccine Information Statement (VIS) for each vaccine ordered above.  I have considered my health status as well as the health status of my close contacts.  I have taken the opportunity to discuss my vaccine questions with my health care provider.   I have requested that the ordered vaccine(s) be given to me.  I understand the benefits and risks of the vaccines.  I understand that I should remain in the clinic for 15 minutes after receiving the vaccine(s).  _________________________________________________________  Signature of Patient or Parent/Legal Guardian ____________________  Date

## 2023-11-20 NOTE — PROGRESS NOTES
The ABCs of the Annual Wellness Visit  Subsequent Medicare Wellness Visit    Subjective    Stephanie Jean is a 68 y.o. female who presents for a Subsequent Medicare Wellness Visit.    The following portions of the patient's history were reviewed and   updated as appropriate: allergies, current medications, past family history, past medical history, past social history, past surgical history, and problem list.    Compared to one year ago, the patient feels her physical   health is worse.    Compared to one year ago, the patient feels her mental   health is the same.    Recent Hospitalizations:  She was admitted within the past 365 days at [facility].       Current Medical Providers:  Patient Care Team:  Nhung Shepard DO as PCP - General (Family Medicine)    Outpatient Medications Prior to Visit   Medication Sig Dispense Refill    aspirin 81 MG EC tablet Take 1 tablet by mouth. Every other day      atorvastatin (LIPITOR) 20 MG tablet Take 1 tablet by mouth Every Night. 90 tablet 3    azaTHIOprine (IMURAN) 50 MG tablet TAKE 2 TABLETS BY MOUTH DAILY 180 tablet 3    busPIRone (BUSPAR) 10 MG tablet Take 1 tablet by mouth 2 (Two) Times a Day As Needed (anxiety). 180 tablet 3    cyclobenzaprine (FLEXERIL) 10 MG tablet TAKE 1 TABLET BY MOUTH THREE TIMES DAILY 270 tablet 3    Diclofenac Sodium (VOLTAREN) 1 % gel gel diclofenac 1 % topical gel   APPLY 1 GRAM TOPICALLY TO THE AFFECTED AREA TWICE DAILY AS NEEDED FOR PAIN      docusate sodium (COLACE) 100 MG capsule TAKE 1 CAPSULE BY MOUTH TWICE DAILY (Patient taking differently: Take 2 capsules by mouth 2 (Two) Times a Day.) 60 capsule 2    donepezil (Aricept) 5 MG tablet Take 1 tablet by mouth Every Night. 30 tablet 6    DULoxetine (CYMBALTA) 60 MG capsule Take 1 capsule by mouth Daily. 90 capsule 3    fluticasone (FLONASE) 50 MCG/ACT nasal spray SHAKE LIQUID AND USE 1 TO 2 SPRAYS IN EACH NOSTRIL EVERY DAY AS NEEDED      furosemide (LASIX) 20 MG tablet TAKE 1  TABLET BY MOUTH DAILY AS NEEDED FOR SWELLING 30 tablet 1    gabapentin (NEURONTIN) 800 MG tablet Take 1 tablet by mouth 3 (Three) Times a Day.      hydrOXYzine pamoate (VISTARIL) 25 MG capsule TAKE 1 CAPSULE UP TO 3 TIMES A DAY AS DIRECTED 90 capsule 1    inFLIXimab (REMICADE IV) Infuse  into a venous catheter Take As Directed.      lactulose (CHRONULAC) 10 GM/15ML solution Take 15 mL by mouth Daily. (Patient taking differently: Take 15 mL by mouth As Needed.) 450 mL 2    lisinopril (PRINIVIL,ZESTRIL) 10 MG tablet Take 1 tablet by mouth Every Night. 90 tablet 3    loratadine (CLARITIN) 10 MG tablet Take 1 tablet by mouth As Needed.      nitroglycerin (NITROSTAT) 0.4 MG SL tablet Place 1 tablet under the tongue Every 5 (Five) Minutes As Needed.      oxyCODONE-acetaminophen (PERCOCET) 7.5-325 MG per tablet Take 2 tablets by mouth Daily.      pantoprazole (PROTONIX) 20 MG EC tablet Take 1 tablet by mouth Daily. 90 tablet 3    promethazine (PHENERGAN) 12.5 MG tablet Take 1 tablet by mouth Every 8 (Eight) Hours As Needed for Nausea or Vomiting. 30 tablet 2    Restasis 0.05 % ophthalmic emulsion Administer 1 drop to both eyes 2 (Two) Times a Day.      rOPINIRole (REQUIP) 2 MG tablet TAKE 1 TABLET BY MOUTH EVERY DAY(1 TO 3 HOURS BEFORE BEDTIME) 90 tablet 1    vitamin B-12 (CYANOCOBALAMIN) 1000 MCG tablet Take 1 tablet by mouth Daily. (Patient taking differently: Take 1 tablet by mouth Every Other Day.) 90 tablet 3    vitamin D (ERGOCALCIFEROL) 1.25 MG (62246 UT) capsule capsule Take 1 capsule by mouth 1 (One) Time Per Week. (Patient taking differently: Take 1 capsule by mouth Every 14 (Fourteen) Days.) 15 capsule 3     No facility-administered medications prior to visit.       Opioid medication/s are on active medication list.  and I have evaluated her active treatment plan and pain score trends (see table).  Vitals:    11/20/23 1053   PainSc:   5     I have reviewed the chart for potential of high risk medication and  "harmful drug interactions in the elderly.          Aspirin is on active medication list. Aspirin use is indicated based on review of current medical condition/s. Pros and cons of this therapy have been discussed today. Benefits of this medication outweigh potential harm.  Patient has been encouraged to continue taking this medication.  .      Patient Active Problem List   Diagnosis    Gastroesophageal reflux disease without esophagitis    Esophageal dysphagia    Chest pain    Abnormal ECG    Dizziness    Transient ischemic attack (TIA)    Nuclear sclerotic cataract of left eye    Primary hypertension    Mixed hyperlipidemia    Fibromyalgia    SLE (systemic lupus erythematosus related syndrome)    Anxiety    Iron deficiency anemia    Crohn's disease of both small and large intestine without complication    Anemia    Lower extremity edema    Vitamin D deficiency    Ischemic colitis     Advance Care Planning   Advance Care Planning     Advance Directive is not on file.  ACP discussion was held with the patient during this visit. Patient does not have an advance directive, information provided.     Objective    Vitals:    11/20/23 1053   BP: 128/82   BP Location: Left arm   Patient Position: Sitting   Cuff Size: Adult   Pulse: 103   Temp: 97 °F (36.1 °C)   SpO2: 97%   Weight: 62.6 kg (138 lb)   Height: 157.5 cm (62\")   PainSc:   5     Estimated body mass index is 25.24 kg/m² as calculated from the following:    Height as of this encounter: 157.5 cm (62\").    Weight as of this encounter: 62.6 kg (138 lb).           Does the patient have evidence of cognitive impairment? No    Lab Results   Component Value Date    TRIG 124 09/27/2023    HDL 75 (H) 09/27/2023    LDL 73 09/27/2023    VLDL 21 09/27/2023        HEALTH RISK ASSESSMENT    Smoking Status:  Social History     Tobacco Use   Smoking Status Former    Packs/day: 1.00    Years: 40.00    Additional pack years: 0.00    Total pack years: 40.00    Types: Cigarettes    " Quit date: 2018    Years since quittin.8   Smokeless Tobacco Never     Alcohol Consumption:  Social History     Substance and Sexual Activity   Alcohol Use No     Fall Risk Screen:    JAKEADI Fall Risk Assessment was completed, and patient is at LOW risk for falls.Assessment completed on:2023    Depression Screenin/20/2023    10:55 AM   PHQ-2/PHQ-9 Depression Screening   Little Interest or Pleasure in Doing Things 0-->not at all   Feeling Down, Depressed or Hopeless 0-->not at all   PHQ-9: Brief Depression Severity Measure Score 0       Health Habits and Functional and Cognitive Screenin/20/2023    10:55 AM   Functional & Cognitive Status   Do you have difficulty preparing food and eating? No   Do you have difficulty bathing yourself, getting dressed or grooming yourself? No   Do you have difficulty using the toilet? No   Do you have difficulty moving around from place to place? No   Do you have trouble with steps or getting out of a bed or a chair? No   Current Diet Well Balanced Diet   Dental Exam Up to date   Eye Exam Up to date   Exercise (times per week) 0 times per week   Current Exercises Include No Regular Exercise;House Cleaning        Exercise Comment stays active at home.   Do you need help using the phone?  No   Are you deaf or do you have serious difficulty hearing?  No   Do you need help to go to places out of walking distance? No   Do you need help shopping? No   Do you need help preparing meals?  No   Do you need help with housework?  No   Do you need help with laundry? No   Do you need help taking your medications? No   Do you need help managing money? No   Do you ever drive or ride in a car without wearing a seat belt? No   Have you felt unusual stress, anger or loneliness in the last month? No   Who do you live with? Spouse   If you need help, do you have trouble finding someone available to you? No   Have you been bothered in the last four weeks by sexual  problems? No   Do you have difficulty concentrating, remembering or making decisions? No       Age-appropriate Screening Schedule:  Refer to the list below for future screening recommendations based on patient's age, sex and/or medical conditions. Orders for these recommended tests are listed in the plan section. The patient has been provided with a written plan.    Health Maintenance   Topic Date Due    LUNG CANCER SCREENING  08/24/2023    ANNUAL WELLNESS VISIT  11/17/2023    COVID-19 Vaccine (4 - 2023-24 season) 12/12/2112 (Originally 9/1/2023)    DXA SCAN  11/29/2023    LIPID PANEL  09/27/2024    BMI FOLLOWUP  11/20/2024    MAMMOGRAM  11/09/2025    TDAP/TD VACCINES (3 - Td or Tdap) 12/29/2026    COLORECTAL CANCER SCREENING  08/15/2033    HEPATITIS C SCREENING  Completed    INFLUENZA VACCINE  Completed    Pneumococcal Vaccine 65+  Completed                  CMS Preventative Services Quick Reference  Risk Factors Identified During Encounter  Immunizations Discussed/Encouraged: Influenza and Prevnar 20 (Pneumococcal 20-valent conjugate)  The above risks/problems have been discussed with the patient.  Pertinent information has been shared with the patient in the After Visit Summary.  An After Visit Summary and PPPS were made available to the patient.        Review of Systems   Constitutional:  Positive for fatigue (occasional). Negative for chills and fever.   HENT:  Negative for congestion and rhinorrhea.    Eyes:  Negative for discharge.   Respiratory:  Positive for shortness of breath. Negative for cough.    Cardiovascular:  Negative for chest pain.   Gastrointestinal:  Positive for abdominal pain (occasional), constipation (off and on), nausea and vomiting. Negative for diarrhea.   Genitourinary:  Negative for difficulty urinating.   Psychiatric/Behavioral:  Negative for dysphoric mood. The patient is not nervous/anxious.        Objective   Vital Signs:  /82 (BP Location: Left arm, Patient Position:  "Sitting, Cuff Size: Adult)   Pulse 103   Temp 97 °F (36.1 °C)   Ht 157.5 cm (62\")   Wt 62.6 kg (138 lb)   SpO2 97%   BMI 25.24 kg/m²     Physical Exam  Constitutional:       General: She is not in acute distress.     Appearance: Normal appearance. She is well-developed.   HENT:      Head: Normocephalic and atraumatic.      Right Ear: Tympanic membrane and external ear normal.      Left Ear: Tympanic membrane and external ear normal.      Mouth/Throat:      Pharynx: No posterior oropharyngeal erythema.   Eyes:      Extraocular Movements: Extraocular movements intact.      Conjunctiva/sclera: Conjunctivae normal.      Pupils: Pupils are equal, round, and reactive to light.   Neck:      Vascular: No carotid bruit.   Cardiovascular:      Rate and Rhythm: Normal rate and regular rhythm.      Heart sounds: No murmur heard.  Pulmonary:      Effort: Pulmonary effort is normal. No respiratory distress.      Breath sounds: Normal breath sounds. No wheezing.   Abdominal:      General: Bowel sounds are normal. There is no distension.      Palpations: Abdomen is soft.      Tenderness: There is no abdominal tenderness.   Musculoskeletal:      Cervical back: Neck supple.      Right lower leg: No edema.      Left lower leg: No edema.   Lymphadenopathy:      Cervical: No cervical adenopathy.   Skin:     General: Skin is warm and dry.   Neurological:      Mental Status: She is alert and oriented to person, place, and time.      Cranial Nerves: No cranial nerve deficit.      Deep Tendon Reflexes: Reflexes normal.   Psychiatric:         Mood and Affect: Mood normal.         Behavior: Behavior normal.                     Assessment and Plan   Diagnoses and all orders for this visit:    1. Medicare annual wellness visit, subsequent (Primary)    2. Nicotine dependence, cigarettes, uncomplicated  -      CT Chest Low Dose Cancer Screening WO    Other orders  -     Pneumococcal Conjugate Vaccine 20-Valent (PCV20)  -     Fluzone " High-Dose 65+yrs ([id number])    Discussed with the patient routine health maintenance including: Vaccines, dental/eye health, healthy diet and exercise, colorectal cancer screening, breast cancer screening, DEXA scan.  Mental health has been addressed today as well.  She is not due for any routine labs at this time.  However, CT scan of the chest has been ordered for lung cancer screening.  Flu vaccine and pneumonia vaccine has been provided today.         Follow Up   Return in about 6 months (around 5/20/2024) for BP check, anxiety.  Patient was given instructions and counseling regarding her condition or for health maintenance advice. Please see specific information pulled into the AVS if appropriate.

## 2023-11-20 NOTE — PATIENT INSTRUCTIONS
Advance Directive    Advance directives are legal documents that allow you to make decisions about your health care and medical treatment in case you become unable to communicate for yourself. Advance directives let your wishes be known to family, friends, and health care providers.  Discussing and writing advance directives should happen over time rather than all at once. Advance directives can be changed and updated at any time. There are different types of advance directives, such as:  Medical power of .  Living will.  Do not resuscitate (DNR) order or do not attempt resuscitation (DNAR) order.  Health care proxy and medical power of   A health care proxy is also called a health care agent. This person is appointed to make medical decisions for you when you are unable to make decisions for yourself. Generally, people ask a trusted friend or family member to act as their proxy and represent their preferences. Make sure you have an agreement with your trusted person to act as your proxy. A proxy may have to make a medical decision on your behalf if your wishes are not known.  A medical power of , also called a durable power of  for health care, is a legal document that names your health care proxy. Depending on the laws in your state, the document may need to be:  Signed.  Notarized.  Dated.  Copied.  Witnessed.  Incorporated into your medical record.  You may also want to appoint a trusted person to manage your money in the event you are unable to do so. This is called a durable power of  for finances. It is a separate legal document from the durable power of  for health care. You may choose your health care proxy or someone different to act as your agent in money matters.  If you do not appoint a proxy, or there is a concern that the proxy is not acting in your best interest, a court may appoint a guardian to act on your behalf.  Living will  A living will is a set  of instructions that state your wishes about medical care when you cannot express them yourself. Health care providers should keep a copy of your living will in your medical record. You may want to give a copy to family members or friends. To alert caregivers in case of an emergency, you can place a card in your wallet to let them know that you have a living will and where they can find it. A living will is used if you become:  Terminally ill.  Disabled.  Unable to communicate or make decisions.  The following decisions should be included in your living will:  To use or not to use life support equipment, such as dialysis machines and breathing machines (ventilators).  Whether you want a DNR or DNAR order. This tells health care providers not to use cardiopulmonary resuscitation (CPR) if breathing or heartbeat stops.  To use or not to use tube feeding.  To be given or not to be given food and fluids.  Whether you want comfort (palliative) care when the goal becomes comfort rather than a cure.  Whether you want to donate your organs and tissues.  A living will does not give instructions for distributing your money and property if you should pass away.  DNR or DNAR  A DNR or DNAR order is a request not to have CPR in the event that your heart stops beating or you stop breathing. If a DNR or DNAR order has not been made and shared, a health care provider will try to help any patient whose heart has stopped or who has stopped breathing. If you plan to have surgery, talk with your health care provider about how your DNR or DNAR order will be followed if problems occur.  What if I do not have an advance directive?  Some states assign family decision makers to act on your behalf if you do not have an advance directive. Each state has its own laws about advance directives. You may want to check with your health care provider, , or state representative about the laws in your state.  Summary  Advance directives are  legal documents that allow you to make decisions about your health care and medical treatment in case you become unable to communicate for yourself.  The process of discussing and writing advance directives should happen over time. You can change and update advance directives at any time.  Advance directives may include a medical power of , a living will, and a DNR or DNAR order.  This information is not intended to replace advice given to you by your health care provider. Make sure you discuss any questions you have with your health care provider.  Document Revised: 09/21/2021 Document Reviewed: 09/21/2021  [employer] Patient Education © 2023 [employer]  Advance Directive    Advance directives are legal documents that allow you to make decisions about your health care and medical treatment in case you become unable to communicate for yourself. Advance directives let your wishes be known to family, friends, and health care providers.  Discussing and writing advance directives should happen over time rather than all at once. Advance directives can be changed and updated at any time. There are different types of advance directives, such as:  Medical power of .  Living will.  Do not resuscitate (DNR) order or do not attempt resuscitation (DNAR) order.  Health care proxy and medical power of   A health care proxy is also called a health care agent. This person is appointed to make medical decisions for you when you are unable to make decisions for yourself. Generally, people ask a trusted friend or family member to act as their proxy and represent their preferences. Make sure you have an agreement with your trusted person to act as your proxy. A proxy may have to make a medical decision on your behalf if your wishes are not known.  A medical power of , also called a durable power of  for health care, is a legal document that names your health care proxy. Depending on the laws in  your state, the document may need to be:  Signed.  Notarized.  Dated.  Copied.  Witnessed.  Incorporated into your medical record.  You may also want to appoint a trusted person to manage your money in the event you are unable to do so. This is called a durable power of  for finances. It is a separate legal document from the durable power of  for health care. You may choose your health care proxy or someone different to act as your agent in money matters.  If you do not appoint a proxy, or there is a concern that the proxy is not acting in your best interest, a court may appoint a guardian to act on your behalf.  Living will  A living will is a set of instructions that state your wishes about medical care when you cannot express them yourself. Health care providers should keep a copy of your living will in your medical record. You may want to give a copy to family members or friends. To alert caregivers in case of an emergency, you can place a card in your wallet to let them know that you have a living will and where they can find it. A living will is used if you become:  Terminally ill.  Disabled.  Unable to communicate or make decisions.  The following decisions should be included in your living will:  To use or not to use life support equipment, such as dialysis machines and breathing machines (ventilators).  Whether you want a DNR or DNAR order. This tells health care providers not to use cardiopulmonary resuscitation (CPR) if breathing or heartbeat stops.  To use or not to use tube feeding.  To be given or not to be given food and fluids.  Whether you want comfort (palliative) care when the goal becomes comfort rather than a cure.  Whether you want to donate your organs and tissues.  A living will does not give instructions for distributing your money and property if you should pass away.  DNR or DNAR  A DNR or DNAR order is a request not to have CPR in the event that your heart stops beating or  you stop breathing. If a DNR or DNAR order has not been made and shared, a health care provider will try to help any patient whose heart has stopped or who has stopped breathing. If you plan to have surgery, talk with your health care provider about how your DNR or DNAR order will be followed if problems occur.  What if I do not have an advance directive?  Some states assign family decision makers to act on your behalf if you do not have an advance directive. Each state has its own laws about advance directives. You may want to check with your health care provider, , or state representative about the laws in your state.  Summary  Advance directives are legal documents that allow you to make decisions about your health care and medical treatment in case you become unable to communicate for yourself.  The process of discussing and writing advance directives should happen over time. You can change and update advance directives at any time.  Advance directives may include a medical power of , a living will, and a DNR or DNAR order.  This information is not intended to replace advice given to you by your health care provider. Make sure you discuss any questions you have with your health care provider.  Document Revised: 2021 Document Reviewed: 2021  [employer] Patient Education ©  [employer]    Medicare Wellness  Personal Prevention Plan of Service     Date of Office Visit:    Encounter Provider:  Nhung Shepard DO  Place of Service:  [facility]  Patient Name: Stephanie Jean  :  1955    As part of the Medicare Wellness portion of your visit today, we are providing you with this personalized preventive plan of services (PPPS). This plan is based upon recommendations of the [place] Preventive Services Task Force (USPSTF) and the Advisory Committee on Immunization Practices (ACIP).    This lists the preventive care services that should be  considered, and provides dates of when you are due. Items listed as completed are up-to-date and do not require any further intervention.    Health Maintenance   Topic Date Due    Pneumococcal Vaccine 65+ (2 - PPSV23 or PCV20) 12/14/2022    INFLUENZA VACCINE  08/01/2023    LUNG CANCER SCREENING  08/24/2023    ANNUAL WELLNESS VISIT  11/17/2023    COVID-19 Vaccine (4 - 2023-24 season) 12/12/2112 (Originally 9/1/2023)    DXA SCAN  11/29/2023    LIPID PANEL  09/27/2024    BMI FOLLOWUP  11/20/2024    MAMMOGRAM  11/09/2025    TDAP/TD VACCINES (3 - Td or Tdap) 12/29/2026    COLORECTAL CANCER SCREENING  08/15/2033    HEPATITIS C SCREENING  Completed       No orders of the defined types were placed in this encounter.      No follow-ups on file.

## 2023-12-05 RX ORDER — ATORVASTATIN CALCIUM 20 MG/1
20 TABLET, FILM COATED ORAL NIGHTLY
Qty: 90 TABLET | Refills: 3 | Status: SHIPPED | OUTPATIENT
Start: 2023-12-05

## 2023-12-08 ENCOUNTER — TELEPHONE (OUTPATIENT)
Dept: GASTROENTEROLOGY | Facility: CLINIC | Age: 68
End: 2023-12-08
Payer: MEDICARE

## 2023-12-12 ENCOUNTER — LAB (OUTPATIENT)
Dept: LAB | Facility: HOSPITAL | Age: 68
End: 2023-12-12
Payer: MEDICARE

## 2023-12-12 DIAGNOSIS — K50.811 CROHN'S DISEASE OF BOTH SMALL AND LARGE INTESTINE WITH RECTAL BLEEDING: ICD-10-CM

## 2023-12-12 DIAGNOSIS — K21.9 GASTROESOPHAGEAL REFLUX DISEASE WITHOUT ESOPHAGITIS: ICD-10-CM

## 2023-12-12 LAB
ALBUMIN SERPL-MCNC: 4.1 G/DL (ref 3.5–5.2)
ALBUMIN/GLOB SERPL: 1.8 G/DL
ALP SERPL-CCNC: 81 U/L (ref 39–117)
ALT SERPL W P-5'-P-CCNC: 17 U/L (ref 1–33)
ANION GAP SERPL CALCULATED.3IONS-SCNC: 9 MMOL/L (ref 5–15)
AST SERPL-CCNC: 22 U/L (ref 1–32)
BASOPHILS # BLD AUTO: 0.02 10*3/MM3 (ref 0–0.2)
BASOPHILS NFR BLD AUTO: 0.5 % (ref 0–1.5)
BILIRUB SERPL-MCNC: <0.2 MG/DL (ref 0–1.2)
BUN SERPL-MCNC: 19 MG/DL (ref 8–23)
BUN/CREAT SERPL: 20.9 (ref 7–25)
CALCIUM SPEC-SCNC: 9.3 MG/DL (ref 8.6–10.5)
CHLORIDE SERPL-SCNC: 107 MMOL/L (ref 98–107)
CLUMPED PLATELETS: PRESENT
CO2 SERPL-SCNC: 26 MMOL/L (ref 22–29)
CREAT SERPL-MCNC: 0.91 MG/DL (ref 0.57–1)
DEPRECATED RDW RBC AUTO: 46.2 FL (ref 37–54)
EGFRCR SERPLBLD CKD-EPI 2021: 69.7 ML/MIN/1.73
EOSINOPHIL # BLD AUTO: 0.06 10*3/MM3 (ref 0–0.4)
EOSINOPHIL NFR BLD AUTO: 1.6 % (ref 0.3–6.2)
ERYTHROCYTE [DISTWIDTH] IN BLOOD BY AUTOMATED COUNT: 13.9 % (ref 12.3–15.4)
GLOBULIN UR ELPH-MCNC: 2.3 GM/DL
GLUCOSE SERPL-MCNC: 116 MG/DL (ref 65–99)
HCT VFR BLD AUTO: 31.6 % (ref 34–46.6)
HGB BLD-MCNC: 10.9 G/DL (ref 12–15.9)
LYMPHOCYTES # BLD AUTO: 1.45 10*3/MM3 (ref 0.7–3.1)
LYMPHOCYTES NFR BLD AUTO: 38.7 % (ref 19.6–45.3)
MCH RBC QN AUTO: 31.9 PG (ref 26.6–33)
MCHC RBC AUTO-ENTMCNC: 34.5 G/DL (ref 31.5–35.7)
MCV RBC AUTO: 92.4 FL (ref 79–97)
MONOCYTES # BLD AUTO: 0.3 10*3/MM3 (ref 0.1–0.9)
MONOCYTES NFR BLD AUTO: 8 % (ref 5–12)
NEUTROPHILS NFR BLD AUTO: 1.9 10*3/MM3 (ref 1.7–7)
NEUTROPHILS NFR BLD AUTO: 50.7 % (ref 42.7–76)
PLATELET # BLD AUTO: 141 10*3/MM3 (ref 140–450)
PMV BLD AUTO: 11.1 FL (ref 6–12)
POTASSIUM SERPL-SCNC: 4.2 MMOL/L (ref 3.5–5.2)
PROT SERPL-MCNC: 6.4 G/DL (ref 6–8.5)
RBC # BLD AUTO: 3.42 10*6/MM3 (ref 3.77–5.28)
RBC MORPH BLD: NORMAL
SMALL PLATELETS BLD QL SMEAR: ADEQUATE
SODIUM SERPL-SCNC: 142 MMOL/L (ref 136–145)
WBC MORPH BLD: NORMAL
WBC NRBC COR # BLD AUTO: 3.75 10*3/MM3 (ref 3.4–10.8)

## 2023-12-12 PROCEDURE — 85007 BL SMEAR W/DIFF WBC COUNT: CPT

## 2023-12-12 PROCEDURE — 80053 COMPREHEN METABOLIC PANEL: CPT

## 2023-12-12 PROCEDURE — 85025 COMPLETE CBC W/AUTO DIFF WBC: CPT

## 2023-12-12 PROCEDURE — 36415 COLL VENOUS BLD VENIPUNCTURE: CPT

## 2023-12-18 LAB
INFLIXIMAB AB SERPL-MCNC: <22 NG/ML
INFLIXIMAB SERPL-MCNC: 15 UG/ML

## 2023-12-20 ENCOUNTER — HOSPITAL ENCOUNTER (OUTPATIENT)
Dept: CT IMAGING | Facility: HOSPITAL | Age: 68
Discharge: HOME | End: 2023-12-20
Admitting: FAMILY MEDICINE
Payer: MEDICARE

## 2023-12-20 PROCEDURE — 71271 CT THORAX LUNG CANCER SCR C-: CPT

## 2024-01-02 ENCOUNTER — HOSPITAL ENCOUNTER (OUTPATIENT)
Dept: INFUSION THERAPY | Facility: HOSPITAL | Age: 69
Discharge: HOME | End: 2024-01-02
Payer: MEDICARE

## 2024-01-02 VITALS
SYSTOLIC BLOOD PRESSURE: 122 MMHG | OXYGEN SATURATION: 94 % | WEIGHT: 140 LBS | DIASTOLIC BLOOD PRESSURE: 62 MMHG | RESPIRATION RATE: 16 BRPM | BODY MASS INDEX: 25.61 KG/M2 | TEMPERATURE: 97.3 F | HEART RATE: 79 BPM

## 2024-01-02 DIAGNOSIS — K50.80 CROHN'S DISEASE OF BOTH SMALL AND LARGE INTESTINE WITHOUT COMPLICATION: Primary | ICD-10-CM

## 2024-01-02 PROCEDURE — 25810000003 SODIUM CHLORIDE 0.9 % SOLUTION 250 ML FLEX CONT: Performed by: PHYSICIAN ASSISTANT

## 2024-01-02 PROCEDURE — 96415 CHEMO IV INFUSION ADDL HR: CPT

## 2024-01-02 PROCEDURE — 63710000001 DIPHENHYDRAMINE PER 50 MG: Performed by: PHYSICIAN ASSISTANT

## 2024-01-02 PROCEDURE — 96365 THER/PROPH/DIAG IV INF INIT: CPT

## 2024-01-02 PROCEDURE — 96413 CHEMO IV INFUSION 1 HR: CPT

## 2024-01-02 PROCEDURE — 25010000002 INFLIXIMAB PER 10 MG: Performed by: PHYSICIAN ASSISTANT

## 2024-01-02 PROCEDURE — 96366 THER/PROPH/DIAG IV INF ADDON: CPT

## 2024-01-02 RX ORDER — SODIUM CHLORIDE 9 MG/ML
250 INJECTION, SOLUTION INTRAVENOUS ONCE
Status: DISCONTINUED | OUTPATIENT
Start: 2024-01-02 | End: 2024-01-04 | Stop reason: HOSPADM

## 2024-01-02 RX ORDER — DIPHENHYDRAMINE HCL 25 MG
25 CAPSULE ORAL ONCE
Status: COMPLETED | OUTPATIENT
Start: 2024-01-02 | End: 2024-01-02

## 2024-01-02 RX ORDER — ACETAMINOPHEN 325 MG/1
650 TABLET ORAL ONCE
Start: 2024-02-27 | End: 2024-02-27

## 2024-01-02 RX ORDER — SODIUM CHLORIDE 9 MG/ML
250 INJECTION, SOLUTION INTRAVENOUS ONCE
OUTPATIENT
Start: 2024-02-27

## 2024-01-02 RX ORDER — ACETAMINOPHEN 325 MG/1
650 TABLET ORAL ONCE
Status: COMPLETED | OUTPATIENT
Start: 2024-01-02 | End: 2024-01-02

## 2024-01-02 RX ORDER — DIPHENHYDRAMINE HCL 25 MG
25 CAPSULE ORAL ONCE
Start: 2024-02-27 | End: 2024-02-27

## 2024-01-02 RX ADMIN — DIPHENHYDRAMINE HYDROCHLORIDE 25 MG: 25 CAPSULE ORAL at 10:07

## 2024-01-02 RX ADMIN — ACETAMINOPHEN 650 MG: 325 TABLET, FILM COATED ORAL at 10:07

## 2024-01-02 RX ADMIN — INFLIXIMAB 317.5 MG: 100 INJECTION, POWDER, LYOPHILIZED, FOR SOLUTION INTRAVENOUS at 10:36

## 2024-01-22 RX ORDER — BUSPIRONE HYDROCHLORIDE 10 MG/1
10 TABLET ORAL 2 TIMES DAILY PRN
Qty: 180 TABLET | Refills: 3 | Status: SHIPPED | OUTPATIENT
Start: 2024-01-22

## 2024-01-29 RX ORDER — ERGOCALCIFEROL 1.25 MG/1
50000 CAPSULE ORAL WEEKLY
Qty: 15 CAPSULE | Refills: 3 | Status: SHIPPED | OUTPATIENT
Start: 2024-01-29

## 2024-02-01 ENCOUNTER — OFFICE VISIT (OUTPATIENT)
Dept: GASTROENTEROLOGY | Facility: CLINIC | Age: 69
End: 2024-02-01
Payer: MEDICARE

## 2024-02-01 VITALS
SYSTOLIC BLOOD PRESSURE: 140 MMHG | WEIGHT: 140.4 LBS | BODY MASS INDEX: 25.68 KG/M2 | DIASTOLIC BLOOD PRESSURE: 78 MMHG | HEART RATE: 76 BPM | OXYGEN SATURATION: 98 %

## 2024-02-01 DIAGNOSIS — K50.811 CROHN'S DISEASE OF BOTH SMALL AND LARGE INTESTINE WITH RECTAL BLEEDING: Primary | ICD-10-CM

## 2024-02-01 DIAGNOSIS — D50.0 IRON DEFICIENCY ANEMIA DUE TO CHRONIC BLOOD LOSS: ICD-10-CM

## 2024-02-01 DIAGNOSIS — R11.0 NAUSEA: ICD-10-CM

## 2024-02-01 DIAGNOSIS — K21.9 GASTROESOPHAGEAL REFLUX DISEASE WITHOUT ESOPHAGITIS: ICD-10-CM

## 2024-02-01 PROCEDURE — 3077F SYST BP >= 140 MM HG: CPT | Performed by: INTERNAL MEDICINE

## 2024-02-01 PROCEDURE — 3078F DIAST BP <80 MM HG: CPT | Performed by: INTERNAL MEDICINE

## 2024-02-01 PROCEDURE — 99214 OFFICE O/P EST MOD 30 MIN: CPT | Performed by: INTERNAL MEDICINE

## 2024-02-01 PROCEDURE — 1159F MED LIST DOCD IN RCRD: CPT | Performed by: INTERNAL MEDICINE

## 2024-02-01 PROCEDURE — 1160F RVW MEDS BY RX/DR IN RCRD: CPT | Performed by: INTERNAL MEDICINE

## 2024-02-01 RX ORDER — HYDROCODONE BITARTRATE AND ACETAMINOPHEN 10; 325 MG/1; MG/1
1 TABLET ORAL 2 TIMES DAILY
COMMUNITY

## 2024-02-01 RX ORDER — FERROUS GLUCONATE 324(38)MG
324 TABLET ORAL
Qty: 30 TABLET | Refills: 5 | Status: SHIPPED | OUTPATIENT
Start: 2024-02-01

## 2024-02-01 RX ORDER — PROMETHAZINE HYDROCHLORIDE 25 MG/1
12.5 TABLET ORAL EVERY 8 HOURS PRN
Qty: 30 TABLET | Refills: 2 | Status: SHIPPED | OUTPATIENT
Start: 2024-02-01

## 2024-02-05 RX ORDER — ROPINIROLE 2 MG/1
TABLET, FILM COATED ORAL
Qty: 90 TABLET | Refills: 1 | Status: SHIPPED | OUTPATIENT
Start: 2024-02-05

## 2024-02-06 DIAGNOSIS — G31.84 MILD NEUROCOGNITIVE DISORDER: ICD-10-CM

## 2024-02-06 RX ORDER — DONEPEZIL HYDROCHLORIDE 5 MG/1
5 TABLET, FILM COATED ORAL NIGHTLY
Qty: 30 TABLET | Refills: 6 | Status: SHIPPED | OUTPATIENT
Start: 2024-02-06

## 2024-02-27 ENCOUNTER — OFFICE VISIT (OUTPATIENT)
Age: 69
End: 2024-02-27
Payer: MEDICARE

## 2024-02-27 VITALS
RESPIRATION RATE: 14 BRPM | TEMPERATURE: 97.3 F | BODY MASS INDEX: 25.91 KG/M2 | HEIGHT: 62 IN | SYSTOLIC BLOOD PRESSURE: 132 MMHG | OXYGEN SATURATION: 99 % | WEIGHT: 140.8 LBS | HEART RATE: 82 BPM | DIASTOLIC BLOOD PRESSURE: 72 MMHG

## 2024-02-27 DIAGNOSIS — R27.0 ATAXIA, UNSPECIFIED: ICD-10-CM

## 2024-02-27 DIAGNOSIS — F01.A0 MILD VASCULAR DEMENTIA WITHOUT BEHAVIORAL DISTURBANCE, PSYCHOTIC DISTURBANCE, MOOD DISTURBANCE, OR ANXIETY: Primary | ICD-10-CM

## 2024-02-27 DIAGNOSIS — G31.84 MILD NEUROCOGNITIVE DISORDER: ICD-10-CM

## 2024-02-27 DIAGNOSIS — G25.2 ACTION TREMOR: ICD-10-CM

## 2024-02-27 PROCEDURE — 3078F DIAST BP <80 MM HG: CPT | Performed by: NURSE PRACTITIONER

## 2024-02-27 PROCEDURE — 3075F SYST BP GE 130 - 139MM HG: CPT | Performed by: NURSE PRACTITIONER

## 2024-02-27 PROCEDURE — 1159F MED LIST DOCD IN RCRD: CPT | Performed by: NURSE PRACTITIONER

## 2024-02-27 PROCEDURE — 99214 OFFICE O/P EST MOD 30 MIN: CPT | Performed by: NURSE PRACTITIONER

## 2024-02-27 PROCEDURE — 1160F RVW MEDS BY RX/DR IN RCRD: CPT | Performed by: NURSE PRACTITIONER

## 2024-02-27 RX ORDER — MEMANTINE HYDROCHLORIDE 5 MG/1
5 TABLET ORAL DAILY
Qty: 30 TABLET | Refills: 2 | Status: SHIPPED | OUTPATIENT
Start: 2024-02-27

## 2024-02-27 RX ORDER — HYDROXYZINE HYDROCHLORIDE 25 MG/1
25 TABLET, FILM COATED ORAL 3 TIMES DAILY
COMMUNITY

## 2024-02-27 RX ORDER — DONEPEZIL HYDROCHLORIDE 5 MG/1
5 TABLET, FILM COATED ORAL NIGHTLY
Qty: 30 TABLET | Refills: 3 | Status: SHIPPED | OUTPATIENT
Start: 2024-02-27

## 2024-02-27 RX ORDER — PREGABALIN 75 MG/1
75 CAPSULE ORAL 2 TIMES DAILY
COMMUNITY
Start: 2024-02-27

## 2024-02-27 NOTE — PROGRESS NOTES
Follow Up Office Visit      Patient Name: Stephanie Jean  : 1955   MRN: [id number]     Chief Complaint:    Chief Complaint   Patient presents with    Follow-up     Mild Neurocognitive disorder       History of Present Illness: Stephanie Jean is a 68 y.o. female who is here today to follow up with Neurology for MCI. She was last seen in clinic  ([facility]). She has been taking Aricept 5 mg Daily with good compliance and tolerance. She does not feel the medication has helped. Spouse manages household finances. She is raising her 16 year old grandson and worries about her memory. Has not had imaging in years. She admits to struggling with word finding and will loose her train of though mid conversation. [facility] a [facility] of  [facility] ([facility] She is still managing the household, driving, cooking and cleaning. Self ADLs    She has noticed new onset faint tremor at rest and with use in her RUE and hand. Sometimes her hand will spasm and will flop like a fish. Tremor does not intensify as she nears target. Seems to come and go. 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     Recent Imaging:    MRI Brain  + Mild amount of scattered small white matter lesions in the bilateral frontoparietal lobes, nonspecific, most likely due to chronic microvascular ischemic white matter changes.  CDUS 10/22 - JALEEL and LICA 20-40%  CT Head WO 10/22 + Mild patchy hypoattenuation in the cerebral white matter     Pertinent Medical History: HTN, HL, Nuclear Sclerotic Left Eye, Crohns, GERD, Ischemic Colitis, ÁNGEL, Anxiety, SLE, Fibromyalgia, TIA, MCI     Subjective      Review of Systems:   Review of Systems   Constitutional: Negative.    HENT: Negative.     Eyes: Negative.    Respiratory: Negative.     Cardiovascular: Negative.    Gastrointestinal: Negative.    Endocrine: Negative.    Genitourinary: Negative.    Musculoskeletal: Negative.    Skin: Negative.   Chronic Obstructive Pulmonary Disease (COPD): Care Instructions  Your Care Instructions    Chronic obstructive pulmonary disease (COPD) is a general term for a group of lung diseases, including emphysema and chronic bronchitis. People with COPD have decreased airflow in and out of the lungs, which makes it hard to breathe. The airways also can get clogged with thick mucus. Cigarette smoking is a major cause of COPD. Although there is no cure for COPD, you can slow its progress. Following your treatment plan and taking care of yourself can help you feel better and live longer. Follow-up care is a key part of your treatment and safety. Be sure to make and go to all appointments, and call your doctor if you are having problems. It's also a good idea to know your test results and keep a list of the medicines you take. How can you care for yourself at home? ?Staying healthy  ? · Do not smoke. This is the most important step you can take to prevent more damage to your lungs. If you need help quitting, talk to your doctor about stop-smoking programs and medicines. These can increase your chances of quitting for good. ? · Avoid colds and flu. Get a pneumococcal vaccine shot. If you have had one before, ask your doctor whether you need a second dose. Get the flu vaccine every fall. If you must be around people with colds or the flu, wash your hands often. ? · Avoid secondhand smoke, air pollution, and high altitudes. Also avoid cold, dry air and hot, humid air. Stay at home with your windows closed when air pollution is bad. ?Medicines and oxygen therapy  ? · Take your medicines exactly as prescribed. Call your doctor if you think you are having a problem with your medicine. ? · You may be taking medicines such as:  ¨ Bronchodilators. These help open your airways and make breathing easier. Bronchodilators are either short-acting (work for 6 to 9 hours) or long-acting (work for 24 hours).  You inhale most   Allergic/Immunologic: Negative.    Neurological:  Positive for tremors and memory problem.   Hematological: Negative.    Psychiatric/Behavioral: Negative.  Negative for behavioral problems and sleep disturbance.    All other systems reviewed and are negative.      I have reviewed and the following portions of the patient's history were updated as appropriate: past family history, past medical history, past social history, past surgical history and problem list.    Medications:     Current Outpatient Medications:     aspirin 81 MG EC tablet, Take 1 tablet by mouth. Every other day, Disp: , Rfl:     atorvastatin (LIPITOR) 20 MG tablet, TAKE 1 TABLET BY MOUTH EVERY NIGHT, Disp: 90 tablet, Rfl: 3    azaTHIOprine (IMURAN) 50 MG tablet, TAKE 2 TABLETS BY MOUTH DAILY, Disp: 180 tablet, Rfl: 3    busPIRone (BUSPAR) 10 MG tablet, TAKE 1 TABLET BY MOUTH TWICE DAILY AS NEEDED FOR ANXIETY, Disp: 180 tablet, Rfl: 3    cyclobenzaprine (FLEXERIL) 10 MG tablet, TAKE 1 TABLET BY MOUTH THREE TIMES DAILY, Disp: 270 tablet, Rfl: 3    Diclofenac Sodium (VOLTAREN) 1 % gel gel, diclofenac 1 % topical gel  APPLY 1 GRAM TOPICALLY TO THE AFFECTED AREA TWICE DAILY AS NEEDED FOR PAIN, Disp: , Rfl:     docusate sodium (COLACE) 100 MG capsule, TAKE 1 CAPSULE BY MOUTH TWICE DAILY (Patient taking differently: Take 2 capsules by mouth 2 (Two) Times a Day.), Disp: 60 capsule, Rfl: 2    donepezil (ARICEPT) 5 MG tablet, Take 1 tablet by mouth Every Night., Disp: 30 tablet, Rfl: 3    DULoxetine (CYMBALTA) 60 MG capsule, Take 1 capsule by mouth Daily., Disp: 90 capsule, Rfl: 3    ferrous gluconate (FERGON) 324 MG tablet, Take 1 tablet by mouth Daily With Breakfast., Disp: 30 tablet, Rfl: 5    fluticasone (FLONASE) 50 MCG/ACT nasal spray, SHAKE LIQUID AND USE 1 TO 2 SPRAYS IN EACH NOSTRIL EVERY DAY AS NEEDED, Disp: , Rfl:     furosemide (LASIX) 20 MG tablet, TAKE 1 TABLET BY MOUTH DAILY AS NEEDED FOR SWELLING, Disp: 30 tablet, Rfl: 1     bronchodilators, so they start to act quickly. Always carry your quick-relief inhaler with you in case you need it while you are away from home. ¨ Corticosteroids (prednisone, budesonide). These reduce airway inflammation. They come in pill or inhaled form. You must take these medicines every day for them to work well. ? · A spacer may help you get more inhaled medicine to your lungs. Ask your doctor or pharmacist if a spacer is right for you. If it is, ask how to use it properly. ? · Do not take any vitamins, over-the-counter medicine, or herbal products without talking to your doctor first.   ? · If your doctor prescribed antibiotics, take them as directed. Do not stop taking them just because you feel better. You need to take the full course of antibiotics. ? · Oxygen therapy boosts the amount of oxygen in your blood and helps you breathe easier. Use the flow rate your doctor has recommended, and do not change it without talking to your doctor first.   Activity  ? · Get regular exercise. Walking is an easy way to get exercise. Start out slowly, and walk a little more each day. ? · Pay attention to your breathing. You are exercising too hard if you cannot talk while you are exercising. ? · Take short rest breaks when doing household chores and other activities. ? · Learn breathing methods-such as breathing through pursed lips-to help you become less short of breath. ? · If your doctor has not set you up with a pulmonary rehabilitation program, talk to him or her about whether rehab is right for you. Rehab includes exercise programs, education about your disease and how to manage it, help with diet and other changes, and emotional support. Diet  ? · Eat regular, healthy meals. Use bronchodilators about 1 hour before you eat to make it easier to eat. Eat several small meals instead of three large ones. Drink beverages at the end of the meal. Avoid foods that are hard to chew.    ? · Eat foods that HYDROcodone-acetaminophen (NORCO)  MG per tablet, Take 1 tablet by mouth 2 (Two) Times a Day., Disp: , Rfl:     hydrOXYzine (ATARAX) 25 MG tablet, Take 1 tablet by mouth 3 (Three) Times a Day., Disp: , Rfl:     hydrOXYzine pamoate (VISTARIL) 25 MG capsule, TAKE 1 CAPSULE UP TO 3 TIMES A DAY AS DIRECTED, Disp: 90 capsule, Rfl: 1    inFLIXimab (REMICADE IV), Infuse  into a venous catheter Take As Directed., Disp: , Rfl:     lactulose (CHRONULAC) 10 GM/15ML solution, Take 15 mL by mouth Daily., Disp: 450 mL, Rfl: 2    lisinopril (PRINIVIL,ZESTRIL) 10 MG tablet, Take 1 tablet by mouth Every Night., Disp: 90 tablet, Rfl: 3    loratadine (CLARITIN) 10 MG tablet, Take 1 tablet by mouth As Needed., Disp: , Rfl:     nitroglycerin (NITROSTAT) 0.4 MG SL tablet, Place 1 tablet under the tongue Every 5 (Five) Minutes As Needed., Disp: , Rfl:     pantoprazole (PROTONIX) 20 MG EC tablet, Take 1 tablet by mouth Daily., Disp: 90 tablet, Rfl: 3    pregabalin (LYRICA) 75 MG capsule, Take 1 capsule by mouth 2 (Two) Times a Day., Disp: , Rfl:     promethazine (PHENERGAN) 25 MG tablet, Take 0.5 tablets by mouth Every 8 (Eight) Hours As Needed for Nausea or Vomiting., Disp: 30 tablet, Rfl: 2    Restasis 0.05 % ophthalmic emulsion, Administer 1 drop to both eyes 2 (Two) Times a Day., Disp: , Rfl:     rOPINIRole (REQUIP) 2 MG tablet, TAKE 1 TABLET BY MOUTH EVERY DAY 1 TO 3 HOURS BEFORE BEDTIME, Disp: 90 tablet, Rfl: 1    vitamin B-12 (CYANOCOBALAMIN) 1000 MCG tablet, Take 1 tablet by mouth Daily. (Patient taking differently: Take 1 tablet by mouth Every Other Day.), Disp: 90 tablet, Rfl: 3    vitamin D (ERGOCALCIFEROL) 1.25 MG (26111 UT) capsule capsule, TAKE 1 CAPSULE BY MOUTH ONCE A WEEK, Disp: 15 capsule, Rfl: 3    memantine (NAMENDA) 5 MG tablet, Take 1 tablet by mouth Daily., Disp: 30 tablet, Rfl: 2    Allergies:   Allergies   Allergen Reactions    Penicillins Rash       Objective     Physical Exam:  Vital Signs:   Vitals:     contain protein so that you do not lose muscle mass. ? · Talk with your doctor if you gain too much weight or if you lose weight without trying. ?Mental health  ? · Talk to your family, friends, or a therapist about your feelings. It is normal to feel frightened, angry, hopeless, helpless, and even guilty. Talking openly about bad feelings can help you cope. If these feelings last, talk to your doctor. When should you call for help? Call 911 anytime you think you may need emergency care. For example, call if:  ? · You have severe trouble breathing. ?Call your doctor now or seek immediate medical care if:  ? · You have new or worse trouble breathing. ? · You cough up blood. ? · You have a fever. ? Watch closely for changes in your health, and be sure to contact your doctor if:  ? · You cough more deeply or more often, especially if you notice more mucus or a change in the color of your mucus. ? · You have new or worse swelling in your legs or belly. ? · You are not getting better as expected. Where can you learn more? Go to http://dipti-fabián.info/. Watson Weiss in the search box to learn more about \"Chronic Obstructive Pulmonary Disease (COPD): Care Instructions. \"  Current as of: May 12, 2017  Content Version: 11.4  © 3882-2034 [employer]. Care instructions adapted under license by [employer] (which disclaims liability or warranty for this information). If you have questions about a medical condition or this instruction, always ask your healthcare professional. [place] [address] any warranty or liability for your use of this information. Anxiety Disorder: Care Instructions  Your Care Instructions    Anxiety is a normal reaction to stress. Difficult situations can cause you to have symptoms such as sweaty palms and a nervous feeling. In an anxiety disorder, the symptoms are far more severe.  Constant worry, muscle tension, "02/27/24 1019   BP: 132/72   BP Location: Left arm   Patient Position: Sitting   Cuff Size: Adult   Pulse: 82   Resp: 14   Temp: 97.3 °F (36.3 °C)   TempSrc: Infrared   SpO2: 99%   Weight: 63.9 kg (140 lb 12.8 oz)   Height: 157.5 cm (62.01\")   PainSc:   5   PainLoc: Comment: neck and hands     Body mass index is 25.75 kg/m².    Physical Exam  Vitals and nursing note reviewed.   Constitutional:       General: She is not in acute distress.     Appearance: Normal appearance. She is normal weight.   HENT:      Head: Normocephalic.      Nose: Nose normal.      Mouth/Throat:      Mouth: Mucous membranes are moist.      Pharynx: Oropharynx is clear.   Eyes:      Extraocular Movements: Extraocular movements intact.      Conjunctiva/sclera: Conjunctivae normal.      Pupils: Pupils are equal, round, and reactive to light.   Musculoskeletal:         General: Normal range of motion.      Cervical back: Normal range of motion and neck supple. No rigidity.   Skin:     General: Skin is warm and dry.      Capillary Refill: Capillary refill takes less than 2 seconds.   Neurological:      Mental Status: She is alert and oriented to person, place, and time.      Cranial Nerves: Cranial nerves 2-12 are intact. No cranial nerve deficit.      Motor: No weakness.      Coordination: Coordination normal. Romberg Test abnormal. Finger-Nose-Finger Test (Mild ataxia with finger taps, finger to nose and alternating hand movements) normal.      Gait: Gait is intact. Gait normal.      Deep Tendon Reflexes: Reflexes normal.      Reflex Scores:       Tricep reflexes are 2+ on the right side and 2+ on the left side.       Bicep reflexes are 2+ on the right side and 2+ on the left side.       Patellar reflexes are 2+ on the right side and 2+ on the left side.  Psychiatric:         Mood and Affect: Mood normal.         Speech: Speech normal.         Behavior: Behavior normal.         Thought Content: Thought content normal.         Judgment: Judgment " trouble sleeping, nausea and diarrhea, and other symptoms can make normal daily activities difficult or impossible. These symptoms may occur for no reason, and they can affect your work, school, or social life. Medicines, counseling, and self-care can all help. Follow-up care is a key part of your treatment and safety. Be sure to make and go to all appointments, and call your doctor if you are having problems. It's also a good idea to know your test results and keep a list of the medicines you take. How can you care for yourself at home? · Take medicines exactly as directed. Call your doctor if you think you are having a problem with your medicine. · Go to your counseling sessions and follow-up appointments. · Recognize and accept your anxiety. Then, when you are in a situation that makes you anxious, say to yourself, \"This is not an emergency. I feel uncomfortable, but I am not in danger. I can keep going even if I feel anxious. \"  · Be kind to your body:  ¨ Relieve tension with exercise or a massage. ¨ Get enough rest.  ¨ Avoid alcohol, caffeine, nicotine, and illegal drugs. They can increase your anxiety level and cause sleep problems. ¨ Learn and do relaxation techniques. See below for more about these techniques. · Engage your mind. Get out and do something you enjoy. Go to a funny movie, or take a walk or hike. Plan your day. Having too much or too little to do can make you anxious. · Keep a record of your symptoms. Discuss your fears with a good friend or family member, or join a support group for people with similar problems. Talking to others sometimes relieves stress. · Get involved in social groups, or volunteer to help others. Being alone sometimes makes things seem worse than they are. · Get at least 30 minutes of exercise on most days of the week to relieve stress. Walking is a good choice.  You also may want to do other activities, such as running, swimming, cycling, or playing tennis or team normal.         Neurologic Exam     Mental Status   Oriented to person, place, and time.   Oriented to country, city, area, street and number.   Oriented to year, month, date, day and season.   Registration: recalls 3 of 3 objects. Recall of objects at 5 minutes: 0/3. Follows 3 step commands.   Attention: normal. Concentration: normal.   Speech: speech is normal   Level of consciousness: alert  Knowledge: good. Able to perform simple calculations.   Able to name object. Able to read. Able to repeat. Able to write. Normal comprehension.     Cranial Nerves   Cranial nerves II through XII intact.     CN III, IV, VI   Pupils are equal, round, and reactive to light.    Motor Exam   Muscle bulk: normal  Overall muscle tone: normal  Right arm tone: spastic  Left arm tone: normal  Right arm pronator drift: absent  Left arm pronator drift: absent  Right leg tone: normal  Left leg tone: normal    Strength   Right biceps: 5/5  Left biceps: 4/5  Right triceps: 5/5  Left triceps: 4/5  Right quadriceps: 5/5  Left quadriceps: 5/5  Right hamstrin/5  Left hamstrin/5    Gait, Coordination, and Reflexes     Gait  Gait: normal    Coordination   Romberg: positive  Finger to nose coordination: normal (Mild ataxia with finger taps, finger to nose and alternating hand movements)    Tremor   Resting tremor: present  Intention tremor: absent  Action tremor: right arm    Reflexes   Right biceps: 2+  Left biceps: 2+  Right triceps: 2+  Left triceps: 2+  Right patellar: 2+  Left patellar: 2+  Right Hartmann: absent  Left Hartmann: absentFine amplitude tremor to right hand at rest and intensifies with action and resistance. Does not intensify as she nears target. She is able to move from sitting to standing position x 1 attempt and can walk 25 feet in 5 seconds with normal gait and adequate arm swing.         Assessment / Plan      Assessment/Plan:   Diagnoses and all orders for this visit:    1. 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 Relaxation techniques  Do relaxation exercises 10 to 20 minutes a day. You can play soothing, relaxing music while you do them, if you wish. · Tell others in your house that you are going to do your relaxation exercises. Ask them not to disturb you. · Find a comfortable place, away from all distractions and noise. · Lie down on your back, or sit with your back straight. · Focus on your breathing. Make it slow and steady. · Breathe in through your nose. Breathe out through either your nose or mouth. · Breathe deeply, filling up the area between your navel and your rib cage. Breathe so that your belly goes up and down. · Do not hold your breath. · Breathe like this for 5 to 10 minutes. Notice the feeling of calmness throughout your whole body. As you continue to breathe slowly and deeply, relax by doing the following for another 5 to 10 minutes:  · Tighten and relax each muscle group in your body. You can begin at your toes and work your way up to your head. · Imagine your muscle groups relaxing and becoming heavy. · Empty your mind of all thoughts. · Let yourself relax more and more deeply. · Become aware of the state of calmness that surrounds you. · When your relaxation time is over, you can bring yourself back to alertness by moving your fingers and toes and then your hands and feet and then stretching and moving your entire body. Sometimes people fall asleep during relaxation, but they usually wake up shortly afterward. · Always give yourself time to return to full alertness before you drive a car or do anything that might cause an accident if you are not fully alert. Never play a relaxation tape while you drive a car. When should you call for help? Call 911 anytime you think you may need emergency care. For example, call if:  ? · You feel you cannot stop from hurting yourself or someone else. ? Keep the numbers for these national suicide hotlines: 9-797-446-TALK (8-858.733.4045) and 8-865-LGTMJOC (3-487-597-869.669.7516). If you or someone you know talks about suicide or feeling hopeless, get help right away. ? Watch closely for changes in your health, and be sure to contact your doctor if:  ? · You have anxiety or fear that affects your life. ? · You have symptoms of anxiety that are new or different from those you had before. Where can you learn more? Go to http://dipti-fabián.info/. Enter P754 in the search box to learn more about \"Anxiety Disorder: Care Instructions. \"  Current as of: May 12, 2017  Content Version: 11.4  © 6711-2782 [employer]. Care instructions adapted under license by [email or web address] (which disclaims liability or warranty for this information). If you have questions about a medical condition or this instruction, always ask your healthcare professional. [address] any warranty or liability for your use of this information. disturbance, psychotic disturbance, mood disturbance, or anxiety (Primary)  -     US Carotid Bilateral; Future    2. Mild neurocognitive disorder  -     MRI Brain With & Without Contrast; Future  -     memantine (NAMENDA) 5 MG tablet; Take 1 tablet by mouth Daily.  Dispense: 30 tablet; Refill: 2  -     donepezil (ARICEPT) 5 MG tablet; Take 1 tablet by mouth Every Night.  Dispense: 30 tablet; Refill: 3  -     US Carotid Bilateral; Future    3. Ataxia, unspecified  -     US Carotid Bilateral; Future    4. Action tremor         Follow Up:   Return in about 3 months (around 5/27/2024), or if symptoms worsen or fail to improve.    Anticipatory Guidance and Safety Reviewed  Patient Education Provided  MMSE 27/30  Continue Aricept 5 mg Daily; SE reviewed  Begin Namenda 5 mg QHS; SE reviewed  Declined offer of referral to  [facility]  MRI Brain W/WO r/o demyelinating diease  CDUS FU surveillance   Discussed the benefits of TLCs with BP goal < 120/80 and exercise > 150 min/week with ETOH and tobacco cessation.   Encouraged community engagement and socialization     RTC PRN or within 12 weeks or sooner with issues     Rose Thompson, DNP, APRN, FNP-C  [facility]

## 2024-02-28 ENCOUNTER — HOSPITAL ENCOUNTER (OUTPATIENT)
Dept: INFUSION THERAPY | Facility: HOSPITAL | Age: 69
Discharge: HOME | End: 2024-02-28
Payer: MEDICARE

## 2024-02-28 VITALS
BODY MASS INDEX: 25.6 KG/M2 | WEIGHT: 140 LBS | DIASTOLIC BLOOD PRESSURE: 63 MMHG | SYSTOLIC BLOOD PRESSURE: 143 MMHG | TEMPERATURE: 97.5 F | HEART RATE: 67 BPM

## 2024-02-28 DIAGNOSIS — K50.80 CROHN'S DISEASE OF BOTH SMALL AND LARGE INTESTINE WITHOUT COMPLICATION: Primary | ICD-10-CM

## 2024-02-28 PROCEDURE — 63710000001 DIPHENHYDRAMINE PER 50 MG: Performed by: PHYSICIAN ASSISTANT

## 2024-02-28 PROCEDURE — 96415 CHEMO IV INFUSION ADDL HR: CPT

## 2024-02-28 PROCEDURE — 25810000003 SODIUM CHLORIDE 0.9 % SOLUTION: Performed by: PHYSICIAN ASSISTANT

## 2024-02-28 PROCEDURE — 25010000002 INFLIXIMAB PER 10 MG: Performed by: PHYSICIAN ASSISTANT

## 2024-02-28 PROCEDURE — 96413 CHEMO IV INFUSION 1 HR: CPT

## 2024-02-28 PROCEDURE — 25810000003 SODIUM CHLORIDE 0.9 % SOLUTION 250 ML FLEX CONT: Performed by: PHYSICIAN ASSISTANT

## 2024-02-28 RX ORDER — ACETAMINOPHEN 325 MG/1
650 TABLET ORAL ONCE
Start: 2024-04-24 | End: 2024-04-24

## 2024-02-28 RX ORDER — SODIUM CHLORIDE 9 MG/ML
250 INJECTION, SOLUTION INTRAVENOUS ONCE
Status: COMPLETED | OUTPATIENT
Start: 2024-02-28 | End: 2024-02-28

## 2024-02-28 RX ORDER — SODIUM CHLORIDE 9 MG/ML
250 INJECTION, SOLUTION INTRAVENOUS ONCE
OUTPATIENT
Start: 2024-04-24

## 2024-02-28 RX ORDER — ACETAMINOPHEN 325 MG/1
650 TABLET ORAL ONCE
Status: DISCONTINUED | OUTPATIENT
Start: 2024-02-28 | End: 2024-03-01 | Stop reason: HOSPADM

## 2024-02-28 RX ORDER — ACETAMINOPHEN 325 MG/1
650 TABLET ORAL ONCE
Start: 2024-02-28 | End: 2024-02-28

## 2024-02-28 RX ORDER — ACETAMINOPHEN 325 MG/1
650 TABLET ORAL ONCE
Status: COMPLETED | OUTPATIENT
Start: 2024-02-28 | End: 2024-02-28

## 2024-02-28 RX ORDER — DIPHENHYDRAMINE HCL 25 MG
25 CAPSULE ORAL ONCE
Start: 2024-02-28 | End: 2024-02-28

## 2024-02-28 RX ORDER — DIPHENHYDRAMINE HCL 25 MG
25 CAPSULE ORAL ONCE
Start: 2024-04-24 | End: 2024-04-24

## 2024-02-28 RX ORDER — DIPHENHYDRAMINE HCL 25 MG
25 CAPSULE ORAL ONCE
Status: COMPLETED | OUTPATIENT
Start: 2024-02-28 | End: 2024-02-28

## 2024-02-28 RX ORDER — DIPHENHYDRAMINE HCL 25 MG
25 CAPSULE ORAL ONCE
Status: DISCONTINUED | OUTPATIENT
Start: 2024-02-28 | End: 2024-03-01 | Stop reason: HOSPADM

## 2024-02-28 RX ADMIN — INFLIXIMAB 319.5 MG: 100 INJECTION, POWDER, LYOPHILIZED, FOR SOLUTION INTRAVENOUS at 12:10

## 2024-02-28 RX ADMIN — DIPHENHYDRAMINE HYDROCHLORIDE 25 MG: 25 CAPSULE ORAL at 11:47

## 2024-02-28 RX ADMIN — ACETAMINOPHEN 650 MG: 325 TABLET, FILM COATED ORAL at 11:47

## 2024-02-28 RX ADMIN — SODIUM CHLORIDE 250 ML: 9 INJECTION, SOLUTION INTRAVENOUS at 14:10

## 2024-03-01 RX ORDER — DOCUSATE SODIUM 100 MG/1
100 CAPSULE, LIQUID FILLED ORAL 2 TIMES DAILY
Qty: 180 CAPSULE | Refills: 2 | Status: SHIPPED | OUTPATIENT
Start: 2024-03-01

## 2024-03-06 ENCOUNTER — HOSPITAL ENCOUNTER (OUTPATIENT)
Dept: OCCUPATIONAL THERAPY | Facility: HOSPITAL | Age: 69
Setting detail: THERAPIES SERIES
Discharge: HOME | End: 2024-03-06
Payer: MEDICARE

## 2024-03-06 PROCEDURE — 97165 OT EVAL LOW COMPLEX 30 MIN: CPT

## 2024-03-06 PROCEDURE — 97530 THERAPEUTIC ACTIVITIES: CPT

## 2024-03-06 NOTE — THERAPY EVALUATION
Moderate Disability  [] 61-80 Severe Disability  []  Very Disabled       Pt is exhbiting decreased strength in B hands d/t osteoarthritis. Verbalizes difficulty with participating in ADLS & IADLS during 'flare ups'. Therapist noticed increased swelling in all digits, pt verbalized that the swelling is decreased on this date. Pt is experiencing deficits within strength and sensation in B hands but is above average with fine motor skills on this date, potentially skill levels will deviate d/t levels of swelling.Therapist providing education on current strength and sensation deficients along with adaptive equipment that can be utilized to elevate pain levels and fatigue.Therapist providing education and pamphlets on HEP including tendon gliding and theraputty exercises. Pt verbalized understanding of each of these exercises.  Pt is a good candidate to benefit from skilled OT services on an OP basis to increase independence w daily tasks & decrease caregiver burden. Upon exit, pt .     PLAN 1x wk, 1 hour, 8-12 wks   [x] Continue per POC [x] Plan of care initiated  [] Hold pending MD visit  [] Discharge  [] Alter current POC     GOALS   Short Term:   1Pt to report follow through w HEP   2 Pt to report decreased level of difficulty with opening jars by 1 point on the quick DASH. (Rated 4 @ eval)  3 Pt to report decreased level of difficulty with washing back by 1 point on the quick DASH. (Rated 4 @ eval)  4 Pt to increase R hand  strength by 5# (40.4 at eval)  5 Pt to increase L hand  strength by 2# (38.9 at eval)  6 Pt to identify 1-2 protection techniques w Sup.(Educated on built up handles at eval)  7 Pt to increase pinch, key and palmar grasps by at least 3# each on both hands. (R Pinch 3.3,R Key 6.0, R Palmar 3.2, L Pinch 5.4, L Galaviz 5.5, L Palmar 3.8 @Eval)  Long Term:   1Pt to report follow through w HEP   2Pt to report decreased level of difficulty with opening jars by 2 points on the quick DASH.

## 2024-03-11 ENCOUNTER — HOSPITAL ENCOUNTER (OUTPATIENT)
Dept: OCCUPATIONAL THERAPY | Facility: HOSPITAL | Age: 69
Setting detail: THERAPIES SERIES
Discharge: HOME | End: 2024-03-11
Payer: MEDICARE

## 2024-03-11 NOTE — CARE COORDINATION
[facility]  Cancel/No Show Note    Service:  []Physical Therapy  [x]Occupational Therapy    Date:3/11/24    # of Missed Tx:     Reason For Missed Treatment:  []Illness  []Provider unavailable  []No call/no show  []Late cancellation (<24 hours)  []Arrived >15 minutes late  []Other appointment  []Scheduling conflict  []Arrived on wrong day  []Physician discharged tx  []Transportation conflict  []Inclement weather  []Frequency of tx change  []No reason given  []Arrived but refused participation  []Unable to tolerate  []Medical complications  []Pt on hold due to-  [x]Other-     Plan:  []Rescheduled  []Continue at next scheduled apt  []Reminder phone call  []Contacted referring provider  [x]Discharged

## 2024-03-18 ENCOUNTER — HOSPITAL ENCOUNTER (OUTPATIENT)
Dept: ULTRASOUND IMAGING | Facility: HOSPITAL | Age: 69
Discharge: HOME | End: 2024-03-18
Admitting: NURSE PRACTITIONER
Payer: MEDICARE

## 2024-03-18 DIAGNOSIS — R27.0 ATAXIA, UNSPECIFIED: ICD-10-CM

## 2024-03-18 DIAGNOSIS — G31.84 MILD NEUROCOGNITIVE DISORDER: ICD-10-CM

## 2024-03-18 DIAGNOSIS — F01.A0 MILD VASCULAR DEMENTIA WITHOUT BEHAVIORAL DISTURBANCE, PSYCHOTIC DISTURBANCE, MOOD DISTURBANCE, OR ANXIETY: ICD-10-CM

## 2024-03-18 PROCEDURE — 93880 EXTRACRANIAL BILAT STUDY: CPT

## 2024-04-10 NOTE — PLAN OF CARE
Goal Outcome Evaluation:  Plan of Care Reviewed With: patient        Progress: improving  Outcome Evaluation: Pt. to be discharged home with family to provide transportation. Pt. alexa. po medications. VSS. Pt. voices no concerns at this time.   Yes Yes

## 2024-04-11 DIAGNOSIS — K50.811 CROHN'S DISEASE OF BOTH SMALL AND LARGE INTESTINE WITH RECTAL BLEEDING: ICD-10-CM

## 2024-04-11 DIAGNOSIS — R11.0 NAUSEA: ICD-10-CM

## 2024-04-12 RX ORDER — PROMETHAZINE HYDROCHLORIDE 25 MG/1
12.5 TABLET ORAL EVERY 8 HOURS PRN
Qty: 30 TABLET | Refills: 2 | Status: SHIPPED | OUTPATIENT
Start: 2024-04-12

## 2024-04-18 ENCOUNTER — HOSPITAL ENCOUNTER (OUTPATIENT)
Dept: MRI IMAGING | Facility: HOSPITAL | Age: 69
Discharge: HOME | End: 2024-04-18
Admitting: NURSE PRACTITIONER
Payer: MEDICARE

## 2024-04-18 DIAGNOSIS — G31.84 MILD NEUROCOGNITIVE DISORDER: ICD-10-CM

## 2024-04-18 PROCEDURE — A9577 INJ MULTIHANCE: HCPCS | Performed by: NURSE PRACTITIONER

## 2024-04-18 PROCEDURE — 70553 MRI BRAIN STEM W/O & W/DYE: CPT

## 2024-04-18 PROCEDURE — 0 GADOBENATE DIMEGLUMINE 529 MG/ML SOLUTION: Performed by: NURSE PRACTITIONER

## 2024-04-18 RX ADMIN — GADOBENATE DIMEGLUMINE 15 ML: 529 INJECTION, SOLUTION INTRAVENOUS at 12:24

## 2024-04-19 RX ORDER — LANOLIN ALCOHOL/MO/W.PET/CERES
1000 CREAM (GRAM) TOPICAL DAILY
Qty: 90 TABLET | Refills: 3 | Status: SHIPPED | OUTPATIENT
Start: 2024-04-19

## 2024-04-24 ENCOUNTER — HOSPITAL ENCOUNTER (OUTPATIENT)
Dept: INFUSION THERAPY | Facility: HOSPITAL | Age: 69
Discharge: HOME | End: 2024-04-24
Admitting: PHYSICIAN ASSISTANT
Payer: MEDICARE

## 2024-04-24 VITALS
HEART RATE: 66 BPM | WEIGHT: 140 LBS | DIASTOLIC BLOOD PRESSURE: 61 MMHG | RESPIRATION RATE: 18 BRPM | SYSTOLIC BLOOD PRESSURE: 112 MMHG | BODY MASS INDEX: 25.6 KG/M2

## 2024-04-24 DIAGNOSIS — K50.80 CROHN'S DISEASE OF BOTH SMALL AND LARGE INTESTINE WITHOUT COMPLICATION: Primary | ICD-10-CM

## 2024-04-24 PROCEDURE — 96415 CHEMO IV INFUSION ADDL HR: CPT

## 2024-04-24 PROCEDURE — 25810000003 SODIUM CHLORIDE 0.9 % SOLUTION 250 ML FLEX CONT: Performed by: PHYSICIAN ASSISTANT

## 2024-04-24 PROCEDURE — 63710000001 DIPHENHYDRAMINE PER 50 MG: Performed by: PHYSICIAN ASSISTANT

## 2024-04-24 PROCEDURE — 25010000002 INFLIXIMAB PER 10 MG: Performed by: PHYSICIAN ASSISTANT

## 2024-04-24 PROCEDURE — 96413 CHEMO IV INFUSION 1 HR: CPT

## 2024-04-24 RX ORDER — DIPHENHYDRAMINE HCL 25 MG
25 CAPSULE ORAL ONCE
Start: 2024-06-19 | End: 2024-06-19

## 2024-04-24 RX ORDER — DIPHENHYDRAMINE HCL 25 MG
25 CAPSULE ORAL ONCE
Status: COMPLETED | OUTPATIENT
Start: 2024-04-24 | End: 2024-04-24

## 2024-04-24 RX ORDER — SODIUM CHLORIDE 9 MG/ML
250 INJECTION, SOLUTION INTRAVENOUS ONCE
Status: DISCONTINUED | OUTPATIENT
Start: 2024-04-24 | End: 2024-04-27 | Stop reason: HOSPADM

## 2024-04-24 RX ORDER — ACETAMINOPHEN 325 MG/1
650 TABLET ORAL ONCE
Start: 2024-06-19 | End: 2024-06-19

## 2024-04-24 RX ORDER — SODIUM CHLORIDE 9 MG/ML
250 INJECTION, SOLUTION INTRAVENOUS ONCE
OUTPATIENT
Start: 2024-06-19

## 2024-04-24 RX ORDER — ACETAMINOPHEN 325 MG/1
650 TABLET ORAL ONCE
Status: COMPLETED | OUTPATIENT
Start: 2024-04-24 | End: 2024-04-24

## 2024-04-24 RX ADMIN — DIPHENHYDRAMINE HYDROCHLORIDE 25 MG: 25 CAPSULE ORAL at 11:07

## 2024-04-24 RX ADMIN — INFLIXIMAB 317.5 MG: 100 INJECTION, POWDER, LYOPHILIZED, FOR SOLUTION INTRAVENOUS at 11:40

## 2024-04-24 RX ADMIN — ACETAMINOPHEN 650 MG: 325 TABLET, FILM COATED ORAL at 11:07

## 2024-04-30 RX ORDER — PANTOPRAZOLE SODIUM 40 MG/1
40 TABLET, DELAYED RELEASE ORAL DAILY
Qty: 90 TABLET | Refills: 3 | OUTPATIENT
Start: 2024-04-30

## 2024-04-30 RX ORDER — LISINOPRIL 10 MG/1
10 TABLET ORAL NIGHTLY
Qty: 90 TABLET | Refills: 3 | OUTPATIENT
Start: 2024-04-30

## 2024-05-06 ENCOUNTER — LAB (OUTPATIENT)
Dept: LAB | Facility: HOSPITAL | Age: 69
End: 2024-05-06
Payer: MEDICARE

## 2024-05-06 DIAGNOSIS — K50.811 CROHN'S DISEASE OF BOTH SMALL AND LARGE INTESTINE WITH RECTAL BLEEDING: ICD-10-CM

## 2024-05-06 DIAGNOSIS — D50.0 IRON DEFICIENCY ANEMIA DUE TO CHRONIC BLOOD LOSS: ICD-10-CM

## 2024-05-06 LAB
ALBUMIN SERPL-MCNC: 4.6 G/DL (ref 3.5–5.2)
ALBUMIN/GLOB SERPL: 1.9 G/DL
ALP SERPL-CCNC: 89 U/L (ref 39–117)
ALT SERPL W P-5'-P-CCNC: 15 U/L (ref 1–33)
ANION GAP SERPL CALCULATED.3IONS-SCNC: 6 MMOL/L (ref 5–15)
AST SERPL-CCNC: 16 U/L (ref 1–32)
BASOPHILS # BLD MANUAL: 0.04 10*3/MM3 (ref 0–0.2)
BASOPHILS NFR BLD MANUAL: 1 % (ref 0–1.5)
BILIRUB SERPL-MCNC: 0.2 MG/DL (ref 0–1.2)
BUN SERPL-MCNC: 20 MG/DL (ref 8–23)
BUN/CREAT SERPL: 17.5 (ref 7–25)
CALCIUM SPEC-SCNC: 9.5 MG/DL (ref 8.6–10.5)
CHLORIDE SERPL-SCNC: 108 MMOL/L (ref 98–107)
CO2 SERPL-SCNC: 27 MMOL/L (ref 22–29)
CREAT SERPL-MCNC: 1.14 MG/DL (ref 0.57–1)
DEPRECATED RDW RBC AUTO: 47.4 FL (ref 37–54)
EGFRCR SERPLBLD CKD-EPI 2021: 53.2 ML/MIN/1.73
ERYTHROCYTE [DISTWIDTH] IN BLOOD BY AUTOMATED COUNT: 13.3 % (ref 12.3–15.4)
GLOBULIN UR ELPH-MCNC: 2.4 GM/DL
GLUCOSE SERPL-MCNC: 91 MG/DL (ref 65–99)
HCT VFR BLD AUTO: 37.1 % (ref 34–46.6)
HGB BLD-MCNC: 12 G/DL (ref 12–15.9)
LYMPHOCYTES # BLD MANUAL: 1.77 10*3/MM3 (ref 0.7–3.1)
LYMPHOCYTES NFR BLD MANUAL: 10 % (ref 5–12)
MCH RBC QN AUTO: 31.7 PG (ref 26.6–33)
MCHC RBC AUTO-ENTMCNC: 32.3 G/DL (ref 31.5–35.7)
MCV RBC AUTO: 97.9 FL (ref 79–97)
MONOCYTES # BLD: 0.41 10*3/MM3 (ref 0.1–0.9)
NEUTROPHILS # BLD AUTO: 1.9 10*3/MM3 (ref 1.7–7)
NEUTROPHILS NFR BLD MANUAL: 46 % (ref 42.7–76)
PLAT MORPH BLD: NORMAL
PLATELET # BLD AUTO: 185 10*3/MM3 (ref 140–450)
PMV BLD AUTO: 10.4 FL (ref 6–12)
POTASSIUM SERPL-SCNC: 4.6 MMOL/L (ref 3.5–5.2)
PROT SERPL-MCNC: 7 G/DL (ref 6–8.5)
RBC # BLD AUTO: 3.79 10*6/MM3 (ref 3.77–5.28)
RBC MORPH BLD: NORMAL
SODIUM SERPL-SCNC: 141 MMOL/L (ref 136–145)
VARIANT LYMPHS NFR BLD MANUAL: 1 % (ref 0–5)
VARIANT LYMPHS NFR BLD MANUAL: 42 % (ref 19.6–45.3)
WBC MORPH BLD: NORMAL
WBC NRBC COR # BLD AUTO: 4.12 10*3/MM3 (ref 3.4–10.8)

## 2024-05-06 PROCEDURE — 85007 BL SMEAR W/DIFF WBC COUNT: CPT

## 2024-05-06 PROCEDURE — 80053 COMPREHEN METABOLIC PANEL: CPT

## 2024-05-06 PROCEDURE — 36415 COLL VENOUS BLD VENIPUNCTURE: CPT

## 2024-05-06 PROCEDURE — 85025 COMPLETE CBC W/AUTO DIFF WBC: CPT

## 2024-05-20 ENCOUNTER — OFFICE VISIT (OUTPATIENT)
Dept: INTERNAL MEDICINE | Facility: CLINIC | Age: 69
End: 2024-05-20
Payer: MEDICARE

## 2024-05-20 VITALS
SYSTOLIC BLOOD PRESSURE: 120 MMHG | DIASTOLIC BLOOD PRESSURE: 82 MMHG | BODY MASS INDEX: 25.4 KG/M2 | WEIGHT: 138 LBS | TEMPERATURE: 97.1 F | OXYGEN SATURATION: 97 % | HEIGHT: 62 IN | HEART RATE: 104 BPM

## 2024-05-20 DIAGNOSIS — E55.9 VITAMIN D DEFICIENCY: ICD-10-CM

## 2024-05-20 DIAGNOSIS — D50.9 IRON DEFICIENCY ANEMIA, UNSPECIFIED IRON DEFICIENCY ANEMIA TYPE: ICD-10-CM

## 2024-05-20 DIAGNOSIS — R32 URINARY INCONTINENCE, UNSPECIFIED TYPE: ICD-10-CM

## 2024-05-20 DIAGNOSIS — D75.89 MACROCYTOSIS: ICD-10-CM

## 2024-05-20 DIAGNOSIS — I10 PRIMARY HYPERTENSION: ICD-10-CM

## 2024-05-20 DIAGNOSIS — F41.9 ANXIETY: Primary | ICD-10-CM

## 2024-05-20 PROCEDURE — 3074F SYST BP LT 130 MM HG: CPT | Performed by: FAMILY MEDICINE

## 2024-05-20 PROCEDURE — 1159F MED LIST DOCD IN RCRD: CPT | Performed by: FAMILY MEDICINE

## 2024-05-20 PROCEDURE — 1160F RVW MEDS BY RX/DR IN RCRD: CPT | Performed by: FAMILY MEDICINE

## 2024-05-20 PROCEDURE — 3079F DIAST BP 80-89 MM HG: CPT | Performed by: FAMILY MEDICINE

## 2024-05-20 PROCEDURE — 99214 OFFICE O/P EST MOD 30 MIN: CPT | Performed by: FAMILY MEDICINE

## 2024-05-20 PROCEDURE — G2211 COMPLEX E/M VISIT ADD ON: HCPCS | Performed by: FAMILY MEDICINE

## 2024-05-20 PROCEDURE — 1125F AMNT PAIN NOTED PAIN PRSNT: CPT | Performed by: FAMILY MEDICINE

## 2024-05-20 RX ORDER — HYDROXYZINE PAMOATE 25 MG/1
25 CAPSULE ORAL 3 TIMES DAILY PRN
Qty: 90 CAPSULE | Refills: 1 | Status: SHIPPED | OUTPATIENT
Start: 2024-05-20

## 2024-05-20 RX ORDER — LISINOPRIL 10 MG/1
10 TABLET ORAL NIGHTLY
Qty: 90 TABLET | Refills: 3 | Status: SHIPPED | OUTPATIENT
Start: 2024-05-20

## 2024-05-20 RX ORDER — GABAPENTIN 800 MG/1
800 TABLET ORAL 2 TIMES DAILY
COMMUNITY

## 2024-05-20 NOTE — PROGRESS NOTES
Stephanie Jean is a 68 y.o. female.    Chief Complaint   Patient presents with    Hypertension    Anxiety       HPI     Stephanie Jean is a 68-year-old female who presents today to follow up on anxiety and hypertension.    Patient has hypertension. She is taking lisinopril and Lasix as needed, although she received a notification from her pharmacy this morning that her lisinopril prescription was denied. She has been compliant with medications. The patient denies any side effects to the medication. The patient does not routinely monitor her blood pressure at home. Blood pressure is well controlled in the office today. Blood pressure has been running 120/82 mmHg. She is following a low salt diet and her sodium intake remains unchanged.    Patient continues to experience intermittent episodes of anxiety.  She occasionally experiences unexplained anxiety but denies any significant anxiety. She denies frequent feelings of sadness or depression.  Patient has a prescription for capsules and hydroxyzine, but is uncertain about the need for refills. She prefers capsules over tablets. Her current regimen includes BuSpar and Cymbalta, which she finds beneficial    Patient reports urinary incontinence, characterized by dribbling and an urge to urinate before reaching the bathroom. She is uncertain if these symptoms are indicative of a urinary tract infection (UTI) or bladder issue. These symptoms are severe and embarrassing for her. She is not currently on any medication for these symptoms.    Patient adds that over the last few weeks, she has had times where she suddenly feels like she is going to vomit, but most of the time it is undigested food. She adds that after eating, a couple of hours later, she has to run to the bathroom.  Her creatinine was elevated when she was having vomiting issues and she was a little dehydrated. She would like to have her thyroid checked as she has not had it for a while. She is having episodes  of hot flashes.    Since her last visit, she was started on Namenda by neurology which she tolerates well. She takes it at night. She takes Phenergan every day and notes that her medicines were making her sick, but now everything is leveled out. She takes B12 supplement every other day and vitamin D every week.    The following portions of the patient's history were reviewed and updated as appropriate: allergies, current medications, past family history, past medical history, past social history, past surgical history and problem list.     Allergies   Allergen Reactions    Penicillins Rash         Current Outpatient Medications:     aspirin 81 MG EC tablet, Take 1 tablet by mouth. Every other day, Disp: , Rfl:     atorvastatin (LIPITOR) 20 MG tablet, TAKE 1 TABLET BY MOUTH EVERY NIGHT, Disp: 90 tablet, Rfl: 3    azaTHIOprine (IMURAN) 50 MG tablet, TAKE 2 TABLETS BY MOUTH DAILY, Disp: 180 tablet, Rfl: 3    busPIRone (BUSPAR) 10 MG tablet, TAKE 1 TABLET BY MOUTH TWICE DAILY AS NEEDED FOR ANXIETY, Disp: 180 tablet, Rfl: 3    cyclobenzaprine (FLEXERIL) 10 MG tablet, TAKE 1 TABLET BY MOUTH THREE TIMES DAILY, Disp: 270 tablet, Rfl: 3    Diclofenac Sodium (VOLTAREN) 1 % gel gel, diclofenac 1 % topical gel  APPLY 1 GRAM TOPICALLY TO THE AFFECTED AREA TWICE DAILY AS NEEDED FOR PAIN, Disp: , Rfl:     docusate sodium (COLACE) 100 MG capsule, TAKE ONE CAPSULE BY MOUTH TWICE DAILY, Disp: 180 capsule, Rfl: 2    donepezil (ARICEPT) 5 MG tablet, Take 1 tablet by mouth Every Night., Disp: 30 tablet, Rfl: 3    DULoxetine (CYMBALTA) 60 MG capsule, Take 1 capsule by mouth Daily., Disp: 90 capsule, Rfl: 3    fluticasone (FLONASE) 50 MCG/ACT nasal spray, SHAKE LIQUID AND USE 1 TO 2 SPRAYS IN EACH NOSTRIL EVERY DAY AS NEEDED, Disp: , Rfl:     furosemide (LASIX) 20 MG tablet, TAKE 1 TABLET BY MOUTH DAILY AS NEEDED FOR SWELLING, Disp: 30 tablet, Rfl: 1    gabapentin (NEURONTIN) 800 MG tablet, Take 1 tablet by mouth 2 (Two) Times a Day.,  Disp: , Rfl:     HYDROcodone-acetaminophen (NORCO)  MG per tablet, Take 1 tablet by mouth 2 (Two) Times a Day., Disp: , Rfl:     hydrOXYzine (ATARAX) 25 MG tablet, Take 1 tablet by mouth 3 (Three) Times a Day., Disp: , Rfl:     hydrOXYzine pamoate (VISTARIL) 25 MG capsule, Take 1 capsule by mouth 3 (Three) Times a Day As Needed for Itching., Disp: 90 capsule, Rfl: 1    inFLIXimab (REMICADE IV), Infuse  into a venous catheter Take As Directed., Disp: , Rfl:     lactulose (CHRONULAC) 10 GM/15ML solution, Take 15 mL by mouth Daily., Disp: 450 mL, Rfl: 2    lisinopril (PRINIVIL,ZESTRIL) 10 MG tablet, Take 1 tablet by mouth Every Night., Disp: 90 tablet, Rfl: 3    loratadine (CLARITIN) 10 MG tablet, Take 1 tablet by mouth As Needed., Disp: , Rfl:     memantine (NAMENDA) 5 MG tablet, Take 1 tablet by mouth Daily., Disp: 30 tablet, Rfl: 2    nitroglycerin (NITROSTAT) 0.4 MG SL tablet, Place 1 tablet under the tongue Every 5 (Five) Minutes As Needed., Disp: , Rfl:     pantoprazole (PROTONIX) 20 MG EC tablet, Take 1 tablet by mouth Daily., Disp: 90 tablet, Rfl: 3    promethazine (PHENERGAN) 25 MG tablet, TAKE 1/2 TABLET BY MOUTH EVERY 8 HOURS AS NEEDED FOR NAUSEA OR VOMITING, Disp: 30 tablet, Rfl: 2    Restasis 0.05 % ophthalmic emulsion, Administer 1 drop to both eyes 2 (Two) Times a Day., Disp: , Rfl:     rOPINIRole (REQUIP) 2 MG tablet, TAKE 1 TABLET BY MOUTH EVERY DAY 1 TO 3 HOURS BEFORE BEDTIME, Disp: 90 tablet, Rfl: 1    vitamin B-12 (CYANOCOBALAMIN) 1000 MCG tablet, TAKE 1 TABLET BY MOUTH EVERY DAY, Disp: 90 tablet, Rfl: 3    vitamin D (ERGOCALCIFEROL) 1.25 MG (00842 UT) capsule capsule, TAKE 1 CAPSULE BY MOUTH ONCE A WEEK, Disp: 15 capsule, Rfl: 3    ferrous gluconate (FERGON) 324 MG tablet, Take 1 tablet by mouth Daily With Breakfast. (Patient not taking: Reported on 5/20/2024), Disp: 30 tablet, Rfl: 5    ROS    Review of Systems   Constitutional:  Negative for chills, fatigue and fever.   Eyes:  Negative for  "blurred vision and visual disturbance.   Respiratory:  Positive for shortness of breath. Negative for cough.    Cardiovascular:  Negative for chest pain.   Gastrointestinal:  Positive for nausea. Negative for abdominal pain, constipation, diarrhea and vomiting.   Endocrine: Positive for heat intolerance. Negative for cold intolerance.   Genitourinary:  Positive for frequency. Negative for dysuria.   Neurological:  Positive for headache. Negative for weakness and numbness.   Psychiatric/Behavioral:  Negative for depressed mood. The patient is nervous/anxious.        Vitals:    05/20/24 0857   BP: 120/82   Pulse: 104   Temp: 97.1 °F (36.2 °C)   TempSrc: Infrared   SpO2: 97%   Weight: 62.6 kg (138 lb)   Height: 157.5 cm (62.01\")   PainSc:   4         Physical Exam     Physical Exam  Constitutional:       General: She is not in acute distress.     Appearance: Normal appearance. She is well-developed.   HENT:      Head: Normocephalic and atraumatic.      Right Ear: External ear normal.      Left Ear: External ear normal.   Eyes:      Extraocular Movements: Extraocular movements intact.      Conjunctiva/sclera: Conjunctivae normal.   Cardiovascular:      Rate and Rhythm: Normal rate and regular rhythm.      Heart sounds: No murmur heard.  Pulmonary:      Effort: Pulmonary effort is normal. No respiratory distress.      Breath sounds: Normal breath sounds. No wheezing.   Abdominal:      General: Bowel sounds are normal. There is no distension.      Palpations: Abdomen is soft.      Tenderness: There is no abdominal tenderness.   Skin:     General: Skin is warm and dry.   Neurological:      Mental Status: She is alert and oriented to person, place, and time.      Cranial Nerves: No cranial nerve deficit.   Psychiatric:         Mood and Affect: Mood normal.         Behavior: Behavior normal.         Assessment/Plan    Diagnoses and all orders for this visit:    1. Anxiety (Primary)  Assessment & Plan:  The condition has " shown improvement with the current medication. The patient is advised to continue with the current regimen of Cymbalta, BuSpar, and hydroxyzine as needed.      2. Primary hypertension  Assessment & Plan:  The patient's hypertension is currently well-managed with the current medication regimen. The patient is advised to persist with the current regimen of lisinopril and Lasix as needed.    Orders:  -     TSH  -     Lipid Panel    3. Vitamin D deficiency  -     Vitamin D,25-Hydroxy    4. Iron deficiency anemia, unspecified iron deficiency anemia type  -     Ferritin  -     Iron Profile    5. Macrocytosis  -     Vitamin B12  -     Folate    6. Urinary incontinence, unspecified type  Assessment & Plan:  A urinalysis and culture will be obtained if deemed necessary. Should the urinalysis yield negative results, the consideration of Myrbetriq may be considered.    Orders:  -     Urinalysis With Culture If Indicated - Urine, Catheter    Other orders  -     lisinopril (PRINIVIL,ZESTRIL) 10 MG tablet; Take 1 tablet by mouth Every Night.  Dispense: 90 tablet; Refill: 3  -     hydrOXYzine pamoate (VISTARIL) 25 MG capsule; Take 1 capsule by mouth 3 (Three) Times a Day As Needed for Itching.  Dispense: 90 capsule; Refill: 1        New Medications Ordered This Visit   Medications    lisinopril (PRINIVIL,ZESTRIL) 10 MG tablet     Sig: Take 1 tablet by mouth Every Night.     Dispense:  90 tablet     Refill:  3    hydrOXYzine pamoate (VISTARIL) 25 MG capsule     Sig: Take 1 capsule by mouth 3 (Three) Times a Day As Needed for Itching.     Dispense:  90 capsule     Refill:  1       No orders of the defined types were placed in this encounter.      Return in about 6 months (around 11/20/2024) for Medicare Wellness.    Nhung Shepard DO    Transcribed from ambient dictation for Nhung Shepard DO by Burke Jones.  05/20/24   10:53 EDT    Patient or patient representative verbalized consent to the visit recording.  I have personally  performed the services described in this document as transcribed by the above individual, and it is both accurate and complete.  Nhung Shepard DO  5/20/2024  21:08 EDT

## 2024-05-20 NOTE — ASSESSMENT & PLAN NOTE
A urinalysis and culture will be obtained if deemed necessary. Should the urinalysis yield negative results, the consideration of Myrbetriq may be considered.

## 2024-05-20 NOTE — ASSESSMENT & PLAN NOTE
The patient's hypertension is currently well-managed with the current medication regimen. The patient is advised to persist with the current regimen of lisinopril and Lasix as needed.

## 2024-05-20 NOTE — ASSESSMENT & PLAN NOTE
The condition has shown improvement with the current medication. The patient is advised to continue with the current regimen of Cymbalta, BuSpar, and hydroxyzine as needed.

## 2024-05-21 LAB
25(OH)D3+25(OH)D2 SERPL-MCNC: 77.7 NG/ML (ref 30–100)
APPEARANCE UR: CLEAR
BACTERIA #/AREA URNS HPF: NORMAL /[HPF]
BILIRUB UR QL STRIP: NEGATIVE
CASTS URNS QL MICRO: NORMAL /LPF
CHOLEST SERPL-MCNC: 139 MG/DL (ref 0–200)
COLOR UR: YELLOW
EPI CELLS #/AREA URNS HPF: NORMAL /HPF (ref 0–10)
FERRITIN SERPL-MCNC: 90.3 NG/ML (ref 13–150)
FOLATE SERPL-MCNC: 5.39 NG/ML (ref 4.78–24.2)
GLUCOSE UR QL STRIP: NEGATIVE
HDLC SERPL-MCNC: 57 MG/DL (ref 40–60)
HGB UR QL STRIP: NEGATIVE
IRON SATN MFR SERPL: 19 % (ref 20–50)
IRON SERPL-MCNC: 62 MCG/DL (ref 37–145)
KETONES UR QL STRIP: NEGATIVE
LDLC SERPL CALC-MCNC: 64 MG/DL (ref 0–100)
LEUKOCYTE ESTERASE UR QL STRIP: NEGATIVE
MICRO URNS: NORMAL
MICRO URNS: NORMAL
NITRITE UR QL STRIP: NEGATIVE
PH UR STRIP: 6 [PH] (ref 5–7.5)
PROT UR QL STRIP: NEGATIVE
RBC #/AREA URNS HPF: NORMAL /HPF (ref 0–2)
SP GR UR STRIP: 1.01 (ref 1–1.03)
TIBC SERPL-MCNC: 325 MCG/DL
TRIGL SERPL-MCNC: 96 MG/DL (ref 0–150)
TSH SERPL DL<=0.005 MIU/L-ACNC: 1.05 UIU/ML (ref 0.27–4.2)
UIBC SERPL-MCNC: 263 MCG/DL (ref 112–346)
URINALYSIS REFLEX: NORMAL
UROBILINOGEN UR STRIP-MCNC: 0.2 MG/DL (ref 0.2–1)
VIT B12 SERPL-MCNC: 946 PG/ML (ref 211–946)
VLDLC SERPL CALC-MCNC: 18 MG/DL (ref 5–40)
WBC #/AREA URNS HPF: NORMAL /HPF (ref 0–5)

## 2024-05-28 ENCOUNTER — OFFICE VISIT (OUTPATIENT)
Age: 69
End: 2024-05-28
Payer: MEDICARE

## 2024-05-28 VITALS
SYSTOLIC BLOOD PRESSURE: 102 MMHG | OXYGEN SATURATION: 99 % | RESPIRATION RATE: 14 BRPM | DIASTOLIC BLOOD PRESSURE: 64 MMHG | HEIGHT: 62 IN | HEART RATE: 96 BPM | BODY MASS INDEX: 25.23 KG/M2 | TEMPERATURE: 98.6 F

## 2024-05-28 DIAGNOSIS — G47.10 HYPERSOMNIA: ICD-10-CM

## 2024-05-28 DIAGNOSIS — G31.84 MCI (MILD COGNITIVE IMPAIRMENT): Primary | ICD-10-CM

## 2024-05-28 PROCEDURE — 1159F MED LIST DOCD IN RCRD: CPT | Performed by: NURSE PRACTITIONER

## 2024-05-28 PROCEDURE — 3074F SYST BP LT 130 MM HG: CPT | Performed by: NURSE PRACTITIONER

## 2024-05-28 PROCEDURE — 3078F DIAST BP <80 MM HG: CPT | Performed by: NURSE PRACTITIONER

## 2024-05-28 PROCEDURE — 1160F RVW MEDS BY RX/DR IN RCRD: CPT | Performed by: NURSE PRACTITIONER

## 2024-05-28 PROCEDURE — 99214 OFFICE O/P EST MOD 30 MIN: CPT | Performed by: NURSE PRACTITIONER

## 2024-05-28 RX ORDER — DONEPEZIL HYDROCHLORIDE 10 MG/1
10 TABLET, FILM COATED ORAL NIGHTLY
Qty: 90 TABLET | Refills: 1 | Status: SHIPPED | OUTPATIENT
Start: 2024-05-28

## 2024-05-28 RX ORDER — MEMANTINE HYDROCHLORIDE 10 MG/1
10 TABLET ORAL DAILY
Qty: 90 TABLET | Refills: 1 | Status: SHIPPED | OUTPATIENT
Start: 2024-05-28

## 2024-05-28 NOTE — PROGRESS NOTES
Follow Up Office Visit      Patient Name: Stephanie Jean  : 1955   MRN: [id number]     Chief Complaint:    Chief Complaint   Patient presents with    Follow-up     Dementia  Neurocognitive disorder       History of Present Illness: Stephanie Jean is a 68 y.o. female who is here today to follow up with Neurology for MCI. She was last seen in clinic  (Zach). She would like to FU in the [place] location.    She has been taking Aricept 5 mg Daily and Namenda 5 mg Daily and reports good tolerance and compliance with medication. She does co continued excessive AM fatigue with snorting to wake herself up and snoring and sighing with sleeping. She reports improved word finding. She is still driving. Self ADLS. Managing household finances and cooking and cleaning.     SOCIAL: Happily  to Jair. Former RN (ICU). Six adult children. HS graduate- regular classes. NO ETOH, recreational drugs or tobacco.     Recent Imaging:    MRI Brain W/WO  -noncontributory   CDUS    -Mild plaque disease < 50% stenosis to JALEEL and LICA  MRI Brain W/WO 10/22 + Mild amount of scattered small white matter lesions in the bilateral frontoparietal lobes, nonspecific, most likely due to chronic microvascular ischemic white matter changes.    Pertinent Medical History: HTN, HL, Vitamin D Deficiency, Crohn's, GERD, Colitis, ÁNGEL, Anxiety, SLE, Fibromyalgia, TIA     Subjective      Review of Systems:   Review of Systems   Constitutional: Negative.    HENT: Negative.     Eyes: Negative.    Respiratory: Negative.     Cardiovascular: Negative.    Gastrointestinal: Negative.    Endocrine: Negative.    Genitourinary: Negative.    Musculoskeletal: Negative.    Skin: Negative.    Allergic/Immunologic: Negative.    Neurological:  Positive for memory problem.   Hematological: Negative.    Psychiatric/Behavioral: Negative.     All other systems reviewed and are negative.      I have reviewed and the following portions of the  patient's history were updated as appropriate: past family history, past medical history, past social history, past surgical history and problem list.    Medications:     Current Outpatient Medications:     aspirin 81 MG EC tablet, Take 1 tablet by mouth. Every other day, Disp: , Rfl:     atorvastatin (LIPITOR) 20 MG tablet, TAKE 1 TABLET BY MOUTH EVERY NIGHT, Disp: 90 tablet, Rfl: 3    azaTHIOprine (IMURAN) 50 MG tablet, TAKE 2 TABLETS BY MOUTH DAILY, Disp: 180 tablet, Rfl: 3    busPIRone (BUSPAR) 10 MG tablet, TAKE 1 TABLET BY MOUTH TWICE DAILY AS NEEDED FOR ANXIETY, Disp: 180 tablet, Rfl: 3    cyclobenzaprine (FLEXERIL) 10 MG tablet, TAKE 1 TABLET BY MOUTH THREE TIMES DAILY, Disp: 270 tablet, Rfl: 3    Diclofenac Sodium (VOLTAREN) 1 % gel gel, diclofenac 1 % topical gel  APPLY 1 GRAM TOPICALLY TO THE AFFECTED AREA TWICE DAILY AS NEEDED FOR PAIN, Disp: , Rfl:     docusate sodium (COLACE) 100 MG capsule, TAKE ONE CAPSULE BY MOUTH TWICE DAILY, Disp: 180 capsule, Rfl: 2    donepezil (ARICEPT) 10 MG tablet, Take 1 tablet by mouth Every Night., Disp: 90 tablet, Rfl: 1    DULoxetine (CYMBALTA) 60 MG capsule, Take 1 capsule by mouth Daily., Disp: 90 capsule, Rfl: 3    fluticasone (FLONASE) 50 MCG/ACT nasal spray, SHAKE LIQUID AND USE 1 TO 2 SPRAYS IN EACH NOSTRIL EVERY DAY AS NEEDED, Disp: , Rfl:     furosemide (LASIX) 20 MG tablet, TAKE 1 TABLET BY MOUTH DAILY AS NEEDED FOR SWELLING, Disp: 30 tablet, Rfl: 1    gabapentin (NEURONTIN) 800 MG tablet, Take 1 tablet by mouth 2 (Two) Times a Day., Disp: , Rfl:     hydrOXYzine pamoate (VISTARIL) 25 MG capsule, Take 1 capsule by mouth 3 (Three) Times a Day As Needed for Itching., Disp: 90 capsule, Rfl: 1    inFLIXimab (REMICADE IV), Infuse  into a venous catheter Take As Directed., Disp: , Rfl:     lactulose (CHRONULAC) 10 GM/15ML solution, Take 15 mL by mouth Daily., Disp: 450 mL, Rfl: 2    lisinopril (PRINIVIL,ZESTRIL) 10 MG tablet, Take 1 tablet by mouth Every Night., Disp:  "90 tablet, Rfl: 3    loratadine (CLARITIN) 10 MG tablet, Take 1 tablet by mouth As Needed., Disp: , Rfl:     memantine (NAMENDA) 10 MG tablet, Take 1 tablet by mouth Daily., Disp: 90 tablet, Rfl: 1    nitroglycerin (NITROSTAT) 0.4 MG SL tablet, Place 1 tablet under the tongue Every 5 (Five) Minutes As Needed., Disp: , Rfl:     pantoprazole (PROTONIX) 20 MG EC tablet, Take 1 tablet by mouth Daily., Disp: 90 tablet, Rfl: 3    promethazine (PHENERGAN) 25 MG tablet, TAKE 1/2 TABLET BY MOUTH EVERY 8 HOURS AS NEEDED FOR NAUSEA OR VOMITING, Disp: 30 tablet, Rfl: 2    Restasis 0.05 % ophthalmic emulsion, Administer 1 drop to both eyes 2 (Two) Times a Day., Disp: , Rfl:     rOPINIRole (REQUIP) 2 MG tablet, TAKE 1 TABLET BY MOUTH EVERY DAY 1 TO 3 HOURS BEFORE BEDTIME, Disp: 90 tablet, Rfl: 1    vitamin B-12 (CYANOCOBALAMIN) 1000 MCG tablet, TAKE 1 TABLET BY MOUTH EVERY DAY, Disp: 90 tablet, Rfl: 3    vitamin D (ERGOCALCIFEROL) 1.25 MG (66224 UT) capsule capsule, TAKE 1 CAPSULE BY MOUTH ONCE A WEEK, Disp: 15 capsule, Rfl: 3    ferrous gluconate (FERGON) 324 MG tablet, Take 1 tablet by mouth Daily With Breakfast. (Patient not taking: Reported on 5/28/2024), Disp: 30 tablet, Rfl: 5    Allergies:   Allergies   Allergen Reactions    Penicillins Rash       Objective     Physical Exam:  Vital Signs:   Vitals:    05/28/24 1106   BP: 102/64   BP Location: Left arm   Patient Position: Sitting   Cuff Size: Adult   Pulse: 96   Resp: 14   Temp: 98.6 °F (37 °C)   TempSrc: Infrared   SpO2: 99%   Height: 157.5 cm (62.01\")   PainSc:   4   PainLoc: Finger     Body mass index is 25.23 kg/m².    Physical Exam  Vitals and nursing note reviewed.   Constitutional:       Appearance: Normal appearance. She is not toxic-appearing.   HENT:      Head: Normocephalic.      Nose: Nose normal.      Mouth/Throat:      Mouth: Mucous membranes are moist.      Pharynx: Oropharynx is clear.   Eyes:      Extraocular Movements: Extraocular movements intact.      " Conjunctiva/sclera: Conjunctivae normal.   Musculoskeletal:      Cervical back: Normal range of motion and neck supple.   Skin:     General: Skin is warm and dry.      Capillary Refill: Capillary refill takes less than 2 seconds.   Neurological:      Mental Status: She is alert and oriented to person, place, and time.      Cranial Nerves: Cranial nerves 2-12 are intact.      Coordination: Romberg Test abnormal. Finger-Nose-Finger Test and Heel to Shin Test normal.      Gait: Gait is intact.      Deep Tendon Reflexes:      Reflex Scores:       Tricep reflexes are 2+ on the right side and 2+ on the left side.       Bicep reflexes are 2+ on the right side and 2+ on the left side.       Patellar reflexes are 2+ on the right side and 2+ on the left side.  Psychiatric:         Mood and Affect: Mood normal.         Speech: Speech normal.         Behavior: Behavior normal.         Neurologic Exam     Mental Status   Oriented to person, place, and time.   Oriented to country, city, area, street and number.   Oriented to year, month, date, day and season.   Registration: recalls 3 of 3 objects. Recall at 5 minutes: recalls 3 of 3 objects. Follows 3 step commands.   Attention: normal. Concentration: normal.   Speech: speech is normal   Level of consciousness: alert  Knowledge: good. Able to perform simple calculations.   Able to name object. Able to read. Able to repeat. Able to write. Normal comprehension.   MMSE 29/30     Cranial Nerves   Cranial nerves II through XII intact.     Motor Exam   Muscle bulk: normal  Overall muscle tone: normal  Right arm tone: normal  Left arm tone: normal  Right arm pronator drift: absent  Left arm pronator drift: absent  Right leg tone: normal  Left leg tone: normal    Strength   Right biceps: 5/5  Left biceps: 5/5  Right triceps: 5/5  Left triceps: 5/5  Right quadriceps: 5/5  Left quadriceps: 5/5  Right hamstrin/5  Left hamstrin/5    Sensory Exam   Light touch normal.     Gait,  Coordination, and Reflexes     Gait  Gait: normal    Coordination   Romberg: positive  Finger to nose coordination: normal  Heel to shin coordination: normal    Tremor   Resting tremor: absent    Reflexes   Right biceps: 2+  Left biceps: 2+  Right triceps: 2+  Left triceps: 2+  Right patellar: 2+  Left patellar: 2+  Right Hartmann: absent  Left Hartmann: absent       Assessment / Plan      Assessment/Plan:   Diagnoses and all orders for this visit:    1. MCI (mild cognitive impairment) (Primary)  -     Home Sleep Study; Future  -     donepezil (ARICEPT) 10 MG tablet; Take 1 tablet by mouth Every Night.  Dispense: 90 tablet; Refill: 1  -     memantine (NAMENDA) 10 MG tablet; Take 1 tablet by mouth Daily.  Dispense: 90 tablet; Refill: 1    2. Hypersomnia  -     Home Sleep Study; Future         Follow Up:   Return in about 6 months (around 11/28/2024), or if symptoms worsen or fail to improve.    Anticipatory Guidance and Safety Reviewed  Patient Education Provided  MMSE 29/30  [place] + (see scanned)  Home Sleep Study  Increase/Continue Aricpet 10 mg Daily  Increase/Continue Namenda 10 mg Daily  Declined offer of referral to [facility]     RTC PRN or within 6 months or sooner with issues     Rose Thompson, DNP, APRN, FNP-C  [facility]

## 2024-06-19 ENCOUNTER — HOSPITAL ENCOUNTER (OUTPATIENT)
Dept: INFUSION THERAPY | Facility: HOSPITAL | Age: 69
Discharge: HOME | End: 2024-06-19
Payer: MEDICARE

## 2024-06-19 VITALS
SYSTOLIC BLOOD PRESSURE: 132 MMHG | BODY MASS INDEX: 24.69 KG/M2 | OXYGEN SATURATION: 98 % | TEMPERATURE: 97.9 F | HEART RATE: 59 BPM | WEIGHT: 135 LBS | DIASTOLIC BLOOD PRESSURE: 65 MMHG

## 2024-06-19 DIAGNOSIS — K50.80 CROHN'S DISEASE OF BOTH SMALL AND LARGE INTESTINE WITHOUT COMPLICATION: Primary | ICD-10-CM

## 2024-06-19 PROCEDURE — 96366 THER/PROPH/DIAG IV INF ADDON: CPT

## 2024-06-19 PROCEDURE — 25010000002 INFLIXIMAB PER 10 MG: Performed by: PHYSICIAN ASSISTANT

## 2024-06-19 PROCEDURE — 96365 THER/PROPH/DIAG IV INF INIT: CPT

## 2024-06-19 PROCEDURE — 63710000001 DIPHENHYDRAMINE PER 50 MG: Performed by: PHYSICIAN ASSISTANT

## 2024-06-19 PROCEDURE — 25810000003 SODIUM CHLORIDE 0.9 % SOLUTION 250 ML FLEX CONT: Performed by: PHYSICIAN ASSISTANT

## 2024-06-19 RX ORDER — DIPHENHYDRAMINE HCL 25 MG
25 CAPSULE ORAL ONCE
Start: 2024-08-14 | End: 2024-08-14

## 2024-06-19 RX ORDER — DIPHENHYDRAMINE HCL 25 MG
25 CAPSULE ORAL ONCE
Status: COMPLETED | OUTPATIENT
Start: 2024-06-19 | End: 2024-06-19

## 2024-06-19 RX ORDER — ACETAMINOPHEN 325 MG/1
650 TABLET ORAL ONCE
Start: 2024-08-14 | End: 2024-08-14

## 2024-06-19 RX ORDER — SODIUM CHLORIDE 9 MG/ML
250 INJECTION, SOLUTION INTRAVENOUS ONCE
OUTPATIENT
Start: 2024-08-14

## 2024-06-19 RX ORDER — SODIUM CHLORIDE 9 MG/ML
250 INJECTION, SOLUTION INTRAVENOUS ONCE
Status: DISCONTINUED | OUTPATIENT
Start: 2024-06-19 | End: 2024-06-21 | Stop reason: HOSPADM

## 2024-06-19 RX ORDER — ACETAMINOPHEN 325 MG/1
650 TABLET ORAL ONCE
Status: COMPLETED | OUTPATIENT
Start: 2024-06-19 | End: 2024-06-19

## 2024-06-19 RX ADMIN — INFLIXIMAB 300 MG: 100 INJECTION, POWDER, LYOPHILIZED, FOR SOLUTION INTRAVENOUS at 10:54

## 2024-06-19 RX ADMIN — DIPHENHYDRAMINE HYDROCHLORIDE 25 MG: 25 CAPSULE ORAL at 10:05

## 2024-06-19 RX ADMIN — ACETAMINOPHEN 650 MG: 325 TABLET, FILM COATED ORAL at 10:06

## 2024-06-26 ENCOUNTER — HOSPITAL ENCOUNTER (OUTPATIENT)
Dept: SLEEP MEDICINE | Facility: HOSPITAL | Age: 69
Discharge: HOME | End: 2024-06-26
Admitting: NURSE PRACTITIONER
Payer: MEDICARE

## 2024-06-26 DIAGNOSIS — G47.10 HYPERSOMNIA: ICD-10-CM

## 2024-06-26 DIAGNOSIS — G31.84 MCI (MILD COGNITIVE IMPAIRMENT): ICD-10-CM

## 2024-06-26 PROCEDURE — 95806 SLEEP STUDY UNATT&RESP EFFT: CPT

## 2024-06-29 PROCEDURE — 95806 SLEEP STUDY UNATT&RESP EFFT: CPT | Performed by: INTERNAL MEDICINE

## 2024-07-13 DIAGNOSIS — G31.84 MILD NEUROCOGNITIVE DISORDER: ICD-10-CM

## 2024-07-15 RX ORDER — DONEPEZIL HYDROCHLORIDE 5 MG/1
5 TABLET, FILM COATED ORAL NIGHTLY
Qty: 30 TABLET | Refills: 6 | OUTPATIENT
Start: 2024-07-15

## 2024-07-15 RX ORDER — ROPINIROLE 2 MG/1
TABLET, FILM COATED ORAL
Qty: 90 TABLET | Refills: 1 | Status: SHIPPED | OUTPATIENT
Start: 2024-07-15

## 2024-07-25 ENCOUNTER — OFFICE VISIT (OUTPATIENT)
Dept: INTERNAL MEDICINE | Facility: CLINIC | Age: 69
End: 2024-07-25
Payer: MEDICARE

## 2024-07-25 VITALS
SYSTOLIC BLOOD PRESSURE: 116 MMHG | BODY MASS INDEX: 25.4 KG/M2 | WEIGHT: 138 LBS | HEIGHT: 62 IN | DIASTOLIC BLOOD PRESSURE: 76 MMHG | HEART RATE: 88 BPM | OXYGEN SATURATION: 99 % | TEMPERATURE: 98 F

## 2024-07-25 DIAGNOSIS — L03.011 PARONYCHIA OF FINGER, RIGHT: Primary | ICD-10-CM

## 2024-07-25 PROCEDURE — 1125F AMNT PAIN NOTED PAIN PRSNT: CPT | Performed by: FAMILY MEDICINE

## 2024-07-25 PROCEDURE — 99213 OFFICE O/P EST LOW 20 MIN: CPT | Performed by: FAMILY MEDICINE

## 2024-07-25 PROCEDURE — 3078F DIAST BP <80 MM HG: CPT | Performed by: FAMILY MEDICINE

## 2024-07-25 PROCEDURE — 3074F SYST BP LT 130 MM HG: CPT | Performed by: FAMILY MEDICINE

## 2024-07-25 RX ORDER — MECLIZINE HCL 25MG 25 MG/1
12.5 TABLET, CHEWABLE ORAL EVERY 8 HOURS SCHEDULED
COMMUNITY

## 2024-07-25 RX ORDER — OXYCODONE AND ACETAMINOPHEN 7.5; 325 MG/1; MG/1
1 TABLET ORAL 2 TIMES DAILY
COMMUNITY

## 2024-07-25 RX ORDER — SULFAMETHOXAZOLE AND TRIMETHOPRIM 800; 160 MG/1; MG/1
1 TABLET ORAL 2 TIMES DAILY
Qty: 14 TABLET | Refills: 0 | Status: SHIPPED | OUTPATIENT
Start: 2024-07-25

## 2024-07-25 NOTE — PROGRESS NOTES
Stephanie Jean is a 68 y.o. female.    Chief Complaint   Patient presents with    infected finger     Right hand ring finger, red, swollen. Laceration right along cuticle.        HPI   History of Present Illness  The patient presents today with a finger infection.    She reports an infected cuticle on her finger, despite no injury. She has been attempting to dislodge any potential discharge. The finger began to swell and become red approximately 2 to 3 days ago, accompanied by a throbbing pain. The tenderness has lessened over time. She has attempted to alleviate the discomfort by soaking the finger and cleaning it with peroxide and alcohol. She has been applying triple antibiotic ointment to the affected area. She denies having a fever.    The following portions of the patient's history were reviewed and updated as appropriate: allergies, current medications, past family history, past medical history, past social history, past surgical history and problem list.     Allergies   Allergen Reactions    Penicillins Rash         Current Outpatient Medications:     aspirin 81 MG EC tablet, Take 1 tablet by mouth. Every other day, Disp: , Rfl:     atorvastatin (LIPITOR) 20 MG tablet, TAKE 1 TABLET BY MOUTH EVERY NIGHT, Disp: 90 tablet, Rfl: 3    azaTHIOprine (IMURAN) 50 MG tablet, TAKE 2 TABLETS BY MOUTH DAILY, Disp: 180 tablet, Rfl: 3    busPIRone (BUSPAR) 10 MG tablet, TAKE 1 TABLET BY MOUTH TWICE DAILY AS NEEDED FOR ANXIETY, Disp: 180 tablet, Rfl: 3    cyclobenzaprine (FLEXERIL) 10 MG tablet, TAKE 1 TABLET BY MOUTH THREE TIMES DAILY, Disp: 270 tablet, Rfl: 3    Diclofenac Sodium (VOLTAREN) 1 % gel gel, diclofenac 1 % topical gel  APPLY 1 GRAM TOPICALLY TO THE AFFECTED AREA TWICE DAILY AS NEEDED FOR PAIN, Disp: , Rfl:     docusate sodium (COLACE) 100 MG capsule, TAKE ONE CAPSULE BY MOUTH TWICE DAILY, Disp: 180 capsule, Rfl: 2    donepezil (ARICEPT) 10 MG tablet, Take 1 tablet by mouth Every Night., Disp: 90 tablet, Rfl:  1    DULoxetine (CYMBALTA) 60 MG capsule, Take 1 capsule by mouth Daily., Disp: 90 capsule, Rfl: 3    ferrous gluconate (FERGON) 324 MG tablet, Take 1 tablet by mouth Daily With Breakfast., Disp: 30 tablet, Rfl: 5    fluticasone (FLONASE) 50 MCG/ACT nasal spray, SHAKE LIQUID AND USE 1 TO 2 SPRAYS IN EACH NOSTRIL EVERY DAY AS NEEDED, Disp: , Rfl:     furosemide (LASIX) 20 MG tablet, TAKE 1 TABLET BY MOUTH DAILY AS NEEDED FOR SWELLING, Disp: 30 tablet, Rfl: 1    gabapentin (NEURONTIN) 800 MG tablet, Take 1 tablet by mouth 2 (Two) Times a Day., Disp: , Rfl:     hydrOXYzine pamoate (VISTARIL) 25 MG capsule, Take 1 capsule by mouth 3 (Three) Times a Day As Needed for Itching., Disp: 90 capsule, Rfl: 1    inFLIXimab (REMICADE IV), Infuse  into a venous catheter Take As Directed., Disp: , Rfl:     lactulose (CHRONULAC) 10 GM/15ML solution, Take 15 mL by mouth Daily., Disp: 450 mL, Rfl: 2    lisinopril (PRINIVIL,ZESTRIL) 10 MG tablet, Take 1 tablet by mouth Every Night., Disp: 90 tablet, Rfl: 3    loratadine (CLARITIN) 10 MG tablet, Take 1 tablet by mouth As Needed., Disp: , Rfl:     meclizine 25 MG chewable tablet chewable tablet, Chew 0.5 tablets Every 8 (Eight) Hours., Disp: , Rfl:     memantine (NAMENDA) 10 MG tablet, Take 1 tablet by mouth Daily., Disp: 90 tablet, Rfl: 1    nitroglycerin (NITROSTAT) 0.4 MG SL tablet, Place 1 tablet under the tongue Every 5 (Five) Minutes As Needed., Disp: , Rfl:     Other, Add info into free text field below,, ESTRIOL 0.3% FACIAL VERSABASE CREAM, Disp: , Rfl:     oxyCODONE-acetaminophen (PERCOCET) 7.5-325 MG per tablet, Take 1 tablet by mouth 2 (Two) Times a Day., Disp: , Rfl:     pantoprazole (PROTONIX) 20 MG EC tablet, Take 1 tablet by mouth Daily., Disp: 90 tablet, Rfl: 3    promethazine (PHENERGAN) 25 MG tablet, TAKE 1/2 TABLET BY MOUTH EVERY 8 HOURS AS NEEDED FOR NAUSEA OR VOMITING, Disp: 30 tablet, Rfl: 2    Restasis 0.05 % ophthalmic emulsion, Administer 1 drop to both eyes 2  "(Two) Times a Day., Disp: , Rfl:     rOPINIRole (REQUIP) 2 MG tablet, TAKE 1 TABLET BY MOUTH EVERY DAY 1 TO 3 HOURS BEFORE BEDTIME, Disp: 90 tablet, Rfl: 1    vitamin B-12 (CYANOCOBALAMIN) 1000 MCG tablet, TAKE 1 TABLET BY MOUTH EVERY DAY, Disp: 90 tablet, Rfl: 3    vitamin D (ERGOCALCIFEROL) 1.25 MG (42862 UT) capsule capsule, TAKE 1 CAPSULE BY MOUTH ONCE A WEEK, Disp: 15 capsule, Rfl: 3    mupirocin (BACTROBAN) 2 % ointment, Apply 1 Application topically to the appropriate area as directed 3 (Three) Times a Day., Disp: 15 g, Rfl: 0    sulfamethoxazole-trimethoprim (Bactrim DS) 800-160 MG per tablet, Take 1 tablet by mouth 2 (Two) Times a Day., Disp: 14 tablet, Rfl: 0    ROS    Review of Systems   Constitutional:  Negative for fever.   Respiratory:  Negative for shortness of breath.    Cardiovascular:  Negative for chest pain.   Skin:  Positive for wound.       Vitals:    07/25/24 1627   BP: 116/76   BP Location: Left arm   Patient Position: Sitting   Cuff Size: Adult   Pulse: 88   Temp: 98 °F (36.7 °C)   SpO2: 99%   Weight: 62.6 kg (138 lb)   Height: 157.5 cm (62.01\")         Physical Exam     Physical Exam  Constitutional:       General: She is not in acute distress.     Appearance: Normal appearance. She is well-developed.   HENT:      Head: Normocephalic and atraumatic.      Right Ear: External ear normal.      Left Ear: External ear normal.   Eyes:      Extraocular Movements: Extraocular movements intact.      Conjunctiva/sclera: Conjunctivae normal.   Cardiovascular:      Rate and Rhythm: Normal rate.   Pulmonary:      Effort: Pulmonary effort is normal. No respiratory distress.   Abdominal:      General: There is no distension.   Skin:     General: Skin is warm and dry.      Findings: Erythema (and swelling to right 4th finger around the cuticle) present.   Neurological:      Mental Status: She is alert and oriented to person, place, and time.      Cranial Nerves: No cranial nerve deficit.   Psychiatric:    "      Mood and Affect: Mood normal.         Behavior: Behavior normal.             Diagnoses and all orders for this visit:    1. Paronychia of finger, right (Primary)    Other orders  -     mupirocin (BACTROBAN) 2 % ointment; Apply 1 Application topically to the appropriate area as directed 3 (Three) Times a Day.  Dispense: 15 g; Refill: 0  -     sulfamethoxazole-trimethoprim (Bactrim DS) 800-160 MG per tablet; Take 1 tablet by mouth 2 (Two) Times a Day.  Dispense: 14 tablet; Refill: 0        Assessment & Plan  1. Paronychia.  Topical antibiotic ointment and oral Bactrim were prescribed. The patient was encouraged to maintain cleanliness of the affected area.    New Medications Ordered This Visit   Medications    mupirocin (BACTROBAN) 2 % ointment     Sig: Apply 1 Application topically to the appropriate area as directed 3 (Three) Times a Day.     Dispense:  15 g     Refill:  0    sulfamethoxazole-trimethoprim (Bactrim DS) 800-160 MG per tablet     Sig: Take 1 tablet by mouth 2 (Two) Times a Day.     Dispense:  14 tablet     Refill:  0       No orders of the defined types were placed in this encounter.      Return if symptoms worsen or fail to improve.    Nhung Shepard,

## 2024-07-31 ENCOUNTER — OFFICE VISIT (OUTPATIENT)
Dept: GASTROENTEROLOGY | Facility: CLINIC | Age: 69
End: 2024-07-31
Payer: MEDICARE

## 2024-07-31 VITALS
DIASTOLIC BLOOD PRESSURE: 67 MMHG | HEART RATE: 83 BPM | BODY MASS INDEX: 24.84 KG/M2 | SYSTOLIC BLOOD PRESSURE: 111 MMHG | TEMPERATURE: 98 F | WEIGHT: 135 LBS | HEIGHT: 62 IN

## 2024-07-31 DIAGNOSIS — K21.9 GASTROESOPHAGEAL REFLUX DISEASE WITHOUT ESOPHAGITIS: ICD-10-CM

## 2024-07-31 DIAGNOSIS — D50.0 IRON DEFICIENCY ANEMIA DUE TO CHRONIC BLOOD LOSS: ICD-10-CM

## 2024-07-31 DIAGNOSIS — K50.811 CROHN'S DISEASE OF BOTH SMALL AND LARGE INTESTINE WITH RECTAL BLEEDING: Primary | ICD-10-CM

## 2024-07-31 DIAGNOSIS — R11.0 NAUSEA: ICD-10-CM

## 2024-07-31 PROCEDURE — 3078F DIAST BP <80 MM HG: CPT | Performed by: INTERNAL MEDICINE

## 2024-07-31 PROCEDURE — 3074F SYST BP LT 130 MM HG: CPT | Performed by: INTERNAL MEDICINE

## 2024-07-31 PROCEDURE — 1159F MED LIST DOCD IN RCRD: CPT | Performed by: INTERNAL MEDICINE

## 2024-07-31 PROCEDURE — 1160F RVW MEDS BY RX/DR IN RCRD: CPT | Performed by: INTERNAL MEDICINE

## 2024-07-31 PROCEDURE — 99214 OFFICE O/P EST MOD 30 MIN: CPT | Performed by: INTERNAL MEDICINE

## 2024-07-31 RX ORDER — DICYCLOMINE HCL 20 MG
20 TABLET ORAL 2 TIMES DAILY PRN
Qty: 60 TABLET | Refills: 1 | Status: SHIPPED | OUTPATIENT
Start: 2024-07-31

## 2024-07-31 RX ORDER — AZATHIOPRINE 50 MG/1
100 TABLET ORAL DAILY
Qty: 180 TABLET | Refills: 3 | Status: SHIPPED | OUTPATIENT
Start: 2024-07-31

## 2024-07-31 NOTE — PROGRESS NOTES
Follow Up Note     Date: 2024   Patient Name: Stephanie Jean  MRN: [id number]  : 1955     Referring Physician: Nhung Shepard DO    Chief Complaint:    Chief Complaint   Patient presents with    Crohn's Disease    Anemia     ÁNGEL      Nausea    Heartburn       Interval History:   2024  Stephanie Jean is a 68 y.o. female who is here today for follow up for her Crohn's disease, nausea and constipation.  She states that she has been having issues with significant abdominal discomfort on the right side abdomen.  She still has alternating constipation followed by loose stools.  She also has intermittent episodes of nausea with vomiting food material.  She is currently taking Dulcolax as needed Linzess did not work for her in the past.    2020  Stephanie Jean is a 64 y.o. female who is here today to establish care with Gastroenterology for evaluation of heartburn. History of upper abdominal pain mainly in the epigastric region since about 2-3 months. Pain started gradually, intermittent, aching pain lasting for about few minutes and occasionally longer.She was given dexilant and sucralfate which did not help initially and later on changed to pantoprazole.  She is on pantoprazole now with reasonable control of pain and reflux symptoms with once in few days but for the last 2 weeks she did not take any PPI. Associated significant heart burn.      She was on PPI before after she was noted to have gastritis on EGD about 10 years ago. Stopped herself year ago. Occasional problem with swallowing to solid food. She also has sjogrens syndrome.   She was mostly constipated now. Bowel movements every once in 2-3 days and occasionally once in 3-5 days. No lower abdominal pain.      There is no history of  hematochezia or melena. There is no history of anemia. Prior history of EGD about 10 yrs ago. Last colonoscopy in 2020 about 4 polyps removed and advised to repeat in 3 years time. Brother had  "UC and uncle from mother's side had colon Ca. No history of any abdominal surgery except hysterectomy. Denies alcohol abuse or cigarette smoking.  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\"    Crohn's disease 2022    Depression 2022    Dysphagia     Elevated cholesterol     Family history of colon cancer     Fibromyalgia     Fibromyalgia, primary 2003    GERD (gastroesophageal reflux disease)     GI (gastrointestinal bleed) 8/8/2022    Heart murmur     Hoarseness     Hyperlipidemia     Hypertension 2021    Inflammatory bowel disease 2022    recent hospitalization for colitis    Irritable bowel syndrome     Ischemic colitis 08/11/2023    Low back pain 2003    treated by [facility]    Lupus     Marijuana use     Daily     Osteopenia 2021    Osteopenia    Peripheral neuropathy 08/10/2022    Pulmonary nodule     Shingles August 2018    Sjogren's disease     Urinary tract infection 2022    Vasovagal syncope      Past Surgical History:   Past Surgical History:   Procedure Laterality Date    ABDOMINAL SURGERY  1998    [facility]    APPENDECTOMY      BREAST SURGERY  1975    lumpectomy 1975    CATARACT EXTRACTION W/ INTRAOCULAR LENS IMPLANT Left 12/20/2021    Procedure: CATARACT PHACO EXTRACTION WITH INTRAOCULAR LENS IMPLANT LEFT COMPLICATED WITH MALYUGIN RING;  Surgeon: Zahcary Landers MD;  Location: [facility];  Service: Ophthalmology;  Laterality: Left;    CATARACT EXTRACTION W/ INTRAOCULAR LENS IMPLANT Right 01/03/2022    Procedure: CATARACT PHACO EXTRACTION WITH INTRAOCULAR LENS IMPLANT RIGHT;  Surgeon: Zachary Landers MD;  " Location: [facility] OR;  Service: Ophthalmology;  Laterality: Right;    COLONOSCOPY      COLONOSCOPY N/A 12/12/2022    Procedure: COLONOSCOPY WITH BIOPSIES AND POLYPECTOMY;  Surgeon: Chaparro Marrero MD;  Location: [facility] ENDOSCOPY;  Service: Gastroenterology;  Laterality: N/A;    ENDOSCOPY      ENDOSCOPY N/A 06/01/2020    Procedure: ESOPHAGOGASTRODUODENOSCOPY;  Surgeon: Chaparro Marrero MD;  Location: [facility] ENDOSCOPY;  Service: Gastroenterology;  Laterality: N/A;    EYE SURGERY  2021-22    cataract removal    HYSTERECTOMY      TOTAL ABDOMINAL HYSTERECTOMY WITH SALPINGO OOPHORECTOMY  1998    TUBAL ABDOMINAL LIGATION  1984    UPPER GASTROINTESTINAL ENDOSCOPY         Family History:   Family History   Problem Relation Age of Onset    Hypertension Mother     Aneurysm Mother     Heart disease Mother         passed away at 54 yr old after 5 vessel bypass    Hyperlipidemia Mother     Stroke Mother         aneurysm ruptured    Emphysema Father     COPD Father     Ulcerative colitis Brother     Alcohol abuse Brother     Cancer Brother         stomach cancer    COPD Brother     Hyperlipidemia Brother     Liver disease Brother     Cirrhosis Brother     Colon polyps Brother     Stomach cancer Brother     Colon cancer Maternal Uncle     Arthritis Maternal Uncle     Cancer Maternal Aunt         breast cancer    Cancer Sister         breast    COPD Paternal Uncle     Early death Brother         ulcerative colitis    Learning disabilities Brother         mental retardation    Mental illness Brother         schizophrenia    Ulcerative colitis Brother     Mental retardation Brother     Inflammatory bowel disease Brother     Kidney disease Paternal Aunt         nephrectomy    Liver cancer Neg Hx     Crohn's disease Neg Hx        Social History:   Social History     Socioeconomic History    Marital status:    Tobacco Use    Smoking status: Former     Current packs/day: 0.00     Average packs/day: 1 pack/day for 15.0  years (15.0 ttl pk-yrs)     Types: Cigarettes     Start date: 2003     Quit date: 2018     Years since quittin.5    Smokeless tobacco: Never   Vaping Use    Vaping status: Never Used   Substance and Sexual Activity    Alcohol use: No    Drug use: Not Currently     Types: Marijuana     Comment: OCCASIONALLY    Sexual activity: Defer     Partners: Male     Birth control/protection: Post-menopausal, Hysterectomy       Medications:     Current Outpatient Medications:     aspirin 81 MG EC tablet, Take 1 tablet by mouth. Every other day, Disp: , Rfl:     atorvastatin (LIPITOR) 20 MG tablet, TAKE 1 TABLET BY MOUTH EVERY NIGHT, Disp: 90 tablet, Rfl: 3    azaTHIOprine (IMURAN) 50 MG tablet, TAKE 2 TABLETS BY MOUTH DAILY, Disp: 180 tablet, Rfl: 3    busPIRone (BUSPAR) 10 MG tablet, TAKE 1 TABLET BY MOUTH TWICE DAILY AS NEEDED FOR ANXIETY, Disp: 180 tablet, Rfl: 3    cyclobenzaprine (FLEXERIL) 10 MG tablet, TAKE 1 TABLET BY MOUTH THREE TIMES DAILY, Disp: 270 tablet, Rfl: 3    Diclofenac Sodium (VOLTAREN) 1 % gel gel, diclofenac 1 % topical gel  APPLY 1 GRAM TOPICALLY TO THE AFFECTED AREA TWICE DAILY AS NEEDED FOR PAIN, Disp: , Rfl:     docusate sodium (COLACE) 100 MG capsule, TAKE ONE CAPSULE BY MOUTH TWICE DAILY, Disp: 180 capsule, Rfl: 2    donepezil (ARICEPT) 10 MG tablet, Take 1 tablet by mouth Every Night., Disp: 90 tablet, Rfl: 1    DULoxetine (CYMBALTA) 60 MG capsule, Take 1 capsule by mouth Daily., Disp: 90 capsule, Rfl: 3    ferrous gluconate (FERGON) 324 MG tablet, Take 1 tablet by mouth Daily With Breakfast. (Patient taking differently: Take 1 tablet by mouth Every Other Day.), Disp: 30 tablet, Rfl: 5    fluticasone (FLONASE) 50 MCG/ACT nasal spray, SHAKE LIQUID AND USE 1 TO 2 SPRAYS IN EACH NOSTRIL EVERY DAY AS NEEDED, Disp: , Rfl:     furosemide (LASIX) 20 MG tablet, TAKE 1 TABLET BY MOUTH DAILY AS NEEDED FOR SWELLING, Disp: 30 tablet, Rfl: 1    gabapentin (NEURONTIN) 800 MG tablet, Take 1 tablet by  mouth 2 (Two) Times a Day., Disp: , Rfl:     hydrOXYzine pamoate (VISTARIL) 25 MG capsule, Take 1 capsule by mouth 3 (Three) Times a Day As Needed for Itching., Disp: 90 capsule, Rfl: 1    inFLIXimab (REMICADE IV), Infuse  into a venous catheter Take As Directed., Disp: , Rfl:     lactulose (CHRONULAC) 10 GM/15ML solution, Take 15 mL by mouth Daily., Disp: 450 mL, Rfl: 2    lisinopril (PRINIVIL,ZESTRIL) 10 MG tablet, Take 1 tablet by mouth Every Night., Disp: 90 tablet, Rfl: 3    loratadine (CLARITIN) 10 MG tablet, Take 1 tablet by mouth As Needed., Disp: , Rfl:     meclizine 25 MG chewable tablet chewable tablet, Chew 0.5 tablets Every 8 (Eight) Hours., Disp: , Rfl:     memantine (NAMENDA) 10 MG tablet, Take 1 tablet by mouth Daily., Disp: 90 tablet, Rfl: 1    mupirocin (BACTROBAN) 2 % ointment, Apply 1 Application topically to the appropriate area as directed 3 (Three) Times a Day., Disp: 15 g, Rfl: 0    nitroglycerin (NITROSTAT) 0.4 MG SL tablet, Place 1 tablet under the tongue Every 5 (Five) Minutes As Needed., Disp: , Rfl:     Other, Add info into free text field below,, ESTRIOL 0.3% FACIAL VERSABASE CREAM, Disp: , Rfl:     oxyCODONE-acetaminophen (PERCOCET) 7.5-325 MG per tablet, Take 1 tablet by mouth 2 (Two) Times a Day., Disp: , Rfl:     pantoprazole (PROTONIX) 20 MG EC tablet, Take 1 tablet by mouth Daily., Disp: 90 tablet, Rfl: 3    promethazine (PHENERGAN) 25 MG tablet, TAKE 1/2 TABLET BY MOUTH EVERY 8 HOURS AS NEEDED FOR NAUSEA OR VOMITING, Disp: 30 tablet, Rfl: 2    Restasis 0.05 % ophthalmic emulsion, Administer 1 drop to both eyes 2 (Two) Times a Day., Disp: , Rfl:     rOPINIRole (REQUIP) 2 MG tablet, TAKE 1 TABLET BY MOUTH EVERY DAY 1 TO 3 HOURS BEFORE BEDTIME, Disp: 90 tablet, Rfl: 1    sulfamethoxazole-trimethoprim (Bactrim DS) 800-160 MG per tablet, Take 1 tablet by mouth 2 (Two) Times a Day., Disp: 14 tablet, Rfl: 0    vitamin B-12 (CYANOCOBALAMIN) 1000 MCG tablet, TAKE 1 TABLET BY MOUTH EVERY  "DAY, Disp: 90 tablet, Rfl: 3    vitamin D (ERGOCALCIFEROL) 1.25 MG (51070 UT) capsule capsule, TAKE 1 CAPSULE BY MOUTH ONCE A WEEK, Disp: 15 capsule, Rfl: 3    Allergies:   Allergies   Allergen Reactions    Penicillins Rash       Review of Systems:   Review of Systems   Constitutional:  Positive for diaphoresis (sweats) and fatigue. Negative for appetite change, fever and unexpected weight loss.   HENT:  Negative for trouble swallowing.    Gastrointestinal:  Positive for abdominal pain, diarrhea, nausea, vomiting, GERD and indigestion. Negative for abdominal distention, anal bleeding, blood in stool, constipation and rectal pain.       The following portions of the patient's history were reviewed and updated as appropriate: allergies, current medications, past family history, past medical history, past social history, past surgical history and problem list.    Objective     Physical Exam:  Vital Signs:   Vitals:    07/31/24 1557   BP: 111/67   Pulse: 83   Temp: 98 °F (36.7 °C)   TempSrc: Infrared   Weight: 61.2 kg (135 lb)  Comment: with clothing/shoes   Height: 157.5 cm (62\")  Comment: per patient.       Physical Exam  Constitutional:       Appearance: Normal appearance.   HENT:      Head: Normocephalic and atraumatic.   Eyes:      Conjunctiva/sclera: Conjunctivae normal.   Abdominal:      General: Abdomen is flat. There is no distension.      Palpations: There is no mass.      Tenderness: There is abdominal tenderness (Minimal discomfort right side abdomen). There is no guarding or rebound.      Hernia: No hernia is present.   Musculoskeletal:      Cervical back: Normal range of motion and neck supple.   Neurological:      Mental Status: She is alert.         Results Review:   I reviewed the patient's new clinical results.    Office Visit on 05/20/2024   Component Date Value Ref Range Status    TSH 05/20/2024 1.050  0.270 - 4.200 uIU/mL Final    25 Hydroxy, Vitamin D 05/20/2024 77.7  30.0 - 100.0 ng/ml Final    " Comment: Reference Range for Total Vitamin D 25(OH)  Deficiency <20.0 ng/mL  Insufficiency 21-29 ng/mL  Sufficiency  ng/mL  Toxicity >100 ng/ml      Vitamin B-12 05/20/2024 946  211 - 946 pg/mL Final    Results may be falsely increased if patient taking Biotin.    Folate 05/20/2024 5.39  4.78 - 24.20 ng/mL Final    Results may be falsely increased if patient taking Biotin.    Ferritin 05/20/2024 90.30  13.00 - 150.00 ng/mL Final    Results may be falsely decreased if patient taking Biotin.    TIBC 05/20/2024 325  mcg/dL Final    UIBC 05/20/2024 263  112 - 346 mcg/dL Final    Iron 05/20/2024 62  37 - 145 mcg/dL Final    Iron Saturation 05/20/2024 19 (L)  20 - 50 % Final    Total Cholesterol 05/20/2024 139  0 - 200 mg/dL Final    Comment: Cholesterol Reference Ranges  (U.S. Department of Health and Human Services ATP III  Classifications)  Desirable          <200 mg/dL  Borderline High    200-239 mg/dL  High Risk          >240 mg/dL  Triglyceride Reference Ranges  (U.S. Department of Health and Human Services ATP III  Classifications)  Normal           <150 mg/dL  Borderline High  150-199 mg/dL  High             200-499 mg/dL  Very High        >500 mg/dL  HDL Reference Ranges  (U.S. Department of Health and Human Services ATP III  Classifications)  Low     <40 mg/dl (major risk factor for CHD)  High    >60 mg/dl ('negative' risk factor for CHD)  LDL Reference Ranges  (U.S. Department of Health and Human Services ATP III  Classifications)  Optimal          <100 mg/dL  Near Optimal     100-129 mg/dL  Borderline High  130-159 mg/dL  High             160-189 mg/dL  Very High        >189 mg/dL      Triglycerides 05/20/2024 96  0 - 150 mg/dL Final    HDL Cholesterol 05/20/2024 57  40 - 60 mg/dL Final    VLDL Cholesterol Davon 05/20/2024 18  5 - 40 mg/dL Final    LDL Chol Calc (NIH) 05/20/2024 64  0 - 100 mg/dL Final    Specific Gravity, UA 05/20/2024 1.014  1.005 - 1.030 Final    pH, UA 05/20/2024 6.0  5.0 - 7.5 Final     Color, UA 05/20/2024 Yellow  Yellow Final    Appearance, UA 05/20/2024 Clear  Clear Final    Leukocytes, UA 05/20/2024 Negative  Negative Final    Protein 05/20/2024 Negative  Negative/Trace Final    Glucose, UA 05/20/2024 Negative  Negative Final    Ketones 05/20/2024 Negative  Negative Final    Blood, UA 05/20/2024 Negative  Negative Final    Bilirubin, UA 05/20/2024 Negative  Negative Final    Urobilinogen, UA 05/20/2024 0.2  0.2 - 1.0 mg/dL Final    Nitrite, UA 05/20/2024 Negative  Negative Final    Microscopic Examination 05/20/2024 Comment   Final    Microscopic follows if indicated.    Microscopic Examination 05/20/2024 See below:   Final    Microscopic was indicated and was performed.    Urinalysis Reflex 05/20/2024 Comment   Final    This specimen will not reflex to a Urine Culture.    WBC, UA 05/20/2024 0-5  0 - 5 /hpf Final    RBC, UA 05/20/2024 0-2  0 - 2 /hpf Final    Epithelial Cells (non renal) 05/20/2024 0-10  0 - 10 /hpf Final    Casts 05/20/2024 None seen  None seen /lpf Final    Bacteria, UA 05/20/2024 None seen  None seen/Few Final   Lab on 05/06/2024   Component Date Value Ref Range Status    Glucose 05/06/2024 91  65 - 99 mg/dL Final    BUN 05/06/2024 20  8 - 23 mg/dL Final    Creatinine 05/06/2024 1.14 (H)  0.57 - 1.00 mg/dL Final    Sodium 05/06/2024 141  136 - 145 mmol/L Final    Potassium 05/06/2024 4.6  3.5 - 5.2 mmol/L Final    Chloride 05/06/2024 108 (H)  98 - 107 mmol/L Final    CO2 05/06/2024 27.0  22.0 - 29.0 mmol/L Final    Calcium 05/06/2024 9.5  8.6 - 10.5 mg/dL Final    Total Protein 05/06/2024 7.0  6.0 - 8.5 g/dL Final    Albumin 05/06/2024 4.6  3.5 - 5.2 g/dL Final    ALT (SGPT) 05/06/2024 15  1 - 33 U/L Final    AST (SGOT) 05/06/2024 16  1 - 32 U/L Final    Alkaline Phosphatase 05/06/2024 89  39 - 117 U/L Final    Total Bilirubin 05/06/2024 0.2  0.0 - 1.2 mg/dL Final    Globulin 05/06/2024 2.4  gm/dL Final    A/G Ratio 05/06/2024 1.9  g/dL Final    BUN/Creatinine Ratio  05/06/2024 17.5  7.0 - 25.0 Final    Anion Gap 05/06/2024 6.0  5.0 - 15.0 mmol/L Final    eGFR 05/06/2024 53.2 (L)  >60.0 mL/min/1.73 Final    WBC 05/06/2024 4.12  3.40 - 10.80 10*3/mm3 Final    RBC 05/06/2024 3.79  3.77 - 5.28 10*6/mm3 Final    Hemoglobin 05/06/2024 12.0  12.0 - 15.9 g/dL Final    Hematocrit 05/06/2024 37.1  34.0 - 46.6 % Final    MCV 05/06/2024 97.9 (H)  79.0 - 97.0 fL Final    MCH 05/06/2024 31.7  26.6 - 33.0 pg Final    MCHC 05/06/2024 32.3  31.5 - 35.7 g/dL Final    RDW 05/06/2024 13.3  12.3 - 15.4 % Final    RDW-SD 05/06/2024 47.4  37.0 - 54.0 fl Final    MPV 05/06/2024 10.4  6.0 - 12.0 fL Final    Platelets 05/06/2024 185  140 - 450 10*3/mm3 Final    Neutrophil % 05/06/2024 46.0  42.7 - 76.0 % Final    Lymphocyte % 05/06/2024 42.0  19.6 - 45.3 % Final    Monocyte % 05/06/2024 10.0  5.0 - 12.0 % Final    Basophil % 05/06/2024 1.0  0.0 - 1.5 % Final    Atypical Lymphocyte % 05/06/2024 1.0  0.0 - 5.0 % Final    Neutrophils Absolute 05/06/2024 1.90  1.70 - 7.00 10*3/mm3 Final    Lymphocytes Absolute 05/06/2024 1.77  0.70 - 3.10 10*3/mm3 Final    Monocytes Absolute 05/06/2024 0.41  0.10 - 0.90 10*3/mm3 Final    Basophils Absolute 05/06/2024 0.04  0.00 - 0.20 10*3/mm3 Final    RBC Morphology 05/06/2024 Normal  Normal Final    WBC Morphology 05/06/2024 Normal  Normal Final    Platelet Morphology 05/06/2024 Normal  Normal Final      No radiology results for the last 90 days.      CT Abdomen Pelvis With Contrast     Result Date: 8/11/2023  Wall thickening, submucosal edema and mucosal enhancement from the distal transverse colon to the rectum in this patient with known Crohn's disease; findings slightly improved compared to 1 year prior.  Air-fluid levels in nondistended small bowel, suspect ileus.  This report was signed and finalized on 8/11/2023 4:01 PM by Samantha Allen MD.        11/15/2022 PillCam: Few scattered gastric erosions. Multiple small and medium size healing erosions and superficial  ulcerations identified throughout the ileum. No deep ulcerations except one small proximal ileal ulceration. These findings with anemia and recent colitis is more suggestive of ileocolonic crohn's disease.     Colonoscopy by Dr. Marrero 12/12/2022:  - The perianal and digital rectal examinations were normal.  - A 3 mm polyp was found in the cecum. The polyp was sessile. The polyp was removed with a cold snare. Resection and retrieval were complete.  - A 4 mm polyp was found in the sigmoid colon. The polyp was flat. The polyp was removed with a cold snare. Resection and retrieval were complete.  - Diffuse moderate inflammation characterized by altered vascularity, congestion (edema), erosions, erythema, friability and granularity was found in the rectum, in the recto-sigmoid colon, in the sigmoid colon and in the descending colon. Biopsies were taken with a cold forceps for histology.  - A medium healed ulcer was found in the mid descending colon. The scar tissue was healthy in appearance.  - The transverse colon, hepatic flexure, ascending colon, cecum, appendiceal orifice and ileocecal valve appeared normal. Biopsies were taken with a cold forceps for histology.  - The terminal ileum appeared normal. Biopsies were taken with a cold forceps for histology.    Pathology DIAGNOSIS:   A.   TERMINAL ILEUM BIOPSY:   Benign small bowel, consistent with terminal ileum   Negative for significant architectural distortion, inflammatory infiltrate,   neoplasia, malignancy   B.   CECUM BIOPSY:   Benign colonic mucosa, negative for significant architectural distortion,   inflammatory infiltrate, neoplasia, dysplasia, malignancy   C.   ASCENDING COLON BIOPSY:   Benign colonic mucosa, negative for significant architectural distortion,   inflammatory infiltrate, neoplasia, dysplasia, malignancy   D.   TRANSVERSE COLON BIOPSY:   Benign colonic mucosa, negative for significant architectural distortion,   inflammatory infiltrate,  neoplasia, dysplasia, malignancy   E.   DESCENDING COLON BIOPSY:   Minimal active colitis without significant accompanying chronicity   Negative for dysplasia, neoplasia, malignancy   See comment   F.   SIGMOID COLON BIOPSY:   Minimal active colitis without significant chronicity   Negative for dysplasia, neoplasia,malignancy   See comment   G.   RECTAL BIOPSY:   Minimal active colitis without significant chronicity   Negative for dysplasia, neoplasia, malignancy   See comment   H.   CECUM POLYP:   Tubular adenoma   Negative for high-grade dysplasia/malignancy   I.   SIGMOID COLON POLYP:   Hyperplastic polyp   Accompanying inflammatory changes   COMMENT:  E-G.  There is minimal active colitis with only rare neutrophils   identified within the mucosal surfaces.  There is no evidence of viral cytopathic effect, significant chronic alteration, dysplasia, neoplasia, malignancy, or granulomatous inflammation.  These findings are not specific and could be seen with an infectious process, acute self-limited colitis, or even potentially inflammatory bowel disease in the appropriate clinical/endoscopic setting. Clinical correlation is needed  Assessment / Plan      1. Crohn's disease of both small and large intestine   2. Suspected arthropathy associated with inflammatory bowel disease / RA   3. Chronic iron deficiency anemia  4. Vitamin B12 deficiency  5. Rheumatoid arthritis  6. Family history of inflammatory bowel disease-UC with brother  7. Gastroesophageal reflux disease without esophagitis  8.  Long-term opioid use with nausea ?  Gastroparesis/ constipation /irritable bowel  7/31/2024  Overall patient is doing well except she still has a right-sided abdominal discomfort and intermittent nausea vomiting.  She feels that her symptoms gets worse about 2 weeks before the next infusion. Remicade trough level was 15 with antibodies less than 22. . Lab work on 5/6/2024 revealed a normal CMP except borderline creatinine of  1.14.  Iron studies revealed iron of 62 ferritin 90 iron saturation 19 vitamin B12 946 folic acid 5.3.  Hemoglobin was 12 WBC 4000 platelet count 185,000.  CT angiogram done on 10/5/2023 revealed no evidence of major peripheral vascular disease    As her Remicade trough level was good we will reduce the frequency to q. 6 weeks, 5 mg/kg  We will continue stool softeners as before  Dulcolax as needed.  Avoid any constipation without bowel movement for 48 hours  Phenergan as before  Dicyclomine 20 m p.o. twice daily as needed.  Patient is aware of the constipation risk  Continue iron pills every other day  Patient was already seen by dermatology this year.  Will continue Imuran 100 mg p.o. daily  We will schedule surveillance colonoscopy next year in 2025.  Consider working on reducing opioid use and smoking    8/31/2023  Record from [facility] reviewed. Patient did not receive her first Remicade maintenance infusion on time and had to wait for about 6 weeks.  First maintenance dose was in July 12.  And next dose is pending for September 12. Patient developed bloody diarrhea with abdominal pain and came to ED on 8/11/2023 and later on was transferred to [facility] due to no coverage from GI here.  Her CT abdomen pelvis done on 8/11/2023 revealed revealed a circumferential wall thickening from distal transverse colon to the rectum findings were in fact less pronounced than the previous findings with the prior CT scans.  There was also fluid-filled small bowel loops nondilated with air-fluid levels from ileus.     Stool C. difficile and GI panel was negative.  Her thiopurine metabolites level was only 55 indicating subtherapeutic level and 6-mercaptopurine level was 850 within limit.  Her random infliximab level was 19.28 without any antibodies.  She had a colonoscopy done by Dr. Valencia at [facility] on 8/15/2023 and reported as having severe inflammation with ulcerations on the left side colon  that may indicate ischemic colitis.  Full colonoscopy and pathology report not available to review.  Her lab work done on 8/22/2023 revealed a normal CMP.  Hemoglobin is 9.8 g/dL.  CRP 1.77.     Although colonoscopy findings suggested possible left-sided ischemic colitis.  These findings were similar to prior colitis findings are chronic , was present with a CT scan a year ago, and persistent findings noted with the colonoscopy done in December 2022, suggesting chronic inflammation. These findings suggest Crohn's flare.  Given her gap in her maintenance Remicade dose infusion for 6 weeks, played a role on this flare of unclear.  However her random Remicade level done was therapeutic and very high level midway through her treatment.      3/29/2023   Her stool calprotectin was 25 within normal limits.  Hemoglobin is still low at 11 g/dL.  Vitamin B12 is low at 199.She had a 2 induction dose of Remicade now third 1 is pending.  Initially prescribed Humira however she could not afford co-pay.     After review of recent pillcam and colonoscopy, it seems that she has chronic inflammation which may have been contributing to her anemia throughout the years. Some element of anemia of chronic disease suspected with her rheumatoid arthritis. She had erosions and ulceration in the small intestine on PillCam. Colonoscopy and colon biopsies show active colitis. History and endoscopic findings consistent with a new diagnosis of Crohn's disease. She is currently complaining of bloating, mucous in stools, mild rectal bleeding and loose stools. This is a change in bowel habits from her previous constipation. Initially diagnosed with colitis in August 2022 with bloody diarrhea.  CT abdomen pelvis done with contrast in August 2022 revealed a left-sided colon colitis.  Subsequently she had a colonoscopy done by Dr. Morel on 8/17/2022 which did not reveal any endoscopic signs of colitis.  However TI was not visualized.  Random colon  biopsies done were all normal without any signs of colitis.  Prior EGD done in 2020 did not reveal any upper GI source of bleeding. Her brother has ulcerative colitis. Previously with reports of constipation at baseline. Now having stools daily. In the past, she took MiraLAX, senna with relief. Had taken Linzess and Movantik before which did not help her. Serum porphyrin normal.      Prior history  9. Tubular adenoma of colon  Colonoscopy 12/2022 had 2 small polyps, 1 was tubular adenoma without dysplasia and other was hyperplastic.  No family history of any colon cancer with a first-degree relatives. Due to new diagnosis of IBD, she will likely need repeat colonoscopy in 1-3 years depending on clinical course      Follow Up:   No follow-ups on file.    Chaparro Marrero MD  [facility] [place]  7/31/2024  15:59 EDT     Please note that portions of this note may have been completed with a voice recognition program.

## 2024-08-02 DIAGNOSIS — K50.80 CROHN'S DISEASE OF BOTH SMALL AND LARGE INTESTINE WITHOUT COMPLICATION: Primary | ICD-10-CM

## 2024-08-02 RX ORDER — ACETAMINOPHEN 325 MG/1
650 TABLET ORAL ONCE
OUTPATIENT
Start: 2024-08-02

## 2024-08-02 RX ORDER — SODIUM CHLORIDE 9 MG/ML
20 INJECTION, SOLUTION INTRAVENOUS ONCE
OUTPATIENT
Start: 2024-08-02

## 2024-08-05 ENCOUNTER — TELEPHONE (OUTPATIENT)
Dept: GASTROENTEROLOGY | Facility: CLINIC | Age: 69
End: 2024-08-05
Payer: MEDICARE

## 2024-08-05 NOTE — TELEPHONE ENCOUNTER
----- Message from Barbara BARBARA sent at 8/2/2024  2:44 PM EDT -----    ----- Message -----  From: Chaparro Marrero MD  Sent: 8/2/2024   2:35 PM EDT  To: [facility]      Let her know that I have changed the order for infusion every 6 weeks.  She need to call the infusion and schedule the next infusion for 6 weeks from her last infusion  ----- Message -----  From: Stephanie Van  Sent: 8/2/2024   9:44 AM EDT  To: Chaparro Marrero MD      She's good to go for dose change.    Thanks  Stephanie  ----- Message -----  From: Chaparro Marrero MD  Sent: 8/1/2024  12:48 PM EDT  To: Stephanie Brown  I want to reduce her Remicade dose frequency to every 6 weeks 5 mg/kg body weight.   Can we get an approval from the insurance and let me know if approved

## 2024-08-12 RX ORDER — HYDROXYZINE PAMOATE 25 MG/1
25 CAPSULE ORAL 3 TIMES DAILY PRN
Qty: 90 CAPSULE | Refills: 1 | Status: SHIPPED | OUTPATIENT
Start: 2024-08-12

## 2024-08-12 RX ORDER — DULOXETIN HYDROCHLORIDE 60 MG/1
60 CAPSULE, DELAYED RELEASE ORAL DAILY
Qty: 90 CAPSULE | Refills: 3 | Status: SHIPPED | OUTPATIENT
Start: 2024-08-12

## 2024-08-16 ENCOUNTER — INFUSION (OUTPATIENT)
Dept: ONCOLOGY | Facility: HOSPITAL | Age: 69
End: 2024-08-16
Payer: MEDICARE

## 2024-08-16 VITALS
HEIGHT: 62 IN | RESPIRATION RATE: 12 BRPM | WEIGHT: 135.6 LBS | DIASTOLIC BLOOD PRESSURE: 52 MMHG | OXYGEN SATURATION: 92 % | BODY MASS INDEX: 24.95 KG/M2 | HEART RATE: 68 BPM | TEMPERATURE: 98.2 F | SYSTOLIC BLOOD PRESSURE: 114 MMHG

## 2024-08-16 DIAGNOSIS — K50.811 CROHN'S DISEASE OF BOTH SMALL AND LARGE INTESTINE WITH RECTAL BLEEDING: ICD-10-CM

## 2024-08-16 DIAGNOSIS — K50.80 CROHN'S DISEASE OF BOTH SMALL AND LARGE INTESTINE WITHOUT COMPLICATION: Primary | ICD-10-CM

## 2024-08-16 LAB
ALBUMIN SERPL-MCNC: 4.1 G/DL (ref 3.5–5.2)
ALBUMIN/GLOB SERPL: 1.4 G/DL
ALP SERPL-CCNC: 101 U/L (ref 39–117)
ALT SERPL W P-5'-P-CCNC: 16 U/L (ref 1–33)
ANION GAP SERPL CALCULATED.3IONS-SCNC: 9.1 MMOL/L (ref 5–15)
AST SERPL-CCNC: 22 U/L (ref 1–32)
BILIRUB SERPL-MCNC: 0.2 MG/DL (ref 0–1.2)
BUN SERPL-MCNC: 24 MG/DL (ref 8–23)
BUN/CREAT SERPL: 26.4 (ref 7–25)
CALCIUM SPEC-SCNC: 9.4 MG/DL (ref 8.6–10.5)
CHLORIDE SERPL-SCNC: 107 MMOL/L (ref 98–107)
CO2 SERPL-SCNC: 25.9 MMOL/L (ref 22–29)
CREAT SERPL-MCNC: 0.91 MG/DL (ref 0.57–1)
DEPRECATED RDW RBC AUTO: 47.5 FL (ref 37–54)
EGFRCR SERPLBLD CKD-EPI 2021: 69.7 ML/MIN/1.73
ERYTHROCYTE [DISTWIDTH] IN BLOOD BY AUTOMATED COUNT: 13.5 % (ref 12.3–15.4)
GLOBULIN UR ELPH-MCNC: 3 GM/DL
GLUCOSE SERPL-MCNC: 88 MG/DL (ref 65–99)
HCT VFR BLD AUTO: 33.4 % (ref 34–46.6)
HGB BLD-MCNC: 11.1 G/DL (ref 12–15.9)
MCH RBC QN AUTO: 31.8 PG (ref 26.6–33)
MCHC RBC AUTO-ENTMCNC: 33.2 G/DL (ref 31.5–35.7)
MCV RBC AUTO: 95.7 FL (ref 79–97)
PLATELET # BLD AUTO: 170 10*3/MM3 (ref 140–450)
PMV BLD AUTO: 9.4 FL (ref 6–12)
POTASSIUM SERPL-SCNC: 4.3 MMOL/L (ref 3.5–5.2)
PROT SERPL-MCNC: 7.1 G/DL (ref 6–8.5)
RBC # BLD AUTO: 3.49 10*6/MM3 (ref 3.77–5.28)
SODIUM SERPL-SCNC: 142 MMOL/L (ref 136–145)
WBC NRBC COR # BLD AUTO: 2.59 10*3/MM3 (ref 3.4–10.8)

## 2024-08-16 PROCEDURE — 63710000001 DIPHENHYDRAMINE PER 50 MG: Performed by: INTERNAL MEDICINE

## 2024-08-16 PROCEDURE — 36415 COLL VENOUS BLD VENIPUNCTURE: CPT

## 2024-08-16 PROCEDURE — 85027 COMPLETE CBC AUTOMATED: CPT

## 2024-08-16 PROCEDURE — 25010000002 INFLIXIMAB-ABDA 100 MG RECONSTITUTED SOLUTION 1 EACH VIAL: Performed by: INTERNAL MEDICINE

## 2024-08-16 PROCEDURE — 96413 CHEMO IV INFUSION 1 HR: CPT

## 2024-08-16 PROCEDURE — 80053 COMPREHEN METABOLIC PANEL: CPT

## 2024-08-16 PROCEDURE — 25810000003 SODIUM CHLORIDE 0.9 % SOLUTION 250 ML FLEX CONT: Performed by: INTERNAL MEDICINE

## 2024-08-16 PROCEDURE — 96415 CHEMO IV INFUSION ADDL HR: CPT

## 2024-08-16 RX ORDER — SODIUM CHLORIDE 9 MG/ML
20 INJECTION, SOLUTION INTRAVENOUS ONCE
OUTPATIENT
Start: 2024-09-27

## 2024-08-16 RX ORDER — DIPHENHYDRAMINE HCL 25 MG
25 CAPSULE ORAL ONCE
Status: COMPLETED | OUTPATIENT
Start: 2024-08-16 | End: 2024-08-16

## 2024-08-16 RX ORDER — SODIUM CHLORIDE 9 MG/ML
20 INJECTION, SOLUTION INTRAVENOUS ONCE
Status: DISCONTINUED | OUTPATIENT
Start: 2024-08-16 | End: 2024-08-16 | Stop reason: HOSPADM

## 2024-08-16 RX ORDER — ACETAMINOPHEN 325 MG/1
650 TABLET ORAL ONCE
OUTPATIENT
Start: 2024-09-27

## 2024-08-16 RX ORDER — ACETAMINOPHEN 325 MG/1
650 TABLET ORAL ONCE
Status: COMPLETED | OUTPATIENT
Start: 2024-08-16 | End: 2024-08-16

## 2024-08-16 RX ADMIN — DIPHENHYDRAMINE HYDROCHLORIDE 25 MG: 25 CAPSULE ORAL at 11:11

## 2024-08-16 RX ADMIN — INFLIXIMAB 310 MG: 100 INJECTION, POWDER, LYOPHILIZED, FOR SOLUTION INTRAVENOUS at 11:31

## 2024-08-16 RX ADMIN — ACETAMINOPHEN 650 MG: 325 TABLET, FILM COATED ORAL at 11:10

## 2024-09-11 DIAGNOSIS — K50.811 CROHN'S DISEASE OF BOTH SMALL AND LARGE INTESTINE WITH RECTAL BLEEDING: ICD-10-CM

## 2024-09-11 DIAGNOSIS — D50.0 IRON DEFICIENCY ANEMIA DUE TO CHRONIC BLOOD LOSS: ICD-10-CM

## 2024-09-11 RX ORDER — FERROUS GLUCONATE 324(38)MG
324 TABLET ORAL
Qty: 30 TABLET | Refills: 2 | Status: SHIPPED | OUTPATIENT
Start: 2024-09-11

## 2024-09-17 RX ORDER — ATORVASTATIN CALCIUM 20 MG/1
20 TABLET, FILM COATED ORAL NIGHTLY
Qty: 90 TABLET | Refills: 3 | Status: SHIPPED | OUTPATIENT
Start: 2024-09-17

## 2024-09-27 ENCOUNTER — HOSPITAL ENCOUNTER (OUTPATIENT)
Facility: HOSPITAL | Age: 69
Discharge: HOME | End: 2024-09-27
Admitting: INTERNAL MEDICINE
Payer: MEDICARE

## 2024-09-27 VITALS
OXYGEN SATURATION: 93 % | SYSTOLIC BLOOD PRESSURE: 118 MMHG | DIASTOLIC BLOOD PRESSURE: 68 MMHG | TEMPERATURE: 97.9 F | WEIGHT: 138 LBS | HEIGHT: 62 IN | BODY MASS INDEX: 25.4 KG/M2 | RESPIRATION RATE: 18 BRPM | HEART RATE: 65 BPM

## 2024-09-27 DIAGNOSIS — K50.80 CROHN'S DISEASE OF BOTH SMALL AND LARGE INTESTINE WITHOUT COMPLICATION: Primary | ICD-10-CM

## 2024-09-27 PROCEDURE — 25010000002 INFLIXIMAB PER 10 MG: Performed by: INTERNAL MEDICINE

## 2024-09-27 PROCEDURE — 96413 CHEMO IV INFUSION 1 HR: CPT

## 2024-09-27 PROCEDURE — 96415 CHEMO IV INFUSION ADDL HR: CPT

## 2024-09-27 PROCEDURE — 25810000003 SODIUM CHLORIDE 0.9 % SOLUTION 250 ML FLEX CONT: Performed by: INTERNAL MEDICINE

## 2024-09-27 RX ORDER — SODIUM CHLORIDE 9 MG/ML
20 INJECTION, SOLUTION INTRAVENOUS ONCE
OUTPATIENT
Start: 2024-11-08

## 2024-09-27 RX ORDER — SODIUM CHLORIDE 9 MG/ML
20 INJECTION, SOLUTION INTRAVENOUS ONCE
Status: DISCONTINUED | OUTPATIENT
Start: 2024-09-27 | End: 2024-09-28 | Stop reason: HOSPADM

## 2024-09-27 RX ORDER — ACETAMINOPHEN 325 MG/1
650 TABLET ORAL ONCE
OUTPATIENT
Start: 2024-11-08

## 2024-09-27 RX ORDER — ACETAMINOPHEN 325 MG/1
650 TABLET ORAL ONCE
Status: COMPLETED | OUTPATIENT
Start: 2024-09-27 | End: 2024-09-27

## 2024-09-27 RX ADMIN — ACETAMINOPHEN 325 MG: 325 TABLET, FILM COATED ORAL at 10:01

## 2024-09-27 RX ADMIN — INFLIXIMAB 310 MG: 100 INJECTION, POWDER, LYOPHILIZED, FOR SOLUTION INTRAVENOUS at 10:33

## 2024-10-11 DIAGNOSIS — K21.9 GASTROESOPHAGEAL REFLUX DISEASE WITHOUT ESOPHAGITIS: ICD-10-CM

## 2024-10-11 RX ORDER — PANTOPRAZOLE SODIUM 20 MG/1
20 TABLET, DELAYED RELEASE ORAL DAILY
Qty: 90 TABLET | Refills: 3 | Status: SHIPPED | OUTPATIENT
Start: 2024-10-11

## 2024-10-15 RX ORDER — CYCLOBENZAPRINE HCL 10 MG
10 TABLET ORAL 3 TIMES DAILY
Qty: 270 TABLET | Refills: 3 | Status: SHIPPED | OUTPATIENT
Start: 2024-10-15

## 2024-10-30 RX ORDER — HYDROXYZINE PAMOATE 25 MG/1
25 CAPSULE ORAL 3 TIMES DAILY PRN
Qty: 90 CAPSULE | Refills: 1 | Status: SHIPPED | OUTPATIENT
Start: 2024-10-30

## 2024-10-31 RX ORDER — ROPINIROLE 2 MG/1
TABLET, FILM COATED ORAL
Qty: 90 TABLET | Refills: 1 | Status: SHIPPED | OUTPATIENT
Start: 2024-10-31

## 2024-11-05 RX ORDER — DOCUSATE SODIUM 100 MG/1
100 CAPSULE, LIQUID FILLED ORAL 2 TIMES DAILY
Qty: 180 CAPSULE | Refills: 2 | Status: SHIPPED | OUTPATIENT
Start: 2024-11-05

## 2024-11-08 ENCOUNTER — HOSPITAL ENCOUNTER (OUTPATIENT)
Facility: HOSPITAL | Age: 69
Discharge: HOME | End: 2024-11-08
Payer: MEDICARE

## 2024-11-08 ENCOUNTER — TELEPHONE (OUTPATIENT)
Dept: NEUROLOGY | Facility: CLINIC | Age: 69
End: 2024-11-08

## 2024-11-08 VITALS
OXYGEN SATURATION: 97 % | BODY MASS INDEX: 23.78 KG/M2 | WEIGHT: 130 LBS | HEART RATE: 80 BPM | SYSTOLIC BLOOD PRESSURE: 125 MMHG | DIASTOLIC BLOOD PRESSURE: 66 MMHG | RESPIRATION RATE: 18 BRPM | TEMPERATURE: 98.2 F

## 2024-11-08 DIAGNOSIS — K50.80 CROHN'S DISEASE OF BOTH SMALL AND LARGE INTESTINE WITHOUT COMPLICATION: Primary | ICD-10-CM

## 2024-11-08 DIAGNOSIS — G31.84 MCI (MILD COGNITIVE IMPAIRMENT): Primary | ICD-10-CM

## 2024-11-08 LAB
ALBUMIN SERPL-MCNC: 4 G/DL (ref 3.5–5.2)
ALBUMIN/GLOB SERPL: 1.6 G/DL
ALP SERPL-CCNC: 90 U/L (ref 39–117)
ALT SERPL W P-5'-P-CCNC: 13 U/L (ref 1–33)
ANION GAP SERPL CALCULATED.3IONS-SCNC: 7.6 MMOL/L (ref 5–15)
AST SERPL-CCNC: 22 U/L (ref 1–32)
BILIRUB SERPL-MCNC: 0.3 MG/DL (ref 0–1.2)
BUN SERPL-MCNC: 19 MG/DL (ref 8–23)
BUN/CREAT SERPL: 18.4 (ref 7–25)
CALCIUM SPEC-SCNC: 8.9 MG/DL (ref 8.6–10.5)
CHLORIDE SERPL-SCNC: 109 MMOL/L (ref 98–107)
CO2 SERPL-SCNC: 26.4 MMOL/L (ref 22–29)
CREAT SERPL-MCNC: 1.03 MG/DL (ref 0.57–1)
EGFRCR SERPLBLD CKD-EPI 2021: 59.7 ML/MIN/1.73
GLOBULIN UR ELPH-MCNC: 2.5 GM/DL
GLUCOSE SERPL-MCNC: 81 MG/DL (ref 65–99)
POTASSIUM SERPL-SCNC: 4.7 MMOL/L (ref 3.5–5.2)
PROT SERPL-MCNC: 6.5 G/DL (ref 6–8.5)
SODIUM SERPL-SCNC: 143 MMOL/L (ref 136–145)

## 2024-11-08 PROCEDURE — 80053 COMPREHEN METABOLIC PANEL: CPT | Performed by: INTERNAL MEDICINE

## 2024-11-08 PROCEDURE — 96415 CHEMO IV INFUSION ADDL HR: CPT

## 2024-11-08 PROCEDURE — 96413 CHEMO IV INFUSION 1 HR: CPT

## 2024-11-08 PROCEDURE — 25010000002 INFLIXIMAB-ABDA 100 MG RECONSTITUTED SOLUTION 1 EACH VIAL: Performed by: INTERNAL MEDICINE

## 2024-11-08 PROCEDURE — 25810000003 SODIUM CHLORIDE 0.9 % SOLUTION 250 ML FLEX CONT: Performed by: INTERNAL MEDICINE

## 2024-11-08 RX ORDER — SODIUM CHLORIDE 9 MG/ML
20 INJECTION, SOLUTION INTRAVENOUS ONCE
Status: DISCONTINUED | OUTPATIENT
Start: 2024-11-08 | End: 2024-11-09 | Stop reason: HOSPADM

## 2024-11-08 RX ORDER — SODIUM CHLORIDE 9 MG/ML
20 INJECTION, SOLUTION INTRAVENOUS ONCE
OUTPATIENT
Start: 2024-12-20

## 2024-11-08 RX ORDER — ACETAMINOPHEN 325 MG/1
650 TABLET ORAL ONCE
OUTPATIENT
Start: 2024-12-20

## 2024-11-08 RX ORDER — ACETAMINOPHEN 325 MG/1
650 TABLET ORAL ONCE
Status: COMPLETED | OUTPATIENT
Start: 2024-11-08 | End: 2024-11-08

## 2024-11-08 RX ADMIN — ACETAMINOPHEN 650 MG: 325 TABLET, FILM COATED ORAL at 09:44

## 2024-11-08 RX ADMIN — INFLIXIMAB 300 MG: 100 INJECTION, POWDER, LYOPHILIZED, FOR SOLUTION INTRAVENOUS at 10:14

## 2024-11-08 NOTE — TELEPHONE ENCOUNTER
Caller: Stephanie Jean    Relationship: Self    Best call back number: [phone]      What is the medical concern/diagnosis: DEMENTIA    What specialty or service is being requested: [facility]    What is the provider, practice or medical service name:  [facility]    What is the office location: [address] [place] [address]     What is the office phone number: [phone]    Any additional details: PT STATES ANNEMARIE GARCAI HAD DISCUSSED REFERRING TO  [facility], PT WANTED TO THINK ABOUT IT AND NOW HAS DECIDED SHE WOULD LIKE TO MOVE FORWARD WITH THE REFERRAL.      PLEASE REVIEW AND ADVISE

## 2024-11-17 DIAGNOSIS — K50.811 CROHN'S DISEASE OF BOTH SMALL AND LARGE INTESTINE WITH RECTAL BLEEDING: ICD-10-CM

## 2024-11-17 DIAGNOSIS — R11.0 NAUSEA: ICD-10-CM

## 2024-11-19 RX ORDER — PROMETHAZINE HYDROCHLORIDE 25 MG/1
12.5 TABLET ORAL EVERY 8 HOURS PRN
Qty: 30 TABLET | Refills: 2 | Status: SHIPPED | OUTPATIENT
Start: 2024-11-19

## 2024-11-25 ENCOUNTER — OFFICE VISIT (OUTPATIENT)
Dept: INTERNAL MEDICINE | Facility: CLINIC | Age: 69
End: 2024-11-25
Payer: MEDICARE

## 2024-11-25 ENCOUNTER — TRANSCRIBE ORDERS (OUTPATIENT)
Dept: GENERAL RADIOLOGY | Facility: HOSPITAL | Age: 69
End: 2024-11-25
Payer: MEDICARE

## 2024-11-25 ENCOUNTER — HOSPITAL ENCOUNTER (OUTPATIENT)
Dept: GENERAL RADIOLOGY | Facility: HOSPITAL | Age: 69
Discharge: HOME | End: 2024-11-25
Payer: MEDICARE

## 2024-11-25 VITALS
OXYGEN SATURATION: 98 % | BODY MASS INDEX: 25.03 KG/M2 | HEIGHT: 62 IN | DIASTOLIC BLOOD PRESSURE: 82 MMHG | WEIGHT: 136 LBS | HEART RATE: 88 BPM | TEMPERATURE: 97 F | SYSTOLIC BLOOD PRESSURE: 122 MMHG

## 2024-11-25 DIAGNOSIS — M47.812 SPONDYLOSIS WITHOUT MYELOPATHY OR RADICULOPATHY, CERVICAL REGION: Primary | ICD-10-CM

## 2024-11-25 DIAGNOSIS — F41.9 ANXIETY: ICD-10-CM

## 2024-11-25 DIAGNOSIS — M47.814 SPONDYLOSIS WITHOUT MYELOPATHY OR RADICULOPATHY, THORACIC REGION: ICD-10-CM

## 2024-11-25 DIAGNOSIS — Z00.00 MEDICARE ANNUAL WELLNESS VISIT, SUBSEQUENT: Primary | ICD-10-CM

## 2024-11-25 DIAGNOSIS — M47.812 SPONDYLOSIS WITHOUT MYELOPATHY OR RADICULOPATHY, CERVICAL REGION: ICD-10-CM

## 2024-11-25 DIAGNOSIS — R32 URINARY INCONTINENCE, UNSPECIFIED TYPE: ICD-10-CM

## 2024-11-25 DIAGNOSIS — F17.210 NICOTINE DEPENDENCE, CIGARETTES, UNCOMPLICATED: ICD-10-CM

## 2024-11-25 PROCEDURE — 99214 OFFICE O/P EST MOD 30 MIN: CPT | Performed by: FAMILY MEDICINE

## 2024-11-25 PROCEDURE — 1159F MED LIST DOCD IN RCRD: CPT | Performed by: FAMILY MEDICINE

## 2024-11-25 PROCEDURE — 3074F SYST BP LT 130 MM HG: CPT | Performed by: FAMILY MEDICINE

## 2024-11-25 PROCEDURE — 1125F AMNT PAIN NOTED PAIN PRSNT: CPT | Performed by: FAMILY MEDICINE

## 2024-11-25 PROCEDURE — 1170F FXNL STATUS ASSESSED: CPT | Performed by: FAMILY MEDICINE

## 2024-11-25 PROCEDURE — 1160F RVW MEDS BY RX/DR IN RCRD: CPT | Performed by: FAMILY MEDICINE

## 2024-11-25 PROCEDURE — 72070 X-RAY EXAM THORAC SPINE 2VWS: CPT

## 2024-11-25 PROCEDURE — 72040 X-RAY EXAM NECK SPINE 2-3 VW: CPT

## 2024-11-25 PROCEDURE — G0439 PPPS, SUBSEQ VISIT: HCPCS | Performed by: FAMILY MEDICINE

## 2024-11-25 PROCEDURE — 3079F DIAST BP 80-89 MM HG: CPT | Performed by: FAMILY MEDICINE

## 2024-11-25 RX ORDER — BUSPIRONE HYDROCHLORIDE 15 MG/1
15 TABLET ORAL 2 TIMES DAILY PRN
Qty: 180 TABLET | Refills: 3 | Status: SHIPPED | OUTPATIENT
Start: 2024-11-25

## 2024-11-25 RX ORDER — OXYBUTYNIN CHLORIDE 10 MG/1
10 TABLET, EXTENDED RELEASE ORAL DAILY
Qty: 90 TABLET | Refills: 3 | Status: SHIPPED | OUTPATIENT
Start: 2024-11-25

## 2024-11-25 RX ORDER — HYDROXYCHLOROQUINE SULFATE 200 MG/1
300 TABLET, FILM COATED ORAL DAILY
COMMUNITY
Start: 2024-08-22

## 2024-11-25 NOTE — PATIENT INSTRUCTIONS
Advance Directive    Advance directives are legal documents that allow you to make decisions about your health care and medical treatment in case you become unable to communicate for yourself. Advance directives let your wishes be known to family, friends, and health care providers.  Discussing and writing advance directives should happen over time rather than all at once. Advance directives can be changed and updated at any time. There are different types of advance directives, such as:  Medical power of .  Living will.  Do not resuscitate (DNR) order or do not attempt resuscitation (DNAR) order.  Health care proxy and medical power of   A health care proxy is also called a health care agent. This person is appointed to make medical decisions for you when you are unable to make decisions for yourself. Generally, people ask a trusted friend or family member to act as their proxy and represent their preferences. Make sure you have an agreement with your trusted person to act as your proxy. A proxy may have to make a medical decision on your behalf if your wishes are not known.  A medical power of , also called a durable power of  for health care, is a legal document that names your health care proxy. Depending on the laws in your state, the document may need to be:  Signed.  Notarized.  Dated.  Copied.  Witnessed.  Incorporated into your medical record.  You may also want to appoint a trusted person to manage your money in the event you are unable to do so. This is called a durable power of  for finances. It is a separate legal document from the durable power of  for health care. You may choose your health care proxy or someone different to act as your agent in money matters.  If you do not appoint a proxy, or there is a concern that the proxy is not acting in your best interest, a court may appoint a guardian to act on your behalf.  Living will  A living will is a set  of instructions that state your wishes about medical care when you cannot express them yourself. Health care providers should keep a copy of your living will in your medical record. You may want to give a copy to family members or friends. To alert caregivers in case of an emergency, you can place a card in your wallet to let them know that you have a living will and where they can find it. A living will is used if you become:  Terminally ill.  Disabled.  Unable to communicate or make decisions.  The following decisions should be included in your living will:  To use or not to use life support equipment, such as dialysis machines and breathing machines (ventilators).  Whether you want a DNR or DNAR order. This tells health care providers not to use cardiopulmonary resuscitation (CPR) if breathing or heartbeat stops.  To use or not to use tube feeding.  To be given or not to be given food and fluids.  Whether you want comfort (palliative) care when the goal becomes comfort rather than a cure.  Whether you want to donate your organs and tissues.  A living will does not give instructions for distributing your money and property if you should pass away.  DNR or DNAR  A DNR or DNAR order is a request not to have CPR in the event that your heart stops beating or you stop breathing. If a DNR or DNAR order has not been made and shared, a health care provider will try to help any patient whose heart has stopped or who has stopped breathing. If you plan to have surgery, talk with your health care provider about how your DNR or DNAR order will be followed if problems occur.  What if I do not have an advance directive?  Some states assign family decision makers to act on your behalf if you do not have an advance directive. Each state has its own laws about advance directives. You may want to check with your health care provider, , or state representative about the laws in your state.  Summary  Advance directives are  legal documents that allow you to make decisions about your health care and medical treatment in case you become unable to communicate for yourself.  The process of discussing and writing advance directives should happen over time. You can change and update advance directives at any time.  Advance directives may include a medical power of , a living will, and a DNR or DNAR order.  This information is not intended to replace advice given to you by your health care provider. Make sure you discuss any questions you have with your health care provider.  Document Revised: 2021 Document Reviewed: 2021  [employer] Patient Education ©  [employer]    Medicare Wellness  Personal Prevention Plan of Service     Date of Office Visit:    Encounter Provider:  Nhung Shepard DO  Place of Service:  [facility]  Patient Name: Stephanie Jean  :  1955    As part of the Medicare Wellness portion of your visit today, we are providing you with this personalized preventive plan of services (PPPS). This plan is based upon recommendations of the [place] Preventive Services Task Force (USPSTF) and the Advisory Committee on Immunization Practices (ACIP).    This lists the preventive care services that should be considered, and provides dates of when you are due. Items listed as completed are up-to-date and do not require any further intervention.    Health Maintenance   Topic Date Due    ANNUAL WELLNESS VISIT  2024    COVID-19 Vaccine (2024- season) 2024 (Originally 2024)    LIPID PANEL  2025    MAMMOGRAM  2025    DXA SCAN  2026    TDAP/TD VACCINES (3 - Td or Tdap) 2026    COLORECTAL CANCER SCREENING  08/15/2033    HEPATITIS C SCREENING  Completed    INFLUENZA VACCINE  Completed    Pneumococcal Vaccine 65+  Completed    LUNG CANCER SCREENING  Discontinued       No orders of the defined types were placed in this  encounter.      No follow-ups on file.

## 2024-11-25 NOTE — PROGRESS NOTES
Subjective   The ABCs of the Annual Wellness Visit  Medicare Wellness Visit      Stephanie Jean is a 69 y.o. patient who presents for a Medicare Wellness Visit.    The following portions of the patient's history were reviewed and   updated as appropriate: allergies, current medications, past family history, past medical history, past social history, past surgical history, and problem list.    Compared to one year ago, the patient's physical   health is better.  Compared to one year ago, the patient's mental   health is worse.    Recent Hospitalizations:  This patient has had a [facility] admission record on file within the last 365 days.  Current Medical Providers:  Patient Care Team:  Nhung Shepard DO as PCP - General (Family Medicine)  Chaparro Marrero MD as Consulting Physician (Gastroenterology)    Outpatient Medications Prior to Visit   Medication Sig Dispense Refill    aspirin 81 MG EC tablet Take 1 tablet by mouth. Every other day      atorvastatin (LIPITOR) 20 MG tablet TAKE 1 TABLET BY MOUTH EVERY NIGHT 90 tablet 3    azaTHIOprine (IMURAN) 50 MG tablet Take 2 tablets by mouth Daily. 180 tablet 3    busPIRone (BUSPAR) 10 MG tablet TAKE 1 TABLET BY MOUTH TWICE DAILY AS NEEDED FOR ANXIETY 180 tablet 3    cyclobenzaprine (FLEXERIL) 10 MG tablet TAKE 1 TABLET BY MOUTH THREE TIMES DAILY 270 tablet 3    Diclofenac Sodium (VOLTAREN) 1 % gel gel diclofenac 1 % topical gel   APPLY 1 GRAM TOPICALLY TO THE AFFECTED AREA TWICE DAILY AS NEEDED FOR PAIN      dicyclomine (BENTYL) 20 MG tablet Take 1 tablet by mouth 2 (Two) Times a Day As Needed for Abdominal Cramping. 60 tablet 1    docusate sodium (COLACE) 100 MG capsule TAKE 1 CAPSULE BY MOUTH TWICE DAILY 180 capsule 2    donepezil (ARICEPT) 10 MG tablet Take 1 tablet by mouth Every Night. 90 tablet 1    DULoxetine (CYMBALTA) 60 MG capsule TAKE 1 CAPSULE BY MOUTH DAILY 90 capsule 3    ferrous gluconate (FERGON) 324 MG tablet TAKE 1 TABLET BY MOUTH  DAILY WITH BREAKFAST 30 tablet 2    fluticasone (FLONASE) 50 MCG/ACT nasal spray SHAKE LIQUID AND USE 1 TO 2 SPRAYS IN EACH NOSTRIL EVERY DAY AS NEEDED      furosemide (LASIX) 20 MG tablet TAKE 1 TABLET BY MOUTH DAILY AS NEEDED FOR SWELLING 30 tablet 1    gabapentin (NEURONTIN) 800 MG tablet Take 1 tablet by mouth 2 (Two) Times a Day.      hydroxychloroquine (PLAQUENIL) 200 MG tablet Take 1.5 tablets by mouth Daily.      hydrOXYzine pamoate (VISTARIL) 25 MG capsule TAKE 1 CAPSULE BY MOUTH THREE TIMES DAILY AS NEEDED FOR ITCHING 90 capsule 1    inFLIXimab (REMICADE IV) Infuse  into a venous catheter Take As Directed.      lactulose (CHRONULAC) 10 GM/15ML solution Take 15 mL by mouth Daily. 450 mL 2    lisinopril (PRINIVIL,ZESTRIL) 10 MG tablet Take 1 tablet by mouth Every Night. 90 tablet 3    loratadine (CLARITIN) 10 MG tablet Take 1 tablet by mouth As Needed.      meclizine 25 MG chewable tablet chewable tablet Chew 0.5 tablets Every 8 (Eight) Hours.      memantine (NAMENDA) 10 MG tablet Take 1 tablet by mouth Daily. 90 tablet 1    mupirocin (BACTROBAN) 2 % ointment Apply 1 Application topically to the appropriate area as directed 3 (Three) Times a Day. 15 g 0    nitroglycerin (NITROSTAT) 0.4 MG SL tablet Place 1 tablet under the tongue Every 5 (Five) Minutes As Needed.      Other, Add info into free text field below, ESTRIOL 0.3% FACIAL VERSABASE CREAM      oxyCODONE-acetaminophen (PERCOCET) 7.5-325 MG per tablet Take 1 tablet by mouth 2 (Two) Times a Day.      pantoprazole (PROTONIX) 20 MG EC tablet TAKE 1 TABLET BY MOUTH DAILY 90 tablet 3    promethazine (PHENERGAN) 25 MG tablet TAKE 1/2 TABLET BY MOUTH EVERY 8 HOURS AS NEEDED FOR NAUSEA OR VOMITING 30 tablet 2    Restasis 0.05 % ophthalmic emulsion Administer 1 drop to both eyes 2 (Two) Times a Day.      rOPINIRole (REQUIP) 2 MG tablet TAKE 1 TABLET BY MOUTH EVERY DAY 1 TO 3 HOURS BEFORE BEDTIME 90 tablet 1    vitamin B-12 (CYANOCOBALAMIN) 1000 MCG tablet TAKE  "1 TABLET BY MOUTH EVERY DAY 90 tablet 3    vitamin D (ERGOCALCIFEROL) 1.25 MG (47751 UT) capsule capsule TAKE 1 CAPSULE BY MOUTH ONCE A WEEK 15 capsule 3    sulfamethoxazole-trimethoprim (Bactrim DS) 800-160 MG per tablet Take 1 tablet by mouth 2 (Two) Times a Day. (Patient not taking: Reported on 11/25/2024) 14 tablet 0     No facility-administered medications prior to visit.     Opioid medication/s are on active medication list.  and I have evaluated her active treatment plan and pain score trends (see table).  Vitals:    11/25/24 0952   PainSc:   6     I have reviewed the chart for potential of high risk medication and harmful drug interactions in the elderly.        Aspirin is on active medication list. Aspirin use is indicated based on review of current medical condition/s. Pros and cons of this therapy have been discussed today. Benefits of this medication outweigh potential harm.  Patient has been encouraged to continue taking this medication.  .      Patient Active Problem List   Diagnosis    Gastroesophageal reflux disease without esophagitis    Esophageal dysphagia    Chest pain    Abnormal ECG    Dizziness    Transient ischemic attack (TIA)    Nuclear sclerotic cataract of left eye    Primary hypertension    Mixed hyperlipidemia    Fibromyalgia    SLE (systemic lupus erythematosus related syndrome)    Anxiety    Iron deficiency anemia    Crohn's disease of both small and large intestine without complication    Anemia    Lower extremity edema    Vitamin D deficiency    Ischemic colitis    Urinary incontinence     Advance Care Planning Advance Directive is not on file.  ACP discussion was held with the patient during this visit. Patient does not have an advance directive, information provided.            Objective   Vitals:    11/25/24 0952   BP: 122/82   Pulse: 88   Temp: 97 °F (36.1 °C)   SpO2: 98%   Weight: 61.7 kg (136 lb)   Height: 157.5 cm (62\")   PainSc:   6       Estimated body mass index is 24.87 " "kg/m² as calculated from the following:    Height as of this encounter: 157.5 cm (62\").    Weight as of this encounter: 61.7 kg (136 lb).    BMI is within normal parameters. No other follow-up for BMI required.       Does the patient have evidence of cognitive impairment? No                                                                                                Health  Risk Assessment    Smoking Status:  Social History     Tobacco Use   Smoking Status Former    Current packs/day: 0.00    Average packs/day: 1 pack/day for 15.0 years (15.0 ttl pk-yrs)    Types: Cigarettes    Start date: 2003    Quit date: 2018    Years since quittin.9   Smokeless Tobacco Never     Alcohol Consumption:  Social History     Substance and Sexual Activity   Alcohol Use No       Fall Risk Screen  STEADI Fall Risk Assessment was completed, and patient is at MODERATE risk for falls. Assessment completed on:2024    Depression Screening   Little interest or pleasure in doing things? Not at all   Feeling down, depressed, or hopeless? Not at all   PHQ-2 Total Score 0      Health Habits and Functional and Cognitive Screenin/25/2024     9:56 AM   Functional & Cognitive Status   Do you have difficulty preparing food and eating? No   Do you have difficulty bathing yourself, getting dressed or grooming yourself? No   Do you have difficulty using the toilet? No   Do you have difficulty moving around from place to place? No   Do you have trouble with steps or getting out of a bed or a chair? No   Current Diet Well Balanced Diet   Dental Exam Up to date   Eye Exam Up to date   Exercise (times per week) 7 times per week   Current Exercises Include Other        Exercise Comment stretching and ROM   Do you need help using the phone?  No   Are you deaf or do you have serious difficulty hearing?  No   Do you need help to go to places out of walking distance? No   Do you need help shopping? No   Do you need help preparing " meals?  No   Do you need help with housework?  No   Do you need help with laundry? No   Do you need help taking your medications? No   Do you need help managing money? No   Do you ever drive or ride in a car without wearing a seat belt? No   Have you felt unusual stress, anger or loneliness in the last month? No   Who do you live with? Spouse   If you need help, do you have trouble finding someone available to you? No   Have you been bothered in the last four weeks by sexual problems? No   Do you have difficulty concentrating, remembering or making decisions? Yes           Age-appropriate Screening Schedule:  Refer to the list below for future screening recommendations based on patient's age, sex and/or medical conditions. Orders for these recommended tests are listed in the plan section. The patient has been provided with a written plan.    Health Maintenance List  Health Maintenance   Topic Date Due    ANNUAL WELLNESS VISIT  11/20/2024    COVID-19 Vaccine (4 - 2024-25 season) 11/27/2024 (Originally 9/1/2024)    LIPID PANEL  05/20/2025    MAMMOGRAM  11/09/2025    DXA SCAN  03/08/2026    TDAP/TD VACCINES (3 - Td or Tdap) 12/29/2026    COLORECTAL CANCER SCREENING  08/15/2033    HEPATITIS C SCREENING  Completed    INFLUENZA VACCINE  Completed    Pneumococcal Vaccine 65+  Completed    LUNG CANCER SCREENING  Discontinued                                                                                                                                                CMS Preventative Services Quick Reference  Risk Factors Identified During Encounter  Immunizations Discussed/Encouraged: Influenza and COVID19    The above risks/problems have been discussed with the patient.  Pertinent information has been shared with the patient in the After Visit Summary.  An After Visit Summary and PPPS were made available to the patient.    Follow Up:   Next Medicare Wellness visit to be scheduled in 1 year.         Additional E&M Note  "during same encounter follows:  Patient has additional, significant, and separately identifiable condition(s)/problem(s) that require work above and beyond the Medicare Wellness Visit     Chief Complaint  Medicare Wellness-subsequent (AWV and preventative exam.  )    Subjective   HPI  Stephanie is also being seen today for additional medical problem/s.  She reports difficulty with urinary incontinence.  She was unable to afford Myrbetriq.  She is interested in trying another medication for incontinence.  In addition, she complains of increased anxiety here lately.  She does report that BuSpar seemed effective previously.  She is interested in increasing the dosage.    Review of Systems   Constitutional:  Positive for fatigue. Negative for chills and fever.   HENT:  Negative for congestion.    Respiratory:  Positive for shortness of breath. Negative for cough.    Cardiovascular:  Negative for chest pain.   Gastrointestinal:  Positive for abdominal pain, diarrhea, nausea and vomiting. Negative for constipation.   Genitourinary:  Positive for frequency.   Musculoskeletal:  Positive for arthralgias.   Skin:  Negative for rash.   Hematological:  Bruises/bleeds easily.   Psychiatric/Behavioral:  The patient is nervous/anxious.               Objective   Vital Signs:  /82   Pulse 88   Temp 97 °F (36.1 °C)   Ht 157.5 cm (62\")   Wt 61.7 kg (136 lb)   SpO2 98%   BMI 24.87 kg/m²   Physical Exam  Constitutional:       General: She is not in acute distress.     Appearance: Normal appearance. She is well-developed.   HENT:      Head: Normocephalic and atraumatic.      Right Ear: Tympanic membrane and external ear normal.      Left Ear: Tympanic membrane and external ear normal.      Mouth/Throat:      Pharynx: No posterior oropharyngeal erythema.   Eyes:      Extraocular Movements: Extraocular movements intact.      Conjunctiva/sclera: Conjunctivae normal.      Pupils: Pupils are equal, round, and reactive to light. "   Neck:      Vascular: No carotid bruit.   Cardiovascular:      Rate and Rhythm: Normal rate and regular rhythm.      Heart sounds: No murmur heard.  Pulmonary:      Effort: Pulmonary effort is normal. No respiratory distress.      Breath sounds: Normal breath sounds. No wheezing.   Abdominal:      General: Bowel sounds are normal. There is no distension.      Palpations: Abdomen is soft.      Tenderness: There is no abdominal tenderness.   Musculoskeletal:         General: Normal range of motion.      Cervical back: Neck supple.   Lymphadenopathy:      Cervical: No cervical adenopathy.   Skin:     General: Skin is warm and dry.   Neurological:      Mental Status: She is alert and oriented to person, place, and time.      Cranial Nerves: No cranial nerve deficit.      Deep Tendon Reflexes: Reflexes normal.   Psychiatric:         Mood and Affect: Mood normal.         Behavior: Behavior normal.         The following data was reviewed by: Nhung Shepard DO on 11/25/2024:  Data reviewed : Labs  CMP          5/6/2024    09:43 8/16/2024    10:55 11/8/2024    09:15   CMP   Glucose 91  88  81    BUN 20  24  19    Creatinine 1.14  0.91  1.03    EGFR 53.2  69.7  59.7    Sodium 141  142  143    Potassium 4.6  4.3  4.7    Chloride 108  107  109    Calcium 9.5  9.4  8.9    Total Protein 7.0  7.1  6.5    Albumin 4.6  4.1  4.0    Globulin 2.4  3.0  2.5    Total Bilirubin 0.2  0.2  0.3    Alkaline Phosphatase 89  101  90    AST (SGOT) 16  22  22    ALT (SGPT) 15  16  13    Albumin/Globulin Ratio 1.9  1.4  1.6    BUN/Creatinine Ratio 17.5  26.4  18.4    Anion Gap 6.0  9.1  7.6              Assessment and Plan       Diagnoses and all orders for this visit:    1. Medicare annual wellness visit, subsequent (Primary)    2. Nicotine dependence, cigarettes, uncomplicated  -      CT Chest Low Dose Cancer Screening WO    3. Urinary incontinence, unspecified type    4. Anxiety    Other orders  -     oxybutynin XL (DITROPAN-XL) 10 MG 24  hr tablet; Take 1 tablet by mouth Daily.  Dispense: 90 tablet; Refill: 3  -     busPIRone (BUSPAR) 15 MG tablet; Take 1 tablet by mouth 2 (Two) Times a Day As Needed (anxiety). for anxiety  Dispense: 180 tablet; Refill: 3    Discussed routine health maintenance with the patient.  She is due for low-dose CT scan of the chest for lung cancer screening.  She is not due for any blood work at this time.  BuSpar has been increased to 50 mg up to 3 times daily as needed for anxiety.  Oxybutynin has been started for urinary incontinence.    Follow Up   No follow-ups on file.  Patient was given instructions and counseling regarding her condition or for health maintenance advice. Please see specific information pulled into the AVS if appropriate.

## 2024-11-30 DIAGNOSIS — D50.0 IRON DEFICIENCY ANEMIA DUE TO CHRONIC BLOOD LOSS: ICD-10-CM

## 2024-11-30 DIAGNOSIS — K50.811 CROHN'S DISEASE OF BOTH SMALL AND LARGE INTESTINE WITH RECTAL BLEEDING: ICD-10-CM

## 2024-12-02 RX ORDER — FERROUS GLUCONATE 324(38)MG
324 TABLET ORAL
Qty: 30 TABLET | Refills: 2 | Status: SHIPPED | OUTPATIENT
Start: 2024-12-02

## 2024-12-11 ENCOUNTER — APPOINTMENT (OUTPATIENT)
Dept: CT IMAGING | Facility: HOSPITAL | Age: 69
End: 2024-12-11
Payer: MEDICARE

## 2024-12-11 ENCOUNTER — HOSPITAL ENCOUNTER (EMERGENCY)
Facility: HOSPITAL | Age: 69
Discharge: HOME | End: 2024-12-11
Attending: EMERGENCY MEDICINE | Admitting: EMERGENCY MEDICINE
Payer: MEDICARE

## 2024-12-11 VITALS
RESPIRATION RATE: 16 BRPM | SYSTOLIC BLOOD PRESSURE: 102 MMHG | OXYGEN SATURATION: 97 % | WEIGHT: 137 LBS | TEMPERATURE: 98 F | DIASTOLIC BLOOD PRESSURE: 71 MMHG | BODY MASS INDEX: 25.21 KG/M2 | HEART RATE: 78 BPM | HEIGHT: 62 IN

## 2024-12-11 DIAGNOSIS — K52.9 ENTERITIS: Primary | ICD-10-CM

## 2024-12-11 LAB
ALBUMIN SERPL-MCNC: 4.3 G/DL (ref 3.5–5.2)
ALBUMIN/GLOB SERPL: 1.5 G/DL
ALP SERPL-CCNC: 109 U/L (ref 39–117)
ALT SERPL W P-5'-P-CCNC: 10 U/L (ref 1–33)
ANION GAP SERPL CALCULATED.3IONS-SCNC: 9 MMOL/L (ref 5–15)
AST SERPL-CCNC: 19 U/L (ref 1–32)
BACTERIA UR QL AUTO: NORMAL /HPF
BASOPHILS # BLD AUTO: 0.01 10*3/MM3 (ref 0–0.2)
BASOPHILS NFR BLD AUTO: 0.3 % (ref 0–1.5)
BILIRUB SERPL-MCNC: 0.2 MG/DL (ref 0–1.2)
BILIRUB UR QL STRIP: NEGATIVE
BUN SERPL-MCNC: 15 MG/DL (ref 8–23)
BUN/CREAT SERPL: 15.2 (ref 7–25)
CALCIUM SPEC-SCNC: 9.3 MG/DL (ref 8.6–10.5)
CHLORIDE SERPL-SCNC: 103 MMOL/L (ref 98–107)
CLARITY UR: CLEAR
CO2 SERPL-SCNC: 26 MMOL/L (ref 22–29)
COLOR UR: YELLOW
CREAT SERPL-MCNC: 0.99 MG/DL (ref 0.57–1)
D-LACTATE SERPL-SCNC: 1.1 MMOL/L (ref 0.5–2)
DEPRECATED RDW RBC AUTO: 46.1 FL (ref 37–54)
EGFRCR SERPLBLD CKD-EPI 2021: 62.6 ML/MIN/1.73
EOSINOPHIL # BLD AUTO: 0.02 10*3/MM3 (ref 0–0.4)
EOSINOPHIL NFR BLD AUTO: 0.6 % (ref 0.3–6.2)
ERYTHROCYTE [DISTWIDTH] IN BLOOD BY AUTOMATED COUNT: 13 % (ref 12.3–15.4)
GLOBULIN UR ELPH-MCNC: 2.9 GM/DL
GLUCOSE SERPL-MCNC: 117 MG/DL (ref 65–99)
GLUCOSE UR STRIP-MCNC: NEGATIVE MG/DL
HCT VFR BLD AUTO: 35.7 % (ref 34–46.6)
HGB BLD-MCNC: 12 G/DL (ref 12–15.9)
HGB UR QL STRIP.AUTO: NEGATIVE
HOLD SPECIMEN: NORMAL
HOLD SPECIMEN: NORMAL
HYALINE CASTS UR QL AUTO: NORMAL /LPF
IMM GRANULOCYTES # BLD AUTO: 0.01 10*3/MM3 (ref 0–0.05)
IMM GRANULOCYTES NFR BLD AUTO: 0.3 % (ref 0–0.5)
KETONES UR QL STRIP: NEGATIVE
LEUKOCYTE ESTERASE UR QL STRIP.AUTO: ABNORMAL
LIPASE SERPL-CCNC: 15 U/L (ref 13–60)
LYMPHOCYTES # BLD AUTO: 1.32 10*3/MM3 (ref 0.7–3.1)
LYMPHOCYTES NFR BLD AUTO: 38.6 % (ref 19.6–45.3)
MCH RBC QN AUTO: 32.4 PG (ref 26.6–33)
MCHC RBC AUTO-ENTMCNC: 33.6 G/DL (ref 31.5–35.7)
MCV RBC AUTO: 96.5 FL (ref 79–97)
MONOCYTES # BLD AUTO: 0.31 10*3/MM3 (ref 0.1–0.9)
MONOCYTES NFR BLD AUTO: 9.1 % (ref 5–12)
NEUTROPHILS NFR BLD AUTO: 1.75 10*3/MM3 (ref 1.7–7)
NEUTROPHILS NFR BLD AUTO: 51.1 % (ref 42.7–76)
NITRITE UR QL STRIP: NEGATIVE
NRBC BLD AUTO-RTO: 0 /100 WBC (ref 0–0.2)
PH UR STRIP.AUTO: 6 [PH] (ref 5–8)
PLATELET # BLD AUTO: 191 10*3/MM3 (ref 140–450)
PMV BLD AUTO: 9.5 FL (ref 6–12)
POTASSIUM SERPL-SCNC: 4 MMOL/L (ref 3.5–5.2)
PROT SERPL-MCNC: 7.2 G/DL (ref 6–8.5)
PROT UR QL STRIP: NEGATIVE
RBC # BLD AUTO: 3.7 10*6/MM3 (ref 3.77–5.28)
RBC # UR STRIP: NORMAL /HPF
REF LAB TEST METHOD: NORMAL
SODIUM SERPL-SCNC: 138 MMOL/L (ref 136–145)
SP GR UR STRIP: 1.01 (ref 1–1.03)
SQUAMOUS #/AREA URNS HPF: NORMAL /HPF
UROBILINOGEN UR QL STRIP: ABNORMAL
WBC # UR STRIP: NORMAL /HPF
WBC NRBC COR # BLD AUTO: 3.42 10*3/MM3 (ref 3.4–10.8)
WHOLE BLOOD HOLD COAG: NORMAL
WHOLE BLOOD HOLD SPECIMEN: NORMAL

## 2024-12-11 PROCEDURE — 25510000001 IOPAMIDOL 61 % SOLUTION: Performed by: EMERGENCY MEDICINE

## 2024-12-11 PROCEDURE — 83605 ASSAY OF LACTIC ACID: CPT | Performed by: EMERGENCY MEDICINE

## 2024-12-11 PROCEDURE — 99285 EMERGENCY DEPT VISIT HI MDM: CPT | Performed by: EMERGENCY MEDICINE

## 2024-12-11 PROCEDURE — 74177 CT ABD & PELVIS W/CONTRAST: CPT

## 2024-12-11 PROCEDURE — 83690 ASSAY OF LIPASE: CPT | Performed by: EMERGENCY MEDICINE

## 2024-12-11 PROCEDURE — 25010000002 ONDANSETRON PER 1 MG: Performed by: EMERGENCY MEDICINE

## 2024-12-11 PROCEDURE — 81001 URINALYSIS AUTO W/SCOPE: CPT | Performed by: EMERGENCY MEDICINE

## 2024-12-11 PROCEDURE — 96375 TX/PRO/DX INJ NEW DRUG ADDON: CPT

## 2024-12-11 PROCEDURE — 85025 COMPLETE CBC W/AUTO DIFF WBC: CPT | Performed by: EMERGENCY MEDICINE

## 2024-12-11 PROCEDURE — 80053 COMPREHEN METABOLIC PANEL: CPT | Performed by: EMERGENCY MEDICINE

## 2024-12-11 PROCEDURE — 96374 THER/PROPH/DIAG INJ IV PUSH: CPT

## 2024-12-11 PROCEDURE — 25010000002 MORPHINE PER 10 MG: Performed by: EMERGENCY MEDICINE

## 2024-12-11 RX ORDER — SODIUM CHLORIDE 0.9 % (FLUSH) 0.9 %
10 SYRINGE (ML) INJECTION AS NEEDED
Status: DISCONTINUED | OUTPATIENT
Start: 2024-12-11 | End: 2024-12-11 | Stop reason: HOSPADM

## 2024-12-11 RX ORDER — CIPROFLOXACIN 500 MG/1
500 TABLET, FILM COATED ORAL ONCE
Status: COMPLETED | OUTPATIENT
Start: 2024-12-11 | End: 2024-12-11

## 2024-12-11 RX ORDER — ONDANSETRON 2 MG/ML
4 INJECTION INTRAMUSCULAR; INTRAVENOUS ONCE
Status: COMPLETED | OUTPATIENT
Start: 2024-12-11 | End: 2024-12-11

## 2024-12-11 RX ORDER — CIPROFLOXACIN 500 MG/1
500 TABLET, FILM COATED ORAL 2 TIMES DAILY
Qty: 10 TABLET | Refills: 0 | Status: SHIPPED | OUTPATIENT
Start: 2024-12-11 | End: 2024-12-16

## 2024-12-11 RX ORDER — IOPAMIDOL 612 MG/ML
100 INJECTION, SOLUTION INTRAVASCULAR
Status: COMPLETED | OUTPATIENT
Start: 2024-12-11 | End: 2024-12-11

## 2024-12-11 RX ORDER — ONDANSETRON 4 MG/1
4 TABLET, ORALLY DISINTEGRATING ORAL EVERY 8 HOURS PRN
Qty: 10 TABLET | Refills: 0 | Status: SHIPPED | OUTPATIENT
Start: 2024-12-11

## 2024-12-11 RX ADMIN — CIPROFLOXACIN 500 MG: 500 TABLET, FILM COATED ORAL at 12:16

## 2024-12-11 RX ADMIN — ONDANSETRON 4 MG: 2 INJECTION INTRAMUSCULAR; INTRAVENOUS at 10:27

## 2024-12-11 RX ADMIN — IOPAMIDOL 100 ML: 612 INJECTION, SOLUTION INTRAVENOUS at 10:55

## 2024-12-11 RX ADMIN — MORPHINE SULFATE 4 MG: 4 INJECTION, SOLUTION INTRAMUSCULAR; INTRAVENOUS at 10:28

## 2024-12-11 NOTE — ED NOTES
Pt provided with red cup to obtain stool sample and instructed to bring back to out patient lab. Pt verbalized understanding.

## 2024-12-11 NOTE — DISCHARGE INSTRUCTIONS
If you notice any concerning symptoms, please return to the ER immediately. These can include but are not limited to: worsening of you condition, fevers, chills, shortness of breath, vomiting, weakness of the extremities, changes in your mental status, numbness, pale extremities, or chest pain.     Take medications as prescribed, your pharmacist may have additional recommendations concerning these medications.    For pain use ibuprofen (Motrin) or acetaminophen (Tylenol), unless prescribed medications that also contain these medications.  You can take over the counter acetaminophen or ibuprofen, please follow the directions as dosages differ. Do not take ibuprofen if you have a history of peptic ulcers, kidney disease, bariatric surgery, or are currently pregnant.  Do not take Tylenol if you have a history of liver disease or alcohol use disorder.        THANK YOU!!! From [facility] Emergency Department    On behalf of the Emergency Department staff at [facility], I would like to thank you for giving us the opportunity to address your health care needs and concerns. We hope that during your visit, our service was delivered in a professional and caring manner. Please keep [facility] in mind as we walk with you down the path to your own personal wellness. Please expect follow-up phone calls concerning additional care and questions about your experience.      You have received additional information specific to your diagnosis in these discharge instructions, please read these fully.  Anytime you have been seen in the emergency department we recommend close follow up with your primary care provider or specialist, please follow these directions as indicated.      Please collect a stool sample, call Dr. Marrero's office for recommendations with the sample, most likely can bring it to them or bring it to the lab.  [facility].  Please take your antibiotic as prescribed.   Please call your GI doctor if you have any worsening pain or worsening symptoms.

## 2024-12-11 NOTE — ED PROVIDER NOTES
"       [facility] EMERGENCY DEPARTMENT  Emergency Department Encounter  Emergency Medicine Physician Note     Pt Name:Stephanie Jean  MRN: [id number]  Birthdate 1955  Date of evaluation: 12/11/2024  PCP:  Nhung Shepard DO  Note Started: 10:09 AM EST      CHIEF COMPLAINT       Chief Complaint   Patient presents with    Abdominal Pain    Rectal Bleeding    Vomiting       HISTORY OF PRESENT ILLNESS  (Location/Symptom, Timing/Onset, Context/Setting, Quality, Duration, Modifying Factors, Severity.)      Stephanie Jean is a 69 y.o. female who presents with abdominal pain, nausea vomiting and bright red blood per rectum.  Patient does have a history of Crohn's, describes having multiple episodes of this in the past.  Describes severe cramping abdominal pain more particularly in the right lower quadrant.  Patient describes having some nausea vomiting yesterday, this has mildly improved.  Patient does describe severe cramping throughout the abdomen.  Patient does describe having Crohn's flares in the past.  Patient does state that she has been compliant with her medications.    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\"    Crohn's disease 2022    Depression 2022    Dysphagia     Elevated cholesterol     Family history of colon cancer     Fibromyalgia     Fibromyalgia, primary 2003    GERD (gastroesophageal reflux disease)     GI (gastrointestinal bleed) 8/8/2022    Heart murmur     Hoarseness     Hyperlipidemia     Hypertension 2021    Inflammatory bowel disease 2022    recent hospitalization for colitis    Irritable bowel syndrome     Ischemic colitis " 08/11/2023    Low back pain 2003    treated by [facility]    Lupus     Marijuana use     Daily     Osteopenia 2021    Osteopenia    Peripheral neuropathy 08/10/2022    Pulmonary nodule     Shingles August 2018    Sjogren's disease     Urinary tract infection 2022    Vasovagal syncope      No additional pertinent       Past Surgical History:   Procedure Laterality Date    ABDOMINAL SURGERY  1998    [facility]    APPENDECTOMY      BREAST SURGERY  1975    lumpectomy 1975    CATARACT EXTRACTION W/ INTRAOCULAR LENS IMPLANT Left 12/20/2021    Procedure: CATARACT PHACO EXTRACTION WITH INTRAOCULAR LENS IMPLANT LEFT COMPLICATED WITH MALYUGIN RING;  Surgeon: Zachary Landers MD;  Location: [facility] OR;  Service: Ophthalmology;  Laterality: Left;    CATARACT EXTRACTION W/ INTRAOCULAR LENS IMPLANT Right 01/03/2022    Procedure: CATARACT PHACO EXTRACTION WITH INTRAOCULAR LENS IMPLANT RIGHT;  Surgeon: Zachary Landers MD;  Location: [facility] OR;  Service: Ophthalmology;  Laterality: Right;    COLONOSCOPY      COLONOSCOPY N/A 12/12/2022    Procedure: COLONOSCOPY WITH BIOPSIES AND POLYPECTOMY;  Surgeon: Chaparro Marrero MD;  Location: [facility] ENDOSCOPY;  Service: Gastroenterology;  Laterality: N/A;    ENDOSCOPY      ENDOSCOPY N/A 06/01/2020    Procedure: ESOPHAGOGASTRODUODENOSCOPY;  Surgeon: Chaparro Marrero MD;  Location: [facility] ENDOSCOPY;  Service: Gastroenterology;  Laterality: N/A;    EYE SURGERY  2021-22    cataract removal    HYSTERECTOMY      TOTAL ABDOMINAL HYSTERECTOMY WITH SALPINGO OOPHORECTOMY  1998    TUBAL ABDOMINAL LIGATION  1984    UPPER GASTROINTESTINAL ENDOSCOPY       No additional pertinent       Social History     Socioeconomic History    Marital status:    Tobacco Use    Smoking status: Former     Current packs/day: 0.00     Average packs/day: 1 pack/day for 15.0 years (15.0 ttl pk-yrs)     Types: Cigarettes     Start date: 1/1/2003     Quit date: 1/1/2018     Years since  quittin.9    Smokeless tobacco: Never   Vaping Use    Vaping status: Every Day    Substances: CBD, Flavoring    Devices: Disposable   Substance and Sexual Activity    Alcohol use: No    Drug use: Not Currently     Types: Marijuana     Comment: OCCASIONALLY    Sexual activity: Yes     Partners: Male     Birth control/protection: Post-menopausal, Hysterectomy       Family History   Problem Relation Age of Onset    Hypertension Mother     Aneurysm Mother     Heart disease Mother         passed away at 54 yr old after 5 vessel bypass    Hyperlipidemia Mother     Stroke Mother         aneurysm ruptured    Emphysema Father     COPD Father     Ulcerative colitis Brother     Alcohol abuse Brother     Cancer Brother         stomach cancer    COPD Brother     Hyperlipidemia Brother     Liver disease Brother     Cirrhosis Brother     Colon polyps Brother     Stomach cancer Brother     Colon cancer Maternal Uncle     Arthritis Maternal Uncle     Cancer Maternal Aunt         breast cancer    Cancer Sister         breast    COPD Paternal Uncle     Early death Brother         ulcerative colitis    Learning disabilities Brother         mental retardation    Mental illness Brother         schizophrenia    Ulcerative colitis Brother     Mental retardation Brother     Inflammatory bowel disease Brother     Kidney disease Paternal Aunt         nephrectomy    Liver cancer Neg Hx     Crohn's disease Neg Hx        Allergies:  Penicillins    Home Medications:  Prior to Admission medications    Medication Sig Start Date End Date Taking? Authorizing Provider   aspirin 81 MG EC tablet Take 1 tablet by mouth. Every other day    Provider, MD Fatmata   atorvastatin (LIPITOR) 20 MG tablet TAKE 1 TABLET BY MOUTH EVERY NIGHT 24   Nhung Shepard DO   azaTHIOprine (IMURAN) 50 MG tablet Take 2 tablets by mouth Daily. 24   Chaparro Marrero MD   busPIRone (BUSPAR) 15 MG tablet Take 1 tablet by mouth 2 (Two) Times a Day As  Needed (anxiety). for anxiety 11/25/24   Nhung Shepard DO   cyclobenzaprine (FLEXERIL) 10 MG tablet TAKE 1 TABLET BY MOUTH THREE TIMES DAILY 10/15/24   Nhung Shepard DO   Diclofenac Sodium (VOLTAREN) 1 % gel gel diclofenac 1 % topical gel   APPLY 1 GRAM TOPICALLY TO THE AFFECTED AREA TWICE DAILY AS NEEDED FOR PAIN    Fatmata Mas MD   dicyclomine (BENTYL) 20 MG tablet Take 1 tablet by mouth 2 (Two) Times a Day As Needed for Abdominal Cramping. 7/31/24   Chaparro Marrero MD   docusate sodium (COLACE) 100 MG capsule TAKE 1 CAPSULE BY MOUTH TWICE DAILY 11/5/24   Nicole Mirza PA-C   donepezil (ARICEPT) 10 MG tablet Take 1 tablet by mouth Every Night. 5/28/24   Rose Thompson APRN   DULoxetine (CYMBALTA) 60 MG capsule TAKE 1 CAPSULE BY MOUTH DAILY 8/12/24   Nhung Shepard DO   ferrous gluconate (FERGON) 324 MG tablet TAKE 1 TABLET BY MOUTH DAILY WITH BREAKFAST 12/2/24   Chaparro Marrero MD   fluticasone (FLONASE) 50 MCG/ACT nasal spray SHAKE LIQUID AND USE 1 TO 2 SPRAYS IN EACH NOSTRIL EVERY DAY AS NEEDED 8/2/22   Fatmata Mas MD   furosemide (LASIX) 20 MG tablet TAKE 1 TABLET BY MOUTH DAILY AS NEEDED FOR SWELLING 5/20/23   Nhung Shepard DO   gabapentin (NEURONTIN) 800 MG tablet Take 1 tablet by mouth 2 (Two) Times a Day.    Fatmata Mas MD   hydroxychloroquine (PLAQUENIL) 200 MG tablet Take 1.5 tablets by mouth Daily. 8/22/24   Fatmata Mas MD   hydrOXYzine pamoate (VISTARIL) 25 MG capsule TAKE 1 CAPSULE BY MOUTH THREE TIMES DAILY AS NEEDED FOR ITCHING 10/30/24   Nhung Shepard DO   inFLIXimab (REMICADE IV) Infuse  into a venous catheter Take As Directed.    Fatmata Mas MD   lactulose (CHRONULAC) 10 GM/15ML solution Take 15 mL by mouth Daily. 3/29/23   Chaparro Marrero MD   lisinopril (PRINIVIL,ZESTRIL) 10 MG tablet Take 1 tablet by mouth Every Night. 5/20/24   Nhung Shepard DO    loratadine (CLARITIN) 10 MG tablet Take 1 tablet by mouth As Needed. 8/2/22   Fatmata Mas MD   meclizine 25 MG chewable tablet chewable tablet Chew 0.5 tablets Every 8 (Eight) Hours.    Fatmata Mas MD   memantine (NAMENDA) 10 MG tablet Take 1 tablet by mouth Daily. 5/28/24   Rose Thompson APRN   mupirocin (BACTROBAN) 2 % ointment Apply 1 Application topically to the appropriate area as directed 3 (Three) Times a Day. 7/25/24   Nhung Shepard DO   nitroglycerin (NITROSTAT) 0.4 MG SL tablet Place 1 tablet under the tongue Every 5 (Five) Minutes As Needed. 6/2/21   Fatmata Mas MD   Other, Add info into free text field below, ESTRIOL 0.3% FACIAL VERSABASE CREAM 5/24/24   Fatmata Mas MD   oxybutynin XL (DITROPAN-XL) 10 MG 24 hr tablet Take 1 tablet by mouth Daily. 11/25/24   Nhung hSepard DO   oxyCODONE-acetaminophen (PERCOCET) 7.5-325 MG per tablet Take 1 tablet by mouth 2 (Two) Times a Day.    Fatmata Mas MD   pantoprazole (PROTONIX) 20 MG EC tablet TAKE 1 TABLET BY MOUTH DAILY 10/11/24   Chaparro Marrero MD   promethazine (PHENERGAN) 25 MG tablet TAKE 1/2 TABLET BY MOUTH EVERY 8 HOURS AS NEEDED FOR NAUSEA OR VOMITING 11/19/24   Nicole Mirza PA-C   Restasis 0.05 % ophthalmic emulsion Administer 1 drop to both eyes 2 (Two) Times a Day. 9/14/23   Fatmata Mas MD   rOPINIRole (REQUIP) 2 MG tablet TAKE 1 TABLET BY MOUTH EVERY DAY 1 TO 3 HOURS BEFORE BEDTIME 10/31/24   Nhung Shepard DO   vitamin B-12 (CYANOCOBALAMIN) 1000 MCG tablet TAKE 1 TABLET BY MOUTH EVERY DAY 4/19/24   Nicole Mirza PA-C   vitamin D (ERGOCALCIFEROL) 1.25 MG (41238 UT) capsule capsule TAKE 1 CAPSULE BY MOUTH ONCE A WEEK 1/29/24   Nhung Shepard          REVIEW OF SYSTEMS       Review of Systems   Constitutional:  Negative for diaphoresis and fever.   Respiratory:  Negative for chest tightness and shortness of breath.   "  Cardiovascular:  Negative for chest pain.   Gastrointestinal:  Positive for abdominal pain, diarrhea, nausea and vomiting.   Endocrine: Negative for polyuria.   Genitourinary:  Negative for difficulty urinating and frequency.       PHYSICAL EXAM      INITIAL VITALS:   /71 (BP Location: Right arm, Patient Position: Lying)   Pulse 78   Temp 98 °F (36.7 °C) (Oral)   Resp 16   Ht 157.5 cm (62\")   Wt 62.1 kg (137 lb)   LMP  (LMP Unknown)   SpO2 97%   BMI 25.06 kg/m²     Physical Exam  Constitutional:       Appearance: Normal appearance.   HENT:      Head: Normocephalic and atraumatic.      Mouth/Throat:      Mouth: Mucous membranes are moist.      Pharynx: Oropharynx is clear.   Cardiovascular:      Rate and Rhythm: Normal rate and regular rhythm.      Pulses: Normal pulses.   Pulmonary:      Effort: Pulmonary effort is normal.      Breath sounds: Normal breath sounds.   Abdominal:      General: Abdomen is flat. There is no distension.      Palpations: Abdomen is soft.      Tenderness: There is abdominal tenderness in the right lower quadrant. There is no guarding.   Musculoskeletal:         General: Normal range of motion.   Skin:     General: Skin is warm and dry.   Neurological:      General: No focal deficit present.      Mental Status: She is alert and oriented to person, place, and time.   Psychiatric:         Mood and Affect: Mood normal.         Behavior: Behavior normal.           DDX/DIAGNOSTIC RESULTS / EMERGENCY DEPARTMENT COURSE / MDM     Differential Diagnosis included but not limited: Crohn's exacerbation, intra-abdominal abscess, infection, diverticulitis    Diagnoses Considered but Do Not Suspect: Severe GI bleeding, bowel obstruction, sepsis    Decision Rules/Scores utilized: N/A     Tests considered but not ordered and why:  N/A    MIPS: N/A     Code Status Discussion:  Not Discussed    Additional Patient Education Provided: None     Medical Decision Making    Medical Decision " Making  This patient presents with nausea, vomiting & diarrhea. Differential diagnosis includes possible acute gastroenteritis. Abdominal exam without peritoneal signs. Currently euvolemic without evidence of dehydration. No evidence of surgical abdomen or other acute medical emergency including bowel obstruction, viscus perforation, vascular catastrophe, atypical appendicitis, acute cholecystitis, UGIB, thyrotoxicosis, or diverticulitis at this time with CT imaging.  Patient does have bloody stool, concern for possible invasive bacteria versus viral enteritis.  Patient unable to provide stool sample, provided ability to get stool sample and have evaluated by her GI doctor.  Presentation not consistent with other acute, emergent causes of vomiting / diarrhea at this time. Patient tolerating p.o. on reevaluation, patient with stable vitals.  Patient instructed to return for any worsening abdominal pain, nausea vomiting, abdominal pain is painful with walking, signs of dehydration including dry mouth.  Patient was agreement with the plan and discharged in stable condition.  Patient instructed to follow-up closely with her GI doctor if she has any worsening pain or not improving with ciprofloxacin.    Problems Addressed:  Enteritis: complicated acute illness or injury    Amount and/or Complexity of Data Reviewed  Labs: ordered.  Radiology: ordered.  Discussion of management or test interpretation with external provider(s): Gastroenterology for further management of Crohn's.    Risk  Prescription drug management.        See ED COURSE for additional MDM statements    EKG  None Performed     All EKG's are interpreted by the Emergency Department Physician who either signs or Co-signs this chart in the absence of a cardiologist.    Additional Scores                   EMERGENCY DEPARTMENT COURSE:    ED Course as of 12/12/24 0756   Wed Dec 11, 2024   1150 Discussed patient's case with Dr. Marrero GI.  He recommends starting  ciprofloxacin 500 mg twice daily for 5 days, calling his office if she has any worsening pain or is not improving as that point they will do determine whether to start steroids.  I do feel this is appropriate plan at this time based upon labs and imaging. [CR]   1158 Discussed plan of care with the patient, she does not think she can provide a stool sample at this time, will consider providing a container for her to bring it to Dr. Marrero's office for further evaluation.  She is agreeable with this.  Plan to start patient on ciprofloxacin and discharge. [CR]      ED Course User Index  [CR] Angus Niño, DO       PROCEDURES:  None Performed   Procedures    DATA FOR LAB AND RADIOLOGY TESTS ORDERED BELOW ARE REVIEWED BY THE ED CLINICIAN:    RADIOLOGY: All x-rays, CT, MRI, and formal ultrasound images (except ED bedside ultrasound) are read by the radiologist, see reports below, unless otherwise noted in MDM or here.  Reports below are reviewed by myself.  CT Abdomen Pelvis With Contrast   Final Result   Bowel gas pattern suggesting ileus versus enteritis.       Stable left adrenal lesion.       Fatty infiltration of the liver.           CTDI: 5.82 mGy   DLP: 265.46 mGy.cm           This study was performed with techniques to keep radiation doses as low   as reasonably achievable (ALARA). Individualized dose reduction   techniques using automated exposure control or adjustment of mA and/or   kV according to the patient size were employed.                       Images were reviewed, interpreted, and dictated by Dr. Samantha Allen MD   Transcribed by Marilu Ponce PA-C.       This report was signed and finalized on 12/11/2024 11:47 AM by Samantha Allen MD.              LABS: Lab orders shown below, the results are reviewed by myself, and all abnormals are listed below.  Labs Reviewed   COMPREHENSIVE METABOLIC PANEL - Abnormal; Notable for the following components:       Result Value    Glucose 117 (*)      All other components within normal limits    Narrative:     GFR Categories in Chronic Kidney Disease (CKD)      GFR Category          GFR (mL/min/1.73)    Interpretation  G1                     90 or greater         Normal or high (1)  G2                      60-89                Mild decrease (1)  G3a                   45-59                Mild to moderate decrease  G3b                   30-44                Moderate to severe decrease  G4                    15-29                Severe decrease  G5                    14 or less           Kidney failure          (1)In the absence of evidence of kidney disease, neither GFR category G1 or G2 fulfill the criteria for CKD.    eGFR calculation 2021 CKD-EPI creatinine equation, which does not include race as a factor   URINALYSIS W/ MICROSCOPIC IF INDICATED (NO CULTURE) - Abnormal; Notable for the following components:    Leuk Esterase, UA Trace (*)     All other components within normal limits   CBC WITH AUTO DIFFERENTIAL - Abnormal; Notable for the following components:    RBC 3.70 (*)     All other components within normal limits   LIPASE - Normal   LACTIC ACID, PLASMA - Normal   RAINBOW DRAW    Narrative:     The following orders were created for panel order [place] Draw.  Procedure                               Abnormality         Status                     ---------                               -----------         ------                     Green Top (Gel)[[id number]]                                  Final result               Lavender Top[[id number]]                                     Final result               Gold Top - SST[[id number]]                                   Final result               Light Blue Top[[id number]]                                   Final result                 Please view results for these tests on the individual orders.   URINALYSIS, MICROSCOPIC ONLY   CBC AND DIFFERENTIAL    Narrative:     The following orders were created for panel order  CBC & Differential.  Procedure                               Abnormality         Status                     ---------                               -----------         ------                     CBC Auto Differential[[id number]]        Abnormal            Final result                 Please view results for these tests on the individual orders.   GREEN TOP   LAVENDER TOP   GOLD TOP - SST   LIGHT BLUE TOP       Vitals Reviewed:    Vitals:    12/11/24 1101 12/11/24 1130 12/11/24 1134 12/11/24 1149   BP:   102/71    BP Location:   Right arm    Patient Position:   Lying    Pulse:   78 78   Resp:   16 16   Temp:       TempSrc:       SpO2: 98% 93% 97% 97%   Weight:       Height:           MEDICATIONS GIVEN TO PATIENT THIS ENCOUNTER:  Medications   morphine injection 4 mg (4 mg Intravenous Given 12/11/24 1028)   ondansetron (ZOFRAN) injection 4 mg (4 mg Intravenous Given 12/11/24 1027)   iopamidol (ISOVUE-300) 61 % injection 100 mL (100 mL Intravenous Given 12/11/24 1055)   ciprofloxacin (CIPRO) tablet 500 mg (500 mg Oral Given 12/11/24 1216)       CONSULTS:  None    CRITICAL CARE:  There was significant risk of life threatening deterioration of patient's condition requiring my direct management. Critical care time 0 minutes, excluding any documented procedures.    FINAL IMPRESSION      1. Enteritis          DISPOSITION / PLAN     ED Disposition       ED Disposition   Discharge    Condition   Stable    Comment   --               PATIENT REFERRED TO:  Nhung Shepard DO  [address] [facility] [address]  [place] [id number] [phone]    In 1 week      Chaparro Marrero MD  [address] [facility] [address]  [place] [id number] [phone]    Call   If symptoms worsen      DISCHARGE MEDICATIONS:     Medication List        START taking these medications      ciprofloxacin 500 MG tablet  Commonly known as: CIPRO  Take 1 tablet by mouth 2 (Two) Times a Day for 5 days.     ondansetron ODT 4 MG  disintegrating tablet  Commonly known as: ZOFRAN-ODT  Place 1 tablet on the tongue Every 8 (Eight) Hours As Needed for Nausea or Vomiting.            CONTINUE taking these medications      aspirin 81 MG EC tablet     atorvastatin 20 MG tablet  Commonly known as: LIPITOR  TAKE 1 TABLET BY MOUTH EVERY NIGHT     azaTHIOprine 50 MG tablet  Commonly known as: IMURAN  Take 2 tablets by mouth Daily.     busPIRone 15 MG tablet  Commonly known as: BUSPAR  Take 1 tablet by mouth 2 (Two) Times a Day As Needed (anxiety). for anxiety     cyclobenzaprine 10 MG tablet  Commonly known as: FLEXERIL  TAKE 1 TABLET BY MOUTH THREE TIMES DAILY     Diclofenac Sodium 1 % gel gel  Commonly known as: VOLTAREN     dicyclomine 20 MG tablet  Commonly known as: BENTYL  Take 1 tablet by mouth 2 (Two) Times a Day As Needed for Abdominal Cramping.     docusate sodium 100 MG capsule  Commonly known as: COLACE  TAKE 1 CAPSULE BY MOUTH TWICE DAILY     donepezil 10 MG tablet  Commonly known as: ARICEPT  Take 1 tablet by mouth Every Night.     DULoxetine 60 MG capsule  Commonly known as: CYMBALTA  TAKE 1 CAPSULE BY MOUTH DAILY     ferrous gluconate 324 MG tablet  Commonly known as: FERGON  TAKE 1 TABLET BY MOUTH DAILY WITH BREAKFAST     fluticasone 50 MCG/ACT nasal spray  Commonly known as: FLONASE     furosemide 20 MG tablet  Commonly known as: LASIX  TAKE 1 TABLET BY MOUTH DAILY AS NEEDED FOR SWELLING     gabapentin 800 MG tablet  Commonly known as: NEURONTIN     hydroxychloroquine 200 MG tablet  Commonly known as: PLAQUENIL     hydrOXYzine pamoate 25 MG capsule  Commonly known as: VISTARIL  TAKE 1 CAPSULE BY MOUTH THREE TIMES DAILY AS NEEDED FOR ITCHING     lactulose 10 GM/15ML solution  Commonly known as: CHRONULAC  Take 15 mL by mouth Daily.     lisinopril 10 MG tablet  Commonly known as: PRINIVIL,ZESTRIL  Take 1 tablet by mouth Every Night.     loratadine 10 MG tablet  Commonly known as: CLARITIN     meclizine 25 MG chewable tablet chewable  tablet     memantine 10 MG tablet  Commonly known as: NAMENDA  Take 1 tablet by mouth Daily.     mupirocin 2 % ointment  Commonly known as: BACTROBAN  Apply 1 Application topically to the appropriate area as directed 3 (Three) Times a Day.     nitroglycerin 0.4 MG SL tablet  Commonly known as: NITROSTAT     Other (Add info into free text field below)     oxybutynin XL 10 MG 24 hr tablet  Commonly known as: DITROPAN-XL  Take 1 tablet by mouth Daily.     oxyCODONE-acetaminophen 7.5-325 MG per tablet  Commonly known as: PERCOCET     pantoprazole 20 MG EC tablet  Commonly known as: PROTONIX  TAKE 1 TABLET BY MOUTH DAILY     promethazine 25 MG tablet  Commonly known as: PHENERGAN  TAKE 1/2 TABLET BY MOUTH EVERY 8 HOURS AS NEEDED FOR NAUSEA OR VOMITING     REMICADE IV     Restasis 0.05 % ophthalmic emulsion  Generic drug: cycloSPORINE     rOPINIRole 2 MG tablet  Commonly known as: REQUIP  TAKE 1 TABLET BY MOUTH EVERY DAY 1 TO 3 HOURS BEFORE BEDTIME     vitamin B-12 1000 MCG tablet  Commonly known as: CYANOCOBALAMIN  TAKE 1 TABLET BY MOUTH EVERY DAY     vitamin D 1.25 MG (92621 UT) capsule capsule  Commonly known as: ERGOCALCIFEROL  TAKE 1 CAPSULE BY MOUTH ONCE A WEEK               Where to Get Your Medications        [id number] - [place], [place] - [address] [facility] [address] - [phone]  - [phone]   [address] [place] [address]      Phone: [phone]   ciprofloxacin 500 MG tablet  ondansetron ODT 4 MG disintegrating tablet         Electronically signed by Angus Niño DO, 12/11/24, 10:09 AM EST.    Emergency Medicine Physician  [facility]  (Please note that portions of thisnote were completed with a voice recognition program.  Efforts were made to edit the dictations but occasionally words are mis-transcribed.)         Angus Niño DO  12/12/24 0758

## 2024-12-17 RX ORDER — ATORVASTATIN CALCIUM 20 MG/1
20 TABLET, FILM COATED ORAL NIGHTLY
Qty: 90 TABLET | Refills: 3 | Status: SHIPPED | OUTPATIENT
Start: 2024-12-17

## 2024-12-20 ENCOUNTER — HOSPITAL ENCOUNTER (OUTPATIENT)
Dept: GENERAL RADIOLOGY | Facility: HOSPITAL | Age: 69
Discharge: HOME | End: 2024-12-20
Payer: MEDICARE

## 2024-12-20 ENCOUNTER — TRANSCRIBE ORDERS (OUTPATIENT)
Dept: GENERAL RADIOLOGY | Facility: HOSPITAL | Age: 69
End: 2024-12-20
Payer: MEDICARE

## 2024-12-20 ENCOUNTER — HOSPITAL ENCOUNTER (OUTPATIENT)
Facility: HOSPITAL | Age: 69
Discharge: HOME | End: 2024-12-20
Payer: MEDICARE

## 2024-12-20 ENCOUNTER — HOSPITAL ENCOUNTER (OUTPATIENT)
Dept: CT IMAGING | Facility: HOSPITAL | Age: 69
Discharge: HOME | End: 2024-12-20
Payer: MEDICARE

## 2024-12-20 VITALS
DIASTOLIC BLOOD PRESSURE: 58 MMHG | RESPIRATION RATE: 14 BRPM | WEIGHT: 138.89 LBS | OXYGEN SATURATION: 98 % | SYSTOLIC BLOOD PRESSURE: 98 MMHG | HEART RATE: 85 BPM | BODY MASS INDEX: 25.4 KG/M2 | TEMPERATURE: 98.2 F

## 2024-12-20 DIAGNOSIS — M47.816 LUMBAR SPONDYLOSIS: Primary | ICD-10-CM

## 2024-12-20 DIAGNOSIS — K50.80 CROHN'S DISEASE OF BOTH SMALL AND LARGE INTESTINE WITHOUT COMPLICATION: Primary | ICD-10-CM

## 2024-12-20 DIAGNOSIS — M47.816 LUMBAR SPONDYLOSIS: ICD-10-CM

## 2024-12-20 LAB
ALBUMIN SERPL-MCNC: 4 G/DL (ref 3.5–5.2)
ALBUMIN/GLOB SERPL: 1.6 G/DL
ALP SERPL-CCNC: 89 U/L (ref 39–117)
ALT SERPL W P-5'-P-CCNC: 11 U/L (ref 1–33)
ANION GAP SERPL CALCULATED.3IONS-SCNC: 10.9 MMOL/L (ref 5–15)
AST SERPL-CCNC: 22 U/L (ref 1–32)
BILIRUB SERPL-MCNC: 0.2 MG/DL (ref 0–1.2)
BUN SERPL-MCNC: 19 MG/DL (ref 8–23)
BUN/CREAT SERPL: 20.7 (ref 7–25)
CALCIUM SPEC-SCNC: 8.9 MG/DL (ref 8.6–10.5)
CHLORIDE SERPL-SCNC: 108 MMOL/L (ref 98–107)
CO2 SERPL-SCNC: 23.1 MMOL/L (ref 22–29)
CREAT SERPL-MCNC: 0.92 MG/DL (ref 0.57–1)
EGFRCR SERPLBLD CKD-EPI 2021: 68.4 ML/MIN/1.73
GLOBULIN UR ELPH-MCNC: 2.5 GM/DL
GLUCOSE SERPL-MCNC: 87 MG/DL (ref 65–99)
POTASSIUM SERPL-SCNC: 4.1 MMOL/L (ref 3.5–5.2)
PROT SERPL-MCNC: 6.5 G/DL (ref 6–8.5)
SODIUM SERPL-SCNC: 142 MMOL/L (ref 136–145)

## 2024-12-20 PROCEDURE — 80053 COMPREHEN METABOLIC PANEL: CPT | Performed by: INTERNAL MEDICINE

## 2024-12-20 PROCEDURE — 96413 CHEMO IV INFUSION 1 HR: CPT

## 2024-12-20 PROCEDURE — 71271 CT THORAX LUNG CANCER SCR C-: CPT

## 2024-12-20 PROCEDURE — 25010000002 INFLIXIMAB-ABDA 100 MG RECONSTITUTED SOLUTION 1 EACH VIAL: Performed by: INTERNAL MEDICINE

## 2024-12-20 PROCEDURE — 96415 CHEMO IV INFUSION ADDL HR: CPT

## 2024-12-20 PROCEDURE — 72100 X-RAY EXAM L-S SPINE 2/3 VWS: CPT

## 2024-12-20 PROCEDURE — 25810000003 SODIUM CHLORIDE 0.9 % SOLUTION 250 ML FLEX CONT: Performed by: INTERNAL MEDICINE

## 2024-12-20 RX ORDER — ACETAMINOPHEN 325 MG/1
650 TABLET ORAL ONCE
OUTPATIENT
Start: 2025-01-31

## 2024-12-20 RX ORDER — ACETAMINOPHEN 325 MG/1
650 TABLET ORAL ONCE
Status: COMPLETED | OUTPATIENT
Start: 2024-12-20 | End: 2024-12-20

## 2024-12-20 RX ORDER — SODIUM CHLORIDE 9 MG/ML
20 INJECTION, SOLUTION INTRAVENOUS ONCE
OUTPATIENT
Start: 2025-01-31

## 2024-12-20 RX ORDER — SODIUM CHLORIDE 9 MG/ML
20 INJECTION, SOLUTION INTRAVENOUS ONCE
Status: DISCONTINUED | OUTPATIENT
Start: 2024-12-20 | End: 2024-12-21 | Stop reason: HOSPADM

## 2024-12-20 RX ADMIN — INFLIXIMAB 310 MG: 100 INJECTION, POWDER, LYOPHILIZED, FOR SOLUTION INTRAVENOUS at 10:44

## 2024-12-20 RX ADMIN — ACETAMINOPHEN 650 MG: 325 TABLET, FILM COATED ORAL at 10:24

## 2024-12-30 RX ORDER — HYDROXYZINE PAMOATE 25 MG/1
25 CAPSULE ORAL 3 TIMES DAILY PRN
Qty: 90 CAPSULE | Refills: 1 | Status: SHIPPED | OUTPATIENT
Start: 2024-12-30

## 2025-01-07 ENCOUNTER — PATIENT MESSAGE (OUTPATIENT)
Dept: GASTROENTEROLOGY | Facility: CLINIC | Age: 70
End: 2025-01-07
Payer: MEDICARE

## 2025-01-07 DIAGNOSIS — K59.04 CHRONIC IDIOPATHIC CONSTIPATION: Primary | ICD-10-CM

## 2025-01-24 ENCOUNTER — PATIENT MESSAGE (OUTPATIENT)
Dept: INTERNAL MEDICINE | Facility: CLINIC | Age: 70
End: 2025-01-24
Payer: MEDICARE

## 2025-01-27 ENCOUNTER — TELEPHONE (OUTPATIENT)
Dept: GASTROENTEROLOGY | Facility: CLINIC | Age: 70
End: 2025-01-27

## 2025-01-27 ENCOUNTER — OFFICE VISIT (OUTPATIENT)
Dept: GASTROENTEROLOGY | Facility: CLINIC | Age: 70
End: 2025-01-27
Payer: MEDICARE

## 2025-01-27 VITALS
TEMPERATURE: 97.5 F | DIASTOLIC BLOOD PRESSURE: 70 MMHG | BODY MASS INDEX: 25.91 KG/M2 | OXYGEN SATURATION: 99 % | HEART RATE: 80 BPM | WEIGHT: 132 LBS | SYSTOLIC BLOOD PRESSURE: 112 MMHG | HEIGHT: 60 IN

## 2025-01-27 DIAGNOSIS — T40.2X5A OPIOID-INDUCED CONSTIPATION: ICD-10-CM

## 2025-01-27 DIAGNOSIS — K21.9 GASTROESOPHAGEAL REFLUX DISEASE WITHOUT ESOPHAGITIS: ICD-10-CM

## 2025-01-27 DIAGNOSIS — Z86.2 HISTORY OF IRON DEFICIENCY ANEMIA: ICD-10-CM

## 2025-01-27 DIAGNOSIS — K59.03 OPIOID-INDUCED CONSTIPATION: ICD-10-CM

## 2025-01-27 DIAGNOSIS — K50.80 CROHN'S DISEASE OF BOTH SMALL AND LARGE INTESTINE WITHOUT COMPLICATION: Primary | ICD-10-CM

## 2025-01-27 PROCEDURE — 1159F MED LIST DOCD IN RCRD: CPT | Performed by: INTERNAL MEDICINE

## 2025-01-27 PROCEDURE — 3074F SYST BP LT 130 MM HG: CPT | Performed by: INTERNAL MEDICINE

## 2025-01-27 PROCEDURE — 3078F DIAST BP <80 MM HG: CPT | Performed by: INTERNAL MEDICINE

## 2025-01-27 PROCEDURE — 99214 OFFICE O/P EST MOD 30 MIN: CPT | Performed by: INTERNAL MEDICINE

## 2025-01-27 PROCEDURE — 1160F RVW MEDS BY RX/DR IN RCRD: CPT | Performed by: INTERNAL MEDICINE

## 2025-01-27 RX ORDER — SODIUM, POTASSIUM,MAG SULFATES 17.5-3.13G
2 SOLUTION, RECONSTITUTED, ORAL ORAL ONCE
Qty: 354 ML | Refills: 0 | Status: SHIPPED | OUTPATIENT
Start: 2025-01-27 | End: 2025-01-27

## 2025-01-27 RX ORDER — LUBIPROSTONE 24 UG/1
24 CAPSULE ORAL 2 TIMES DAILY WITH MEALS
Qty: 60 CAPSULE | Refills: 5 | Status: SHIPPED | OUTPATIENT
Start: 2025-01-27

## 2025-01-27 NOTE — TELEPHONE ENCOUNTER
Caller: Stephanie Jean    Relationship to patient: Self    Best call back number: [phone]     Type of visit: COLONOSCOPY     Requested date:3/7/2025 IF POSSIBLE     If rescheduling, when is the original appointment:  2/28/2025    Additional notes:PATIENT HAS ANOTHER APPT ON 2/28/2025 AND NEEDS TO RESCHEDULE COLONOSCOPY

## 2025-01-27 NOTE — PROGRESS NOTES
Follow Up Note     Date: 2025   Patient Name: Stephanie Jean  MRN: [id number]  : 1955     Referring Physician: Nhung Shepard DO    Chief Complaint:    Chief Complaint   Patient presents with    Crohn's Disease    Anemia    Heartburn    Nausea       Interval History:   2025  Stephanie Jean is a 69 y.o. female who is here today for follow up for her Crohn's disease and anemia.  She has been taking Linzess as needed.  Currently takes Dulcolax daily.  Linzess caused loose stools as per patient.  No issues with Remicade infusion.    2020  Stephanie Jean is a 64 y.o. female who is here today to establish care with Gastroenterology for evaluation of heartburn. History of upper abdominal pain mainly in the epigastric region since about 2-3 months. Pain started gradually, intermittent, aching pain lasting for about few minutes and occasionally longer.She was given dexilant and sucralfate which did not help initially and later on changed to pantoprazole.  She is on pantoprazole now with reasonable control of pain and reflux symptoms with once in few days but for the last 2 weeks she did not take any PPI. Associated significant heart burn.      She was on PPI before after she was noted to have gastritis on EGD about 10 years ago. Stopped herself year ago. Occasional problem with swallowing to solid food. She also has sjogrens syndrome.   She was mostly constipated now. Bowel movements every once in 2-3 days and occasionally once in 3-5 days. No lower abdominal pain.      There is no history of  hematochezia or melena. There is no history of anemia. Prior history of EGD about 10 yrs ago. Last colonoscopy in 2020 about 4 polyps removed and advised to repeat in 3 years time. Brother had UC and uncle from mother's side had colon Ca. No history of any abdominal surgery except hysterectomy. Denies alcohol abuse or cigarette smoking.  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\"    Crohn's disease 2022    Depression 2022    Dysphagia     Elevated cholesterol     Family history of colon cancer     Fibromyalgia     Fibromyalgia, primary 2003    GERD (gastroesophageal reflux disease)     GI (gastrointestinal bleed) 8/8/2022    Heart murmur     Hoarseness     Hyperlipidemia     Hypertension 2021    Inflammatory bowel disease 2022    recent hospitalization for colitis    Irritable bowel syndrome     Ischemic colitis 08/11/2023    Low back pain 2003    treated by [facility]    Lupus     Marijuana use     Daily     Osteopenia 2021    Osteopenia    Peripheral neuropathy 08/10/2022    Pulmonary nodule     Shingles August 2018    Sjogren's disease     Urinary tract infection 2022    Vasovagal syncope      Past Surgical History:   Past Surgical History:   Procedure Laterality Date    ABDOMINAL SURGERY  1998    [facility]    APPENDECTOMY      BREAST SURGERY  1975    lumpectomy 1975    CATARACT EXTRACTION W/ INTRAOCULAR LENS IMPLANT Left 12/20/2021    Procedure: CATARACT PHACO EXTRACTION WITH INTRAOCULAR LENS IMPLANT LEFT COMPLICATED WITH MALYUGIN RING;  Surgeon: Zachary Landers MD;  Location: [facility] OR;  Service: Ophthalmology;  Laterality: Left;    CATARACT EXTRACTION W/ INTRAOCULAR LENS IMPLANT Right 01/03/2022    Procedure: CATARACT PHACO EXTRACTION WITH INTRAOCULAR LENS IMPLANT RIGHT;  Surgeon: Zachary Landers MD;  Location: [facility] OR;  Service: Ophthalmology;  Laterality: Right;    COLONOSCOPY      COLONOSCOPY N/A 12/12/2022    Procedure: COLONOSCOPY WITH BIOPSIES AND POLYPECTOMY;  Surgeon: Chaparro Marrero MD;  " Location: [facility] ENDOSCOPY;  Service: Gastroenterology;  Laterality: N/A;    ENDOSCOPY      ENDOSCOPY N/A 2020    Procedure: ESOPHAGOGASTRODUODENOSCOPY;  Surgeon: Chaparro Marrero MD;  Location: [facility] ENDOSCOPY;  Service: Gastroenterology;  Laterality: N/A;    EYE SURGERY      cataract removal    HYSTERECTOMY      TOTAL ABDOMINAL HYSTERECTOMY WITH SALPINGO OOPHORECTOMY      TUBAL ABDOMINAL LIGATION      UPPER GASTROINTESTINAL ENDOSCOPY         Family History:   Family History   Problem Relation Age of Onset    Hypertension Mother     Aneurysm Mother     Heart disease Mother         passed away at 54 yr old after 5 vessel bypass    Hyperlipidemia Mother     Stroke Mother         aneurysm ruptured    Emphysema Father     COPD Father     Ulcerative colitis Brother     Alcohol abuse Brother     Cancer Brother         stomach cancer    COPD Brother     Hyperlipidemia Brother     Liver disease Brother     Cirrhosis Brother     Colon polyps Brother     Stomach cancer Brother     Colon cancer Maternal Uncle     Arthritis Maternal Uncle     Cancer Maternal Aunt         breast cancer    Cancer Sister         breast    COPD Paternal Uncle     Early death Brother         ulcerative colitis    Learning disabilities Brother         mental retardation    Mental illness Brother         schizophrenia    Ulcerative colitis Brother     Mental retardation Brother     Inflammatory bowel disease Brother     Kidney disease Paternal Aunt         nephrectomy    Liver cancer Neg Hx     Crohn's disease Neg Hx        Social History:   Social History     Socioeconomic History    Marital status:    Tobacco Use    Smoking status: Former     Current packs/day: 0.00     Average packs/day: 1 pack/day for 15.0 years (15.0 ttl pk-yrs)     Types: Cigarettes     Start date: 2003     Quit date: 2018     Years since quittin.0    Smokeless tobacco: Never   Vaping Use    Vaping status: Every Day    Substances:  CBD, Flavoring    Devices: Disposable   Substance and Sexual Activity    Alcohol use: No    Drug use: Not Currently     Types: Marijuana     Comment: OCCASIONALLY    Sexual activity: Defer     Birth control/protection: Post-menopausal, Hysterectomy       Medications:     Current Outpatient Medications:     aspirin 81 MG EC tablet, Take 1 tablet by mouth. Every other day, Disp: , Rfl:     atorvastatin (LIPITOR) 20 MG tablet, TAKE 1 TABLET BY MOUTH EVERY NIGHT, Disp: 90 tablet, Rfl: 3    azaTHIOprine (IMURAN) 50 MG tablet, Take 2 tablets by mouth Daily., Disp: 180 tablet, Rfl: 3    busPIRone (BUSPAR) 15 MG tablet, Take 1 tablet by mouth 2 (Two) Times a Day As Needed (anxiety). for anxiety, Disp: 180 tablet, Rfl: 3    cyclobenzaprine (FLEXERIL) 10 MG tablet, TAKE 1 TABLET BY MOUTH THREE TIMES DAILY, Disp: 270 tablet, Rfl: 3    Diclofenac Sodium (VOLTAREN) 1 % gel gel, diclofenac 1 % topical gel  APPLY 1 GRAM TOPICALLY TO THE AFFECTED AREA TWICE DAILY AS NEEDED FOR PAIN, Disp: , Rfl:     dicyclomine (BENTYL) 20 MG tablet, Take 1 tablet by mouth 2 (Two) Times a Day As Needed for Abdominal Cramping., Disp: 60 tablet, Rfl: 1    docusate sodium (COLACE) 100 MG capsule, TAKE 1 CAPSULE BY MOUTH TWICE DAILY, Disp: 180 capsule, Rfl: 2    donepezil (ARICEPT) 10 MG tablet, Take 1 tablet by mouth Every Night., Disp: 90 tablet, Rfl: 1    DULoxetine (CYMBALTA) 60 MG capsule, TAKE 1 CAPSULE BY MOUTH DAILY, Disp: 90 capsule, Rfl: 3    ferrous gluconate (FERGON) 324 MG tablet, TAKE 1 TABLET BY MOUTH DAILY WITH BREAKFAST, Disp: 30 tablet, Rfl: 2    fluticasone (FLONASE) 50 MCG/ACT nasal spray, SHAKE LIQUID AND USE 1 TO 2 SPRAYS IN EACH NOSTRIL EVERY DAY AS NEEDED, Disp: , Rfl:     furosemide (LASIX) 20 MG tablet, TAKE 1 TABLET BY MOUTH DAILY AS NEEDED FOR SWELLING, Disp: 30 tablet, Rfl: 1    gabapentin (NEURONTIN) 800 MG tablet, Take 1 tablet by mouth 2 (Two) Times a Day., Disp: , Rfl:     hydroxychloroquine (PLAQUENIL) 200 MG tablet,  Take 1.5 tablets by mouth Daily., Disp: , Rfl:     hydrOXYzine pamoate (VISTARIL) 25 MG capsule, TAKE 1 CAPSULE BY MOUTH THREE TIMES DAILY AS NEEDED FOR ITCHING, Disp: 90 capsule, Rfl: 1    inFLIXimab (REMICADE IV), Infuse  into a venous catheter Take As Directed., Disp: , Rfl:     linaclotide (Linzess) 145 MCG capsule capsule, Take 1 capsule by mouth Daily. 30 minutes before meals on an empty stomach., Disp: 90 capsule, Rfl: 3    lisinopril (PRINIVIL,ZESTRIL) 10 MG tablet, Take 1 tablet by mouth Every Night., Disp: 90 tablet, Rfl: 3    loratadine (CLARITIN) 10 MG tablet, Take 1 tablet by mouth As Needed., Disp: , Rfl:     meclizine 25 MG chewable tablet chewable tablet, Chew 0.5 tablets Every 8 (Eight) Hours., Disp: , Rfl:     nitroglycerin (NITROSTAT) 0.4 MG SL tablet, Place 1 tablet under the tongue Every 5 (Five) Minutes As Needed., Disp: , Rfl:     ondansetron ODT (ZOFRAN-ODT) 4 MG disintegrating tablet, Place 1 tablet on the tongue Every 8 (Eight) Hours As Needed for Nausea or Vomiting., Disp: 10 tablet, Rfl: 0    oxybutynin XL (DITROPAN-XL) 10 MG 24 hr tablet, Take 1 tablet by mouth Daily., Disp: 90 tablet, Rfl: 3    oxyCODONE-acetaminophen (PERCOCET) 7.5-325 MG per tablet, Take 1 tablet by mouth 2 (Two) Times a Day., Disp: , Rfl:     pantoprazole (PROTONIX) 20 MG EC tablet, TAKE 1 TABLET BY MOUTH DAILY, Disp: 90 tablet, Rfl: 3    promethazine (PHENERGAN) 25 MG tablet, TAKE 1/2 TABLET BY MOUTH EVERY 8 HOURS AS NEEDED FOR NAUSEA OR VOMITING, Disp: 30 tablet, Rfl: 2    Restasis 0.05 % ophthalmic emulsion, Administer 1 drop to both eyes 2 (Two) Times a Day., Disp: , Rfl:     rOPINIRole (REQUIP) 2 MG tablet, TAKE 1 TABLET BY MOUTH EVERY DAY 1 TO 3 HOURS BEFORE BEDTIME, Disp: 90 tablet, Rfl: 1    vitamin B-12 (CYANOCOBALAMIN) 1000 MCG tablet, TAKE 1 TABLET BY MOUTH EVERY DAY, Disp: 90 tablet, Rfl: 3    vitamin D (ERGOCALCIFEROL) 1.25 MG (27289 UT) capsule capsule, TAKE 1 CAPSULE BY MOUTH ONCE A WEEK, Disp: 15  "capsule, Rfl: 3    lactulose (CHRONULAC) 10 GM/15ML solution, Take 15 mL by mouth Daily. (Patient not taking: Reported on 1/27/2025), Disp: 450 mL, Rfl: 2    memantine (NAMENDA) 10 MG tablet, Take 1 tablet by mouth Daily. (Patient not taking: Reported on 1/27/2025), Disp: 90 tablet, Rfl: 1    Allergies:   Allergies   Allergen Reactions    Penicillins Rash       Review of Systems:   Review of Systems   Constitutional:  Negative for appetite change, fatigue, fever and unexpected weight loss.   HENT:  Negative for trouble swallowing.    Gastrointestinal:  Positive for abdominal pain, anal bleeding (1 month ago, associated with cramping) and diarrhea. Negative for abdominal distention, blood in stool, constipation, nausea, rectal pain, vomiting, GERD and indigestion.        1 month ago mucus in stool         The following portions of the patient's history were reviewed and updated as appropriate: allergies, current medications, past family history, past medical history, past social history, past surgical history and problem list.    Objective     Physical Exam:  Vital Signs:   Vitals:    01/27/25 1320   BP: 112/70   Pulse: 80   Temp: 97.5 °F (36.4 °C)   TempSrc: Infrared   SpO2: 99%   Weight: 59.9 kg (132 lb)  Comment: with clothing/shoes   Height: 152.4 cm (60\")  Comment: per patient.       Physical Exam  Constitutional:       Appearance: Normal appearance.   HENT:      Head: Normocephalic and atraumatic.   Eyes:      Conjunctiva/sclera: Conjunctivae normal.   Abdominal:      General: Abdomen is flat. There is no distension.      Palpations: There is no mass.      Tenderness: There is no abdominal tenderness. There is no guarding or rebound.      Hernia: No hernia is present.   Musculoskeletal:      Cervical back: Normal range of motion and neck supple.   Neurological:      Mental Status: She is alert.         Results Review:   I reviewed the patient's new clinical results.    Hospital Outpatient Visit on 12/20/2024 "   Component Date Value Ref Range Status    Glucose 12/20/2024 87  65 - 99 mg/dL Final    BUN 12/20/2024 19  8 - 23 mg/dL Final    Creatinine 12/20/2024 0.92  0.57 - 1.00 mg/dL Final    Sodium 12/20/2024 142  136 - 145 mmol/L Final    Potassium 12/20/2024 4.1  3.5 - 5.2 mmol/L Final    Chloride 12/20/2024 108 (H)  98 - 107 mmol/L Final    CO2 12/20/2024 23.1  22.0 - 29.0 mmol/L Final    Calcium 12/20/2024 8.9  8.6 - 10.5 mg/dL Final    Total Protein 12/20/2024 6.5  6.0 - 8.5 g/dL Final    Albumin 12/20/2024 4.0  3.5 - 5.2 g/dL Final    ALT (SGPT) 12/20/2024 11  1 - 33 U/L Final    AST (SGOT) 12/20/2024 22  1 - 32 U/L Final    Alkaline Phosphatase 12/20/2024 89  39 - 117 U/L Final    Total Bilirubin 12/20/2024 0.2  0.0 - 1.2 mg/dL Final    Globulin 12/20/2024 2.5  gm/dL Final    A/G Ratio 12/20/2024 1.6  g/dL Final    BUN/Creatinine Ratio 12/20/2024 20.7  7.0 - 25.0 Final    Anion Gap 12/20/2024 10.9  5.0 - 15.0 mmol/L Final    eGFR 12/20/2024 68.4  >60.0 mL/min/1.73 Final   Admission on 12/11/2024, Discharged on 12/11/2024   Component Date Value Ref Range Status    Glucose 12/11/2024 117 (H)  65 - 99 mg/dL Final    BUN 12/11/2024 15  8 - 23 mg/dL Final    Creatinine 12/11/2024 0.99  0.57 - 1.00 mg/dL Final    Sodium 12/11/2024 138  136 - 145 mmol/L Final    Potassium 12/11/2024 4.0  3.5 - 5.2 mmol/L Final    Chloride 12/11/2024 103  98 - 107 mmol/L Final    CO2 12/11/2024 26.0  22.0 - 29.0 mmol/L Final    Calcium 12/11/2024 9.3  8.6 - 10.5 mg/dL Final    Total Protein 12/11/2024 7.2  6.0 - 8.5 g/dL Final    Albumin 12/11/2024 4.3  3.5 - 5.2 g/dL Final    ALT (SGPT) 12/11/2024 10  1 - 33 U/L Final    AST (SGOT) 12/11/2024 19  1 - 32 U/L Final    Alkaline Phosphatase 12/11/2024 109  39 - 117 U/L Final    Total Bilirubin 12/11/2024 0.2  0.0 - 1.2 mg/dL Final    Globulin 12/11/2024 2.9  gm/dL Final    A/G Ratio 12/11/2024 1.5  g/dL Final    BUN/Creatinine Ratio 12/11/2024 15.2  7.0 - 25.0 Final    Anion Gap 12/11/2024  9.0  5.0 - 15.0 mmol/L Final    eGFR 12/11/2024 62.6  >60.0 mL/min/1.73 Final    Lipase 12/11/2024 15  13 - 60 U/L Final    Color, UA 12/11/2024 Yellow  Yellow, Straw Final    Appearance, UA 12/11/2024 Clear  Clear Final    pH, UA 12/11/2024 6.0  5.0 - 8.0 Final    Specific Gravity, UA 12/11/2024 1.008  1.005 - 1.030 Final    Glucose, UA 12/11/2024 Negative  Negative Final    Ketones, UA 12/11/2024 Negative  Negative Final    Bilirubin, UA 12/11/2024 Negative  Negative Final    Blood, UA 12/11/2024 Negative  Negative Final    Protein, UA 12/11/2024 Negative  Negative Final    Leuk Esterase, UA 12/11/2024 Trace (A)  Negative Final    Nitrite, UA 12/11/2024 Negative  Negative Final    Urobilinogen, UA 12/11/2024 0.2 E.U./dL  0.2 - 1.0 E.U./dL Final    Lactate 12/11/2024 1.1  0.5 - 2.0 mmol/L Final    Extra Tube 12/11/2024 Hold for add-ons.   Final    Auto resulted.    Extra Tube 12/11/2024 hold for add-on   Final    Auto resulted    Extra Tube 12/11/2024 Hold for add-ons.   Final    Auto resulted.    Extra Tube 12/11/2024 Hold for add-ons.   Final    Auto resulted    WBC 12/11/2024 3.42  3.40 - 10.80 10*3/mm3 Final    RBC 12/11/2024 3.70 (L)  3.77 - 5.28 10*6/mm3 Final    Hemoglobin 12/11/2024 12.0  12.0 - 15.9 g/dL Final    Hematocrit 12/11/2024 35.7  34.0 - 46.6 % Final    MCV 12/11/2024 96.5  79.0 - 97.0 fL Final    MCH 12/11/2024 32.4  26.6 - 33.0 pg Final    MCHC 12/11/2024 33.6  31.5 - 35.7 g/dL Final    RDW 12/11/2024 13.0  12.3 - 15.4 % Final    RDW-SD 12/11/2024 46.1  37.0 - 54.0 fl Final    MPV 12/11/2024 9.5  6.0 - 12.0 fL Final    Platelets 12/11/2024 191  140 - 450 10*3/mm3 Final    Neutrophil % 12/11/2024 51.1  42.7 - 76.0 % Final    Lymphocyte % 12/11/2024 38.6  19.6 - 45.3 % Final    Monocyte % 12/11/2024 9.1  5.0 - 12.0 % Final    Eosinophil % 12/11/2024 0.6  0.3 - 6.2 % Final    Basophil % 12/11/2024 0.3  0.0 - 1.5 % Final    Immature Grans % 12/11/2024 0.3  0.0 - 0.5 % Final    Neutrophils, Absolute  12/11/2024 1.75  1.70 - 7.00 10*3/mm3 Final    Lymphocytes, Absolute 12/11/2024 1.32  0.70 - 3.10 10*3/mm3 Final    Monocytes, Absolute 12/11/2024 0.31  0.10 - 0.90 10*3/mm3 Final    Eosinophils, Absolute 12/11/2024 0.02  0.00 - 0.40 10*3/mm3 Final    Basophils, Absolute 12/11/2024 0.01  0.00 - 0.20 10*3/mm3 Final    Immature Grans, Absolute 12/11/2024 0.01  0.00 - 0.05 10*3/mm3 Final    nRBC 12/11/2024 0.0  0.0 - 0.2 /100 WBC Final    RBC, UA 12/11/2024 None Seen  None Seen, 0-2 /HPF Final    WBC, UA 12/11/2024 0-2  None Seen, 0-2 /HPF Final    Bacteria, UA 12/11/2024 None Seen  None Seen /HPF Final    Squamous Epithelial Cells, UA 12/11/2024 0-2  None Seen, 0-2 /HPF Final    Hyaline Casts, UA 12/11/2024 None Seen  None Seen /LPF Final    Methodology 12/11/2024 Manual Light Microscopy   Final   Hospital Outpatient Visit on 11/08/2024   Component Date Value Ref Range Status    Glucose 11/08/2024 81  65 - 99 mg/dL Final    BUN 11/08/2024 19  8 - 23 mg/dL Final    Creatinine 11/08/2024 1.03 (H)  0.57 - 1.00 mg/dL Final    Sodium 11/08/2024 143  136 - 145 mmol/L Final    Potassium 11/08/2024 4.7  3.5 - 5.2 mmol/L Final    Slight hemolysis detected by analyzer. Result may be falsely elevated.    Chloride 11/08/2024 109 (H)  98 - 107 mmol/L Final    CO2 11/08/2024 26.4  22.0 - 29.0 mmol/L Final    Calcium 11/08/2024 8.9  8.6 - 10.5 mg/dL Final    Total Protein 11/08/2024 6.5  6.0 - 8.5 g/dL Final    Albumin 11/08/2024 4.0  3.5 - 5.2 g/dL Final    ALT (SGPT) 11/08/2024 13  1 - 33 U/L Final    AST (SGOT) 11/08/2024 22  1 - 32 U/L Final    Alkaline Phosphatase 11/08/2024 90  39 - 117 U/L Final    Total Bilirubin 11/08/2024 0.3  0.0 - 1.2 mg/dL Final    Globulin 11/08/2024 2.5  gm/dL Final    A/G Ratio 11/08/2024 1.6  g/dL Final    BUN/Creatinine Ratio 11/08/2024 18.4  7.0 - 25.0 Final    Anion Gap 11/08/2024 7.6  5.0 - 15.0 mmol/L Final    eGFR 11/08/2024 59.7 (L)  >60.0 mL/min/1.73 Final       CT Chest Low Dose Cancer  Screening WO    Result Date: 12/20/2024  Stable 5 mm right lower lobe nodule. Lung RADS category 2.  RECOMMENDATIONS:Annual low-dose chest CT.    Images were reviewed, interpreted, and dictated by Dr. Samantha Allen MD Transcribed by Marilu Ponce PA-C.  This report was signed and finalized on 12/20/2024 3:51 PM by Samantha Allen MD.      XR Spine Lumbar 2 or 3 View    Result Date: 12/20/2024  Degenerative changes as above, most pronounced at L5-S1.      Images were reviewed, interpreted, and dictated by Dr. Samantha Allen MD Transcribed by Marilu Ponce PA-C.  This report was signed and finalized on 12/20/2024 2:49 PM by Samantha Allen MD.      CT Abdomen Pelvis With Contrast    Result Date: 12/11/2024  Bowel gas pattern suggesting ileus versus enteritis.  Stable left adrenal lesion.  Fatty infiltration of the liver.   CTDI: 5.82 mGy DLP: 265.46 mGy.cm   This study was performed with techniques to keep radiation doses as low as reasonably achievable (ALARA). Individualized dose reduction techniques using automated exposure control or adjustment of mA and/or kV according to the patient size were employed.      Images were reviewed, interpreted, and dictated by Dr. Samantha Allen MD Transcribed by Marilu Ponce PA-C.  This report was signed and finalized on 12/11/2024 11:47 AM by Samantha Allen MD.      XR Spine Thoracic 2 View    Result Date: 11/25/2024  No acute process.  Chronic changes as above.    Images were reviewed, interpreted, and dictated by Dr. Samantha Allen MD Transcribed by Marilu Ponce PA-C.  This report was signed and finalized on 11/25/2024 3:23 PM by Samantha Allen MD.      XR Spine Cervical 2 or 3 View    Result Date: 11/25/2024  1. Chronic changes as above. 2. Vascular calcifications in the left neck. Consider CTA for further evaluation.        Images were reviewed, interpreted, and dictated by Dr. Samantha Allen MD Transcribed by Marilu Ponce PA-C.  This report was signed and finalized on  11/25/2024 12:41 PM by Samantha Allen MD.       CT Abdomen Pelvis With Contrast     Result Date: 8/11/2023  Wall thickening, submucosal edema and mucosal enhancement from the distal transverse colon to the rectum in this patient with known Crohn's disease; findings slightly improved compared to 1 year prior.  Air-fluid levels in nondistended small bowel, suspect ileus.  This report was signed and finalized on 8/11/2023 4:01 PM by Samantha Allen MD.        11/15/2022 PillCam: Few scattered gastric erosions. Multiple small and medium size healing erosions and superficial ulcerations identified throughout the ileum. No deep ulcerations except one small proximal ileal ulceration. These findings with anemia and recent colitis is more suggestive of ileocolonic crohn's disease.     Colonoscopy by Dr. Marrero 12/12/2022:  - The perianal and digital rectal examinations were normal.  - A 3 mm polyp was found in the cecum. The polyp was sessile. The polyp was removed with a cold snare. Resection and retrieval were complete.  - A 4 mm polyp was found in the sigmoid colon. The polyp was flat. The polyp was removed with a cold snare. Resection and retrieval were complete.  - Diffuse moderate inflammation characterized by altered vascularity, congestion (edema), erosions, erythema, friability and granularity was found in the rectum, in the recto-sigmoid colon, in the sigmoid colon and in the descending colon. Biopsies were taken with a cold forceps for histology.  - A medium healed ulcer was found in the mid descending colon. The scar tissue was healthy in appearance.  - The transverse colon, hepatic flexure, ascending colon, cecum, appendiceal orifice and ileocecal valve appeared normal. Biopsies were taken with a cold forceps for histology.  - The terminal ileum appeared normal. Biopsies were taken with a cold forceps for histology.     Pathology DIAGNOSIS:   A.   TERMINAL ILEUM BIOPSY:   Benign small bowel, consistent with  terminal ileum   Negative for significant architectural distortion, inflammatory infiltrate,   neoplasia, malignancy   B.   CECUM BIOPSY:   Benign colonic mucosa, negative for significant architectural distortion,   inflammatory infiltrate, neoplasia, dysplasia, malignancy   C.   ASCENDING COLON BIOPSY:   Benign colonic mucosa, negative for significant architectural distortion,   inflammatory infiltrate, neoplasia, dysplasia, malignancy   D.   TRANSVERSE COLON BIOPSY:   Benign colonic mucosa, negative for significant architectural distortion,   inflammatory infiltrate, neoplasia, dysplasia, malignancy   E.   DESCENDING COLON BIOPSY:   Minimal active colitis without significant accompanying chronicity   Negative for dysplasia, neoplasia, malignancy   See comment   F.   SIGMOID COLON BIOPSY:   Minimal active colitis without significant chronicity   Negative for dysplasia, neoplasia,malignancy   See comment   G.   RECTAL BIOPSY:   Minimal active colitis without significant chronicity   Negative for dysplasia, neoplasia, malignancy   See comment   H.   CECUM POLYP:   Tubular adenoma   Negative for high-grade dysplasia/malignancy   I.   SIGMOID COLON POLYP:   Hyperplastic polyp   Accompanying inflammatory changes   COMMENT:  E-G.  There is minimal active colitis with only rare neutrophils   identified within the mucosal surfaces.  There is no evidence of viral cytopathic effect, significant chronic alteration, dysplasia, neoplasia, malignancy, or granulomatous inflammation.  These findings are not specific and could be seen with an infectious process, acute self-limited colitis, or even potentially inflammatory bowel disease in the appropriate clinical/endoscopic setting. Clinical correlation is needed    Assessment / Plan      1. Crohn's disease of both small and large intestine   2. Suspected arthropathy associated with inflammatory bowel disease / RA   3. Chronic iron deficiency anemia  4. Vitamin B12 deficiency  5.  Family history of inflammatory bowel disease-UC with brother  6. Gastroesophageal reflux disease without esophagitis  7. Long-term opioid use with nausea ? /drug-induced constipation /irritable bowel  1/27/2025  Still has a significant issue with constipation .  Reflux is not helping much.  She does not want to take MiraLAX as it is not helping.  Lab work on 8/2022,024 reveals normal CMP except bloodline chloride of 108.  CBC is normal with a WBC 3.4 hemoglobin 12 platelet count of 1 91,000.  Previously had a borderline low WBC from Imuran use.  CT abdomen pelvis with contrast on 12/11/2024 that revealed bowel gas pattern suggesting ileus versus enteritis.  This is a common finding with opioid use with ileus.      Smoking cessation  Continue Remicade infusion as before  Continue Imuran 100 mg p.o. daily  Will start her on Amitiza 24 mcg p.o. twice daily  She can and Dulcolax if needed as needed  Bentyl 20 minutes p.o. twice daily as needed  Dermatology after this year  She is due for her surveillance colonoscopy will schedule  Suprep bowel prep  CBC CMP    The indications, technique, alternatives and potential risk and complications were discussed with the patient including but not limited to bleeding, bowel perforations, missing lesions and anesthetic complications. The patient understands and wishes to proceed with the procedure and has given their verbal consent. Written patient education information was given to the patient.   The patient will call if they have further questions before procedure.      7/31/2024  Overall patient is doing well except she still has a right-sided abdominal discomfort and intermittent nausea vomiting.  She feels that her symptoms gets worse about 2 weeks before the next infusion. Remicade trough level was 15 with antibodies less than 22.   CT angiogram done on 10/5/2023 revealed no evidence of major peripheral vascular disease     8/31/2023  Record from [facility] reviewed.  Patient did not receive her first Remicade maintenance infusion on time and had to wait for about 6 weeks.  First maintenance dose was in July 12.  And next dose is pending for September 12. Patient developed bloody diarrhea with abdominal pain and came to ED on 8/11/2023 and later on was transferred to [facility] due to no coverage from GI here.  Her CT abdomen pelvis done on 8/11/2023 revealed revealed a circumferential wall thickening from distal transverse colon to the rectum findings were in fact less pronounced than the previous findings with the prior CT scans.  There was also fluid-filled small bowel loops nondilated with air-fluid levels from ileus.     Stool C. difficile and GI panel was negative.  Her thiopurine metabolites level was only 55 indicating subtherapeutic level and 6-mercaptopurine level was 850 within limit.  Her random infliximab level was 19.28 without any antibodies.  She had a colonoscopy done by Dr. Valencia at [facility] on 8/15/2023 and reported as having severe inflammation with ulcerations on the left side colon that may indicate ischemic colitis.  Full colonoscopy and pathology report not available to review.  Her lab work done on 8/22/2023 revealed a normal CMP.  Hemoglobin is 9.8 g/dL.  CRP 1.77.     Although colonoscopy findings suggested possible left-sided ischemic colitis.  These findings were similar to prior colitis findings are chronic , was present with a CT scan a year ago, and persistent findings noted with the colonoscopy done in December 2022, suggesting chronic inflammation. These findings suggest Crohn's flare.  Given her gap in her maintenance Remicade dose infusion for 6 weeks, played a role on this flare of unclear.  However her random Remicade level done was therapeutic and very high level midway through her treatment.      3/29/2023   Her stool calprotectin was 25 within normal limits.  Hemoglobin is still low at 11 g/dL.  Vitamin B12 is low at  199.She had a 2 induction dose of Remicade now third 1 is pending.  Initially prescribed Humira however she could not afford co-pay.     After review of recent pillcam and colonoscopy, it seems that she has chronic inflammation which may have been contributing to her anemia throughout the years. Some element of anemia of chronic disease suspected with her rheumatoid arthritis. She had erosions and ulceration in the small intestine on PillCam. Colonoscopy and colon biopsies show active colitis. History and endoscopic findings consistent with a new diagnosis of Crohn's disease. She is currently complaining of bloating, mucous in stools, mild rectal bleeding and loose stools. This is a change in bowel habits from her previous constipation. Initially diagnosed with colitis in August 2022 with bloody diarrhea.  CT abdomen pelvis done with contrast in August 2022 revealed a left-sided colon colitis.  Subsequently she had a colonoscopy done by Dr. Morel on 8/17/2022 which did not reveal any endoscopic signs of colitis.  However TI was not visualized.  Random colon biopsies done were all normal without any signs of colitis.  Prior EGD done in 2020 did not reveal any upper GI source of bleeding. Her brother has ulcerative colitis. Previously with reports of constipation at baseline. Now having stools daily. In the past, she took MiraLAX, senna with relief. Had taken Linzess and Movantik before which did not help her. Serum porphyrin normal.      Prior history  8. Tubular adenoma of colon  Colonoscopy 12/2022 had 2 small polyps, 1 was tubular adenoma without dysplasia and other was hyperplastic.  No family history of any colon cancer with a first-degree relatives. Due to new diagnosis of IBD, she will likely need repeat colonoscopy in 1-3 years depending on clinical course  9.  Nonalcoholic fatty liver / medication induced    Follow Up:   No follow-ups on file.    Chaparro Marrero MD  Gastroenterology  [place]  1/27/2025  13:22 EST     Please note that portions of this note may have been completed with a voice recognition program.

## 2025-01-29 RX ORDER — BUSPIRONE HYDROCHLORIDE 10 MG/1
10 TABLET ORAL 2 TIMES DAILY PRN
Qty: 180 TABLET | Refills: 3 | OUTPATIENT
Start: 2025-01-29

## 2025-01-31 ENCOUNTER — HOSPITAL ENCOUNTER (OUTPATIENT)
Facility: HOSPITAL | Age: 70
Discharge: HOME | End: 2025-01-31
Payer: MEDICARE

## 2025-01-31 VITALS
BODY MASS INDEX: 25.6 KG/M2 | SYSTOLIC BLOOD PRESSURE: 112 MMHG | WEIGHT: 130.4 LBS | HEIGHT: 60 IN | HEART RATE: 62 BPM | RESPIRATION RATE: 14 BRPM | DIASTOLIC BLOOD PRESSURE: 68 MMHG | TEMPERATURE: 98.1 F | OXYGEN SATURATION: 97 %

## 2025-01-31 DIAGNOSIS — K50.80 CROHN'S DISEASE OF BOTH SMALL AND LARGE INTESTINE WITHOUT COMPLICATION: Primary | ICD-10-CM

## 2025-01-31 LAB
ALBUMIN SERPL-MCNC: 4.2 G/DL (ref 3.5–5.2)
ALBUMIN/GLOB SERPL: 1.5 G/DL
ALP SERPL-CCNC: 102 U/L (ref 39–117)
ALT SERPL W P-5'-P-CCNC: 18 U/L (ref 1–33)
ANION GAP SERPL CALCULATED.3IONS-SCNC: 11.4 MMOL/L (ref 5–15)
AST SERPL-CCNC: 24 U/L (ref 1–32)
BASOPHILS # BLD AUTO: 0.02 10*3/MM3 (ref 0–0.2)
BASOPHILS NFR BLD AUTO: 0.7 % (ref 0–1.5)
BILIRUB SERPL-MCNC: 0.3 MG/DL (ref 0–1.2)
BUN SERPL-MCNC: 20 MG/DL (ref 8–23)
BUN/CREAT SERPL: 20.6 (ref 7–25)
CALCIUM SPEC-SCNC: 9.2 MG/DL (ref 8.6–10.5)
CHLORIDE SERPL-SCNC: 105 MMOL/L (ref 98–107)
CO2 SERPL-SCNC: 25.6 MMOL/L (ref 22–29)
CREAT SERPL-MCNC: 0.97 MG/DL (ref 0.57–1)
DEPRECATED RDW RBC AUTO: 44.4 FL (ref 37–54)
EGFRCR SERPLBLD CKD-EPI 2021: 64.2 ML/MIN/1.73
EOSINOPHIL # BLD AUTO: 0.02 10*3/MM3 (ref 0–0.4)
EOSINOPHIL NFR BLD AUTO: 0.7 % (ref 0.3–6.2)
ERYTHROCYTE [DISTWIDTH] IN BLOOD BY AUTOMATED COUNT: 12.8 % (ref 12.3–15.4)
GLOBULIN UR ELPH-MCNC: 2.8 GM/DL
GLUCOSE SERPL-MCNC: 98 MG/DL (ref 65–99)
HCT VFR BLD AUTO: 34.6 % (ref 34–46.6)
HGB BLD-MCNC: 11.9 G/DL (ref 12–15.9)
IMM GRANULOCYTES # BLD AUTO: 0.01 10*3/MM3 (ref 0–0.05)
IMM GRANULOCYTES NFR BLD AUTO: 0.3 % (ref 0–0.5)
LYMPHOCYTES # BLD AUTO: 1.1 10*3/MM3 (ref 0.7–3.1)
LYMPHOCYTES NFR BLD AUTO: 36.7 % (ref 19.6–45.3)
MCH RBC QN AUTO: 32.5 PG (ref 26.6–33)
MCHC RBC AUTO-ENTMCNC: 34.4 G/DL (ref 31.5–35.7)
MCV RBC AUTO: 94.5 FL (ref 79–97)
MONOCYTES # BLD AUTO: 0.28 10*3/MM3 (ref 0.1–0.9)
MONOCYTES NFR BLD AUTO: 9.3 % (ref 5–12)
NEUTROPHILS NFR BLD AUTO: 1.57 10*3/MM3 (ref 1.7–7)
NEUTROPHILS NFR BLD AUTO: 52.3 % (ref 42.7–76)
NRBC BLD AUTO-RTO: 0 /100 WBC (ref 0–0.2)
PLATELET # BLD AUTO: 169 10*3/MM3 (ref 140–450)
PMV BLD AUTO: 9.5 FL (ref 6–12)
POTASSIUM SERPL-SCNC: 4.3 MMOL/L (ref 3.5–5.2)
PROT SERPL-MCNC: 7 G/DL (ref 6–8.5)
RBC # BLD AUTO: 3.66 10*6/MM3 (ref 3.77–5.28)
SODIUM SERPL-SCNC: 142 MMOL/L (ref 136–145)
WBC NRBC COR # BLD AUTO: 3 10*3/MM3 (ref 3.4–10.8)

## 2025-01-31 PROCEDURE — 85025 COMPLETE CBC W/AUTO DIFF WBC: CPT | Performed by: INTERNAL MEDICINE

## 2025-01-31 PROCEDURE — 25010000002 INFLIXIMAB-ABDA 100 MG RECONSTITUTED SOLUTION 1 EACH VIAL: Performed by: INTERNAL MEDICINE

## 2025-01-31 PROCEDURE — 80053 COMPREHEN METABOLIC PANEL: CPT | Performed by: INTERNAL MEDICINE

## 2025-01-31 PROCEDURE — 96413 CHEMO IV INFUSION 1 HR: CPT

## 2025-01-31 PROCEDURE — 25810000003 SODIUM CHLORIDE 0.9 % SOLUTION 250 ML FLEX CONT: Performed by: INTERNAL MEDICINE

## 2025-01-31 PROCEDURE — 96415 CHEMO IV INFUSION ADDL HR: CPT

## 2025-01-31 RX ORDER — SODIUM CHLORIDE 9 MG/ML
20 INJECTION, SOLUTION INTRAVENOUS ONCE
OUTPATIENT
Start: 2025-03-14

## 2025-01-31 RX ORDER — SODIUM CHLORIDE 9 MG/ML
20 INJECTION, SOLUTION INTRAVENOUS ONCE
Status: DISCONTINUED | OUTPATIENT
Start: 2025-01-31 | End: 2025-02-01 | Stop reason: HOSPADM

## 2025-01-31 RX ORDER — ACETAMINOPHEN 325 MG/1
650 TABLET ORAL ONCE
Status: COMPLETED | OUTPATIENT
Start: 2025-01-31 | End: 2025-01-31

## 2025-01-31 RX ORDER — ACETAMINOPHEN 325 MG/1
650 TABLET ORAL ONCE
OUTPATIENT
Start: 2025-03-14

## 2025-01-31 RX ADMIN — INFLIXIMAB 300 MG: 100 INJECTION, POWDER, LYOPHILIZED, FOR SOLUTION INTRAVENOUS at 10:11

## 2025-01-31 RX ADMIN — ACETAMINOPHEN 650 MG: 325 TABLET, FILM COATED ORAL at 09:46

## 2025-02-03 ENCOUNTER — TELEPHONE (OUTPATIENT)
Dept: GASTROENTEROLOGY | Facility: CLINIC | Age: 70
End: 2025-02-03
Payer: MEDICARE

## 2025-02-03 NOTE — TELEPHONE ENCOUNTER
----- Message from Barbara BARBARA sent at 1/31/2025  4:25 PM EST -----    ----- Message -----  From: Chaparro Marrero MD  Sent: 1/31/2025   4:17 PM EST  To: [facility]      Her lab work done shows persistent low WBC suggesting Imuran related leukopenia.  Will reduce the Imuran dose to 50 mg p.o. daily  Let her know to take 1 pill p.o. daily instead of 2 pills from today  ----- Message -----  From: Lab, Background User  Sent: 1/31/2025   9:40 AM EST  To: Chaparro Marrero MD

## 2025-02-10 RX ORDER — HYDROXYZINE PAMOATE 25 MG/1
CAPSULE ORAL
Qty: 90 CAPSULE | Refills: 1 | Status: SHIPPED | OUTPATIENT
Start: 2025-02-10

## 2025-02-13 RX ORDER — LANOLIN ALCOHOL/MO/W.PET/CERES
1000 CREAM (GRAM) TOPICAL DAILY
Qty: 90 TABLET | Refills: 3 | Status: SHIPPED | OUTPATIENT
Start: 2025-02-13

## 2025-02-13 NOTE — TELEPHONE ENCOUNTER
Rx Refill Note  Requested Prescriptions     Pending Prescriptions Disp Refills    vitamin B-12 (CYANOCOBALAMIN) 1000 MCG tablet 90 tablet 3     Sig: Take 1 tablet by mouth Daily.      Last office visit with prescribing clinician: Visit date not found   Last telemedicine visit with prescribing clinician: Visit date not found   Next office visit with prescribing clinician: Visit date not found                         Would you like a call back once the refill request has been completed: [] Yes [] No    If the office needs to give you a call back, can they leave a voicemail: [] Yes [] No    David Sheikh MA  02/13/25, 13:09 EST

## 2025-02-17 ENCOUNTER — PRIOR AUTHORIZATION (OUTPATIENT)
Dept: INTERNAL MEDICINE | Facility: CLINIC | Age: 70
End: 2025-02-17
Payer: MEDICARE

## 2025-02-17 NOTE — TELEPHONE ENCOUNTER
Outcome  Approved today by [employer] 2017  Approved. This drug has been approved under the Member's Medicare Part D benefit. Approved quantity: 90 units per 30 day(s). You may fill up to a 90 day supply except for those on Specialty Tier 5, which can be filled up to a 30 day supply. Please call the pharmacy to process the prescription claim.

## 2025-02-25 ENCOUNTER — OFFICE VISIT (OUTPATIENT)
Age: 70
End: 2025-02-25
Payer: MEDICARE

## 2025-02-25 VITALS
HEART RATE: 97 BPM | SYSTOLIC BLOOD PRESSURE: 120 MMHG | BODY MASS INDEX: 25.47 KG/M2 | TEMPERATURE: 98.4 F | HEIGHT: 60 IN | RESPIRATION RATE: 14 BRPM | OXYGEN SATURATION: 99 % | DIASTOLIC BLOOD PRESSURE: 76 MMHG

## 2025-02-25 DIAGNOSIS — F41.9 ANXIETY: ICD-10-CM

## 2025-02-25 DIAGNOSIS — G31.84 MCI (MILD COGNITIVE IMPAIRMENT): Primary | ICD-10-CM

## 2025-02-25 DIAGNOSIS — F32.A DEPRESSION, UNSPECIFIED DEPRESSION TYPE: ICD-10-CM

## 2025-02-25 DIAGNOSIS — G47.33 OSA (OBSTRUCTIVE SLEEP APNEA): ICD-10-CM

## 2025-02-25 RX ORDER — MEMANTINE HYDROCHLORIDE 10 MG/1
10 TABLET ORAL DAILY
Qty: 90 TABLET | Refills: 1 | Status: SHIPPED | OUTPATIENT
Start: 2025-02-25

## 2025-02-25 RX ORDER — DONEPEZIL HYDROCHLORIDE 10 MG/1
10 TABLET, FILM COATED ORAL NIGHTLY
Qty: 90 TABLET | Refills: 1 | Status: SHIPPED | OUTPATIENT
Start: 2025-02-25

## 2025-02-25 NOTE — PROGRESS NOTES
Follow Up Office Visit      Patient Name: Stephanie Jean  : 1955   MRN: [id number]     Chief Complaint:    Chief Complaint   Patient presents with    Follow-up     MCI  NAN; Pt reports she is unable to use the CPAP.        History of Present Illness: Stephanie Jean is a 69 y.o. female who is here today to follow up with neurology for MCI and NAN. She was last seen in clinic  ([facility]).     She was sent for Home Sleep Study which she completed. She has been using autopap 3/30 (10%) compliance with an AHI 6.6/hr. She could not tolerate mask or machine and returned after 14 days trial. + hypersomnia    She has been taking Aricept 10 mg Daily and Namenda 10 mg Daily and reports good tolerance and compliance. She ran out of Namenda and has not gotten this refilled. She reports she has good days and bad days with her memory. On bad days, her anxiety and depression will be worse and she cannot focus to make a decision. No SI/HI. She has been taking Duloxetine 60 mg Daily and Buspar 15 mg BID PRN and this is not helping with her depression or anxiety; medications are managed by PCP (Devyn). She is driving. Self ADLs. She is managing the household cooking, cleaning and shopping. [facility] and has not heard about referral.     Recent Imaging:     Home Sleep Study  + AHI 9.6/hr with lowest desaturations 66%  MRI Brain W/WO  -noncontributory   CDUS  + Mild plaque disease < 50% stenosis to JALEEL and LICA  MRI Brain W/WO 10/22 + Mild amount of scattered small white matter lesions in the bilateral frontoparietal lobes, nonspecific, most likely due to chronic microvascular ischemic white matter changes.     Pertinent Medical History: HTN, HL, Vitamin D Deficiency, Crohn's, GERD, Colitis, ÁNGEL, Anxiety, SLE, Fibromyalgia, TIA         Review of Systems:   Review of Systems   Constitutional: Negative.    HENT: Negative.     Eyes: Negative.    Respiratory: Negative.      Cardiovascular: Negative.    Gastrointestinal: Negative.    Endocrine: Negative.    Genitourinary: Negative.    Musculoskeletal: Negative.    Skin: Negative.    Allergic/Immunologic: Negative.    Neurological:  Positive for memory problem.   Hematological: Negative.    Psychiatric/Behavioral:  Positive for depressed mood. The patient is nervous/anxious.    All other systems reviewed and are negative.      I have reviewed and the following portions of the patient's history were updated as appropriate: past family history, past medical history, past social history, past surgical history and problem list.    Medications:     Current Outpatient Medications:     aspirin 81 MG EC tablet, Take 1 tablet by mouth. Every other day, Disp: , Rfl:     atorvastatin (LIPITOR) 20 MG tablet, TAKE 1 TABLET BY MOUTH EVERY NIGHT, Disp: 90 tablet, Rfl: 3    azaTHIOprine (IMURAN) 50 MG tablet, Take 2 tablets by mouth Daily., Disp: 180 tablet, Rfl: 3    busPIRone (BUSPAR) 15 MG tablet, Take 1 tablet by mouth 2 (Two) Times a Day As Needed (anxiety). for anxiety, Disp: 180 tablet, Rfl: 3    cyclobenzaprine (FLEXERIL) 10 MG tablet, TAKE 1 TABLET BY MOUTH THREE TIMES DAILY, Disp: 270 tablet, Rfl: 3    Diclofenac Sodium (VOLTAREN) 1 % gel gel, diclofenac 1 % topical gel  APPLY 1 GRAM TOPICALLY TO THE AFFECTED AREA TWICE DAILY AS NEEDED FOR PAIN, Disp: , Rfl:     dicyclomine (BENTYL) 20 MG tablet, Take 1 tablet by mouth 2 (Two) Times a Day As Needed for Abdominal Cramping., Disp: 60 tablet, Rfl: 1    docusate sodium (COLACE) 100 MG capsule, TAKE 1 CAPSULE BY MOUTH TWICE DAILY, Disp: 180 capsule, Rfl: 2    donepezil (ARICEPT) 10 MG tablet, Take 1 tablet by mouth Every Night., Disp: 90 tablet, Rfl: 1    DULoxetine (CYMBALTA) 60 MG capsule, TAKE 1 CAPSULE BY MOUTH DAILY, Disp: 90 capsule, Rfl: 3    ferrous gluconate (FERGON) 324 MG tablet, TAKE 1 TABLET BY MOUTH DAILY WITH BREAKFAST, Disp: 30 tablet, Rfl: 2    fluticasone (FLONASE) 50 MCG/ACT  nasal spray, SHAKE LIQUID AND USE 1 TO 2 SPRAYS IN EACH NOSTRIL EVERY DAY AS NEEDED, Disp: , Rfl:     furosemide (LASIX) 20 MG tablet, TAKE 1 TABLET BY MOUTH DAILY AS NEEDED FOR SWELLING, Disp: 30 tablet, Rfl: 1    gabapentin (NEURONTIN) 800 MG tablet, Take 1 tablet by mouth 2 (Two) Times a Day., Disp: , Rfl:     hydroxychloroquine (PLAQUENIL) 200 MG tablet, Take 1.5 tablets by mouth Daily., Disp: , Rfl:     hydrOXYzine pamoate (VISTARIL) 25 MG capsule, TAKE 1 CAPSULE BY MOUTH UP TO THREE TIMES DAILY AS DIRECTED, Disp: 90 capsule, Rfl: 1    inFLIXimab (REMICADE IV), Infuse  into a venous catheter Take As Directed., Disp: , Rfl:     lactulose (CHRONULAC) 10 GM/15ML solution, Take 15 mL by mouth Daily., Disp: 450 mL, Rfl: 2    lisinopril (PRINIVIL,ZESTRIL) 10 MG tablet, Take 1 tablet by mouth Every Night., Disp: 90 tablet, Rfl: 3    loratadine (CLARITIN) 10 MG tablet, Take 1 tablet by mouth As Needed., Disp: , Rfl:     lubiprostone (Amitiza) 24 MCG capsule, Take 1 capsule by mouth 2 (Two) Times a Day With Meals., Disp: 60 capsule, Rfl: 5    meclizine 25 MG chewable tablet chewable tablet, Chew 0.5 tablets Every 8 (Eight) Hours., Disp: , Rfl:     memantine (NAMENDA) 10 MG tablet, Take 1 tablet by mouth Daily., Disp: 90 tablet, Rfl: 1    nitroglycerin (NITROSTAT) 0.4 MG SL tablet, Place 1 tablet under the tongue Every 5 (Five) Minutes As Needed., Disp: , Rfl:     ondansetron ODT (ZOFRAN-ODT) 4 MG disintegrating tablet, Place 1 tablet on the tongue Every 8 (Eight) Hours As Needed for Nausea or Vomiting., Disp: 10 tablet, Rfl: 0    oxybutynin XL (DITROPAN-XL) 10 MG 24 hr tablet, Take 1 tablet by mouth Daily., Disp: 90 tablet, Rfl: 3    oxyCODONE-acetaminophen (PERCOCET) 7.5-325 MG per tablet, Take 1 tablet by mouth 2 (Two) Times a Day., Disp: , Rfl:     pantoprazole (PROTONIX) 20 MG EC tablet, TAKE 1 TABLET BY MOUTH DAILY, Disp: 90 tablet, Rfl: 3    promethazine (PHENERGAN) 25 MG tablet, TAKE 1/2 TABLET BY MOUTH EVERY 8  "HOURS AS NEEDED FOR NAUSEA OR VOMITING, Disp: 30 tablet, Rfl: 2    Restasis 0.05 % ophthalmic emulsion, Administer 1 drop to both eyes 2 (Two) Times a Day., Disp: , Rfl:     rOPINIRole (REQUIP) 2 MG tablet, TAKE 1 TABLET BY MOUTH EVERY DAY 1 TO 3 HOURS BEFORE BEDTIME, Disp: 90 tablet, Rfl: 1    vitamin B-12 (CYANOCOBALAMIN) 1000 MCG tablet, Take 1 tablet by mouth Daily., Disp: 90 tablet, Rfl: 3    vitamin D (ERGOCALCIFEROL) 1.25 MG (25303 UT) capsule capsule, TAKE 1 CAPSULE BY MOUTH ONCE A WEEK, Disp: 15 capsule, Rfl: 3    Allergies:   Allergies   Allergen Reactions    Penicillins Rash       Objective     Physical Exam:  Vital Signs:   Vitals:    02/25/25 1017   BP: 120/76   BP Location: Right arm   Patient Position: Sitting   Cuff Size: Adult   Pulse: 97   Resp: 14   Temp: 98.4 °F (36.9 °C)   TempSrc: Infrared   SpO2: 99%   Height: 152.4 cm (60\")   PainSc: 5    PainLoc: Neck     Body mass index is 25.47 kg/m².    Physical Exam  Vitals and nursing note reviewed.   Constitutional:       General: She is not in acute distress.     Appearance: Normal appearance.   HENT:      Head: Normocephalic.      Nose: Nose normal.      Mouth/Throat:      Mouth: Mucous membranes are moist.      Pharynx: Oropharynx is clear.   Eyes:      General: Lids are normal.      Extraocular Movements: Extraocular movements intact.      Conjunctiva/sclera: Conjunctivae normal.      Pupils: Pupils are equal, round, and reactive to light.   Musculoskeletal:      Cervical back: Normal range of motion and neck supple.   Skin:     General: Skin is warm and dry.      Capillary Refill: Capillary refill takes less than 2 seconds.   Neurological:      General: No focal deficit present.      Mental Status: She is oriented to person, place, and time.      Cranial Nerves: No cranial nerve deficit.      Sensory: No sensory deficit.      Motor: Motor strength is normal.No weakness.      Coordination: Coordination normal.      Gait: Gait normal.      Deep " Tendon Reflexes: Reflexes normal.   Psychiatric:         Mood and Affect: Mood normal.         Speech: Speech normal.         Behavior: Behavior normal.         Neurological Exam  Mental Status  Awake, alert and oriented to person, place and time. Oriented to person, place, and time. Recalls 3 of 3 objects immediately. At 5 minutes recalls 3 of 3 objects. Able to copy figure. Speech is normal. Able to name objects, name parts of objects, repeat, read and write. Follows complex commands. Able to perform serial calculations. Able to spell words backwards. Digit span was 5 forward and 5 backward. Fund of knowledge is appropriate for level of education. MMSE score: 30.    Cranial Nerves  CN II: Visual acuity is normal. Visual fields full to confrontation.  CN III, IV, VI: Extraocular movements intact bilaterally. Normal lids and orbits bilaterally. Pupils equal round and reactive to light bilaterally.  CN V: Facial sensation is normal.  CN VII: Full and symmetric facial movement.  CN IX, X: Palate elevates symmetrically. Normal gag reflex.  CN XI: Shoulder shrug strength is normal.  CN XII: Tongue midline without atrophy or fasciculations.    Motor  Normal muscle bulk throughout. No fasciculations present. Normal muscle tone. No abnormal involuntary movements. Strength is 5/5 throughout all four extremities.    Sensory  Light touch is normal in upper and lower extremities.     Coordination  Right: Finger-to-nose normal. Rapid alternating movement normal.Left: Finger-to-nose normal. Rapid alternating movement normal.    Gait   Normal gait.Casual gait is normal including stance, stride, and arm swing. Able to rise from chair without using arms.       Assessment / Plan      Assessment/Plan:   Diagnoses and all orders for this visit:    1. MCI (mild cognitive impairment) (Primary)  -     donepezil (ARICEPT) 10 MG tablet; Take 1 tablet by mouth Every Night.  Dispense: 90 tablet; Refill: 1  -     memantine (NAMENDA) 10 MG  tablet; Take 1 tablet by mouth Daily.  Dispense: 90 tablet; Refill: 1    2. NAN (obstructive sleep apnea)    3. Depression, unspecified depression type  -     Ambulatory Referral to Psychiatry    4. Anxiety  -     Ambulatory Referral to Psychiatry         Follow Up:   Return in about 3 months (around 5/25/2025), or if symptoms worsen or fail to improve.    Anticipatory Guidance and Safety Reviewed  Patient Education Provided  NAN Compliance Report Reviewed  MMSE 30/30  Psychiatry Referral for Medication Mgmt of Depression/Anxiety  Mental Health Promotion activities reviewed: sleep hygiene, positive social support, exercise > 150 min/week and self care.   Continue Aricept 10 mg Daily; SE reviewed  Continue Namenda 10 mg Daily; SE reviewed     RTC PRN Or within 12 weeks or sooner with issues    Rose Thompson, DNP, APRN, FNP-C  [facility]

## 2025-03-03 NOTE — PRE-PROCEDURE INSTRUCTIONS
PAT phone history completed with patient for upcoming procedure on 3/7/25.    PAT PASS reviewed with patient and he/she verbalized understanding of the following:     Do not eat or drink anything after midnight the night before procedure unless otherwise instructed by physician/surgeon's office, this includes no gum, candy, mints, tobacco products or e-cigarettes.  Do not shave the area to be operated on at least 48 hours prior to procedure.  Do not wear makeup, lotion, hair products, or nail polish.  Do not wear any jewelry and remove all piercings.  Do not wear any adhesive if you wear dentures.  Do not wear contacts; bring in glasses if needed.  Only take medications on the morning of procedure as instructed by PAT nurse per anesthesia guidelines or as instructed by physician's office.   If you are on any blood thinners reach out to the physician/surgeon's office for instructions on when/if they will need to be stopped prior to procedure.   Bring in picture ID and insurance card, advanced directive copies if applicable, CPAP/BIPAP/Inhalers if indicated morning of procedure, leave any other valuables at home.  Ensure you have arranged for someone to drive you home the day of your procedure and someone to care for you at home afterwards. It is recommended that you do not drive, drink alcohol, or make any major legal decisions for at least 24 hours after your procedure is complete.  Refer to Dr. Marrero's  instructions regarding low fiber and clear liquid diets; and bowel prep instructions.     Instructions given on hospital entrance and registration location.

## 2025-03-07 ENCOUNTER — ANESTHESIA EVENT (OUTPATIENT)
Dept: GASTROENTEROLOGY | Facility: HOSPITAL | Age: 70
End: 2025-03-07
Payer: MEDICARE

## 2025-03-07 ENCOUNTER — ANESTHESIA (OUTPATIENT)
Dept: GASTROENTEROLOGY | Facility: HOSPITAL | Age: 70
End: 2025-03-07
Payer: MEDICARE

## 2025-03-07 ENCOUNTER — HOSPITAL ENCOUNTER (OUTPATIENT)
Facility: HOSPITAL | Age: 70
Setting detail: HOSPITAL OUTPATIENT SURGERY
Discharge: HOME | End: 2025-03-07
Attending: INTERNAL MEDICINE | Admitting: INTERNAL MEDICINE
Payer: MEDICARE

## 2025-03-07 VITALS
RESPIRATION RATE: 15 BRPM | TEMPERATURE: 97.5 F | SYSTOLIC BLOOD PRESSURE: 119 MMHG | DIASTOLIC BLOOD PRESSURE: 86 MMHG | OXYGEN SATURATION: 99 % | BODY MASS INDEX: 24.61 KG/M2 | WEIGHT: 126 LBS | HEART RATE: 62 BPM

## 2025-03-07 DIAGNOSIS — K50.80 CROHN'S DISEASE OF BOTH SMALL AND LARGE INTESTINE WITHOUT COMPLICATION: ICD-10-CM

## 2025-03-07 PROCEDURE — 25010000002 PROPOFOL 200 MG/20ML EMULSION: Performed by: NURSE ANESTHETIST, CERTIFIED REGISTERED

## 2025-03-07 PROCEDURE — 25010000002 FENTANYL CITRATE (PF) 50 MCG/ML SOLUTION PREFILLED SYRINGE: Performed by: NURSE ANESTHETIST, CERTIFIED REGISTERED

## 2025-03-07 PROCEDURE — 45385 COLONOSCOPY W/LESION REMOVAL: CPT | Performed by: INTERNAL MEDICINE

## 2025-03-07 RX ORDER — PROPOFOL 10 MG/ML
INJECTION, EMULSION INTRAVENOUS AS NEEDED
Status: DISCONTINUED | OUTPATIENT
Start: 2025-03-07 | End: 2025-03-07 | Stop reason: SURG

## 2025-03-07 RX ORDER — LIDOCAINE HCL/PF 100 MG/5ML
SYRINGE (ML) INJECTION AS NEEDED
Status: DISCONTINUED | OUTPATIENT
Start: 2025-03-07 | End: 2025-03-07 | Stop reason: SURG

## 2025-03-07 RX ORDER — SIMETHICONE 40MG/0.6ML
SUSPENSION, DROPS(FINAL DOSAGE FORM)(ML) ORAL AS NEEDED
Status: DISCONTINUED | OUTPATIENT
Start: 2025-03-07 | End: 2025-03-07 | Stop reason: HOSPADM

## 2025-03-07 RX ORDER — SODIUM CHLORIDE 9 MG/ML
75 INJECTION, SOLUTION INTRAVENOUS CONTINUOUS
Status: DISCONTINUED | OUTPATIENT
Start: 2025-03-07 | End: 2025-03-07 | Stop reason: HOSPADM

## 2025-03-07 RX ORDER — FENTANYL CITRATE 50 UG/ML
INJECTION, SOLUTION INTRAMUSCULAR; INTRAVENOUS AS NEEDED
Status: DISCONTINUED | OUTPATIENT
Start: 2025-03-07 | End: 2025-03-07 | Stop reason: SURG

## 2025-03-07 RX ADMIN — PROPOFOL 100 MCG/KG/MIN: 10 INJECTION, EMULSION INTRAVENOUS at 08:49

## 2025-03-07 RX ADMIN — PROPOFOL 100 MG: 10 INJECTION, EMULSION INTRAVENOUS at 08:44

## 2025-03-07 RX ADMIN — Medication 100 MG: at 08:44

## 2025-03-07 RX ADMIN — FENTANYL CITRATE 50 MCG: 50 INJECTION, SOLUTION INTRAMUSCULAR; INTRAVENOUS at 08:44

## 2025-03-07 NOTE — ANESTHESIA PREPROCEDURE EVALUATION
Anesthesia Evaluation     Patient summary reviewed and Nursing notes reviewed   no history of anesthetic complications:   NPO Solid Status: > 8 hours  NPO Liquid Status: > 8 hours           Airway   Mallampati: II  TM distance: >3 FB  Neck ROM: full  No difficulty expected and Possible difficult intubation  Dental - normal exam     Pulmonary - normal exam   (+) a smoker Former,  Cardiovascular - normal exam    PT is on anticoagulation therapy    (+) hypertension, valvular problems/murmurs murmur, CAD, angina, hyperlipidemia    ROS comment: ? Estimated left ventricular EF = 57% Left ventricular ejection fraction appears to be 56 - 60%. Left ventricular systolic function is normal.  ? Left ventricular diastolic function was normal.  ? Estimated right ventricular systolic pressure from tricuspid regurgitation is normal (<35 mmHg).      Neuro/Psych  (+) TIA, dizziness/light headedness, syncope, psychiatric history Anxiety  GI/Hepatic/Renal/Endo    (+) obesity, GERD well controlled, GI bleeding     Musculoskeletal     (+) arthralgias, back pain, chronic pain, myalgias  Abdominal   (+) obese   Substance History   (+) drug use (marijuana use daily )     OB/GYN          Other   arthritis, autoimmune disease lupus and Sjogren syndrome,     ROS/Med Hx Other: Daily MJ use                Anesthesia Plan    ASA 3     MAC     (Risks and benefits discussed including risk of aspiration, recall and dental damage. All patient questions answered.    Will continue with plan of care.)  intravenous induction     Anesthetic plan, risks, benefits, and alternatives have been provided, discussed and informed consent has been obtained with: patient.    Plan discussed with CRNA.

## 2025-03-07 NOTE — H&P
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 Denies chest pain, SOB 3/3/25    Crohn's disease     Depression     Dysphagia     Elevated cholesterol     Family history of colon cancer     Fibromyalgia     Fibromyalgia, primary     GERD (gastroesophageal reflux disease)     GI (gastrointestinal bleed) 2022    Heart murmur     Hoarseness     Hyperlipidemia     Hypertension     Inflammatory bowel disease     recent hospitalization for colitis    Irritable bowel syndrome     Ischemic colitis 2023    Low back pain     treated by [facility]    Lupus     Marijuana use     Daily - has medical marijuana card    Osteopenia     Osteopenia    Peripheral neuropathy 08/10/2022    Pulmonary nodule     Shingles 2018    Sjogren's disease     Sleep apnea     per home sleep study; does not use CPAP    Urinary tract infection     Vasovagal syncope        Past Surgical History:   Procedure Laterality Date    ABDOMINAL SURGERY      WALE    APPENDECTOMY      BREAST SURGERY      lumpectomy     CATARACT EXTRACTION W/ INTRAOCULAR LENS IMPLANT Left 2021    Procedure: CATARACT PHACO EXTRACTION WITH INTRAOCULAR LENS IMPLANT LEFT COMPLICATED WITH MALYUGIN RING;  Surgeon: Zachary Landers MD;  Location: [facility];  Service: Ophthalmology;  Laterality: Left;    CATARACT EXTRACTION W/ INTRAOCULAR LENS IMPLANT Right 2022    Procedure: " CATARACT PHACO EXTRACTION WITH INTRAOCULAR LENS IMPLANT RIGHT;  Surgeon: Zachary Landers MD;  Location: [facility] OR;  Service: Ophthalmology;  Laterality: Right;    COLONOSCOPY      COLONOSCOPY N/A 2022    Procedure: COLONOSCOPY WITH BIOPSIES AND POLYPECTOMY;  Surgeon: Chaparro Marrero MD;  Location: [facility] ENDOSCOPY;  Service: Gastroenterology;  Laterality: N/A;    ENDOSCOPY      ENDOSCOPY N/A 2020    Procedure: ESOPHAGOGASTRODUODENOSCOPY;  Surgeon: Chaparro Marrero MD;  Location: [facility] ENDOSCOPY;  Service: Gastroenterology;  Laterality: N/A;    EYE SURGERY      cataract removal    HYSTERECTOMY      TEETH EXTRACTION      has full set of dental implants    TOTAL ABDOMINAL HYSTERECTOMY WITH SALPINGO OOPHORECTOMY      TUBAL ABDOMINAL LIGATION      UPPER GASTROINTESTINAL ENDOSCOPY         Social History     Socioeconomic History    Marital status:    Tobacco Use    Smoking status: Former     Current packs/day: 0.00     Average packs/day: 1 pack/day for 15.0 years (15.0 ttl pk-yrs)     Types: Cigarettes     Start date: 2003     Quit date: 2018     Years since quittin.1    Smokeless tobacco: Never   Vaping Use    Vaping status: Every Day    Substances: Nicotine, Flavoring    Devices: Disposable   Substance and Sexual Activity    Alcohol use: No    Drug use: Yes     Types: Marijuana     Comment: OCCASIONALLY -has medical marijuana    Sexual activity: Defer     Birth control/protection: Post-menopausal, Hysterectomy       Family History   Problem Relation Age of Onset    Hypertension Mother     Aneurysm Mother     Heart disease Mother         passed away at 54 yr old after 5 vessel bypass    Hyperlipidemia Mother     Stroke Mother         aneurysm ruptured    Emphysema Father     COPD Father     Ulcerative colitis Brother     Alcohol abuse Brother     Cancer Brother         stomach cancer    COPD Brother     Hyperlipidemia Brother     Liver disease Brother      Cirrhosis Brother     Colon polyps Brother     Stomach cancer Brother     Colon cancer Maternal Uncle     Arthritis Maternal Uncle     Cancer Maternal Aunt         breast cancer    Cancer Sister         breast    COPD Paternal Uncle     Early death Brother         ulcerative colitis    Learning disabilities Brother         mental retardation    Mental illness Brother         schizophrenia    Ulcerative colitis Brother     Mental retardation Brother     Inflammatory bowel disease Brother     Kidney disease Paternal Aunt         nephrectomy    Liver cancer Neg Hx     Crohn's disease Neg Hx        Prior to Admission Medications:  Medications Prior to Admission   Medication Sig Dispense Refill Last Dose/Taking    aspirin 81 MG EC tablet Take 1 tablet by mouth. Every other day   Past Week    azaTHIOprine (IMURAN) 50 MG tablet Take 2 tablets by mouth Daily. 180 tablet 3 3/6/2025    busPIRone (BUSPAR) 15 MG tablet Take 1 tablet by mouth 2 (Two) Times a Day As Needed (anxiety). for anxiety 180 tablet 3 3/6/2025    cyclobenzaprine (FLEXERIL) 10 MG tablet TAKE 1 TABLET BY MOUTH THREE TIMES DAILY 270 tablet 3 3/6/2025    docusate sodium (COLACE) 100 MG capsule TAKE 1 CAPSULE BY MOUTH TWICE DAILY 180 capsule 2 3/6/2025    DULoxetine (CYMBALTA) 60 MG capsule TAKE 1 CAPSULE BY MOUTH DAILY 90 capsule 3 3/6/2025    ferrous gluconate (FERGON) 324 MG tablet TAKE 1 TABLET BY MOUTH DAILY WITH BREAKFAST (Patient taking differently: Take 1 tablet by mouth 3 (Three) Times a Week.) 30 tablet 2 3/3/2025    furosemide (LASIX) 20 MG tablet TAKE 1 TABLET BY MOUTH DAILY AS NEEDED FOR SWELLING 30 tablet 1 3/6/2025    gabapentin (NEURONTIN) 800 MG tablet Take 1 tablet by mouth 3 (Three) Times a Day.   3/6/2025    hydroxychloroquine (PLAQUENIL) 200 MG tablet Take 1.5 tablets by mouth Daily.   3/6/2025    hydrOXYzine pamoate (VISTARIL) 25 MG capsule TAKE 1 CAPSULE BY MOUTH UP TO THREE TIMES DAILY AS DIRECTED 90 capsule 1 3/6/2025    lactulose  (CHRONULAC) 10 GM/15ML solution Take 15 mL by mouth Daily. 450 mL 2 3/6/2025    lisinopril (PRINIVIL,ZESTRIL) 10 MG tablet Take 1 tablet by mouth Every Night. 90 tablet 3 3/6/2025    loratadine (CLARITIN) 10 MG tablet Take 1 tablet by mouth As Needed.   3/6/2025    lubiprostone (Amitiza) 24 MCG capsule Take 1 capsule by mouth 2 (Two) Times a Day With Meals. 60 capsule 5 3/6/2025    oxybutynin XL (DITROPAN-XL) 10 MG 24 hr tablet Take 1 tablet by mouth Daily. 90 tablet 3 3/6/2025    oxyCODONE-acetaminophen (PERCOCET)  MG per tablet Take 1 tablet by mouth 2 (Two) Times a Day.   3/6/2025    pantoprazole (PROTONIX) 20 MG EC tablet TAKE 1 TABLET BY MOUTH DAILY 90 tablet 3 3/6/2025    promethazine (PHENERGAN) 25 MG tablet TAKE 1/2 TABLET BY MOUTH EVERY 8 HOURS AS NEEDED FOR NAUSEA OR VOMITING 30 tablet 2 Past Week    Restasis 0.05 % ophthalmic emulsion Administer 1 drop to both eyes 2 (Two) Times a Day.   3/6/2025    rOPINIRole (REQUIP) 2 MG tablet TAKE 1 TABLET BY MOUTH EVERY DAY 1 TO 3 HOURS BEFORE BEDTIME 90 tablet 1 3/6/2025    vitamin B-12 (CYANOCOBALAMIN) 1000 MCG tablet Take 1 tablet by mouth Daily. 90 tablet 3 3/6/2025    vitamin D (ERGOCALCIFEROL) 1.25 MG (15485 UT) capsule capsule TAKE 1 CAPSULE BY MOUTH ONCE A WEEK 15 capsule 3 3/6/2025    atorvastatin (LIPITOR) 20 MG tablet TAKE 1 TABLET BY MOUTH EVERY NIGHT 90 tablet 3     Diclofenac Sodium (VOLTAREN) 1 % gel gel diclofenac 1 % topical gel   APPLY 1 GRAM TOPICALLY TO THE AFFECTED AREA TWICE DAILY AS NEEDED FOR PAIN   More than a month    dicyclomine (BENTYL) 20 MG tablet Take 1 tablet by mouth 2 (Two) Times a Day As Needed for Abdominal Cramping. 60 tablet 1 More than a month    donepezil (ARICEPT) 10 MG tablet Take 1 tablet by mouth Every Night. 90 tablet 1     fluticasone (FLONASE) 50 MCG/ACT nasal spray SHAKE LIQUID AND USE 1 TO 2 SPRAYS IN EACH NOSTRIL EVERY DAY AS NEEDED   More than a month    inFLIXimab (REMICADE IV) Infuse  into a venous catheter  Every 6 (Six) Weeks.   More than a month    meclizine 25 MG chewable tablet chewable tablet Chew 0.5 tablets 3 (Three) Times a Day As Needed.   More than a month    memantine (NAMENDA) 10 MG tablet Take 1 tablet by mouth Daily. 90 tablet 1     nitroglycerin (NITROSTAT) 0.4 MG SL tablet Place 1 tablet under the tongue Every 5 (Five) Minutes As Needed.       ondansetron ODT (ZOFRAN-ODT) 4 MG disintegrating tablet Place 1 tablet on the tongue Every 8 (Eight) Hours As Needed for Nausea or Vomiting. 10 tablet 0 Unknown       Allergies:  Allergies   Allergen Reactions    Penicillins Rash        Vitals: Temp:  [97.7 °F (36.5 °C)] 97.7 °F (36.5 °C)  Heart Rate:  [84] 84  Resp:  [16] 16  BP: (113)/(87) 113/87    Review Of Systems:  Constitutional:  Negative for chills, fever, and unexpected weight change.  Respiratory:  Negative for cough, chest tightness, shortness of breath, and wheezing.  Cardiovascular:  Negative for chest pain, palpitations, and leg swelling.  Gastrointestinal:  Negative for abdominal distention, abdominal pain, nausea, vomiting.  Neurological:  Negative for weakness, numbness, and headaches.     Physical Exam:    General Appearance:  Alert, cooperative, in no acute distress.   Lungs:   Clear to auscultation, respirations regular, even and                 unlabored.   Heart:  Regular rhythm and normal rate.   Abdomen:   Normal bowel sounds, no masses, no organomegaly. Soft, nontender, nondistended   Neurologic: Alert and oriented x 3. Moves all four limbs equally       Assessment & Plan     Assessment:  Principal Problem:    Crohn's disease of both small and large intestine without complication      Plan: Colonoscopy with  possible biopsy, polypectomy, ablation of arteriovenous malformations, or control of bleeding (N/A)     Chaparro Marrero MD  3/7/2025

## 2025-03-07 NOTE — ANESTHESIA POSTPROCEDURE EVALUATION
Patient: Stephanie Jean    Procedure Summary       Date: 03/07/25 Room / Location: [facility] ENDOSCOPY 2 / [facility] ENDOSCOPY    Anesthesia Start: 0843 Anesthesia Stop: 0914    Procedure: Colonoscopy with biopsy and polypectomy (Anus) Diagnosis:       Crohn's disease of both small and large intestine without complication      (Crohn's disease of both small and large intestine without complication [K50.80])    Surgeons: Chaparro Marrero MD Provider: Moises Prado CRNA    Anesthesia Type: MAC ASA Status: 3            Anesthesia Type: MAC    Vitals  No vitals data found for the desired time range.          Post Anesthesia Care and Evaluation    Patient location during evaluation: bedside  Patient participation: complete - patient participated  Level of consciousness: awake  Pain score: 0  Pain management: adequate    Airway patency: patent  Anesthetic complications: No anesthetic complications  PONV Status: controlled  Cardiovascular status: acceptable and stable  Respiratory status: acceptable and room air  Hydration status: acceptable    Comments: Vital signs as noted in nursing documentation as per protocol

## 2025-03-07 NOTE — DISCHARGE INSTRUCTIONS
Rest today  No pushing,pulling,tugging,heavy lifting, or strenuous activity   No major decision making,driving,or drinking alcoholic beverages for 24 hours due to the sedation you received  Always use good hand hygiene/washing technique  No driving on pain medication.    To assist you in voiding:  Drink plenty of fluids  Listen to running water while attempting to void.    If you are unable to urinate and you have an uncomfortable urge to void or it has been   6 hours since you were discharged, return to the Emergency Room.    - Discharge patient to home (ambulatory).   - High fiber diet.   - Continue present medications.   - Await pathology results.   - Repeat colonoscopy in 1 year because the bowel preparation was poor.   - Return to GI office in 8 weeks.

## 2025-03-10 LAB — REF LAB TEST METHOD: NORMAL

## 2025-03-14 ENCOUNTER — HOSPITAL ENCOUNTER (OUTPATIENT)
Facility: HOSPITAL | Age: 70
Discharge: HOME | End: 2025-03-14
Payer: MEDICARE

## 2025-03-14 VITALS
TEMPERATURE: 97.8 F | WEIGHT: 129.8 LBS | DIASTOLIC BLOOD PRESSURE: 62 MMHG | SYSTOLIC BLOOD PRESSURE: 97 MMHG | BODY MASS INDEX: 25.35 KG/M2 | OXYGEN SATURATION: 94 % | HEART RATE: 76 BPM | RESPIRATION RATE: 12 BRPM

## 2025-03-14 DIAGNOSIS — K50.80 CROHN'S DISEASE OF BOTH SMALL AND LARGE INTESTINE WITHOUT COMPLICATION: Primary | ICD-10-CM

## 2025-03-14 PROCEDURE — 96413 CHEMO IV INFUSION 1 HR: CPT

## 2025-03-14 PROCEDURE — 25010000002 INFLIXIMAB-ABDA 100 MG RECONSTITUTED SOLUTION 1 EACH VIAL: Performed by: INTERNAL MEDICINE

## 2025-03-14 PROCEDURE — 96415 CHEMO IV INFUSION ADDL HR: CPT

## 2025-03-14 PROCEDURE — 25810000003 SODIUM CHLORIDE 0.9 % SOLUTION 250 ML FLEX CONT: Performed by: INTERNAL MEDICINE

## 2025-03-14 RX ORDER — SODIUM CHLORIDE 9 MG/ML
20 INJECTION, SOLUTION INTRAVENOUS ONCE
OUTPATIENT
Start: 2025-04-25

## 2025-03-14 RX ORDER — SODIUM CHLORIDE 9 MG/ML
20 INJECTION, SOLUTION INTRAVENOUS ONCE
Status: DISCONTINUED | OUTPATIENT
Start: 2025-03-14 | End: 2025-03-15 | Stop reason: HOSPADM

## 2025-03-14 RX ORDER — ACETAMINOPHEN 325 MG/1
650 TABLET ORAL ONCE
OUTPATIENT
Start: 2025-04-25

## 2025-03-14 RX ORDER — ACETAMINOPHEN 325 MG/1
650 TABLET ORAL ONCE
Status: COMPLETED | OUTPATIENT
Start: 2025-03-14 | End: 2025-03-14

## 2025-03-14 RX ADMIN — ACETAMINOPHEN 650 MG: 325 TABLET ORAL at 09:50

## 2025-03-14 RX ADMIN — INFLIXIMAB 290 MG: 100 INJECTION, POWDER, LYOPHILIZED, FOR SOLUTION INTRAVENOUS at 10:24

## 2025-03-28 ENCOUNTER — TELEPHONE (OUTPATIENT)
Dept: NEUROLOGY | Facility: CLINIC | Age: 70
End: 2025-03-28

## 2025-03-28 NOTE — TELEPHONE ENCOUNTER
DAVIDA  [phone]    SHE STATES ON WEDS THE PT WAS LOADING THE  THEN STATES NEXT THING SHE KNEW SHE WAS OVER THE DOOR OF  AND HER LEGS WOULDN'T WORK , WHEN SHE FINALLY COULD GET UP THE PT WENT OVER TO THE COUCH.     SHE STATES THE PT STATED SHE HAD A SLIGHT HEADACHE AFTERWARDS. SHE STATES PT ENDED UP LOSING TIME AND MISSED AN APPOINTMENT.    SHE STATES PT IS EXPERIENCING:    - SLURRING SPEECH  - BALANCE IF OFF  - MEMORY IS GETTING WORSE  - BEHAVIOR CHANGE  - SHE GETS WORSE IN THE EVENING  - NOT TAKING HER MEMORY MEDS (PT COMPLAINED THAT BOTH MEDICATIONS MAKE HER VOMIT)   -EVERYTHING HAS BEEN FUZZY SINCE THE EVENT ON WEDS  -MISPLACING EVERYTHING      THE DAUGHTER HAS A LOT OF CONCERNS OF PT HAVING A STROKE, PT DID NOT INFORM HER OF ANY OF THIS UNTIL TODAY.

## 2025-03-31 RX ORDER — ERGOCALCIFEROL 1.25 MG/1
50000 CAPSULE, LIQUID FILLED ORAL WEEKLY
Qty: 15 CAPSULE | Refills: 3 | Status: SHIPPED | OUTPATIENT
Start: 2025-03-31

## 2025-04-10 ENCOUNTER — OFFICE VISIT (OUTPATIENT)
Dept: PSYCHIATRY | Facility: CLINIC | Age: 70
End: 2025-04-10
Payer: MEDICARE

## 2025-04-10 VITALS
HEIGHT: 60 IN | WEIGHT: 130.4 LBS | BODY MASS INDEX: 25.6 KG/M2 | SYSTOLIC BLOOD PRESSURE: 124 MMHG | DIASTOLIC BLOOD PRESSURE: 80 MMHG | OXYGEN SATURATION: 96 % | HEART RATE: 91 BPM

## 2025-04-10 DIAGNOSIS — G31.84 MCI (MILD COGNITIVE IMPAIRMENT): ICD-10-CM

## 2025-04-10 DIAGNOSIS — F43.10 POST TRAUMATIC STRESS DISORDER (PTSD): ICD-10-CM

## 2025-04-10 DIAGNOSIS — F41.1 GAD (GENERALIZED ANXIETY DISORDER): Primary | ICD-10-CM

## 2025-04-10 DIAGNOSIS — G47.09 OTHER INSOMNIA: ICD-10-CM
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
## 2025-04-11 ENCOUNTER — OFFICE VISIT (OUTPATIENT)
Dept: PSYCHIATRY | Facility: CLINIC | Age: 70
End: 2025-04-11
Payer: MEDICARE

## 2025-04-11 DIAGNOSIS — F33.1 MODERATE EPISODE OF RECURRENT MAJOR DEPRESSIVE DISORDER: Primary | ICD-10-CM

## 2025-04-11 DIAGNOSIS — G47.09 OTHER INSOMNIA: ICD-10-CM

## 2025-04-11 DIAGNOSIS — F41.9 ANXIETY: ICD-10-CM

## 2025-04-11 DIAGNOSIS — F41.1 GAD (GENERALIZED ANXIETY DISORDER): ICD-10-CM

## 2025-04-11 PROBLEM — M54.16 LUMBAR RADICULOPATHY: Status: RESOLVED | Noted: 2021-10-21 | Resolved: 2025-04-11

## 2025-04-11 PROBLEM — T40.2X5A THERAPEUTIC OPIOID INDUCED CONSTIPATION: Status: RESOLVED | Noted: 2023-10-23 | Resolved: 2025-04-11

## 2025-04-11 PROBLEM — M50.30 DDD (DEGENERATIVE DISC DISEASE), CERVICAL: Status: RESOLVED | Noted: 2023-12-27 | Resolved: 2025-04-11

## 2025-04-11 PROBLEM — M47.814 THORACIC SPONDYLOSIS: Status: RESOLVED | Noted: 2025-01-07 | Resolved: 2025-04-11

## 2025-04-11 PROBLEM — K92.2 GI BLEED: Status: RESOLVED | Noted: 2023-08-11 | Resolved: 2025-04-11

## 2025-04-11 PROBLEM — M54.2 NECK PAIN: Status: RESOLVED | Noted: 2021-08-13 | Resolved: 2025-04-11

## 2025-04-11 PROBLEM — Q24.5 ANOMALOUS RIGHT CORONARY ARTERY: Status: RESOLVED | Noted: 2021-10-25 | Resolved: 2025-04-11

## 2025-04-11 PROBLEM — F12.10 MARIJUANA ABUSE: Status: RESOLVED | Noted: 2023-08-12 | Resolved: 2025-04-11

## 2025-04-11 PROBLEM — M47.816 LUMBAR SPONDYLOSIS: Status: RESOLVED | Noted: 2022-04-21 | Resolved: 2025-04-11

## 2025-04-11 PROBLEM — M54.6 PAIN IN THORACIC SPINE: Status: RESOLVED | Noted: 2025-01-07 | Resolved: 2025-04-11

## 2025-04-11 PROBLEM — M35.00 H/O SJOGREN'S DISEASE: Status: RESOLVED | Noted: 2022-10-18 | Resolved: 2025-04-11

## 2025-04-11 PROBLEM — M54.12 CERVICAL RADICULOPATHY: Status: RESOLVED | Noted: 2022-11-18 | Resolved: 2025-04-11

## 2025-04-11 PROBLEM — K59.03 THERAPEUTIC OPIOID INDUCED CONSTIPATION: Status: RESOLVED | Noted: 2023-10-23 | Resolved: 2025-04-11

## 2025-04-11 PROBLEM — M79.18 MYOFASCIAL PAIN: Status: RESOLVED | Noted: 2021-10-21 | Resolved: 2025-04-11

## 2025-04-11 PROBLEM — M79.10 MUSCLE PAIN: Status: RESOLVED | Noted: 2022-04-21 | Resolved: 2025-04-11

## 2025-04-11 PROBLEM — M32.9 SYSTEMIC LUPUS: Status: RESOLVED | Noted: 2021-03-18 | Resolved: 2025-04-11

## 2025-04-11 PROBLEM — Z79.4 ENCOUNTER FOR LONG-TERM (CURRENT) USE OF INSULIN: Status: RESOLVED | Noted: 2021-08-13 | Resolved: 2025-04-11

## 2025-04-11 PROBLEM — M47.812 CERVICAL SPONDYLOSIS WITHOUT MYELOPATHY: Status: RESOLVED | Noted: 2021-08-13 | Resolved: 2025-04-11

## 2025-04-11 RX ORDER — MIRTAZAPINE 15 MG/1
15 TABLET, FILM COATED ORAL NIGHTLY
Qty: 30 TABLET | Refills: 3 | Status: SHIPPED | OUTPATIENT
Start: 2025-04-11
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
## 2025-04-17 ENCOUNTER — OFFICE VISIT (OUTPATIENT)
Dept: PSYCHIATRY | Facility: CLINIC | Age: 70
End: 2025-04-17
Payer: MEDICARE

## 2025-04-17 DIAGNOSIS — F33.1 MODERATE EPISODE OF RECURRENT MAJOR DEPRESSIVE DISORDER: Primary | ICD-10-CM

## 2025-04-17 DIAGNOSIS — F41.1 GAD (GENERALIZED ANXIETY DISORDER): ICD-10-CM

## 2025-04-17 NOTE — PROGRESS NOTES
Follow Up Adult Note   Date:2025   Client Name: Stephanie Jean  : 1955   MRN: [id number]   Time IN: 12:32 pm    Time OUT: 1:14 pm     Referring Provider: Nhung Shepard DO    Chief Complaint:      ICD-10-CM ICD-9-CM   1. Moderate episode of recurrent major depressive disorder  F33.1 296.32   2. JAIMIE (generalized anxiety disorder)  F41.1 300.02      History of Present Illness:   Stephanie Jean is a 69 y.o. female who is being seen today for follow up individual psychotherapy counseling. Stephanie discussed one stressor of anniversary of brothers passing; symptoms include feeling down, anxious, fatigue; with medication it's easier to fall sleep but still wakes frequently. CBT was used to assist Stephanie with processing thoughts/feelings and with building effective coping techniques.      Objective      Mental Status Exam:   MENTAL STATUS EXAM   General Appearance:  Cleanly groomed and dressed  Eye Contact:  Good eye contact  Attitude:  Cooperative  Motor Activity:  Normal gait, posture  Muscle Strength:  Normal  Speech:  Normal rate, tone, volume  Language:  Spontaneous  Mood and affect:  Appropriate, mood congruent, dysthymic and anxious  Hopelessness:  Denies  Loneliness: Denies  Thought Process:  Logical  Associations/ Thought Content:  No delusions  Hallucinations:  None  Suicidal Ideations:  Not present  Homicidal Ideation:  Not present  Sensorium:  Alert  Orientation:  Person, place, time and situation  Immediate Recall, Recent, and Remote Memory:  Intact  Attention Span/ Concentration:  Good  Fund of Knowledge:  Appropriate for age and educational level  Intellectual Functioning:  Average range  Insight:  Good  Judgement:  Good  Reliability:  Good  Impulse Control:  Good     Subjective      Functional Status: Moderate impairment   Progress toward goal: At goal  Prognosis: Good with Ongoing Treatment     Assessment / Plan / Progress   Visit Diagnosis/Orders Placed This Visit:     ICD-10-CM ICD-9-CM   1. Moderate episode of recurrent major depressive disorder  F33.1 296.32   2. JAIMIE (generalized anxiety disorder)  F41.1 300.02      PLAN:  Safety: No acute safety concerns  Risk Assessment: Risk of self-harm acutely is low. Risk of self-harm chronically is also low, but could be further elevated in the event of treatment noncompliance and/or AODA.    Treatment Plan/Goals & Progress: Continue supportive psychotherapy efforts and medications as indicated. Treatment options discussed during today's visit. Client ackowledged and verbally consented to continue with current treatment plan and was educated on the importance of compliance with treatment and follow-up appointments. Client seems reasonably able to adhere to treatment plan.      Assisted client in processing above session content; acknowledged and normalized client’s thoughts, feelings, and concerns.     Allowed client to freely discuss issues without interruption or judgement with unconditional positive regard, active listening skills, and empathy. Clinician provided a safe, confidential environment to facilitate the development of a positive therapeutic relationship and encouraged open, honest communication. Assisted client in identifying risk factors which would indicate the need for higher level of care including thoughts to harm self or others and/or self-harming behavior and encouraged client to contact this office, call 911, or present to the nearest emergency room should any of these events occur. Discussed crisis intervention services and means to access. Client adamantly and convincingly denies current suicidal or homicidal ideation or perceptual disturbance. Assisted client in processing session content; acknowledged and normalized client’s thoughts, feelings, and concerns by utilizing a person-centered approach in efforts to build appropriate rapport and a positive therapeutic relationship with open and honest communication.       Follow Up:   Return in about 3 weeks (around 5/8/2025) for therapy session.    Concetta Flores LCSW  [facility]

## 2025-04-25 ENCOUNTER — HOSPITAL ENCOUNTER (OUTPATIENT)
Facility: HOSPITAL | Age: 70
Discharge: HOME | End: 2025-04-25
Payer: MEDICARE

## 2025-04-25 VITALS
BODY MASS INDEX: 24.3 KG/M2 | TEMPERATURE: 97.9 F | HEART RATE: 90 BPM | SYSTOLIC BLOOD PRESSURE: 114 MMHG | WEIGHT: 124.4 LBS | OXYGEN SATURATION: 97 % | DIASTOLIC BLOOD PRESSURE: 76 MMHG | RESPIRATION RATE: 16 BRPM

## 2025-04-25 DIAGNOSIS — K50.80 CROHN'S DISEASE OF BOTH SMALL AND LARGE INTESTINE WITHOUT COMPLICATION: Primary | ICD-10-CM

## 2025-04-25 PROCEDURE — 96413 CHEMO IV INFUSION 1 HR: CPT

## 2025-04-25 PROCEDURE — 96415 CHEMO IV INFUSION ADDL HR: CPT

## 2025-04-25 PROCEDURE — 25010000002 INFLIXIMAB-ABDA 100 MG RECONSTITUTED SOLUTION 1 EACH VIAL: Performed by: INTERNAL MEDICINE

## 2025-04-25 PROCEDURE — 25810000003 SODIUM CHLORIDE 0.9 % SOLUTION 250 ML FLEX CONT: Performed by: INTERNAL MEDICINE

## 2025-04-25 RX ORDER — ACETAMINOPHEN 325 MG/1
650 TABLET ORAL ONCE
Status: COMPLETED | OUTPATIENT
Start: 2025-04-25 | End: 2025-04-25

## 2025-04-25 RX ORDER — SODIUM CHLORIDE 9 MG/ML
20 INJECTION, SOLUTION INTRAVENOUS ONCE
Status: DISCONTINUED | OUTPATIENT
Start: 2025-04-25 | End: 2025-04-26 | Stop reason: HOSPADM

## 2025-04-25 RX ORDER — ACETAMINOPHEN 325 MG/1
650 TABLET ORAL ONCE
OUTPATIENT
Start: 2025-06-06

## 2025-04-25 RX ORDER — SODIUM CHLORIDE 9 MG/ML
20 INJECTION, SOLUTION INTRAVENOUS ONCE
OUTPATIENT
Start: 2025-06-06

## 2025-04-25 RX ADMIN — ACETAMINOPHEN 650 MG: 325 TABLET, FILM COATED ORAL at 10:01

## 2025-04-25 RX ADMIN — INFLIXIMAB 280 MG: 100 INJECTION, POWDER, LYOPHILIZED, FOR SOLUTION INTRAVENOUS at 10:38

## 2025-04-29 RX ORDER — HYDROXYZINE PAMOATE 25 MG/1
CAPSULE ORAL
Qty: 90 CAPSULE | Refills: 1 | Status: SHIPPED | OUTPATIENT
Start: 2025-04-29

## 2025-04-29 RX ORDER — ROPINIROLE 2 MG/1
TABLET, FILM COATED ORAL
Qty: 90 TABLET | Refills: 1 | Status: SHIPPED | OUTPATIENT
Start: 2025-04-29

## 2025-05-01 ENCOUNTER — OFFICE VISIT (OUTPATIENT)
Dept: PSYCHIATRY | Facility: CLINIC | Age: 70
End: 2025-05-01
Payer: MEDICARE

## 2025-05-01 VITALS
HEIGHT: 60 IN | DIASTOLIC BLOOD PRESSURE: 76 MMHG | WEIGHT: 127.8 LBS | OXYGEN SATURATION: 96 % | HEART RATE: 101 BPM | BODY MASS INDEX: 25.09 KG/M2 | SYSTOLIC BLOOD PRESSURE: 122 MMHG

## 2025-05-01 DIAGNOSIS — F43.10 POST TRAUMATIC STRESS DISORDER (PTSD): Primary | ICD-10-CM

## 2025-05-01 DIAGNOSIS — F41.1 GAD (GENERALIZED ANXIETY DISORDER): ICD-10-CM

## 2025-05-01 DIAGNOSIS — F33.1 MODERATE EPISODE OF RECURRENT MAJOR DEPRESSIVE DISORDER: ICD-10-CM

## 2025-05-01 DIAGNOSIS — F41.0 PANIC ATTACK: ICD-10-CM

## 2025-05-01 PROBLEM — M47.817 LUMBOSACRAL SPONDYLOSIS WITHOUT MYELOPATHY: Status: RESOLVED | Noted: 2025-04-11 | Resolved: 2025-05-01

## 2025-05-01 RX ORDER — DIAZEPAM 5 MG/1
TABLET ORAL
Qty: 7 TABLET | Refills: 0 | Status: SHIPPED | OUTPATIENT
Start: 2025-05-01 | End: 2025-06-01

## 2025-05-01 RX ORDER — ARIPIPRAZOLE 2 MG/1
2 TABLET ORAL DAILY
Qty: 30 TABLET | Refills: 1 | Status: SHIPPED | OUTPATIENT
Start: 2025-05-01

## 2025-05-01 NOTE — PROGRESS NOTES
Follow Up Office Visit      Patient Name: Stephanie Jean  : 1955   MRN: [id number]     Referring Provider: Nhung Shepard DO    Chief Complaint:      ICD-10-CM ICD-9-CM   1. Post traumatic stress disorder (PTSD)  F43.10 309.81   2. JAIMIE (generalized anxiety disorder)  F41.1 300.02   3. Moderate episode of recurrent major depressive disorder  F33.1 296.32   4. Panic attack  F41.0 300.01        History of Present Illness:   Stephanie Jean is a 69 y.o. female who is here today for follow up for her anxiety and depression PTSD.  Patient was last seen in this office on 4/10/2025, at that time mirtazapine was started and patient was recommended to slowly wean 30 mg dose of Flexeril down at night as tolerated.  History of Present Illness  The patient presents for evaluation of anxiety, panic attacks, and sleep issues.    She has initiated therapy with Kristan, which she reports as beneficial. Her mood has improved, but her anxiety remains unchanged, with occasional panic attacks that are not alleviated by mirtazapine. She reports no specific worries related to her anxiety. She is scheduled to see Kristan again before her trip to [place] next week.She continues to vape and has a poor appetite. S    She experiences panic attacks every few days, which occur randomly and without apparent triggers. She is planning a trip to [place] with her daughter and is extremely anxious about potential panic attacks during the trip. She manages her panic attacks by isolating herself, practicing deep breathing, and self-soothing until she can resume normal functioning.  She states that she is concerned that she would try to keep this from her family, keep them from seeing this and that is going to be difficult.  She also endorses a significant fear of flying.    She has discontinued ropinirole and reduced her cyclobenzaprine dosage from 3 to 1 tablet at night without any adverse effects on her restless legs syndrome,  "however the rapid decrease of Flexeril has seemingly made sleep initiation difficult. She reports some mildly improved \"brain fog\" since reducing her medication. She has not consulted a neurologist since her last visit here but has an appointment scheduled for the end of the month.    She reports difficulty falling asleep due to racing thoughts and frequent awakenings at night, which she attributes to the reduction in her Flexeril dosage. It takes her approximately 1 to 1.5 hours to fall asleep, compared to 30 minutes previously. She initially had attempted to reduce her Flexeril dosage to 2 tablets, which did not significantly affect her sleep onset.    She has not changed her CPAP mask and does not believe she has sleep apnea as she does not snore or wake up gasping for breath, and states that her results were likely ineffective due to poor contact with oximeter. She follows up with neurology for her sleep apnea as well.     She denies any current SI, HI, AVH    Social History:  - Planning a trip to [place] with her daughter  - Nervous about flying and having panic attacks during the trip  - Uses an adjustable bed to elevate her head    Psychiatric History:  - Previously tried Effexor without success  - Previously on Savella for depression, which was ineffective  - Currently on mirtazapine and Cymbalta    Substance Use:  - Continues to vape    Pertinent Negatives:  - No specific worries related to anxiety  - Does not snore or wake up gasping for breath    SOCIAL HISTORY  She admits to vaping.      Subjective     Review of Systems:   Review of Systems    Screening Scores:   PHQ-9 : 12  JAIMIE-7 : 11    Medications:     Current Outpatient Medications:     aspirin 81 MG EC tablet, Take 1 tablet by mouth. Every other day, Disp: , Rfl:     azaTHIOprine (IMURAN) 50 MG tablet, Take 2 tablets by mouth Daily., Disp: 180 tablet, Rfl: 3    busPIRone (BUSPAR) 15 MG tablet, Take 1 tablet by mouth 2 (Two) Times a Day As Needed " (anxiety). for anxiety, Disp: 180 tablet, Rfl: 3    cyclobenzaprine (FLEXERIL) 10 MG tablet, TAKE 1 TABLET BY MOUTH THREE TIMES DAILY, Disp: 270 tablet, Rfl: 3    Diclofenac Sodium (VOLTAREN) 1 % gel gel, diclofenac 1 % topical gel  APPLY 1 GRAM TOPICALLY TO THE AFFECTED AREA TWICE DAILY AS NEEDED FOR PAIN, Disp: , Rfl:     docusate sodium (COLACE) 100 MG capsule, TAKE 1 CAPSULE BY MOUTH TWICE DAILY, Disp: 180 capsule, Rfl: 2    DULoxetine (CYMBALTA) 60 MG capsule, TAKE 1 CAPSULE BY MOUTH DAILY, Disp: 90 capsule, Rfl: 3    ferrous gluconate (FERGON) 324 MG tablet, TAKE 1 TABLET BY MOUTH DAILY WITH BREAKFAST (Patient taking differently: Take 1 tablet by mouth 3 (Three) Times a Week.), Disp: 30 tablet, Rfl: 2    fluticasone (FLONASE) 50 MCG/ACT nasal spray, SHAKE LIQUID AND USE 1 TO 2 SPRAYS IN EACH NOSTRIL EVERY DAY AS NEEDED, Disp: , Rfl:     furosemide (LASIX) 20 MG tablet, TAKE 1 TABLET BY MOUTH DAILY AS NEEDED FOR SWELLING, Disp: 30 tablet, Rfl: 1    gabapentin (NEURONTIN) 800 MG tablet, Take 1 tablet by mouth 3 (Three) Times a Day., Disp: , Rfl:     hydroxychloroquine (PLAQUENIL) 200 MG tablet, Take 1.5 tablets by mouth Daily., Disp: , Rfl:     hydrOXYzine pamoate (VISTARIL) 25 MG capsule, TAKE 1 CAPSULE BY MOUTH UP TO THREE TIMES DAILY AS DIRECTED, Disp: 90 capsule, Rfl: 1    inFLIXimab (REMICADE IV), Infuse  into a venous catheter Every 6 (Six) Weeks., Disp: , Rfl:     lactulose (CHRONULAC) 10 GM/15ML solution, Take 15 mL by mouth Daily., Disp: 450 mL, Rfl: 2    lisinopril (PRINIVIL,ZESTRIL) 10 MG tablet, Take 1 tablet by mouth Every Night., Disp: 90 tablet, Rfl: 3    loratadine (CLARITIN) 10 MG tablet, Take 1 tablet by mouth As Needed., Disp: , Rfl:     lubiprostone (Amitiza) 24 MCG capsule, Take 1 capsule by mouth 2 (Two) Times a Day With Meals., Disp: 60 capsule, Rfl: 5    mirtazapine (REMERON) 15 MG tablet, Take 1 tablet by mouth Every Night., Disp: 30 tablet, Rfl: 3    ondansetron ODT (ZOFRAN-ODT) 4 MG  "disintegrating tablet, Place 1 tablet on the tongue Every 8 (Eight) Hours As Needed for Nausea or Vomiting., Disp: 10 tablet, Rfl: 0    oxybutynin XL (DITROPAN-XL) 10 MG 24 hr tablet, Take 1 tablet by mouth Daily., Disp: 90 tablet, Rfl: 3    oxyCODONE-acetaminophen (PERCOCET)  MG per tablet, Take 1 tablet by mouth 2 (Two) Times a Day., Disp: , Rfl:     pantoprazole (PROTONIX) 20 MG EC tablet, TAKE 1 TABLET BY MOUTH DAILY, Disp: 90 tablet, Rfl: 3    promethazine (PHENERGAN) 25 MG tablet, TAKE 1/2 TABLET BY MOUTH EVERY 8 HOURS AS NEEDED FOR NAUSEA OR VOMITING, Disp: 30 tablet, Rfl: 2    Restasis 0.05 % ophthalmic emulsion, Administer 1 drop to both eyes 2 (Two) Times a Day., Disp: , Rfl:     rOPINIRole (REQUIP) 2 MG tablet, TAKE 1 TABLET BY MOUTH EVERY DAY 1 TO 3 HOURS BEFORE BEDTIME, Disp: 90 tablet, Rfl: 1    vitamin B-12 (CYANOCOBALAMIN) 1000 MCG tablet, Take 1 tablet by mouth Daily., Disp: 90 tablet, Rfl: 3    vitamin D (ERGOCALCIFEROL) 1.25 MG (95030 UT) capsule capsule, TAKE ONE CAPSULE BY MOUTH EVERY WEEK, Disp: 15 capsule, Rfl: 3    ARIPiprazole (ABILIFY) 2 MG tablet, Take 1 tablet by mouth Daily., Disp: 30 tablet, Rfl: 1    diazePAM (VALIUM) 5 MG tablet, Take half or one oral tablet once daily as needed for panic episodes, Disp: 7 tablet, Rfl: 0    Medication Considerations:  WATSON reviewed and appropriate.      Allergies:   Allergies   Allergen Reactions    Penicillins Rash         Objective     Physical Exam:  Vital Signs:   Vitals:    05/01/25 1103   BP: 122/76   Pulse: 101   SpO2: 96%   Weight: 58 kg (127 lb 12.8 oz)   Height: 152.4 cm (60\")     Body mass index is 24.96 kg/m².     RISK ASSESSMENT:  Patient denies any high risk factors today.      Mental Status Exam:   MENTAL STATUS EXAM   General Appearance:  Cleanly groomed and dressed  Eye Contact:  Good eye contact  Attitude:  Cooperative and polite  Motor Activity:  Normal gait, posture  Muscle Strength:  Normal  Speech:  Normal rate, tone, " volume  Language:  Spontaneous  Mood and affect:  Anxious and irritable  Hopelessness:  5  Loneliness: 5  Thought Process:  Logical and goal-directed  Associations/ Thought Content:  No delusions  Hallucinations:  None  Suicidal Ideations:  Not present  Homicidal Ideation:  Not present  Sensorium:  Alert and clear  Orientation:  Person, place, time and situation  Immediate recall, recent, and remote memory: Not formally tested at this time.  Attention Span/ Concentration:  Good  Fund of Knowledge:  Appropriate for age and educational level  Intellectual Functioning:  Average range  Insight:  Good  Judgement:  Good  Reliability:  Good  Impulse Control:  Good         Assessment / Plan      Visit Diagnosis/Orders Placed This Visit:  Diagnoses and all orders for this visit:    1. Post traumatic stress disorder (PTSD) (Primary)  -     ARIPiprazole (ABILIFY) 2 MG tablet; Take 1 tablet by mouth Daily.  Dispense: 30 tablet; Refill: 1    2. JAIMIE (generalized anxiety disorder)    3. Moderate episode of recurrent major depressive disorder  -     ARIPiprazole (ABILIFY) 2 MG tablet; Take 1 tablet by mouth Daily.  Dispense: 30 tablet; Refill: 1    4. Panic attack  -     diazePAM (VALIUM) 5 MG tablet; Take half or one oral tablet once daily as needed for panic episodes  Dispense: 7 tablet; Refill: 0       Assessment & Plan  Problems:  - Anxiety  - Panic attacks  - Sleep issues  - Medication management  - Sleep apnea    Content of Therapy:  During the session, the patient discussed her ongoing anxiety and panic attacks, which occur every couple of days and interfere with her quality of life. She reported that her mood has improved, but her anxiety remains unchanged despite taking mirtazapine. She also mentioned difficulties with sleep, including trouble falling and staying asleep, which worsened after rapidly reducing her Flexeril dosage. The patient has successfully weaned off ropinirole  without issues. She expressed concerns  about her upcoming trip to [place] due to fear of flying and the potential for panic attacks during the trip which she prefers to not disclose to her family. Additionally, she does not believe she has sleep apnea and follows up with neurology for this condition.    Clinical Impression:  The patient continues to experience significant anxiety and panic attacks, despite some improvement in mood. The mirtazapine does not appear to be effectively managing her anxiety symptoms. Sleep disturbances have increased since reducing her Flexeril dosage. The patient has successfully discontinued ropinirole without adverse effects. She remains concerned about her upcoming trip and the potential for panic attacks. The patient does not believe she has sleep apnea and follows up with neurology for this condition.    Therapeutic Intervention:  - Cognitive-behavioral strategies were employed to help reframe anxious thoughts and explore feelings related to panic attacks and sleep disturbances.  - Psychoeducation was provided regarding the use of Abilify (aripiprazole) to manage  mood and anxious distress/symptoms of irritability.  - Mindfulness exercises were suggested to help manage anxiety and improve sleep quality.    Plan:  - Add Abilify (aripiprazole) 2 mg to the current regimen to help with mood, irritability, and anxiousness. Take at night if it causes drowsiness.  - Continue mirtazapine as prescribed.  - Provide a prescription for Valium (diazepam) 5 mg for use during the upcoming trip to [place]. Do not combine with pain relievers or alcohol.  - Increase Flexeril (cyclobenzaprine) intake to 2 tablets at night to improve sleep quality and do not wean further until sleep has become more stable.  - Follow up with neurology for cognitive impairment and sleep apnea management.    Follow-up:  - Follow-up appointment scheduled in 4 weeks to assess the effectiveness of the new medication regimen and address any ongoing  concerns.    Notes & Risk Factors:  - Risk factors: Persistent anxiety and panic attacks, potential for medication side effects.  - Protective factors: Supportive family, ongoing therapy with Miss Rushing.    Functional Status: Moderate impairment     Prognosis: Fair with Ongoing Treatment     Impression/Formulation:  Patient appeared alert and oriented. Patient is receptive to assistance with maintaining a stable lifestyle.  Patient presents with history of     ICD-10-CM ICD-9-CM   1. Post traumatic stress disorder (PTSD)  F43.10 309.81   2. JAIMIE (generalized anxiety disorder)  F41.1 300.02   3. Moderate episode of recurrent major depressive disorder  F33.1 296.32   4. Panic attack  F41.0 300.01   .     Care/ Treatment Plan:   -Continue mirtazapine 15 mg, 1 tablet nightly for depression/anxiety/insomnia  - Start aripiprazole 2 mg, 1 tab nightly for mood/PTSD  - Start diazepam 5 mg, half to 1 tablet daily as needed for panic episodes (#7/30 days)  - Encouraged continued therapy    Patient will continue supportive psychotherapy efforts and medications as indicated. Clinic will obtain release of information for current treatment team for continuity of care as needed. Patient will contact this office, call 911 or present to the nearest emergency room should suicidal or homicidal ideations occur.  Discussed medication options and treatment plan of prescribed medication(s) as well as the risks, benefits, and potential side effects. Patient ackowledged and verbally consented to continue with current treatment plan and was educated on the importance of compliance with treatment and follow-up appointments.     Quality Measures:   Stephanie Jean  reports that she quit smoking about 7 years ago. Her smoking use included cigarettes. She started smoking about 22 years ago. She has a 15 pack-year smoking history. She has been exposed to tobacco smoke. She has never used smokeless tobacco. I have educated her on the risk of  diseases from using tobacco products such as cancer, COPD, and heart disease.     I advised her to quit and she is not willing to quit.    I spent 3  minutes counseling the patient.            ACP discussion was declined by the patient. Patient does not have an advance directive, declines further assistance.    Depression (PHQ >9): 12    Follow Up:   Return in about 4 weeks (around 5/29/2025).    Patient or patient representative verbalized consent for the use of Ambient Listening during the visit with  ANNEMARIE Monreal for chart documentation. 5/1/2025  12:58 EDT        ANNEMARIE Randall, PMHNP-BC [facility]

## 2025-05-05 ENCOUNTER — OFFICE VISIT (OUTPATIENT)
Dept: GASTROENTEROLOGY | Facility: CLINIC | Age: 70
End: 2025-05-05
Payer: MEDICARE

## 2025-05-05 ENCOUNTER — TELEPHONE (OUTPATIENT)
Dept: PSYCHIATRY | Facility: CLINIC | Age: 70
End: 2025-05-05
Payer: MEDICARE

## 2025-05-05 ENCOUNTER — LAB (OUTPATIENT)
Dept: LAB | Facility: HOSPITAL | Age: 70
End: 2025-05-05
Payer: MEDICARE

## 2025-05-05 VITALS
SYSTOLIC BLOOD PRESSURE: 125 MMHG | WEIGHT: 128 LBS | TEMPERATURE: 98 F | OXYGEN SATURATION: 97 % | DIASTOLIC BLOOD PRESSURE: 76 MMHG | BODY MASS INDEX: 25.13 KG/M2 | HEIGHT: 60 IN | HEART RATE: 93 BPM

## 2025-05-05 DIAGNOSIS — E53.8 VITAMIN B12 DEFICIENCY: ICD-10-CM

## 2025-05-05 DIAGNOSIS — K50.811 CROHN'S DISEASE OF BOTH SMALL AND LARGE INTESTINE WITH RECTAL BLEEDING: ICD-10-CM

## 2025-05-05 DIAGNOSIS — K50.80 CROHN'S DISEASE OF BOTH SMALL AND LARGE INTESTINE WITHOUT COMPLICATION: ICD-10-CM

## 2025-05-05 DIAGNOSIS — D72.819 LEUKOPENIA, UNSPECIFIED TYPE: ICD-10-CM

## 2025-05-05 DIAGNOSIS — K59.03 DRUG-INDUCED CONSTIPATION: ICD-10-CM

## 2025-05-05 DIAGNOSIS — K21.9 GASTROESOPHAGEAL REFLUX DISEASE WITHOUT ESOPHAGITIS: ICD-10-CM

## 2025-05-05 DIAGNOSIS — K50.80 CROHN'S DISEASE OF BOTH SMALL AND LARGE INTESTINE WITHOUT COMPLICATION: Primary | ICD-10-CM

## 2025-05-05 LAB
ALBUMIN SERPL-MCNC: 4.1 G/DL (ref 3.5–5.2)
ALBUMIN/GLOB SERPL: 1.6 G/DL
ALP SERPL-CCNC: 87 U/L (ref 39–117)
ALT SERPL W P-5'-P-CCNC: 14 U/L (ref 1–33)
ANION GAP SERPL CALCULATED.3IONS-SCNC: 10 MMOL/L (ref 5–15)
AST SERPL-CCNC: 24 U/L (ref 1–32)
BASOPHILS # BLD AUTO: 0.02 10*3/MM3 (ref 0–0.2)
BASOPHILS NFR BLD AUTO: 0.5 % (ref 0–1.5)
BILIRUB SERPL-MCNC: 0.2 MG/DL (ref 0–1.2)
BUN SERPL-MCNC: 15 MG/DL (ref 8–23)
BUN/CREAT SERPL: 13.5 (ref 7–25)
CALCIUM SPEC-SCNC: 9.1 MG/DL (ref 8.6–10.5)
CHLORIDE SERPL-SCNC: 105 MMOL/L (ref 98–107)
CLUMPED PLATELETS: PRESENT
CO2 SERPL-SCNC: 27 MMOL/L (ref 22–29)
CREAT SERPL-MCNC: 1.11 MG/DL (ref 0.57–1)
DEPRECATED RDW RBC AUTO: 46.4 FL (ref 37–54)
EGFRCR SERPLBLD CKD-EPI 2021: 54.6 ML/MIN/1.73
EOSINOPHIL # BLD AUTO: 0.03 10*3/MM3 (ref 0–0.4)
EOSINOPHIL NFR BLD AUTO: 0.8 % (ref 0.3–6.2)
ERYTHROCYTE [DISTWIDTH] IN BLOOD BY AUTOMATED COUNT: 13 % (ref 12.3–15.4)
GLOBULIN UR ELPH-MCNC: 2.5 GM/DL
GLUCOSE SERPL-MCNC: 80 MG/DL (ref 65–99)
HCT VFR BLD AUTO: 34.9 % (ref 34–46.6)
HGB BLD-MCNC: 11.6 G/DL (ref 12–15.9)
IMM GRANULOCYTES # BLD AUTO: 0.01 10*3/MM3 (ref 0–0.05)
IMM GRANULOCYTES NFR BLD AUTO: 0.3 % (ref 0–0.5)
LYMPHOCYTES # BLD AUTO: 1.5 10*3/MM3 (ref 0.7–3.1)
LYMPHOCYTES NFR BLD AUTO: 41.2 % (ref 19.6–45.3)
MCH RBC QN AUTO: 32.3 PG (ref 26.6–33)
MCHC RBC AUTO-ENTMCNC: 33.2 G/DL (ref 31.5–35.7)
MCV RBC AUTO: 97.2 FL (ref 79–97)
MONOCYTES # BLD AUTO: 0.37 10*3/MM3 (ref 0.1–0.9)
MONOCYTES NFR BLD AUTO: 10.2 % (ref 5–12)
NEUTROPHILS NFR BLD AUTO: 1.71 10*3/MM3 (ref 1.7–7)
NEUTROPHILS NFR BLD AUTO: 47 % (ref 42.7–76)
NRBC BLD AUTO-RTO: 0 /100 WBC (ref 0–0.2)
PLATELET # BLD AUTO: 66 10*3/MM3 (ref 140–450)
PMV BLD AUTO: 11.6 FL (ref 6–12)
POTASSIUM SERPL-SCNC: 3.7 MMOL/L (ref 3.5–5.2)
PROT SERPL-MCNC: 6.6 G/DL (ref 6–8.5)
RBC # BLD AUTO: 3.59 10*6/MM3 (ref 3.77–5.28)
RBC MORPH BLD: NORMAL
SODIUM SERPL-SCNC: 142 MMOL/L (ref 136–145)
VIT B12 BLD-MCNC: 946 PG/ML (ref 211–946)
WBC MORPH BLD: NORMAL
WBC NRBC COR # BLD AUTO: 3.64 10*3/MM3 (ref 3.4–10.8)

## 2025-05-05 PROCEDURE — 82607 VITAMIN B-12: CPT

## 2025-05-05 PROCEDURE — 85025 COMPLETE CBC W/AUTO DIFF WBC: CPT

## 2025-05-05 PROCEDURE — 3074F SYST BP LT 130 MM HG: CPT | Performed by: INTERNAL MEDICINE

## 2025-05-05 PROCEDURE — 36415 COLL VENOUS BLD VENIPUNCTURE: CPT

## 2025-05-05 PROCEDURE — 85007 BL SMEAR W/DIFF WBC COUNT: CPT

## 2025-05-05 PROCEDURE — 80053 COMPREHEN METABOLIC PANEL: CPT

## 2025-05-05 PROCEDURE — 3078F DIAST BP <80 MM HG: CPT | Performed by: INTERNAL MEDICINE

## 2025-05-05 PROCEDURE — 1159F MED LIST DOCD IN RCRD: CPT | Performed by: INTERNAL MEDICINE

## 2025-05-05 PROCEDURE — 1160F RVW MEDS BY RX/DR IN RCRD: CPT | Performed by: INTERNAL MEDICINE

## 2025-05-05 PROCEDURE — 99214 OFFICE O/P EST MOD 30 MIN: CPT | Performed by: INTERNAL MEDICINE

## 2025-05-05 RX ORDER — PANTOPRAZOLE SODIUM 20 MG/1
20 TABLET, DELAYED RELEASE ORAL DAILY
Qty: 90 TABLET | Refills: 3 | Status: SHIPPED | OUTPATIENT
Start: 2025-05-05

## 2025-05-05 NOTE — PROGRESS NOTES
Follow Up Note     Date: 2025   Patient Name: Stephanie Jean  MRN: [id number]  : 1955     Referring Physician: Nhung Shepard DO    Chief Complaint:    Chief Complaint   Patient presents with    Crohn's Disease    Constipation    Heartburn    Anemia     HX ÁNGEL         Interval History:   2025  Stephanie Jean is a 69 y.o. female who is here today for follow up for her Crohn's disease and constipation.  She states that Amitiza is not helping her.  Her insurance did not cover Trulance or the Linzess before.  She will still have a bowel movement once in few days hard stool.  She will have excessive gas.  Denies any blood in the stool tolerating Remicade infusion..      2020  Stephanie Jean is a 64 y.o. female who is here today to establish care with Gastroenterology for evaluation of heartburn. History of upper abdominal pain mainly in the epigastric region since about 2-3 months. Pain started gradually, intermittent, aching pain lasting for about few minutes and occasionally longer.She was given dexilant and sucralfate which did not help initially and later on changed to pantoprazole.  She is on pantoprazole now with reasonable control of pain and reflux symptoms with once in few days but for the last 2 weeks she did not take any PPI. Associated significant heart burn.      She was on PPI before after she was noted to have gastritis on EGD about 10 years ago. Stopped herself year ago. Occasional problem with swallowing to solid food. She also has sjogrens syndrome.   She was mostly constipated now. Bowel movements every once in 2-3 days and occasionally once in 3-5 days. No lower abdominal pain.      There is no history of  hematochezia or melena. There is no history of anemia. Prior history of EGD about 10 yrs ago. Last colonoscopy in 2020 about 4 polyps removed and advised to repeat in 3 years time. Brother had UC and uncle from mother's side had colon Ca. No history of  "any abdominal surgery except hysterectomy. Denies alcohol abuse or cigarette smoking.  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 Denies chest pain, SOB 3/3/25    Crohn's disease 2022    DDD (degenerative disc disease), cervical 12/27/2023    Depression 2022    Dysphagia     Elevated cholesterol     Encounter for long-term (current) use of insulin 08/13/2021    Family history of colon cancer     Fibromyalgia     Fibromyalgia, primary 2003    GERD (gastroesophageal reflux disease)     GI (gastrointestinal bleed) 8/8/2022    GI bleed 08/11/2023    H/O Sjogren's disease 10/18/2022    Heart murmur     Hoarseness     Hyperlipidemia     Hypertension 2021    Inflammatory bowel disease 2022    recent hospitalization for colitis    Irritable bowel syndrome     Ischemic colitis 08/11/2023    Low back pain 2003    treated by [facility]    Lumbar radiculopathy 10/21/2021    Lumbar spondylosis 04/21/2022    Lumbosacral spondylosis without myelopathy 04/11/2025    Lupus     Marijuana abuse 08/12/2023    Marijuana use     Daily - has medical marijuana card    Muscle pain 04/21/2022    Myofascial pain 10/21/2021    Neck pain 08/13/2021    Osteopenia 2021    Osteopenia    Pain in thoracic spine 01/07/2025    Peripheral neuropathy 08/10/2022    Pulmonary nodule     Shingles August 2018    Sjogren's disease     Sleep apnea     per home sleep study; does not use CPAP    Systemic lupus " 03/18/2021    Rheumatologist      Therapeutic opioid induced constipation 10/23/2023    Thoracic spondylosis 01/07/2025    Thyroid nodule 3-4 years ago    Urinary tract infection 2022    Vasovagal syncope      Past Surgical History:   Past Surgical History:   Procedure Laterality Date    ABDOMINAL SURGERY  1998    [facility]    APPENDECTOMY      BREAST BIOPSY      right    BREAST SURGERY  1975    lumpectomy 1975    CATARACT EXTRACTION W/ INTRAOCULAR LENS IMPLANT Left 12/20/2021    Procedure: CATARACT PHACO EXTRACTION WITH INTRAOCULAR LENS IMPLANT LEFT COMPLICATED WITH MALYUGIN RING;  Surgeon: Zachary Landers MD;  Location: [facility] OR;  Service: Ophthalmology;  Laterality: Left;    CATARACT EXTRACTION W/ INTRAOCULAR LENS IMPLANT Right 01/03/2022    Procedure: CATARACT PHACO EXTRACTION WITH INTRAOCULAR LENS IMPLANT RIGHT;  Surgeon: Zachary Landers MD;  Location: [facility] OR;  Service: Ophthalmology;  Laterality: Right;    COLONOSCOPY      COLONOSCOPY N/A 12/12/2022    Procedure: COLONOSCOPY WITH BIOPSIES AND POLYPECTOMY;  Surgeon: Chaparro Marrero MD;  Location: [facility] ENDOSCOPY;  Service: Gastroenterology;  Laterality: N/A;    COLONOSCOPY N/A 03/07/2025    Procedure: Colonoscopy with biopsy and polypectomy;  Surgeon: Chaparro Marrero MD;  Location: [facility] ENDOSCOPY;  Service: Gastroenterology;  Laterality: N/A;    DENTAL PROCEDURE Bilateral 02/04/2025    Dental Implants- Entire Mouth    ENDOSCOPY      ENDOSCOPY N/A 06/01/2020    Procedure: ESOPHAGOGASTRODUODENOSCOPY;  Surgeon: Chaparro Marrero MD;  Location: [facility] ENDOSCOPY;  Service: Gastroenterology;  Laterality: N/A;    EYE SURGERY  2021-22    cataract removal    HYSTERECTOMY      TEETH EXTRACTION      has full set of dental implants    TOTAL ABDOMINAL HYSTERECTOMY WITH SALPINGO OOPHORECTOMY  1998    TUBAL ABDOMINAL LIGATION  1984    UPPER GASTROINTESTINAL ENDOSCOPY         Family History:   Family History   Problem Relation Age of Onset     Hypertension Mother     Aneurysm Mother     Heart disease Mother         passed away at 54 yr old after 5 vessel bypass    Hyperlipidemia Mother     Stroke Mother         aneurysm ruptured    Arrhythmia Mother     Emphysema Father     COPD Father     Ulcerative colitis Brother     Alcohol abuse Brother     Cancer Brother         stomach cancer    COPD Brother     Hyperlipidemia Brother     Liver disease Brother     Cirrhosis Brother     Colon polyps Brother     Stomach cancer Brother     Heart attack Brother     Early death Brother         ulcerative colitis    Learning disabilities Brother         mental retardation    Mental retardation Brother     Drug abuse Brother     Hypertension Brother     Colon cancer Brother         stomach cancer (neuroendocrine)    Colon cancer Maternal Uncle     Arthritis Maternal Uncle     Cancer Maternal Uncle     Cancer Maternal Aunt         breast cancer    Cancer Sister         breast    COPD Paternal Uncle     Early death Brother         ulcerative colitis    Learning disabilities Brother         mental retardation    Mental illness Brother         schizophrenia    Ulcerative colitis Brother     Mental retardation Brother     Inflammatory bowel disease Brother     Stomach cancer Brother     Kidney disease Paternal Aunt         nephrectomy    Cancer Sister         breast    Cancer Maternal Aunt         breast cancer    COPD Paternal Uncle     Kidney disease Paternal Aunt         nephrectomy    Liver cancer Neg Hx     Crohn's disease Neg Hx        Social History:   Social History     Socioeconomic History    Marital status:    Tobacco Use    Smoking status: Former     Current packs/day: 0.00     Average packs/day: 1 pack/day for 15.0 years (15.0 ttl pk-yrs)     Types: Cigarettes     Start date: 2003     Quit date: 2018     Years since quittin.3     Passive exposure: Past    Smokeless tobacco: Never   Vaping Use    Vaping status: Every Day    Substances: Nicotine,  Flavoring    Devices: Disposable    Passive vaping exposure: Yes   Substance and Sexual Activity    Alcohol use: No    Drug use: Yes     Types: Marijuana     Comment: OCCASIONALLY -has medical marijuana    Sexual activity: Defer     Birth control/protection: Post-menopausal, Hysterectomy       Medications:     Current Outpatient Medications:     ARIPiprazole (ABILIFY) 2 MG tablet, Take 1 tablet by mouth Daily., Disp: 30 tablet, Rfl: 1    aspirin 81 MG EC tablet, Take 1 tablet by mouth Daily., Disp: , Rfl:     azaTHIOprine (IMURAN) 50 MG tablet, Take 2 tablets by mouth Daily. (Patient taking differently: Take 1 tablet by mouth Daily.), Disp: 180 tablet, Rfl: 3    busPIRone (BUSPAR) 15 MG tablet, Take 1 tablet by mouth 2 (Two) Times a Day As Needed (anxiety). for anxiety, Disp: 180 tablet, Rfl: 3    cyclobenzaprine (FLEXERIL) 10 MG tablet, TAKE 1 TABLET BY MOUTH THREE TIMES DAILY, Disp: 270 tablet, Rfl: 3    diazePAM (VALIUM) 5 MG tablet, Take half or one oral tablet once daily as needed for panic episodes, Disp: 7 tablet, Rfl: 0    Diclofenac Sodium (VOLTAREN) 1 % gel gel, diclofenac 1 % topical gel  APPLY 1 GRAM TOPICALLY TO THE AFFECTED AREA TWICE DAILY AS NEEDED FOR PAIN, Disp: , Rfl:     docusate sodium (COLACE) 100 MG capsule, TAKE 1 CAPSULE BY MOUTH TWICE DAILY, Disp: 180 capsule, Rfl: 2    DULoxetine (CYMBALTA) 60 MG capsule, TAKE 1 CAPSULE BY MOUTH DAILY, Disp: 90 capsule, Rfl: 3    ferrous gluconate (FERGON) 324 MG tablet, TAKE 1 TABLET BY MOUTH DAILY WITH BREAKFAST (Patient taking differently: Take 1 tablet by mouth 3 (Three) Times a Week.), Disp: 30 tablet, Rfl: 2    fluticasone (FLONASE) 50 MCG/ACT nasal spray, SHAKE LIQUID AND USE 1 TO 2 SPRAYS IN EACH NOSTRIL EVERY DAY AS NEEDED, Disp: , Rfl:     furosemide (LASIX) 20 MG tablet, TAKE 1 TABLET BY MOUTH DAILY AS NEEDED FOR SWELLING, Disp: 30 tablet, Rfl: 1    gabapentin (NEURONTIN) 800 MG tablet, Take 1 tablet by mouth 3 (Three) Times a Day., Disp: , Rfl:      hydroxychloroquine (PLAQUENIL) 200 MG tablet, Take 1.5 tablets by mouth Daily., Disp: , Rfl:     hydrOXYzine pamoate (VISTARIL) 25 MG capsule, TAKE 1 CAPSULE BY MOUTH UP TO THREE TIMES DAILY AS DIRECTED, Disp: 90 capsule, Rfl: 1    inFLIXimab (REMICADE IV), Infuse  into a venous catheter Every 6 (Six) Weeks., Disp: , Rfl:     lactulose (CHRONULAC) 10 GM/15ML solution, Take 15 mL by mouth Daily. (Patient taking differently: Take 15 mL by mouth Daily. PRN), Disp: 450 mL, Rfl: 2    lisinopril (PRINIVIL,ZESTRIL) 10 MG tablet, Take 1 tablet by mouth Every Night., Disp: 90 tablet, Rfl: 3    loratadine (CLARITIN) 10 MG tablet, Take 1 tablet by mouth As Needed., Disp: , Rfl:     lubiprostone (Amitiza) 24 MCG capsule, Take 1 capsule by mouth 2 (Two) Times a Day With Meals., Disp: 60 capsule, Rfl: 5    mirtazapine (REMERON) 15 MG tablet, Take 1 tablet by mouth Every Night., Disp: 30 tablet, Rfl: 3    ondansetron ODT (ZOFRAN-ODT) 4 MG disintegrating tablet, Place 1 tablet on the tongue Every 8 (Eight) Hours As Needed for Nausea or Vomiting., Disp: 10 tablet, Rfl: 0    oxybutynin XL (DITROPAN-XL) 10 MG 24 hr tablet, Take 1 tablet by mouth Daily., Disp: 90 tablet, Rfl: 3    oxyCODONE-acetaminophen (PERCOCET)  MG per tablet, Take 1 tablet by mouth 2 (Two) Times a Day., Disp: , Rfl:     pantoprazole (PROTONIX) 20 MG EC tablet, TAKE 1 TABLET BY MOUTH DAILY, Disp: 90 tablet, Rfl: 3    promethazine (PHENERGAN) 25 MG tablet, TAKE 1/2 TABLET BY MOUTH EVERY 8 HOURS AS NEEDED FOR NAUSEA OR VOMITING, Disp: 30 tablet, Rfl: 2    Restasis 0.05 % ophthalmic emulsion, Administer 1 drop to both eyes 2 (Two) Times a Day., Disp: , Rfl:     rOPINIRole (REQUIP) 2 MG tablet, TAKE 1 TABLET BY MOUTH EVERY DAY 1 TO 3 HOURS BEFORE BEDTIME (Patient taking differently: Take  by mouth Every Night. PRN), Disp: 90 tablet, Rfl: 1    vitamin B-12 (CYANOCOBALAMIN) 1000 MCG tablet, Take 1 tablet by mouth Daily., Disp: 90 tablet, Rfl: 3    vitamin D  "(ERGOCALCIFEROL) 1.25 MG (40422 UT) capsule capsule, TAKE ONE CAPSULE BY MOUTH EVERY WEEK, Disp: 15 capsule, Rfl: 3    Allergies:   Allergies   Allergen Reactions    Penicillins Rash       Review of Systems:   Review of Systems   Constitutional:  Negative for appetite change, fatigue, fever and unexpected weight loss.   HENT:  Negative for trouble swallowing.    Gastrointestinal:  Positive for abdominal pain, anal bleeding, constipation, nausea, rectal pain, GERD and indigestion. Negative for abdominal distention, blood in stool, diarrhea and vomiting.        Mucus in stool         The following portions of the patient's history were reviewed and updated as appropriate: allergies, current medications, past family history, past medical history, past social history, past surgical history and problem list.    Objective     Physical Exam:  Vital Signs:   Vitals:    05/05/25 1306   BP: 125/76   Pulse: 93   Temp: 98 °F (36.7 °C)   TempSrc: Infrared   SpO2: 97%   Weight: 58.1 kg (128 lb)  Comment: with clothing/shoes   Height: 152.4 cm (60\")  Comment: per patient       Physical Exam  Constitutional:       Appearance: Normal appearance.   HENT:      Head: Normocephalic and atraumatic.   Eyes:      Conjunctiva/sclera: Conjunctivae normal.   Abdominal:      General: Abdomen is flat. There is no distension.      Palpations: There is no mass.      Tenderness: There is no abdominal tenderness. There is no guarding or rebound.      Hernia: No hernia is present.   Musculoskeletal:      Cervical back: Normal range of motion and neck supple.   Neurological:      Mental Status: She is alert.         Results Review:   I reviewed the patient's new clinical results.    Admission on 03/07/2025, Discharged on 03/07/2025   Component Date Value Ref Range Status    Reference Lab Report 03/07/2025    Final                    Value:[facility]  [address] [place] [address]  Phone: [phone] or " [phone]  Fax: [phone]  Fnin Yusuf M.D., Medical Director    PATIENT NAME                                     LABORATORY NO.  SHELBI LAND.                            [id number]  [id number]                                 AGE                    SEX   SSN              CLIENT REF #  [facility]                    69        1955      F     [id number]      [id number]    [address]                        REQUESTING M.D.           ATTENDING M.D.         COPY TO.  [facility] [address]                                [place] [id number]                         On license of [facility]  DATE COLLECTED            DATE RECEIVED          DATE REPORTED  2025                2025             03/10/2025    DIAGNOSIS:  A.     CECUM BIOPSIES, AND ASCENDING:  Colonic mucosa with no significant histopathologic abnormality  No evidence of dysplasia,                           carcinoma, or active colitis    B.     TRANSVERSE COLON BIOPSY:  Colonic mucosa with no significant histopathologic abnormality  No evidence of dysplasia, carcinoma, or active colitis    C.     ASCENDING COLON POLYP:  Colonic mucosa with no significant histopathologic abnormality  No evidence of dysplasia, carcinoma, or active colitis    D.     DESCENDING COLON BIOPSY:  Colonic mucosa with no significant histopathologic abnormality  No evidence of dysplasia, carcinoma, or active colitis    E.     SIGMOID COLON BIOPSY, AND RECTAL:  Colonic mucosa with no significant histopathologic abnormality  No evidence of dysplasia, carcinoma, or active colitis      CLINICAL HISTORY:  Crohn's disease of both small and large intestine without complication      SPECIMENS RECEIVED:  A.    CECUM BIOPSIES , AND ASCENDING  B.    TRANSVERSE COLON BIOPSY  C.    ASCENDING COLON POLYP  D.    DESCENDING COLON BIOPSY  E.    SIGMOID COLON BIOPSY , AND RECTAL    MICROSCOPIC DESCRIPTION:  Tissue blocks are                            "prepared and slides are examined microscopically on all  specimens. See diagnosis for details.    Professional interpretation rendered by Joey Gallardo M.D.,ERVIN. at [facility], [address] [place] [address].    GROSS DESCRIPTION:  A.    Labeled \"cecum and ascending colon biopsies\" consisting of 3 pieces of  tan soft tissue measuring 0.3 x 0.2 x 0.2 cm in aggregate.  Submitted  entirely in 1 cassette.  BAW  B.    Labeled \"transverse colon polyp\" consisting of 3 pieces tan soft tissue  measuring 0.3 x 0.2 x 0.2 cm in aggregate.  Submitted entirely in 1  cassette.  C.    Labeled \"ascending colon polyp\" consisting of multiple pieces of tan soft  tissue measuring 1.8 x 0.3 x 0.2 cm in aggregate.  Submitted entirely in 1  cassette.  D.    Labeled \"descending colon biopsy\" consisting of 2 pieces of tan soft tissue  measuring 0.5 x 0.2 x 0.2 cm in aggregate.  Submitted entirely in 1  cassette.  E.    Labeled \"sigmoid colon and rectum\" consisting of multiple tan soft                           tissue  measuring 0% x 0.3 x 0.2 cm in aggregate.  Submitted entirely in 1  cassette.    REVIEWED, DIAGNOSED AND ELECTRONICALLY  SIGNED BY:    Joey Gallardo M.D.,LISAP.  CPT CODES:  88305x5        Mammo Screening Digital Tomosynthesis Bilateral With CAD  Result Date: 3/2/2025  No mammographic evidence of malignancy. BI-RADS CATEGORY: Overall: 2 - Benign RECOMMENDATION:      - Routine Screening Mammogram in 1 Year. Patient Lifetime Risk Score of Breast Malignancy: 10.5% This risk assessment is calculated using the Tiffanie Risk Assessment model which may underestimate the lifetime risk of breast malignancy. COMMUNICATION: Computer-aided detection (CAD) and tomosynthesis were utilized by the radiologist in the interpretation of this examination. The results and recommendations will be sent to the patient in a printed lay language version of the imaging report.        CT Abdomen Pelvis With Contrast     Result Date: " 8/11/2023  Wall thickening, submucosal edema and mucosal enhancement from the distal transverse colon to the rectum in this patient with known Crohn's disease; findings slightly improved compared to 1 year prior.  Air-fluid levels in nondistended small bowel, suspect ileus.  This report was signed and finalized on 8/11/2023 4:01 PM by Samantha Allen MD.        11/15/2022 PillCam: Few scattered gastric erosions. Multiple small and medium size healing erosions and superficial ulcerations identified throughout the ileum. No deep ulcerations except one small proximal ileal ulceration. These findings with anemia and recent colitis is more suggestive of ileocolonic crohn's disease.     Colonoscopy by Dr. Marrero 12/12/2022:  - The perianal and digital rectal examinations were normal.  - A 3 mm polyp was found in the cecum. The polyp was sessile. The polyp was removed with a cold snare. Resection and retrieval were complete.  - A 4 mm polyp was found in the sigmoid colon. The polyp was flat. The polyp was removed with a cold snare. Resection and retrieval were complete.  - Diffuse moderate inflammation characterized by altered vascularity, congestion (edema), erosions, erythema, friability and granularity was found in the rectum, in the recto-sigmoid colon, in the sigmoid colon and in the descending colon. Biopsies were taken with a cold forceps for histology.  - A medium healed ulcer was found in the mid descending colon. The scar tissue was healthy in appearance.  - The transverse colon, hepatic flexure, ascending colon, cecum, appendiceal orifice and ileocecal valve appeared normal. Biopsies were taken with a cold forceps for histology.  - The terminal ileum appeared normal. Biopsies were taken with a cold forceps for histology.     Pathology DIAGNOSIS:   A.   TERMINAL ILEUM BIOPSY:   Benign small bowel, consistent with terminal ileum   Negative for significant architectural distortion, inflammatory infiltrate,    neoplasia, malignancy   B.   CECUM BIOPSY:   Benign colonic mucosa, negative for significant architectural distortion,   inflammatory infiltrate, neoplasia, dysplasia, malignancy   C.   ASCENDING COLON BIOPSY:   Benign colonic mucosa, negative for significant architectural distortion,   inflammatory infiltrate, neoplasia, dysplasia, malignancy   D.   TRANSVERSE COLON BIOPSY:   Benign colonic mucosa, negative for significant architectural distortion,   inflammatory infiltrate, neoplasia, dysplasia, malignancy   E.   DESCENDING COLON BIOPSY:   Minimal active colitis without significant accompanying chronicity   Negative for dysplasia, neoplasia, malignancy   See comment   F.   SIGMOID COLON BIOPSY:   Minimal active colitis without significant chronicity   Negative for dysplasia, neoplasia,malignancy   See comment   G.   RECTAL BIOPSY:   Minimal active colitis without significant chronicity   Negative for dysplasia, neoplasia, malignancy   See comment   H.   CECUM POLYP:   Tubular adenoma   Negative for high-grade dysplasia/malignancy   I.   SIGMOID COLON POLYP:   Hyperplastic polyp   Accompanying inflammatory changes   COMMENT:  E-G.  There is minimal active colitis with only rare neutrophils   identified within the mucosal surfaces.  There is no evidence of viral cytopathic effect, significant chronic alteration, dysplasia, neoplasia, malignancy, or granulomatous inflammation.  These findings are not specific and could be seen with an infectious process, acute self-limited colitis, or even potentially inflammatory bowel disease in the appropr    Colonoscopy on 0 3/7/2025.  - Preparation of the colon was inadequate.   - Stool in the entire examined colon.   - One 3-4 mm polyp in the mid ascending colon, removed with a cold snare. Resected and retrieved. - Diverticulosis in the sigmoid colon.   - No current colits. Crohn's appears to be in remission    DIAGNOSIS:  A. CECUM BIOPSIES, AND ASCENDING:  Colonic mucosa  with no significant histopathologic abnormality  No evidence of dysplasia, carcinoma, or active colitis  B. TRANSVERSE COLON BIOPSY:  Colonic mucosa with no significant histopathologic abnormality  No evidence of dysplasia, carcinoma, or active colitis  C. ASCENDING COLON POLYP:  Colonic mucosa with no significant histopathologic abnormality  No evidence of dysplasia, carcinoma, or active colitis  D. DESCENDING COLON BIOPSY:  Colonic mucosa with no significant histopathologic abnormality  No evidence of dysplasia, carcinoma, or active colitis  E. SIGMOID COLON BIOPSY, AND RECTAL:  Colonic mucosa with no significant histopathologic abnormality  No evidence of dysplasia, carcinoma, or active colitis    Assessment / Plan      1. Crohn's disease of both small and large intestine   2. Suspected arthropathy associated with inflammatory bowel disease / RA   3. Chronic iron deficiency anemia  4. Vitamin B12 deficiency  5. Family history of inflammatory bowel disease-UC with brother  6. Gastroesophageal reflux disease without esophagitis  7. Long-term opioid use with nausea ? /drug-induced constipation /irritable bowel  8.  Imuran induced leukopenia    5/5/2025,   Patient is an endoscopic and pathological remission.  Amitiza is not helping for her constipation.  No issues with Remicade infusion.  Her Imuran dose has been reduced to 50 mg p.o. daily due to medication induced persistent leukopenia.  Her reflux symptoms flared up as she ran out of her PPI.  No lab work since last visit.  Colonoscopy 3/7/2025 did not reveal any endoscopic signs of colitis and pathology was negative.    Continue to work on complete smoking cessation  Will continue Amitiza twice daily  Will add senna 2 tablets daily  Will reduce Imuran 50 mg p.o. daily  Remicade infusion 5 mg/kg every 8 weeks  Dermatology follow-up in July as planned  Will start back on oral PPI Protonix 20 mg p.o. daily  Iron pills every other day  Repeat labs CBC CMP vitamin  B12.      1/27/2025   CT abdomen pelvis with contrast on 12/11/2024 that revealed bowel gas pattern suggesting ileus versus enteritis.  This is a common finding with opioid use with ileus.  Oh Remicade trough level was 15 with antibodies less than 22.   CT angiogram done on 10/5/2023 revealed no evidence of major peripheral vascular disease     8/31/2023  Record from [facility] reviewed. Patient did not receive her first Remicade maintenance infusion on time and had to wait for about 6 weeks.  First maintenance dose was in July 12.  And next dose is pending for September 12. Patient developed bloody diarrhea with abdominal pain and came to ED on 8/11/2023 and later on was transferred to [facility] due to no coverage from GI here.  Her CT abdomen pelvis done on 8/11/2023 revealed revealed a circumferential wall thickening from distal transverse colon to the rectum findings were in fact less pronounced than the previous findings with the prior CT scans.  There was also fluid-filled small bowel loops nondilated with air-fluid levels from ileus.     Stool C. difficile and GI panel was negative.  Her thiopurine metabolites level was only 55 indicating subtherapeutic level and 6-mercaptopurine level was 850 within limit.  Her random infliximab level was 19.28 without any antibodies.  She had a colonoscopy done by Dr. Valencia at [facility] on 8/15/2023 and reported as having severe inflammation with ulcerations on the left side colon that may indicate ischemic colitis.  Full colonoscopy and pathology report not available to review.  Her lab work done on 8/22/2023 revealed a normal CMP.  Hemoglobin is 9.8 g/dL.  CRP 1.77.     Although colonoscopy findings suggested possible left-sided ischemic colitis.  These findings were similar to prior colitis findings are chronic , was present with a CT scan a year ago, and persistent findings noted with the colonoscopy done in December 2022, suggesting chronic  inflammation. These findings suggest Crohn's flare.  Given her gap in her maintenance Remicade dose infusion for 6 weeks, played a role on this flare of unclear.  However her random Remicade level done was therapeutic and very high level midway through her treatment.      3/29/2023   Her stool calprotectin was 25 within normal limits.  Hemoglobin is still low at 11 g/dL.  Vitamin B12 is low at 199.She had a 2 induction dose of Remicade now third 1 is pending.  Initially prescribed Humira however she could not afford co-pay.     After review of recent pillcam and colonoscopy, it seems that she has chronic inflammation which may have been contributing to her anemia throughout the years. Some element of anemia of chronic disease suspected with her rheumatoid arthritis. She had erosions and ulceration in the small intestine on PillCam. Colonoscopy and colon biopsies show active colitis. History and endoscopic findings consistent with a new diagnosis of Crohn's disease. She is currently complaining of bloating, mucous in stools, mild rectal bleeding and loose stools. This is a change in bowel habits from her previous constipation. Initially diagnosed with colitis in August 2022 with bloody diarrhea.  CT abdomen pelvis done with contrast in August 2022 revealed a left-sided colon colitis.  Subsequently she had a colonoscopy done by Dr. Morel on 8/17/2022 which did not reveal any endoscopic signs of colitis.  However TI was not visualized.  Random colon biopsies done were all normal without any signs of colitis.  Prior EGD done in 2020 did not reveal any upper GI source of bleeding. Her brother has ulcerative colitis. Previously with reports of constipation at baseline. Now having stools daily. In the past, she took MiraLAX, senna with relief. Had taken Linzess and Movantik before which did not help her. Serum porphyrin normal.      Prior history  9. Tubular adenoma of colon  Colonoscopy 12/2022 had 2 small polyps, 1 was  tubular adenoma without dysplasia and other was hyperplastic.  No family history of any colon cancer with a first-degree relatives. Due to new diagnosis of IBD, she will likely need repeat colonoscopy in 1-3 years depending on clinical course  10.  Nonalcoholic fatty liver / ??medication induced    Follow Up:   No follow-ups on file.    Chaparro Marrero MD  [facility] [place]  5/5/2025  13:08 EDT     Please note that portions of this note may have been completed with a voice recognition program.

## 2025-05-08 ENCOUNTER — OFFICE VISIT (OUTPATIENT)
Dept: PSYCHIATRY | Facility: CLINIC | Age: 70
End: 2025-05-08
Payer: MEDICARE

## 2025-05-08 DIAGNOSIS — F33.1 MODERATE EPISODE OF RECURRENT MAJOR DEPRESSIVE DISORDER: ICD-10-CM

## 2025-05-08 DIAGNOSIS — F41.1 GAD (GENERALIZED ANXIETY DISORDER): Primary | ICD-10-CM

## 2025-05-08 RX ORDER — LISINOPRIL 10 MG/1
10 TABLET ORAL NIGHTLY
Qty: 90 TABLET | Refills: 3 | Status: SHIPPED | OUTPATIENT
Start: 2025-05-08

## 2025-05-08 NOTE — PROGRESS NOTES
"   Follow Up Adult Note   Date:2025   Client Name: Stephanie Jean  : 1955   MRN: [id number]   Time IN: 12:33 pm    Time OUT: 1:26 pm      Referring Provider: Nhung Shepard DO    Chief Complaint:      ICD-10-CM ICD-9-CM   1. JAIMIE (generalized anxiety disorder)  F41.1 300.02   2. Moderate episode of recurrent major depressive disorder  F33.1 296.32      History of Present Illness:   Stephanie Jean is a 69 y.o. female who is being seen today for follow up individual psychotherapy counseling. Stephanie shared \"It's been a rough week\", rated anxiety at a 10 and depression at a 5 on a 1-10 scale where 1 is minimal and 10 is max; discussed recent stressors including brother and dad's passing; symptoms include mind racing, worrying, anxiety builds to panic attacks; panic attacks are increasing in frequency; denied any SI/HI. CBT was used to assist Stephanie with processing thoughts/feelings and with building effective coping techniques (outdoors mindfulness technique, positive self talk, mindfully embracing positive experiences); a treatment plan was completed this date.      Objective   Medications:     Current Outpatient Medications:     ARIPiprazole (ABILIFY) 2 MG tablet, Take 1 tablet by mouth Daily., Disp: 30 tablet, Rfl: 1    aspirin 81 MG EC tablet, Take 1 tablet by mouth Daily., Disp: , Rfl:     azaTHIOprine (IMURAN) 50 MG tablet, Take 2 tablets by mouth Daily. (Patient taking differently: Take 1 tablet by mouth Daily.), Disp: 180 tablet, Rfl: 3    busPIRone (BUSPAR) 15 MG tablet, Take 1 tablet by mouth 2 (Two) Times a Day As Needed (anxiety). for anxiety, Disp: 180 tablet, Rfl: 3    cyclobenzaprine (FLEXERIL) 10 MG tablet, TAKE 1 TABLET BY MOUTH THREE TIMES DAILY, Disp: 270 tablet, Rfl: 3    diazePAM (VALIUM) 5 MG tablet, Take half or one oral tablet once daily as needed for panic episodes, Disp: 7 tablet, Rfl: 0    Diclofenac Sodium (VOLTAREN) 1 % gel gel, diclofenac 1 % topical gel  APPLY 1 " GRAM TOPICALLY TO THE AFFECTED AREA TWICE DAILY AS NEEDED FOR PAIN, Disp: , Rfl:     docusate sodium (COLACE) 100 MG capsule, TAKE 1 CAPSULE BY MOUTH TWICE DAILY, Disp: 180 capsule, Rfl: 2    DULoxetine (CYMBALTA) 60 MG capsule, TAKE 1 CAPSULE BY MOUTH DAILY, Disp: 90 capsule, Rfl: 3    ferrous gluconate (FERGON) 324 MG tablet, TAKE 1 TABLET BY MOUTH DAILY WITH BREAKFAST (Patient taking differently: Take 1 tablet by mouth 3 (Three) Times a Week.), Disp: 30 tablet, Rfl: 2    fluticasone (FLONASE) 50 MCG/ACT nasal spray, SHAKE LIQUID AND USE 1 TO 2 SPRAYS IN EACH NOSTRIL EVERY DAY AS NEEDED, Disp: , Rfl:     furosemide (LASIX) 20 MG tablet, TAKE 1 TABLET BY MOUTH DAILY AS NEEDED FOR SWELLING, Disp: 30 tablet, Rfl: 1    gabapentin (NEURONTIN) 800 MG tablet, Take 1 tablet by mouth 3 (Three) Times a Day., Disp: , Rfl:     hydroxychloroquine (PLAQUENIL) 200 MG tablet, Take 1.5 tablets by mouth Daily., Disp: , Rfl:     hydrOXYzine pamoate (VISTARIL) 25 MG capsule, TAKE 1 CAPSULE BY MOUTH UP TO THREE TIMES DAILY AS DIRECTED, Disp: 90 capsule, Rfl: 1    inFLIXimab (REMICADE IV), Infuse  into a venous catheter Every 6 (Six) Weeks., Disp: , Rfl:     lactulose (CHRONULAC) 10 GM/15ML solution, Take 15 mL by mouth Daily. (Patient taking differently: Take 15 mL by mouth Daily. PRN), Disp: 450 mL, Rfl: 2    lisinopril (PRINIVIL,ZESTRIL) 10 MG tablet, TAKE 1 TABLET BY MOUTH EVERY NIGHT, Disp: 90 tablet, Rfl: 3    loratadine (CLARITIN) 10 MG tablet, Take 1 tablet by mouth As Needed., Disp: , Rfl:     lubiprostone (Amitiza) 24 MCG capsule, Take 1 capsule by mouth 2 (Two) Times a Day With Meals., Disp: 60 capsule, Rfl: 5    mirtazapine (REMERON) 15 MG tablet, Take 1 tablet by mouth Every Night., Disp: 30 tablet, Rfl: 3    ondansetron ODT (ZOFRAN-ODT) 4 MG disintegrating tablet, Place 1 tablet on the tongue Every 8 (Eight) Hours As Needed for Nausea or Vomiting., Disp: 10 tablet, Rfl: 0    oxybutynin XL (DITROPAN-XL) 10 MG 24 hr tablet,  Take 1 tablet by mouth Daily., Disp: 90 tablet, Rfl: 3    oxyCODONE-acetaminophen (PERCOCET)  MG per tablet, Take 1 tablet by mouth 2 (Two) Times a Day., Disp: , Rfl:     pantoprazole (PROTONIX) 20 MG EC tablet, Take 1 tablet by mouth Daily. Indications: Gastroesophageal Reflux Disease, Disp: 90 tablet, Rfl: 3    promethazine (PHENERGAN) 25 MG tablet, TAKE 1/2 TABLET BY MOUTH EVERY 8 HOURS AS NEEDED FOR NAUSEA OR VOMITING, Disp: 30 tablet, Rfl: 2    Restasis 0.05 % ophthalmic emulsion, Administer 1 drop to both eyes 2 (Two) Times a Day., Disp: , Rfl:     rOPINIRole (REQUIP) 2 MG tablet, TAKE 1 TABLET BY MOUTH EVERY DAY 1 TO 3 HOURS BEFORE BEDTIME (Patient taking differently: Take  by mouth Every Night. PRN), Disp: 90 tablet, Rfl: 1    vitamin B-12 (CYANOCOBALAMIN) 1000 MCG tablet, Take 1 tablet by mouth Daily., Disp: 90 tablet, Rfl: 3    vitamin D (ERGOCALCIFEROL) 1.25 MG (63908 UT) capsule capsule, TAKE ONE CAPSULE BY MOUTH EVERY WEEK, Disp: 15 capsule, Rfl: 3    Allergies:   Allergies   Allergen Reactions    Penicillins Rash     Mental Status Exam:   MENTAL STATUS EXAM   General Appearance:  Cleanly groomed and dressed  Eye Contact:  Good eye contact  Attitude:  Cooperative  Motor Activity:  Normal gait, posture  Muscle Strength:  Normal  Speech:  Normal rate, tone, volume  Language:  Spontaneous  Mood and affect:  Appropriate, mood congruent, anxious and dysthymic  Hopelessness:  Denies  Loneliness: Denies  Thought Process:  Logical  Associations/ Thought Content:  No delusions  Hallucinations:  None  Suicidal Ideations:  Not present  Homicidal Ideation:  Not present  Sensorium:  Alert  Orientation:  Person, place, time and situation  Immediate Recall, Recent, and Remote Memory:  Intact  Attention Span/ Concentration:  Good  Fund of Knowledge:  Appropriate for age and educational level  Intellectual Functioning:  Average range  Insight:  Good  Judgement:  Good  Reliability:  Good  Impulse Control:  Good      Subjective       Functional Status: Severe impairment  Progress toward goal: At goal  Prognosis: Good with Ongoing Treatment     Assessment / Plan / Progress   Visit Diagnosis/Orders Placed This Visit:    ICD-10-CM ICD-9-CM   1. JAIMIE (generalized anxiety disorder)  F41.1 300.02   2. Moderate episode of recurrent major depressive disorder  F33.1 296.32      PLAN:  Safety: No acute safety concerns  Risk Assessment: Risk of self-harm acutely is low. Risk of self-harm chronically is also low, but could be further elevated in the event of treatment noncompliance and/or AODA.    Treatment Plan/Goals & Progress: Continue supportive psychotherapy efforts and medications as indicated. Treatment options discussed during today's visit. Client acknowledged and verbally consented to continue with current treatment plan and was educated on the importance of compliance with treatment and follow-up appointments. Client seems reasonably able to adhere to treatment plan.      Assisted client in processing above session content; acknowledged and normalized client’s thoughts, feelings, and concerns.     Allowed client to freely discuss issues without interruption or judgement with unconditional positive regard, active listening skills, and empathy. Clinician provided a safe, confidential environment to facilitate the development of a positive therapeutic relationship and encouraged open, honest communication. Assisted client in identifying risk factors which would indicate the need for higher level of care including thoughts to harm self or others and/or self-harming behavior and encouraged client to contact this office, call 911, or present to the nearest emergency room should any of these events occur. Discussed crisis intervention services and means to access. Client adamantly and convincingly denies current suicidal or homicidal ideation or perceptual disturbance. Assisted client in processing session content; acknowledged and  normalized client’s thoughts, feelings, and concerns by utilizing a person-centered approach in efforts to build appropriate rapport and a positive therapeutic relationship with open and honest communication.      Follow Up:   Return in about 2 weeks (around 5/22/2025) for therapy session.    Concetta Flores LCSW  [facility]

## 2025-05-08 NOTE — TREATMENT PLAN
Multi-Disciplinary Problems (from Behavioral Health Treatment Plan)      Active Problems       Problem: Anxiety  Start Date: 05/08/25      Problem Details: The patient self-scales this problem as a 10 with 10 being the worst.          Goal Priority Start Date Expected End Date End Date    Patient will develop and implement behavioral and cognitive strategies to reduce anxiety and irrational fears. -- 05/08/25 11/06/25 --    Goal Details: Progress toward goal:  Not appropriate to rate progress toward goal since this is the initial treatment plan.        Goal Intervention Frequency Start Date End Date    Help patient explore past emotional issues in relation to present anxiety. Weekly 05/08/25 --    Intervention Details: Duration of treatment until remission of symptoms.        Goal Intervention Frequency Start Date End Date    Help patient develop an awareness of their cognitive and physical responses to anxiety. Weekly 05/08/25 --    Intervention Details: Duration of treatment until remission of symptoms.                Problem: Depression  Start Date: 05/08/25      Problem Details: The patient self-scales this problem as a 5 with 10 being the worst.          Goal Priority Start Date Expected End Date End Date    Patient will demonstrate the ability to initiate new constructive life skills outside of sessions on a consistent basis. -- 05/08/25 11/06/25 --    Goal Details: Progress toward goal:  Not appropriate to rate progress toward goal since this is the initial treatment plan.        Goal Intervention Frequency Start Date End Date    Assist patient in developing healthy coping strategies. Weekly 05/08/25 --    Intervention Details: Duration of treatment until remission of symptoms.                        Reviewed By       Concetta Flores LCSW 05/08/25 [id number]                     I have discussed and reviewed this treatment plan with the patient. While anxiety is an issue, irrational fears is not. Stephanie beaulieu  verbal consent for this treatment plan

## 2025-05-22 ENCOUNTER — OFFICE VISIT (OUTPATIENT)
Dept: PSYCHIATRY | Facility: CLINIC | Age: 70
End: 2025-05-22
Payer: MEDICARE

## 2025-05-22 DIAGNOSIS — F41.1 GAD (GENERALIZED ANXIETY DISORDER): ICD-10-CM

## 2025-05-22 DIAGNOSIS — F33.1 MODERATE EPISODE OF RECURRENT MAJOR DEPRESSIVE DISORDER: Primary | ICD-10-CM

## 2025-05-22 NOTE — PROGRESS NOTES
Follow Up Adult Note   Date:2025   Client Name: Stephanie Jean  : 1955   MRN: [id number]   Time IN: 12:37 pm    Time OUT: 1:21 pm      Referring Provider: Nhung Shepard DO    Chief Complaint:      ICD-10-CM ICD-9-CM   1. Moderate episode of recurrent major depressive disorder  F33.1 296.32   2. JAIMIE (generalized anxiety disorder)  F41.1 300.02      History of Present Illness:   Stephanie Jean is a 69 y.o. female who is being seen today for follow up individual psychotherapy counseling. Stephanie discussed recent stressors including holiday, family; symptoms include feeling sad, tearfulness; denied any SI; discussed some past memories; is using mindfulness technique-- helpful. CBT was used to assist Stephanie with processing thoughts/feelings and with building effective coping techniques (mindfulness technique, positive self talk,mindfully embracing positive experiences).      Objective   Mental Status Exam:   MENTAL STATUS EXAM   General Appearance:  Cleanly groomed and dressed  Eye Contact:  Good eye contact  Attitude:  Cooperative  Motor Activity:  Normal gait, posture  Muscle Strength:  Normal  Speech:  Normal rate, tone, volume  Language:  Spontaneous  Mood and affect:  Appropriate, mood congruent and dysthymic  Hopelessness:  Denies  Loneliness: Denies  Thought Process:  Logical  Associations/ Thought Content:  No delusions  Hallucinations:  None  Suicidal Ideations:  Not present  Homicidal Ideation:  Not present  Sensorium:  Alert  Orientation:  Person, place, time and situation  Immediate Recall, Recent, and Remote Memory:  Intact  Attention Span/ Concentration:  Good  Fund of Knowledge:  Appropriate for age and educational level  Intellectual Functioning:  Average range  Insight:  Good  Judgement:  Good  Reliability:  Good  Impulse Control:  Good     Subjective       Functional Status: Moderate impairment   Progress toward goal: At goal  Prognosis: Good with Ongoing Treatment      Assessment / Plan / Progress   Visit Diagnosis/Orders Placed This Visit:    ICD-10-CM ICD-9-CM   1. Moderate episode of recurrent major depressive disorder  F33.1 296.32   2. JAIMIE (generalized anxiety disorder)  F41.1 300.02      PLAN:  Safety: No acute safety concerns  Risk Assessment: Risk of self-harm acutely is low. Risk of self-harm chronically is also low, but could be further elevated in the event of treatment noncompliance and/or AODA.    Treatment Plan/Goals & Progress: Continue supportive psychotherapy efforts and medications as indicated. Treatment options discussed during today's visit. Client acknowledged and verbally consented to continue with current treatment plan and was educated on the importance of compliance with treatment and follow-up appointments. Client seems reasonably able to adhere to treatment plan.      Assisted client in processing above session content; acknowledged and normalized client’s thoughts, feelings, and concerns.     Allowed client to freely discuss issues without interruption or judgement with unconditional positive regard, active listening skills, and empathy. Clinician provided a safe, confidential environment to facilitate the development of a positive therapeutic relationship and encouraged open, honest communication. Assisted client in identifying risk factors which would indicate the need for higher level of care including thoughts to harm self or others and/or self-harming behavior and encouraged client to contact this office, call 911, or present to the nearest emergency room should any of these events occur. Discussed crisis intervention services and means to access. Client adamantly and convincingly denies current suicidal or homicidal ideation or perceptual disturbance. Assisted client in processing session content; acknowledged and normalized client’s thoughts, feelings, and concerns by utilizing a person-centered approach in efforts to build appropriate  rapport and a positive therapeutic relationship with open and honest communication.      Follow Up:   Return in about 20 days (around 6/11/2025) for therapy session.    Concetta Flores LCSW  [facility]

## 2025-05-26 DIAGNOSIS — D50.0 IRON DEFICIENCY ANEMIA DUE TO CHRONIC BLOOD LOSS: ICD-10-CM

## 2025-05-26 DIAGNOSIS — K50.811 CROHN'S DISEASE OF BOTH SMALL AND LARGE INTESTINE WITH RECTAL BLEEDING: ICD-10-CM

## 2025-05-27 ENCOUNTER — OFFICE VISIT (OUTPATIENT)
Age: 70
End: 2025-05-27
Payer: MEDICARE

## 2025-05-27 ENCOUNTER — OFFICE VISIT (OUTPATIENT)
Dept: INTERNAL MEDICINE | Facility: CLINIC | Age: 70
End: 2025-05-27
Payer: MEDICARE

## 2025-05-27 VITALS
OXYGEN SATURATION: 96 % | RESPIRATION RATE: 14 BRPM | TEMPERATURE: 99.4 F | BODY MASS INDEX: 23.63 KG/M2 | HEIGHT: 60 IN | DIASTOLIC BLOOD PRESSURE: 68 MMHG | SYSTOLIC BLOOD PRESSURE: 112 MMHG | HEART RATE: 82 BPM

## 2025-05-27 VITALS
BODY MASS INDEX: 23.75 KG/M2 | HEIGHT: 60 IN | HEART RATE: 90 BPM | WEIGHT: 121 LBS | DIASTOLIC BLOOD PRESSURE: 76 MMHG | TEMPERATURE: 97 F | SYSTOLIC BLOOD PRESSURE: 118 MMHG | OXYGEN SATURATION: 97 %

## 2025-05-27 DIAGNOSIS — R32 URINARY INCONTINENCE, UNSPECIFIED TYPE: ICD-10-CM

## 2025-05-27 DIAGNOSIS — I10 PRIMARY HYPERTENSION: ICD-10-CM

## 2025-05-27 DIAGNOSIS — F41.9 ANXIETY: Primary | ICD-10-CM

## 2025-05-27 DIAGNOSIS — G31.84 MCI (MILD COGNITIVE IMPAIRMENT): Primary | ICD-10-CM

## 2025-05-27 DIAGNOSIS — R40.4 ALTERED AWARENESS, TRANSIENT: ICD-10-CM

## 2025-05-27 DIAGNOSIS — G25.81 RLS (RESTLESS LEGS SYNDROME): ICD-10-CM

## 2025-05-27 DIAGNOSIS — E55.9 VITAMIN D DEFICIENCY: ICD-10-CM

## 2025-05-27 DIAGNOSIS — D64.9 ANEMIA, UNSPECIFIED TYPE: ICD-10-CM

## 2025-05-27 PROCEDURE — G2211 COMPLEX E/M VISIT ADD ON: HCPCS | Performed by: FAMILY MEDICINE

## 2025-05-27 PROCEDURE — 1160F RVW MEDS BY RX/DR IN RCRD: CPT | Performed by: FAMILY MEDICINE

## 2025-05-27 PROCEDURE — 3078F DIAST BP <80 MM HG: CPT | Performed by: FAMILY MEDICINE

## 2025-05-27 PROCEDURE — 1125F AMNT PAIN NOTED PAIN PRSNT: CPT | Performed by: FAMILY MEDICINE

## 2025-05-27 PROCEDURE — 1159F MED LIST DOCD IN RCRD: CPT | Performed by: FAMILY MEDICINE

## 2025-05-27 PROCEDURE — 99214 OFFICE O/P EST MOD 30 MIN: CPT | Performed by: FAMILY MEDICINE

## 2025-05-27 PROCEDURE — 3074F SYST BP LT 130 MM HG: CPT | Performed by: FAMILY MEDICINE

## 2025-05-27 RX ORDER — FERROUS GLUCONATE 324(38)MG
324 TABLET ORAL
Qty: 30 TABLET | Refills: 2 | Status: SHIPPED | OUTPATIENT
Start: 2025-05-27

## 2025-05-27 RX ORDER — MEMANTINE HYDROCHLORIDE 10 MG/1
10 TABLET ORAL DAILY
Qty: 90 TABLET | Refills: 1 | Status: SHIPPED | OUTPATIENT
Start: 2025-05-27

## 2025-05-27 NOTE — PROGRESS NOTES
Follow Up Office Visit      Patient Name: Stephanie Jean  : 1955   MRN: [id number]     Chief Complaint:    Chief Complaint   Patient presents with   • Follow-up     MCI; pt reports her memory is worse. She has stopped taking Namenda and Donepezil.   NAN; pt stopped using CPAP and states she does not have NAN.       History of Present Illness: Stephanie Jean is a 69 y.o. female who is here today to follow up with neurology for MCI and NAN. She was last seen in clinic  ([place]).     She could not tolerate her AutoPap and returned for NAN and reports improved sleep quality. She was taking Aricept 10 mg Daily and Namenda 10 mg Daily and self Dcd medication after developing N. She was not having N previously before dose increase. Depression is improved and well controlled. She is an established pt of Behavioral Health for CBT ([place]) and she has been referred to  [facility] with appt pending  ([place]). She reports she has good days and bad days with her memory. On bad days, her anxiety and depression will be worse and she cannot focus to make a decision.  She is driving. Self ADLs. She is managing the household cooking, cleaning and shopping.     Recent Imaging:     Home Sleep Study  + AHI 9.6/hr with lowest desaturations 66%  MRI Brain W/WO  -noncontributory   CDUS  + Mild plaque disease < 50% stenosis to JALEEL and LICA  MRI Brain W/WO 10/22 + Mild amount of scattered small white matter lesions in the bilateral frontoparietal lobes, nonspecific, most likely due to chronic microvascular ischemic white matter changes.     Pertinent Medical History: HTN, HL, Vitamin D Deficiency, Crohn's, GERD, Colitis, ÁNGEL, Anxiety, SLE, Fibromyalgia, TIA     Subjective      Review of Systems:   Review of Systems   Constitutional: Negative.    HENT: Negative.     Eyes: Negative.    Respiratory: Negative.     Cardiovascular: Negative.    Gastrointestinal: Negative.    Endocrine:  Negative.    Genitourinary: Negative.    Musculoskeletal: Negative.    Skin: Negative.    Allergic/Immunologic: Negative.    Neurological:  Positive for memory problem.   Hematological: Negative.    Psychiatric/Behavioral: Negative.  Negative for sleep disturbance.    All other systems reviewed and are negative.      I have reviewed and the following portions of the patient's history were updated as appropriate: past family history, past medical history, past social history, past surgical history and problem list.    Medications:     Current Outpatient Medications:   •  ARIPiprazole (ABILIFY) 2 MG tablet, Take 1 tablet by mouth Daily., Disp: 30 tablet, Rfl: 1  •  aspirin 81 MG EC tablet, Take 1 tablet by mouth Daily., Disp: , Rfl:   •  azaTHIOprine (IMURAN) 50 MG tablet, Take 2 tablets by mouth Daily. (Patient taking differently: Take 1 tablet by mouth Daily.), Disp: 180 tablet, Rfl: 3  •  busPIRone (BUSPAR) 15 MG tablet, Take 1 tablet by mouth 2 (Two) Times a Day As Needed (anxiety). for anxiety, Disp: 180 tablet, Rfl: 3  •  cyclobenzaprine (FLEXERIL) 10 MG tablet, TAKE 1 TABLET BY MOUTH THREE TIMES DAILY, Disp: 270 tablet, Rfl: 3  •  diazePAM (VALIUM) 5 MG tablet, Take half or one oral tablet once daily as needed for panic episodes, Disp: 7 tablet, Rfl: 0  •  Diclofenac Sodium (VOLTAREN) 1 % gel gel, diclofenac 1 % topical gel  APPLY 1 GRAM TOPICALLY TO THE AFFECTED AREA TWICE DAILY AS NEEDED FOR PAIN, Disp: , Rfl:   •  docusate sodium (COLACE) 100 MG capsule, TAKE 1 CAPSULE BY MOUTH TWICE DAILY, Disp: 180 capsule, Rfl: 2  •  DULoxetine (CYMBALTA) 60 MG capsule, TAKE 1 CAPSULE BY MOUTH DAILY, Disp: 90 capsule, Rfl: 3  •  fluticasone (FLONASE) 50 MCG/ACT nasal spray, SHAKE LIQUID AND USE 1 TO 2 SPRAYS IN EACH NOSTRIL EVERY DAY AS NEEDED, Disp: , Rfl:   •  furosemide (LASIX) 20 MG tablet, TAKE 1 TABLET BY MOUTH DAILY AS NEEDED FOR SWELLING, Disp: 30 tablet, Rfl: 1  •  gabapentin (NEURONTIN) 800 MG tablet, Take 1  tablet by mouth 3 (Three) Times a Day., Disp: , Rfl:   •  hydroxychloroquine (PLAQUENIL) 200 MG tablet, Take 1.5 tablets by mouth Daily., Disp: , Rfl:   •  hydrOXYzine pamoate (VISTARIL) 25 MG capsule, TAKE 1 CAPSULE BY MOUTH UP TO THREE TIMES DAILY AS DIRECTED, Disp: 90 capsule, Rfl: 1  •  inFLIXimab (REMICADE IV), Infuse  into a venous catheter Every 6 (Six) Weeks., Disp: , Rfl:   •  lactulose (CHRONULAC) 10 GM/15ML solution, Take 15 mL by mouth Daily. (Patient taking differently: Take 15 mL by mouth Daily. PRN), Disp: 450 mL, Rfl: 2  •  lisinopril (PRINIVIL,ZESTRIL) 10 MG tablet, TAKE 1 TABLET BY MOUTH EVERY NIGHT, Disp: 90 tablet, Rfl: 3  •  loratadine (CLARITIN) 10 MG tablet, Take 1 tablet by mouth As Needed., Disp: , Rfl:   •  lubiprostone (Amitiza) 24 MCG capsule, Take 1 capsule by mouth 2 (Two) Times a Day With Meals., Disp: 60 capsule, Rfl: 5  •  mirtazapine (REMERON) 15 MG tablet, Take 1 tablet by mouth Every Night., Disp: 30 tablet, Rfl: 3  •  ondansetron ODT (ZOFRAN-ODT) 4 MG disintegrating tablet, Place 1 tablet on the tongue Every 8 (Eight) Hours As Needed for Nausea or Vomiting., Disp: 10 tablet, Rfl: 0  •  oxybutynin XL (DITROPAN-XL) 10 MG 24 hr tablet, Take 1 tablet by mouth Daily., Disp: 90 tablet, Rfl: 3  •  oxyCODONE-acetaminophen (PERCOCET)  MG per tablet, Take 1 tablet by mouth 2 (Two) Times a Day., Disp: , Rfl:   •  pantoprazole (PROTONIX) 20 MG EC tablet, Take 1 tablet by mouth Daily. Indications: Gastroesophageal Reflux Disease, Disp: 90 tablet, Rfl: 3  •  promethazine (PHENERGAN) 25 MG tablet, TAKE 1/2 TABLET BY MOUTH EVERY 8 HOURS AS NEEDED FOR NAUSEA OR VOMITING, Disp: 30 tablet, Rfl: 2  •  Restasis 0.05 % ophthalmic emulsion, Administer 1 drop to both eyes 2 (Two) Times a Day., Disp: , Rfl:   •  rOPINIRole (REQUIP) 2 MG tablet, TAKE 1 TABLET BY MOUTH EVERY DAY 1 TO 3 HOURS BEFORE BEDTIME (Patient taking differently: Take  by mouth Every Night. PRN), Disp: 90 tablet, Rfl: 1  •   "vitamin B-12 (CYANOCOBALAMIN) 1000 MCG tablet, Take 1 tablet by mouth Daily., Disp: 90 tablet, Rfl: 3  •  vitamin D (ERGOCALCIFEROL) 1.25 MG (58938 UT) capsule capsule, TAKE ONE CAPSULE BY MOUTH EVERY WEEK, Disp: 15 capsule, Rfl: 3  •  ferrous gluconate (FERGON) 324 MG tablet, TAKE 1 TABLET BY MOUTH DAILY WITH BREAKFAST, Disp: 30 tablet, Rfl: 2  •  memantine (Namenda) 10 MG tablet, Take 1 tablet by mouth Daily., Disp: 90 tablet, Rfl: 1    Allergies:   Allergies   Allergen Reactions   • Penicillins Rash       Objective     Physical Exam:  Vital Signs:   Vitals:    05/27/25 1432   BP: 112/68   BP Location: Right arm   Patient Position: Sitting   Cuff Size: Adult   Pulse: 82   Resp: 14   Temp: 99.4 °F (37.4 °C)   TempSrc: Temporal   SpO2: 96%   Height: 152.4 cm (60\")   PainSc: 4    PainLoc: Neck     Body mass index is 23.63 kg/m².    Physical Exam  Vitals and nursing note reviewed.   Constitutional:       General: She is not in acute distress.     Appearance: Normal appearance.   HENT:      Head: Normocephalic.      Nose: Nose normal.      Mouth/Throat:      Mouth: Mucous membranes are moist.      Pharynx: Oropharynx is clear.   Eyes:      General: Lids are normal.      Extraocular Movements: Extraocular movements intact.      Conjunctiva/sclera: Conjunctivae normal.      Pupils: Pupils are equal, round, and reactive to light.   Musculoskeletal:      Cervical back: Normal range of motion and neck supple.   Skin:     General: Skin is warm and dry.      Capillary Refill: Capillary refill takes less than 2 seconds.   Neurological:      General: No focal deficit present.      Mental Status: She is alert and oriented to person, place, and time.      Coordination: Romberg sign negative.   Psychiatric:         Mood and Affect: Mood normal.         Speech: Speech normal.         Behavior: Behavior normal.         Neurological Exam  Mental Status  Alert. Oriented to person, place and time. Oriented to person, place, and time. " Recalls 3 of 3 objects immediately. At 5 minutes recalls 2 of 3 objects. Able to copy figure. Speech is normal. Able to name objects, name parts of objects, repeat, read and write. Follows complex commands. Able to perform serial calculations. Able to spell words backwards. Digit span was 5 forward and 5 backward. Fund of knowledge is appropriate for level of education. MMSE score: 28.    Cranial Nerves  CN II: Visual acuity is normal. Visual fields full to confrontation.  CN III, IV, VI: Extraocular movements intact bilaterally. Normal lids and orbits bilaterally. Pupils equal round and reactive to light bilaterally.  CN V: Facial sensation is normal.  CN VII: Full and symmetric facial movement.  CN IX, X: Palate elevates symmetrically. Normal gag reflex.  CN XI: Shoulder shrug strength is normal.  CN XII: Tongue midline without atrophy or fasciculations.    Motor  Normal muscle bulk throughout. No fasciculations present. Normal muscle tone. No abnormal involuntary movements. No pronator drift.    Sensory  Light touch is normal in upper and lower extremities.     Coordination  Right: Finger-to-nose normal. Rapid alternating movement normal.Left: Finger-to-nose normal. Rapid alternating movement normal.    Gait  Casual gait is normal including stance, stride, and arm swing. Romberg is absent. Able to rise from chair without using arms.       Assessment / Plan      Assessment/Plan:   Diagnoses and all orders for this visit:    1. MCI (mild cognitive impairment) (Primary)  -     memantine (Namenda) 10 MG tablet; Take 1 tablet by mouth Daily.  Dispense: 90 tablet; Refill: 1  -     Ambulatory Referral to Neuropsychology         Follow Up:   Return in about 6 months (around 11/27/2025), or if symptoms worsen or fail to improve.    Anticipatory Guidance and Safety Reviewed  Patient Education Provided  MMSE 28/30  DC Aricept  Continue/resume Namenda 10 mg Daily; SE reviewed   Neuropsychology Referral for neurocognitive  testing   Keep Referral Appt with [facility]      RTC PRN Or within 6 months or sooner with issues    Rose Thompson, DNP, APRN, FNP-C  [facility]

## 2025-05-27 NOTE — PROGRESS NOTES
Stephanie Jean is a 69 y.o. female.    Chief Complaint   Patient presents with    Anxiety    Hypertension    Urinary Incontinence       HPI   History of Present Illness  The patient presents for follow-up on hypertension, anxiety, and urinary incontinence.    She reports no significant changes in health status. Under psychiatric care, continues Cymbalta, Abilify, buspar and Valium PRN. Daily panic attacks have improved from 2-3 times/day. Reports headaches during panic attacks with racing thoughts.    Occasionally monitors BP at home, remains within normal limits. On lisinopril and Lasix PRN for swelling, not required Lasix recently. Consumes salty foods.  BP controlled in office today.     Continues oxybutynin for urinary incontinence, finds it beneficial.    Experienced transient altered awareness while loading , felt as though she was going to fall, loss of time and memory, disoriented. Observed by . No recurrence. Neurology appointment scheduled this afternoon.    Reports abdominal tenderness and constipation, bowel movements after 10-day interval following multiple interventions.  She does have crohn's and sees GI regularly.     Discontinued nightly ropinirole, satisfactory management of restless legs syndrome,  reports kicking during sleep.  Takes medication as needed.     The following portions of the patient's history were reviewed and updated as appropriate: allergies, current medications, past family history, past medical history, past social history, past surgical history and problem list.     Allergies   Allergen Reactions    Penicillins Rash         Current Outpatient Medications:     ARIPiprazole (ABILIFY) 2 MG tablet, Take 1 tablet by mouth Daily., Disp: 30 tablet, Rfl: 1    aspirin 81 MG EC tablet, Take 1 tablet by mouth Daily., Disp: , Rfl:     azaTHIOprine (IMURAN) 50 MG tablet, Take 2 tablets by mouth Daily. (Patient taking differently: Take 1 tablet by mouth Daily.),  Disp: 180 tablet, Rfl: 3    busPIRone (BUSPAR) 15 MG tablet, Take 1 tablet by mouth 2 (Two) Times a Day As Needed (anxiety). for anxiety, Disp: 180 tablet, Rfl: 3    cyclobenzaprine (FLEXERIL) 10 MG tablet, TAKE 1 TABLET BY MOUTH THREE TIMES DAILY, Disp: 270 tablet, Rfl: 3    diazePAM (VALIUM) 5 MG tablet, Take half or one oral tablet once daily as needed for panic episodes, Disp: 7 tablet, Rfl: 0    Diclofenac Sodium (VOLTAREN) 1 % gel gel, diclofenac 1 % topical gel  APPLY 1 GRAM TOPICALLY TO THE AFFECTED AREA TWICE DAILY AS NEEDED FOR PAIN, Disp: , Rfl:     docusate sodium (COLACE) 100 MG capsule, TAKE 1 CAPSULE BY MOUTH TWICE DAILY, Disp: 180 capsule, Rfl: 2    DULoxetine (CYMBALTA) 60 MG capsule, TAKE 1 CAPSULE BY MOUTH DAILY, Disp: 90 capsule, Rfl: 3    ferrous gluconate (FERGON) 324 MG tablet, TAKE 1 TABLET BY MOUTH DAILY WITH BREAKFAST (Patient taking differently: Take 1 tablet by mouth 3 (Three) Times a Week.), Disp: 30 tablet, Rfl: 2    fluticasone (FLONASE) 50 MCG/ACT nasal spray, SHAKE LIQUID AND USE 1 TO 2 SPRAYS IN EACH NOSTRIL EVERY DAY AS NEEDED, Disp: , Rfl:     furosemide (LASIX) 20 MG tablet, TAKE 1 TABLET BY MOUTH DAILY AS NEEDED FOR SWELLING, Disp: 30 tablet, Rfl: 1    gabapentin (NEURONTIN) 800 MG tablet, Take 1 tablet by mouth 3 (Three) Times a Day., Disp: , Rfl:     hydroxychloroquine (PLAQUENIL) 200 MG tablet, Take 1.5 tablets by mouth Daily., Disp: , Rfl:     hydrOXYzine pamoate (VISTARIL) 25 MG capsule, TAKE 1 CAPSULE BY MOUTH UP TO THREE TIMES DAILY AS DIRECTED, Disp: 90 capsule, Rfl: 1    inFLIXimab (REMICADE IV), Infuse  into a venous catheter Every 6 (Six) Weeks., Disp: , Rfl:     lactulose (CHRONULAC) 10 GM/15ML solution, Take 15 mL by mouth Daily. (Patient taking differently: Take 15 mL by mouth Daily. PRN), Disp: 450 mL, Rfl: 2    lisinopril (PRINIVIL,ZESTRIL) 10 MG tablet, TAKE 1 TABLET BY MOUTH EVERY NIGHT, Disp: 90 tablet, Rfl: 3    loratadine (CLARITIN) 10 MG tablet, Take 1  tablet by mouth As Needed., Disp: , Rfl:     lubiprostone (Amitiza) 24 MCG capsule, Take 1 capsule by mouth 2 (Two) Times a Day With Meals., Disp: 60 capsule, Rfl: 5    mirtazapine (REMERON) 15 MG tablet, Take 1 tablet by mouth Every Night., Disp: 30 tablet, Rfl: 3    ondansetron ODT (ZOFRAN-ODT) 4 MG disintegrating tablet, Place 1 tablet on the tongue Every 8 (Eight) Hours As Needed for Nausea or Vomiting., Disp: 10 tablet, Rfl: 0    oxybutynin XL (DITROPAN-XL) 10 MG 24 hr tablet, Take 1 tablet by mouth Daily., Disp: 90 tablet, Rfl: 3    oxyCODONE-acetaminophen (PERCOCET)  MG per tablet, Take 1 tablet by mouth 2 (Two) Times a Day., Disp: , Rfl:     pantoprazole (PROTONIX) 20 MG EC tablet, Take 1 tablet by mouth Daily. Indications: Gastroesophageal Reflux Disease, Disp: 90 tablet, Rfl: 3    promethazine (PHENERGAN) 25 MG tablet, TAKE 1/2 TABLET BY MOUTH EVERY 8 HOURS AS NEEDED FOR NAUSEA OR VOMITING, Disp: 30 tablet, Rfl: 2    Restasis 0.05 % ophthalmic emulsion, Administer 1 drop to both eyes 2 (Two) Times a Day., Disp: , Rfl:     rOPINIRole (REQUIP) 2 MG tablet, TAKE 1 TABLET BY MOUTH EVERY DAY 1 TO 3 HOURS BEFORE BEDTIME (Patient taking differently: Take  by mouth Every Night. PRN), Disp: 90 tablet, Rfl: 1    vitamin B-12 (CYANOCOBALAMIN) 1000 MCG tablet, Take 1 tablet by mouth Daily., Disp: 90 tablet, Rfl: 3    vitamin D (ERGOCALCIFEROL) 1.25 MG (96223 UT) capsule capsule, TAKE ONE CAPSULE BY MOUTH EVERY WEEK, Disp: 15 capsule, Rfl: 3    ROS    Review of Systems   Constitutional:  Positive for fatigue. Negative for chills, diaphoresis and fever.   HENT:  Negative for congestion and sore throat.    Respiratory:  Positive for shortness of breath. Negative for cough.    Cardiovascular:  Negative for chest pain.   Gastrointestinal:  Positive for abdominal pain and nausea. Negative for vomiting.   Genitourinary:  Negative for dysuria.   Musculoskeletal:  Positive for myalgias and neck pain.   Skin:  Negative  "for rash.   Neurological:  Positive for weakness and numbness.   Psychiatric/Behavioral:  The patient is nervous/anxious.        Vitals:    05/27/25 0943   BP: 118/76   Pulse: 90   Temp: 97 °F (36.1 °C)   SpO2: 97%   Weight: 54.9 kg (121 lb)   Height: 152.4 cm (60\")   PainSc: 4          Physical Exam     Physical Exam  Constitutional:       General: She is not in acute distress.     Appearance: Normal appearance. She is well-developed.   HENT:      Head: Normocephalic and atraumatic.      Right Ear: Tympanic membrane and external ear normal.      Left Ear: Tympanic membrane and external ear normal.      Mouth/Throat:      Pharynx: No posterior oropharyngeal erythema.   Eyes:      Extraocular Movements: Extraocular movements intact.      Conjunctiva/sclera: Conjunctivae normal.   Cardiovascular:      Rate and Rhythm: Normal rate and regular rhythm.      Heart sounds: No murmur heard.  Pulmonary:      Effort: Pulmonary effort is normal. No respiratory distress.      Breath sounds: Normal breath sounds. No wheezing.   Abdominal:      General: Bowel sounds are normal. There is no distension.      Palpations: Abdomen is soft.      Tenderness: There is no abdominal tenderness.   Musculoskeletal:      Right lower leg: No edema.      Left lower leg: No edema.   Skin:     General: Skin is warm and dry.   Neurological:      Mental Status: She is alert and oriented to person, place, and time.      Cranial Nerves: No cranial nerve deficit.   Psychiatric:         Mood and Affect: Mood normal.         Behavior: Behavior normal.             Diagnoses and all orders for this visit:    1. Anxiety (Primary)    2. Primary hypertension  -     Cancel: Lipid Panel  -     Lipid Panel  -     TSH  -     T4, Free    3. Urinary incontinence, unspecified type    4. Anemia, unspecified type  -     Cancel: Folate  -     Cancel: Ferritin  -     Cancel: Iron Profile  -     Cancel: Vitamin B12  -     Folate  -     Ferritin  -     Iron Profile  -    "  Vitamin B12    5. Altered awareness, transient    6. RLS (restless legs syndrome)    7. Vitamin D deficiency  -     Cancel: Vitamin D,25-Hydroxy  -     Vitamin D,25-Hydroxy        Assessment & Plan  1. Anxiety.  - Improved with current medication regimen.  - Continue Abilify, BuSpar, Cymbalta, and hydroxyzine PRN.  - Follow-up with psychiatry and counseling as scheduled.    2. Hypertension.  - Well-managed with current medication.  - Home BP readings within normal limits.  - Continue lisinopril.    3. Urinary incontinence.  - Stable with oxybutynin.    4. Anemia.  - Mild anemia on recent labs.  - Follow-up labs to monitor.  - Check vitamin levels and iron for deficiencies.    5. Transient altered awareness.  - Neurology appointment today.  - No further episodes.    6. Restless legs syndrome.  - Discontinued nightly ropinirole, satisfactory management.      No orders of the defined types were placed in this encounter.      No orders of the defined types were placed in this encounter.      Return in about 6 months (around 11/27/2025) for Medicare Wellness.    Nhung Shepard DO

## 2025-05-28 LAB
25(OH)D3+25(OH)D2 SERPL-MCNC: 98.9 NG/ML (ref 30–100)
CHOLEST SERPL-MCNC: 181 MG/DL (ref 0–200)
FERRITIN SERPL-MCNC: 56.4 NG/ML (ref 13–150)
FOLATE SERPL-MCNC: 19.8 NG/ML (ref 4.78–24.2)
HDLC SERPL-MCNC: 69 MG/DL (ref 40–60)
IRON SATN MFR SERPL: 31 % (ref 20–50)
IRON SERPL-MCNC: 121 MCG/DL (ref 37–145)
LDLC SERPL CALC-MCNC: 91 MG/DL (ref 0–100)
T4 FREE SERPL-MCNC: 1.22 NG/DL (ref 0.92–1.68)
TIBC SERPL-MCNC: 396 MCG/DL
TRIGL SERPL-MCNC: 123 MG/DL (ref 0–150)
TSH SERPL DL<=0.005 MIU/L-ACNC: 0.58 UIU/ML (ref 0.27–4.2)
UIBC SERPL-MCNC: 275 MCG/DL (ref 112–346)
VIT B12 SERPL-MCNC: 987 PG/ML (ref 211–946)
VLDLC SERPL CALC-MCNC: 21 MG/DL (ref 5–40)

## 2025-05-29 ENCOUNTER — OFFICE VISIT (OUTPATIENT)
Dept: PSYCHIATRY | Facility: CLINIC | Age: 70
End: 2025-05-29
Payer: MEDICARE

## 2025-05-29 VITALS
HEIGHT: 60 IN | HEART RATE: 87 BPM | SYSTOLIC BLOOD PRESSURE: 124 MMHG | DIASTOLIC BLOOD PRESSURE: 82 MMHG | OXYGEN SATURATION: 98 % | WEIGHT: 123.4 LBS | BODY MASS INDEX: 24.23 KG/M2

## 2025-05-29 DIAGNOSIS — F33.1 MODERATE EPISODE OF RECURRENT MAJOR DEPRESSIVE DISORDER: ICD-10-CM

## 2025-05-29 DIAGNOSIS — F41.9 ANXIETY: ICD-10-CM

## 2025-05-29 DIAGNOSIS — F41.0 PANIC ATTACK: ICD-10-CM

## 2025-05-29 DIAGNOSIS — Z79.899 MEDICATION MANAGEMENT: Primary | ICD-10-CM

## 2025-05-29 DIAGNOSIS — F41.1 GAD (GENERALIZED ANXIETY DISORDER): ICD-10-CM

## 2025-05-29 DIAGNOSIS — Z15.89 MTHFR MUTATION: ICD-10-CM

## 2025-05-29 RX ORDER — LEVOMEFOLATE CALCIUM 15 MG
15 TABLET ORAL DAILY
Qty: 30 TABLET | Refills: 3 | Status: SHIPPED | OUTPATIENT
Start: 2025-05-29

## 2025-05-29 RX ORDER — DULOXETIN HYDROCHLORIDE 60 MG/1
60 CAPSULE, DELAYED RELEASE ORAL 2 TIMES DAILY
Qty: 60 CAPSULE | Refills: 1 | Status: SHIPPED | OUTPATIENT
Start: 2025-05-29

## 2025-05-29 NOTE — PROGRESS NOTES
Follow Up Office Visit      Patient Name: Stephanie Jean  : 1955   MRN: [id number]     Referring Provider: Nhung Shepard DO    Chief Complaint:      ICD-10-CM ICD-9-CM   1. Medication management  Z79.899 V58.69   2. Anxiety  F41.9 300.00   3. Panic attack  F41.0 300.01   4. JAIMIE (generalized anxiety disorder)  F41.1 300.02   5. Moderate episode of recurrent major depressive disorder  F33.1 296.32   6. MTHFR mutation  Z15.89 V84.89        History of Present Illness:   Stephanie Jean is a 69 y.o. female who is here today for follow up after last being seen on 2025.  At that time continued mirtazapine, added Abilify and gave patient a one-time prescription of Valium for a plane ride.  History of Present Illness  The patient presents for evaluation of anxiety, depression, and sleep issues.    She reports a decrease in her depressive symptoms, attributing this improvement to the addition of Abilify to her treatment regimen. She has been tolerating Abilify well. She is currently on a daily dose of Cymbalta 60 mg, which she has been taking for an extended period. She has previously tried Effexor and amitriptyline but discontinued them due to adverse effects. She is also on buspirone 15 mg twice daily and baby aspirin. She takes Abilify in the morning and does not experience any drowsiness as a side effect. She has recently refilled her buspirone prescription. She has been prescribed Namenda for memory issues, but Aricept was discontinued due to gastrointestinal side effects.    Despite this, she continues to experience significant anxiety, characterized by racing thoughts, difficulty concentrating, nervousness, and loss of appetite. These symptoms occur daily and are often uncontrollable once they reach a certain intensity. She believes that better management of her anxiety could alleviate her muscular pain and fibromyalgia, potentially reducing her reliance on pain medication. She is  currently under the care of a pain clinic. She has found therapy with [facility] beneficial in addressing emotional issues and feels more supported now that her children are aware of her condition.    Her sleep pattern is generally stable, with bedtime around 9:00 PM and waking up at 5:30 AM, although she has experienced early awakenings at 4:00 AM in recent nights. She has increased her Flexeril dosage from 1 tablet back to 2, which aids in sleep. She is on mirtazapine 15 mg at night. She has previously used Ambien and Benadryl for sleep.  - Appetite, she describes appetite as stable and unchanged, no concerns at this time    She denies any SI, HI, AVH  Interim History: She reports that her depressive symptoms have improved with the addition of Abilify. Anxiety remains significant, occurring daily and often uncontrollable. Therapy has been beneficial, and she feels more supported now that her children are aware of her condition.    Social History:  - Recently returned from a vacation in [place]  - Children are aware of her concerns for memory issues and related appointments  - Currently under the care of a pain clinic    Psychiatric History: She has previously tried Effexor and amitriptyline but discontinued them due to adverse effects. She has been prescribed Namenda for memory issues, but Aricept was discontinued due to gastrointestinal side effects.  For    SOCIAL HISTORY  The patient vapes.      Subjective     Review of Systems:   Review of Systems    Screening Scores:   PHQ-9 : 14  JAIMIE-7 : 13    Medications:     Current Outpatient Medications:     ARIPiprazole (ABILIFY) 2 MG tablet, Take 1 tablet by mouth Daily., Disp: 30 tablet, Rfl: 1    aspirin 81 MG EC tablet, Take 1 tablet by mouth Daily., Disp: , Rfl:     azaTHIOprine (IMURAN) 50 MG tablet, Take 2 tablets by mouth Daily. (Patient taking differently: Take 1 tablet by mouth Daily.), Disp: 180 tablet, Rfl: 3    busPIRone (BUSPAR) 15 MG tablet, Take 1 tablet  by mouth 2 (Two) Times a Day As Needed (anxiety). for anxiety, Disp: 180 tablet, Rfl: 3    cyclobenzaprine (FLEXERIL) 10 MG tablet, TAKE 1 TABLET BY MOUTH THREE TIMES DAILY, Disp: 270 tablet, Rfl: 3    Diclofenac Sodium (VOLTAREN) 1 % gel gel, diclofenac 1 % topical gel  APPLY 1 GRAM TOPICALLY TO THE AFFECTED AREA TWICE DAILY AS NEEDED FOR PAIN, Disp: , Rfl:     docusate sodium (COLACE) 100 MG capsule, TAKE 1 CAPSULE BY MOUTH TWICE DAILY, Disp: 180 capsule, Rfl: 2    DULoxetine (CYMBALTA) 60 MG capsule, Take 1 capsule by mouth 2 (Two) Times a Day., Disp: 60 capsule, Rfl: 1    ferrous gluconate (FERGON) 324 MG tablet, TAKE 1 TABLET BY MOUTH DAILY WITH BREAKFAST, Disp: 30 tablet, Rfl: 2    fluticasone (FLONASE) 50 MCG/ACT nasal spray, SHAKE LIQUID AND USE 1 TO 2 SPRAYS IN EACH NOSTRIL EVERY DAY AS NEEDED, Disp: , Rfl:     furosemide (LASIX) 20 MG tablet, TAKE 1 TABLET BY MOUTH DAILY AS NEEDED FOR SWELLING, Disp: 30 tablet, Rfl: 1    gabapentin (NEURONTIN) 800 MG tablet, Take 1 tablet by mouth 3 (Three) Times a Day., Disp: , Rfl:     hydroxychloroquine (PLAQUENIL) 200 MG tablet, Take 1.5 tablets by mouth Daily., Disp: , Rfl:     hydrOXYzine pamoate (VISTARIL) 25 MG capsule, TAKE 1 CAPSULE BY MOUTH UP TO THREE TIMES DAILY AS DIRECTED, Disp: 90 capsule, Rfl: 1    inFLIXimab (REMICADE IV), Infuse  into a venous catheter Every 6 (Six) Weeks., Disp: , Rfl:     lactulose (CHRONULAC) 10 GM/15ML solution, Take 15 mL by mouth Daily. (Patient taking differently: Take 15 mL by mouth Daily. PRN), Disp: 450 mL, Rfl: 2    lisinopril (PRINIVIL,ZESTRIL) 10 MG tablet, TAKE 1 TABLET BY MOUTH EVERY NIGHT, Disp: 90 tablet, Rfl: 3    loratadine (CLARITIN) 10 MG tablet, Take 1 tablet by mouth As Needed., Disp: , Rfl:     lubiprostone (Amitiza) 24 MCG capsule, Take 1 capsule by mouth 2 (Two) Times a Day With Meals., Disp: 60 capsule, Rfl: 5    memantine (Namenda) 10 MG tablet, Take 1 tablet by mouth Daily., Disp: 90 tablet, Rfl: 1     "mirtazapine (REMERON) 15 MG tablet, Take 1 tablet by mouth Every Night., Disp: 30 tablet, Rfl: 3    ondansetron ODT (ZOFRAN-ODT) 4 MG disintegrating tablet, Place 1 tablet on the tongue Every 8 (Eight) Hours As Needed for Nausea or Vomiting., Disp: 10 tablet, Rfl: 0    oxybutynin XL (DITROPAN-XL) 10 MG 24 hr tablet, Take 1 tablet by mouth Daily., Disp: 90 tablet, Rfl: 3    oxyCODONE-acetaminophen (PERCOCET)  MG per tablet, Take 1 tablet by mouth 2 (Two) Times a Day., Disp: , Rfl:     pantoprazole (PROTONIX) 20 MG EC tablet, Take 1 tablet by mouth Daily. Indications: Gastroesophageal Reflux Disease, Disp: 90 tablet, Rfl: 3    promethazine (PHENERGAN) 25 MG tablet, TAKE 1/2 TABLET BY MOUTH EVERY 8 HOURS AS NEEDED FOR NAUSEA OR VOMITING, Disp: 30 tablet, Rfl: 2    Restasis 0.05 % ophthalmic emulsion, Administer 1 drop to both eyes 2 (Two) Times a Day., Disp: , Rfl:     rOPINIRole (REQUIP) 2 MG tablet, TAKE 1 TABLET BY MOUTH EVERY DAY 1 TO 3 HOURS BEFORE BEDTIME (Patient taking differently: Take  by mouth Every Night. PRN), Disp: 90 tablet, Rfl: 1    vitamin B-12 (CYANOCOBALAMIN) 1000 MCG tablet, Take 1 tablet by mouth Daily., Disp: 90 tablet, Rfl: 3    vitamin D (ERGOCALCIFEROL) 1.25 MG (03282 UT) capsule capsule, TAKE ONE CAPSULE BY MOUTH EVERY WEEK, Disp: 15 capsule, Rfl: 3    l-methylfolate 15 MG tablet tablet, Take 1 tablet by mouth Daily., Disp: 30 tablet, Rfl: 3  No current facility-administered medications for this visit.    Facility-Administered Medications Ordered in Other Visits:     sodium chloride 0.9 % infusion, 20 mL/hr, Intravenous, Once, Chaparro Marrero MD    Medication Considerations:  WATSON reviewed and appropriate.      Allergies:   Allergies   Allergen Reactions    Penicillins Rash         Objective     Physical Exam:  Vital Signs:   Vitals:    05/29/25 1029   BP: 124/82   Pulse: 87   SpO2: 98%   Weight: 56 kg (123 lb 6.4 oz)   Height: 152.4 cm (60\")     Body mass index is 24.1 kg/m². "     RISK ASSESSMENT:  Patient denies any high risk factors today.      Mental Status Exam:   MENTAL STATUS EXAM   General Appearance:  Cleanly groomed and dressed  Eye Contact:  Good eye contact  Attitude:  Cooperative  Motor Activity:  Normal gait, posture  Muscle Strength:  Normal  Speech:  Normal rate, tone, volume  Language:  Spontaneous  Mood and affect:  Normal, pleasant and anxious  Hopelessness:  3  Loneliness: 3  Thought Process:  Logical and goal-directed  Associations/ Thought Content:  No delusions  Hallucinations:  None  Suicidal Ideations:  Not present  Homicidal Ideation:  Not present  Sensorium:  Alert and clear  Orientation:  Person, place, time and situation  Immediate Recall, Recent, and Remote Memory:  Deficit noted  Attention Span/ Concentration:  Poor  Fund of Knowledge:  Appropriate for age and educational level  Intellectual Functioning:  Average range  Insight:  Fair  Judgement:  Good  Reliability:  Good  Impulse Control:  Good         Assessment / Plan      Visit Diagnosis/Orders Placed This Visit:  Diagnoses and all orders for this visit:    1. Medication management (Primary)  -     Compliance Drug Analysis, Ur - Urine, Clean Catch    2. Anxiety  -     Compliance Drug Analysis, Ur - Urine, Clean Catch    3. Panic attack  -     Compliance Drug Analysis, Ur - Urine, Clean Catch    4. JAIMIE (generalized anxiety disorder)  -     DULoxetine (CYMBALTA) 60 MG capsule; Take 1 capsule by mouth 2 (Two) Times a Day.  Dispense: 60 capsule; Refill: 1    5. Moderate episode of recurrent major depressive disorder  -     DULoxetine (CYMBALTA) 60 MG capsule; Take 1 capsule by mouth 2 (Two) Times a Day.  Dispense: 60 capsule; Refill: 1    6. MTHFR mutation  -     l-methylfolate 15 MG tablet tablet; Take 1 tablet by mouth Daily.  Dispense: 30 tablet; Refill: 3       Assessment & Plan  Problems:  - Anxiety  - Depression  - Sleep disturbances  - Memory issues  - MTHFR gene mutation    Content of Therapy:  During  the session, the patient discussed her recent vacation and the positive impact of Valium on her flight anxiety. She reported ongoing daily anxiety with symptoms such as racing thoughts, inability to focus, nervousness, and loss of appetite. The patient expressed that better control of her anxiety might alleviate her fibromyalgia pain. She also mentioned waking up early and difficulty returning to sleep. The patient is currently attending therapy sessions with [facility], which she finds beneficial for addressing emotional issues. Additionally, she discussed her upcoming brain mapping appointment to assess memory issues as ordered by neurology, and the impact of the MTHFR gene mutation on her mental health while reviewing her [employer] test.    Clinical Impression:  The patient continues to experience significant anxiety on a daily basis, despite some improvement in depressive symptoms with the current medication regimen. Her mood has improved with Abilify and mirtazapine, but anxiety remains a major concern. Sleep disturbances persist, with early morning awakenings and difficulty returning to sleep. The patient is hopeful that better anxiety management will help with her fibromyalgia pain. She is actively engaged in therapy and has supportive family members aware of her condition. The upcoming brain mapping may provide further insights into her memory issues.  Neurology also restarted memantine at last visit 10 mg daily.    Plan:  - Increase duloxetine to 60 mg twice daily.  - Continue Abilify in the morning and mirtazapine 15 mg at night.  - Continue Flexeril as needed for sleep.  - Follow up with neurology for brain mapping results.  - Start L-methylfolate 15 mg daily; purchase online if not covered by insurance.  - Continue therapy sessions with [facility].    Follow-up:  - Follow up in 4 weeks to assess the impact of increased duloxetine and other interventions.    Notes & Risk Factors:  - Patient reports  significant daily anxiety impacting her ability to focus and eat.  - Supportive family members aware of her condition.  - No current risk factors for harm to self or others discussed.    Functional Status: Moderate impairment     Prognosis: Fair with Ongoing Treatment     Impression/Formulation:  Patient appeared alert and oriented. Patient is receptive to assistance with maintaining a stable lifestyle.  Patient presents with history of     ICD-10-CM ICD-9-CM   1. Medication management  Z79.899 V58.69   2. Anxiety  F41.9 300.00   3. Panic attack  F41.0 300.01   4. JAIMIE (generalized anxiety disorder)  F41.1 300.02   5. Moderate episode of recurrent major depressive disorder  F33.1 296.32   6. MTHFR mutation  Z15.89 V84.89   .-Continue Abilify 2 mg, 1 tablet nightly for mood  - Continue mirtazapine 15 mg, 1 tablet nightly for sleep/mood/anxiety  - Increase duloxetine 60 mg, 1 capsule twice daily for mood and anxiety  - Start L-methyl folate 15 mg daily for MTHFR gene mutation  -Encourage continued therapy      Spoke to the patient about specific potential risks associated with the use of antipsychotic medications including any black box warning(s), as well as risk for weight gain, metabolic issues, extra-pyramidal symptoms and tardive dyskinesia. AIMS assessment completed at initial evaluation/prescription of antipsychotic medication(s) and at least once annually.    Patient will continue supportive psychotherapy efforts and medications as indicated. Clinic will obtain release of information for current treatment team for continuity of care as needed. Patient will contact this office, call 911 or present to the nearest emergency room should suicidal or homicidal ideations occur.  Discussed medication options and treatment plan of prescribed medication(s) as well as the risks, benefits, and potential side effects. Patient ackowledged and verbally consented to continue with current treatment plan and was educated on the  importance of compliance with treatment and follow-up appointments.     Quality Measures:   Stephanie Jean  reports that she quit smoking about 7 years ago. Her smoking use included cigarettes. She started smoking about 22 years ago. She has a 15 pack-year smoking history. She has been exposed to tobacco smoke. She has never used smokeless tobacco. I have educated her on the risk of diseases from using tobacco products such as cancer, COPD, and heart disease.     Patient is a former tobacco user. No tobacco cessation education necessary.           ACP discussion was declined by the patient. Patient does not have an advance directive, declines further assistance.    Depression (PHQ >9): 14  Patient screened positive for depression based on a PHQ-9 score of 14 on 5/29/2025. Follow-up recommendations include: Prescribed antidepressant medication treatment and Continue with outpatient therapy.     Follow Up:   Return in about 4 weeks (around 6/26/2025).    Patient or patient representative verbalized consent for the use of Ambient Listening during the visit with  ANNEMARIE Monreal for chart documentation. 6/6/2025  17:52 EDT        ANNEMARIE Randall, PMHNP-BC [facility]

## 2025-06-04 LAB — DRUGS UR: NORMAL

## 2025-06-06 ENCOUNTER — HOSPITAL ENCOUNTER (OUTPATIENT)
Facility: HOSPITAL | Age: 70
Discharge: HOME | End: 2025-06-06
Payer: MEDICARE

## 2025-06-06 VITALS
OXYGEN SATURATION: 96 % | TEMPERATURE: 98.2 F | HEART RATE: 74 BPM | SYSTOLIC BLOOD PRESSURE: 114 MMHG | WEIGHT: 124.1 LBS | BODY MASS INDEX: 24.24 KG/M2 | DIASTOLIC BLOOD PRESSURE: 71 MMHG

## 2025-06-06 DIAGNOSIS — K50.80 CROHN'S DISEASE OF BOTH SMALL AND LARGE INTESTINE WITHOUT COMPLICATION: Primary | ICD-10-CM

## 2025-06-06 PROCEDURE — 25010000002 INFLIXIMAB-ABDA 100 MG RECONSTITUTED SOLUTION 1 EACH VIAL: Performed by: INTERNAL MEDICINE

## 2025-06-06 PROCEDURE — 96415 CHEMO IV INFUSION ADDL HR: CPT

## 2025-06-06 PROCEDURE — 96413 CHEMO IV INFUSION 1 HR: CPT

## 2025-06-06 PROCEDURE — 25810000003 SODIUM CHLORIDE 0.9 % SOLUTION 250 ML FLEX CONT: Performed by: INTERNAL MEDICINE

## 2025-06-06 RX ORDER — SODIUM CHLORIDE 9 MG/ML
20 INJECTION, SOLUTION INTRAVENOUS ONCE
OUTPATIENT
Start: 2025-06-18

## 2025-06-06 RX ORDER — ACETAMINOPHEN 325 MG/1
650 TABLET ORAL ONCE
OUTPATIENT
Start: 2025-06-18

## 2025-06-06 RX ORDER — SODIUM CHLORIDE 9 MG/ML
20 INJECTION, SOLUTION INTRAVENOUS ONCE
Status: DISCONTINUED | OUTPATIENT
Start: 2025-06-06 | End: 2025-06-07 | Stop reason: HOSPADM

## 2025-06-06 RX ORDER — ACETAMINOPHEN 325 MG/1
650 TABLET ORAL ONCE
Status: COMPLETED | OUTPATIENT
Start: 2025-06-06 | End: 2025-06-06

## 2025-06-06 RX ADMIN — ACETAMINOPHEN 650 MG: 325 TABLET, FILM COATED ORAL at 09:42

## 2025-06-06 RX ADMIN — INFLIXIMAB 280 MG: 100 INJECTION, POWDER, LYOPHILIZED, FOR SOLUTION INTRAVENOUS at 10:11

## 2025-06-11 ENCOUNTER — OFFICE VISIT (OUTPATIENT)
Dept: PSYCHIATRY | Facility: CLINIC | Age: 70
End: 2025-06-11
Payer: MEDICARE

## 2025-06-11 DIAGNOSIS — F33.1 MODERATE EPISODE OF RECURRENT MAJOR DEPRESSIVE DISORDER: Primary | ICD-10-CM

## 2025-06-11 DIAGNOSIS — F41.1 GAD (GENERALIZED ANXIETY DISORDER): ICD-10-CM

## 2025-06-11 NOTE — PROGRESS NOTES
"   Follow Up Adult Note   Date:2025   Client Name: Stephanie Jean  : 1955   MRN: [id number]   Time IN: 12:32 pm    Time OUT: 1:17 pm      Referring Provider: Nhung Shepard DO    Chief Complaint:      ICD-10-CM ICD-9-CM   1. Moderate episode of recurrent major depressive disorder  F33.1 296.32   2. JAIMIE (generalized anxiety disorder)  F41.1 300.02      History of Present Illness:   Stephanie Jean is a 69 y.o. female who is being seen today for follow up individual psychotherapy counseling. Stephanie shared \"I've really felt really good\", reported a decrease in depression, and panic attacks have decreased from 2-3x a day to about 1x a day; discussed recent stressors including medical; is taking more time for herself, using mindfulness techniques and walking with her daughter about every day, focusing on the present. CBT was used to assist Stephanie with processing thoughts/feelings and with reinforcing effective coping techniques (including self-care, prioritizing, asking for help with needed).      Objective   Mental Status Exam:   MENTAL STATUS EXAM   General Appearance:  Cleanly groomed and dressed  Eye Contact:  Good eye contact  Attitude:  Cooperative  Motor Activity:  Normal gait, posture  Muscle Strength:  Normal  Speech:  Normal rate, tone, volume  Language:  Spontaneous  Mood and affect:  Appropriate, mood congruent and anxious  Hopelessness:  Denies  Loneliness: Denies  Thought Process:  Logical  Associations/ Thought Content:  No delusions  Hallucinations:  None  Suicidal Ideations:  Not present  Homicidal Ideation:  Not present  Sensorium:  Alert  Orientation:  Person, place, time and situation  Immediate Recall, Recent, and Remote Memory:  Intact  Attention Span/ Concentration:  Good  Fund of Knowledge:  Appropriate for age and educational level  Intellectual Functioning:  Average range  Insight:  Good  Judgement:  Good  Reliability:  Good  Impulse Control:  Good   Subjective    "   Functional Status: Mild impairment   Progress toward goal: At goal  Prognosis: Good with Ongoing Treatment     Assessment / Plan / Progress   Visit Diagnosis/Orders Placed This Visit:    ICD-10-CM ICD-9-CM   1. Moderate episode of recurrent major depressive disorder  F33.1 296.32   2. JAIMIE (generalized anxiety disorder)  F41.1 300.02      PLAN:  Safety: No acute safety concerns  Risk Assessment: Risk of self-harm acutely is low. Risk of self-harm chronically is also low, but could be further elevated in the event of treatment noncompliance and/or AODA.    Treatment Plan/Goals & Progress: Continue supportive psychotherapy efforts and medications as indicated. Treatment options discussed during today's visit. Client acknowledged and verbally consented to continue with current treatment plan and was educated on the importance of compliance with treatment and follow-up appointments. Client seems reasonably able to adhere to treatment plan.      Assisted client in processing above session content; acknowledged and normalized client’s thoughts, feelings, and concerns.     Allowed client to freely discuss issues without interruption or judgement with unconditional positive regard, active listening skills, and empathy. Clinician provided a safe, confidential environment to facilitate the development of a positive therapeutic relationship and encouraged open, honest communication. Assisted client in identifying risk factors which would indicate the need for higher level of care including thoughts to harm self or others and/or self-harming behavior and encouraged client to contact this office, call 911, or present to the nearest emergency room should any of these events occur. Discussed crisis intervention services and means to access. Client adamantly and convincingly denies current suicidal or homicidal ideation or perceptual disturbance. Assisted client in processing session content; acknowledged and normalized client’s  thoughts, feelings, and concerns by utilizing a person-centered approach in efforts to build appropriate rapport and a positive therapeutic relationship with open and honest communication.      Follow Up:   Return in about 6 weeks (around 7/23/2025) for therapy session.    Concetta Flores LCSW  [facility]

## 2025-06-23 RX ORDER — HYDROXYZINE PAMOATE 25 MG/1
CAPSULE ORAL
Qty: 90 CAPSULE | Refills: 1 | Status: SHIPPED | OUTPATIENT
Start: 2025-06-23

## 2025-06-23 NOTE — TELEPHONE ENCOUNTER
Rx Refill Note  Requested Prescriptions     Pending Prescriptions Disp Refills    hydrOXYzine pamoate (VISTARIL) 25 MG capsule [Pharmacy Med Name: HYDROXYZINE PAMOATE 25MG CAPSULES] 90 capsule 1     Sig: TAKE 1 CAPSULE BY MOUTH UP TO THREE TIMES DAILY AS DIRECTED      Last office visit with prescribing clinician: 5/27/2025   Last telemedicine visit with prescribing clinician: Visit date not found   Next office visit with prescribing clinician: 12/1/2025                         Would you like a call back once the refill request has been completed: [] Yes [] No    If the office needs to give you a call back, can they leave a voicemail: [] Yes [] No    Santiago Valencia, TING  06/23/25, 09:36 EDT

## 2025-06-26 ENCOUNTER — OFFICE VISIT (OUTPATIENT)
Dept: PSYCHIATRY | Facility: CLINIC | Age: 70
End: 2025-06-26
Payer: MEDICARE

## 2025-06-26 VITALS
HEIGHT: 60 IN | OXYGEN SATURATION: 97 % | HEART RATE: 91 BPM | BODY MASS INDEX: 25.09 KG/M2 | WEIGHT: 127.8 LBS | DIASTOLIC BLOOD PRESSURE: 82 MMHG | SYSTOLIC BLOOD PRESSURE: 124 MMHG

## 2025-06-26 DIAGNOSIS — F33.1 MODERATE EPISODE OF RECURRENT MAJOR DEPRESSIVE DISORDER: ICD-10-CM

## 2025-06-26 DIAGNOSIS — F43.10 POST TRAUMATIC STRESS DISORDER (PTSD): ICD-10-CM

## 2025-06-26 DIAGNOSIS — F41.1 GAD (GENERALIZED ANXIETY DISORDER): Primary | ICD-10-CM

## 2025-06-26 RX ORDER — ATORVASTATIN CALCIUM 20 MG/1
TABLET, FILM COATED ORAL
COMMUNITY
Start: 2025-06-13

## 2025-06-26 RX ORDER — ARIPIPRAZOLE 5 MG/1
5 TABLET ORAL DAILY
Qty: 30 TABLET | Refills: 2 | Status: SHIPPED | OUTPATIENT
Start: 2025-06-26

## 2025-06-26 RX ORDER — BUSPIRONE HYDROCHLORIDE 30 MG/1
30 TABLET ORAL 2 TIMES DAILY
Qty: 60 TABLET | Refills: 2 | Status: SHIPPED | OUTPATIENT
Start: 2025-06-26

## 2025-06-26 NOTE — PROGRESS NOTES
Follow Up Office Visit      Patient Name: Stephanie Jean  : 1955   MRN: [id number]     Referring Provider: Nhung Shepard DO    Chief Complaint:      ICD-10-CM ICD-9-CM   1. JAIMIE (generalized anxiety disorder)  F41.1 300.02   2. Post traumatic stress disorder (PTSD)  F43.10 309.81   3. Moderate episode of recurrent major depressive disorder  F33.1 296.32        History of Present Illness:   Stephanie Jean is a 69 y.o. female who is here today for follow up after last thing seen on 2025.  At that time duloxetine was increased, and l-methylfolate was added, other meds were continued.  Patient tolerated med changes with no adverse effects.  History of Present Illness  The patient presents for medication management of anxiety, insomnia and depression.    She has undergone brain mapping, which she states was different than anticipated, resembling the evaluation conducted at [facility]. An MRI had previously been performed. She is scheduled to see her neurologist in 6 months. She continues to tolerate memantine well.    Her depression has significantly improved following an increase in her Cymbalta dosage during her last visit. She reports no feelings of depression, increased activity levels, and overall satisfaction with her current medication regimen. She recently refilled her Cymbalta prescription. She has also started taking L-methylfolate daily. She is currently undergoing therapy, which she finds beneficial,  she states that she anticipates she may not require it for much longer.    Her anxiety has shown slight improvement, with less agitation, but it remains a concern. She reports no recent panic attacks, she experiences some irritability due to her anxiety, which her  and grandson have noticed. . She continues to take BuSpar, although she does not find it particularly helpful, but she tolerates it well without any adverse effects. She does not frequently use  hydroxyzine.  She denies any OCD, phobia, paranoia     Her appetite remains good, no questions or concerns at this time.   Sleep is currently stable with medications, she has no complaints at this time.  She denies any SI, HI, AVH.    Interim History: The patient reports improved mood and increased activity levels since the last visit. She does not feel depressed and is more active. Anxiety has slightly improved, with no recent panic attacks and satisfactory sleep quality. Therapy has been beneficial, and she may not need it much longer.    Social History:  - Recently attended a birthday party for her great-grandson  - Planning a trip to [place] in August with her daughter and her family    Pertinent Negatives: No recent panic attacks, no negative side effects from BuSpar, no frequent use of hydroxyzine      Subjective     PHQ-9 Depression Screening  Little interest or pleasure in doing things? Not at all   Feeling down, depressed, or hopeless? Not at all   PHQ-2 Total Score 0   Trouble falling or staying asleep, or sleeping too much? Not at all   Feeling tired or having little energy? Several days   Poor appetite or overeating? More than half the days   Feeling bad about yourself - or that you are a failure or have let yourself or your family down? Not at all   Trouble concentrating on things, such as reading the newspaper or watching television? More than half the days   Moving or speaking so slowly that other people could have noticed? Or the opposite - being so fidgety or restless that you have been moving around a lot more than usual? More than half the days     Thoughts that you would be better off dead, or of hurting yourself in some way? Not at all   PHQ-9 Total Score 7   If you checked off any problems, how difficult have these problems made it for you to do your work, take care of things at home, or get along with other people? Somewhat difficult      JAIMIE-7 Anxiety Screening  Feeling nervous, anxious or on  edge? Nearly every day   Not being able to stop or control worrying? Not at all   Worrying too much about different things? Not at all   Trouble relaxing? Nearly every day   Being so restless that it is hard to sit still? More than half the days   Becoming easily annoyed or irritable? Several days   Feeling afraid as if something awful might happen? Not at all   JAIMIE-7 Total Score 9   If you checked any problems, how difficult have these problems made it for you to do your work, take care of things at home, or get along with other people? Somewhat difficult        Medications:     Current Outpatient Medications:     ARIPiprazole (ABILIFY) 5 MG tablet, Take 1 tablet by mouth Daily., Disp: 30 tablet, Rfl: 2    aspirin 81 MG EC tablet, Take 1 tablet by mouth Daily., Disp: , Rfl:     atorvastatin (LIPITOR) 20 MG tablet, , Disp: , Rfl:     azaTHIOprine (IMURAN) 50 MG tablet, Take 2 tablets by mouth Daily. (Patient taking differently: Take 1 tablet by mouth Daily.), Disp: 180 tablet, Rfl: 3    busPIRone (BUSPAR) 30 MG tablet, Take 1 tablet by mouth 2 (Two) Times a Day. for anxiety, Disp: 60 tablet, Rfl: 2    cyclobenzaprine (FLEXERIL) 10 MG tablet, TAKE 1 TABLET BY MOUTH THREE TIMES DAILY, Disp: 270 tablet, Rfl: 3    Diclofenac Sodium (VOLTAREN) 1 % gel gel, diclofenac 1 % topical gel  APPLY 1 GRAM TOPICALLY TO THE AFFECTED AREA TWICE DAILY AS NEEDED FOR PAIN, Disp: , Rfl:     docusate sodium (COLACE) 100 MG capsule, TAKE 1 CAPSULE BY MOUTH TWICE DAILY, Disp: 180 capsule, Rfl: 2    DULoxetine (CYMBALTA) 60 MG capsule, Take 1 capsule by mouth 2 (Two) Times a Day., Disp: 60 capsule, Rfl: 1    fluticasone (FLONASE) 50 MCG/ACT nasal spray, SHAKE LIQUID AND USE 1 TO 2 SPRAYS IN EACH NOSTRIL EVERY DAY AS NEEDED, Disp: , Rfl:     furosemide (LASIX) 20 MG tablet, TAKE 1 TABLET BY MOUTH DAILY AS NEEDED FOR SWELLING, Disp: 30 tablet, Rfl: 1    gabapentin (NEURONTIN) 800 MG tablet, Take 1 tablet by mouth 3 (Three) Times a Day., Disp:  , Rfl:     hydroxychloroquine (PLAQUENIL) 200 MG tablet, Take 1.5 tablets by mouth Daily., Disp: , Rfl:     hydrOXYzine pamoate (VISTARIL) 25 MG capsule, TAKE 1 CAPSULE BY MOUTH UP TO THREE TIMES DAILY AS DIRECTED, Disp: 90 capsule, Rfl: 1    inFLIXimab (REMICADE IV), Infuse  into a venous catheter Every 6 (Six) Weeks., Disp: , Rfl:     l-methylfolate 15 MG tablet tablet, Take 1 tablet by mouth Daily., Disp: 30 tablet, Rfl: 3    lactulose (CHRONULAC) 10 GM/15ML solution, Take 15 mL by mouth Daily. (Patient taking differently: Take 15 mL by mouth Daily. PRN), Disp: 450 mL, Rfl: 2    loratadine (CLARITIN) 10 MG tablet, Take 1 tablet by mouth As Needed., Disp: , Rfl:     lubiprostone (Amitiza) 24 MCG capsule, Take 1 capsule by mouth 2 (Two) Times a Day With Meals., Disp: 60 capsule, Rfl: 5    memantine (Namenda) 10 MG tablet, Take 1 tablet by mouth Daily., Disp: 90 tablet, Rfl: 1    mirtazapine (REMERON) 15 MG tablet, Take 1 tablet by mouth Every Night., Disp: 30 tablet, Rfl: 3    ondansetron ODT (ZOFRAN-ODT) 4 MG disintegrating tablet, Place 1 tablet on the tongue Every 8 (Eight) Hours As Needed for Nausea or Vomiting., Disp: 10 tablet, Rfl: 0    oxybutynin XL (DITROPAN-XL) 10 MG 24 hr tablet, Take 1 tablet by mouth Daily., Disp: 90 tablet, Rfl: 3    oxyCODONE-acetaminophen (PERCOCET)  MG per tablet, Take 1 tablet by mouth 2 (Two) Times a Day., Disp: , Rfl:     pantoprazole (PROTONIX) 20 MG EC tablet, Take 1 tablet by mouth Daily. Indications: Gastroesophageal Reflux Disease, Disp: 90 tablet, Rfl: 3    promethazine (PHENERGAN) 25 MG tablet, TAKE 1/2 TABLET BY MOUTH EVERY 8 HOURS AS NEEDED FOR NAUSEA OR VOMITING, Disp: 30 tablet, Rfl: 2    Restasis 0.05 % ophthalmic emulsion, Administer 1 drop to both eyes 2 (Two) Times a Day., Disp: , Rfl:     rOPINIRole (REQUIP) 2 MG tablet, TAKE 1 TABLET BY MOUTH EVERY DAY 1 TO 3 HOURS BEFORE BEDTIME (Patient taking differently: Take  by mouth Every Night. PRN), Disp: 90  "tablet, Rfl: 1    vitamin B-12 (CYANOCOBALAMIN) 1000 MCG tablet, Take 1 tablet by mouth Daily., Disp: 90 tablet, Rfl: 3    vitamin D (ERGOCALCIFEROL) 1.25 MG (20813 UT) capsule capsule, TAKE ONE CAPSULE BY MOUTH EVERY WEEK, Disp: 15 capsule, Rfl: 3    ferrous gluconate (FERGON) 324 MG tablet, TAKE 1 TABLET BY MOUTH DAILY WITH BREAKFAST (Patient not taking: Reported on 6/26/2025), Disp: 30 tablet, Rfl: 2    lisinopril (PRINIVIL,ZESTRIL) 10 MG tablet, TAKE 1 TABLET BY MOUTH EVERY NIGHT, Disp: 90 tablet, Rfl: 3    Medication Considerations:  WATSON reviewed and appropriate.      Allergies:   Allergies   Allergen Reactions    Penicillins Rash         Objective     Physical Exam:  Vital Signs:   Vitals:    06/26/25 1036   BP: 124/82   Pulse: 91   SpO2: 97%   Weight: 58 kg (127 lb 12.8 oz)   Height: 152.4 cm (60\")     Body mass index is 24.96 kg/m².     RISK ASSESSMENT:  Patient denies any high risk factors today.      Mental Status Exam:   MENTAL STATUS EXAM   General Appearance:  Cleanly groomed and dressed  Eye Contact:  Good eye contact  Attitude:  Cooperative and polite  Motor Activity:  Normal gait, posture  Muscle Strength:  Normal  Speech:  Normal rate, tone, volume  Language:  Spontaneous  Mood and affect:  Normal, pleasant  Hopelessness:  Denies  Loneliness: Denies  Thought Process:  Logical and goal-directed  Associations/ Thought Content:  No delusions  Hallucinations:  None  Suicidal Ideations:  Not present  Sensorium:  Alert and clear  Orientation:  Person, place, time and situation  Immediate Recall, Recent, and Remote Memory:  Deficit noted (Sees neurology)  Attention Span/ Concentration:  Good  Fund of Knowledge:  Appropriate for age and educational level  Intellectual Functioning:  Average range  Insight:  Good  Judgement:  Good  Reliability:  Good  Impulse Control:  Good         Assessment / Plan      Visit Diagnosis/Orders Placed This Visit:  Diagnoses and all orders for this visit:    1. JAIMIE " (generalized anxiety disorder) (Primary)  -     busPIRone (BUSPAR) 30 MG tablet; Take 1 tablet by mouth 2 (Two) Times a Day. for anxiety  Dispense: 60 tablet; Refill: 2    2. Post traumatic stress disorder (PTSD)  -     ARIPiprazole (ABILIFY) 5 MG tablet; Take 1 tablet by mouth Daily.  Dispense: 30 tablet; Refill: 2    3. Moderate episode of recurrent major depressive disorder  -     ARIPiprazole (ABILIFY) 5 MG tablet; Take 1 tablet by mouth Daily.  Dispense: 30 tablet; Refill: 2       Assessment & Plan  Problems:  - Anxiety  - Depression  -Insomnia    Content of Therapy:  - Discussed recent activities, including a birthday party for her great-grandson and an upcoming trip to [place].  - Reviewed recent medical appointments and brain mapping evaluation.  - Addressed current medication regimen and its effects on her symptoms.  - Explored feelings of anxiety and irritability, particularly in interactions with family members.  - Discussed the effectiveness of therapy and potential adjustments to medication.    Clinical Impression:  The patient reports a slight improvement in anxiety but continues to experience some irritability. No panic attacks have occurred since the last visit. Depression has significantly improved with the current medication regimen, and she reports feeling more active and less depressed. Sleep quality is good, and there are no issues with appetite. The patient does not find BuSpar helpful for anxiety and has not experienced any negative side effects from it. Overall, the patient is making progress, but further optimization of medication is needed to better manage anxiety.      Follow-up:  - Next appointment in 4 weeks to assess the effectiveness of the increased medication dosages and overall symptom management.    Notes & Risk Factors:  - Irritability noted in interactions with family members.  - Protective factors include supportive family relationships and ongoing therapy.    Functional Status:  Moderate impairment     Prognosis: Fair with Ongoing Treatment     Impression/Formulation:  Patient appeared alert and oriented. Patient is receptive to assistance with maintaining a stable lifestyle.  Patient presents with history of     ICD-10-CM ICD-9-CM   1. JAIMIE (generalized anxiety disorder)  F41.1 300.02   2. Post traumatic stress disorder (PTSD)  F43.10 309.81   3. Moderate episode of recurrent major depressive disorder  F33.1 296.32   .     Care/ Treatment Plan:   Plan:  - Increase BuSpar dosage to 30 mg, 1 tablet twice daily for anxiety.  - increase Abilify 5 mg, 1 tablet daily for mood.  - Continue Cymbalta, 60 mg, 1 tablet twice daily for mood and anxiety  - Continue L-methylfolate 15 mg, 1 tablet daily for MTHFR mutation  - Continue mirtazapine 15 mg, 1 tablet nightly for mood, insomnia  - Encourage continued therapy    Spoke to the patient about specific potential risks associated with the use of antipsychotic medications including any black box warning(s), as well as risk for weight gain, metabolic issues, extra-pyramidal symptoms and tardive dyskinesia. AIMS assessment completed at initial evaluation/prescription of antipsychotic medication(s) and at least once annually.      Patient will continue supportive psychotherapy efforts and medications as indicated. Clinic will obtain release of information for current treatment team for continuity of care as needed. Patient will contact this office, call 911 or present to the nearest emergency room should suicidal or homicidal ideations occur.  Discussed medication options and treatment plan of prescribed medication(s) as well as the risks, benefits, and potential side effects. Patient ackowledged and verbally consented to continue with current treatment plan and was educated on the importance of compliance with treatment and follow-up appointments.     Quality Measures:   Stephanie Jean  reports that she quit smoking about 7 years ago. Her smoking use  included cigarettes. She started smoking about 22 years ago. She has a 15 pack-year smoking history. She has been exposed to tobacco smoke. She has never used smokeless tobacco. I have educated her on the risk of diseases from using tobacco products such as cancer, COPD, and heart disease.     Patient is a former tobacco user. No tobacco cessation education necessary.             ACP discussion was declined by the patient. Patient does not have an advance directive, declines further assistance.    Patient screened positive for depression based on a PHQ-9 score of 7 on 6/26/2025. Follow-up recommendations include: Prescribed antidepressant medication treatment.     Follow Up:   Return in about 4 weeks (around 7/24/2025).    Patient or patient representative verbalized consent for the use of Ambient Listening during the visit with  ANNEMARIE Monreal for chart documentation. 6/26/2025  11:56 EDT        ANNEMARIE Randall, PMHNP-BC [facility]    Answers submitted by the patient for this visit:  Anxiety (Submitted on 6/26/2025)  Chief Complaint: Anxiety  Visit: follow-up  Frequency: constantly  Severity: moderate  chest pain: No  confusion: Yes  dizziness: Yes  dry mouth: Yes  excessive worry: No  insomnia: No  irritability: No  malaise/fatigue: Yes  nausea: Yes  obsessions: No  palpitations: Yes  shortness of breath: Yes  Sleep quality: good  Hours of sleep per night: 8 Hours  Medication compliance: %

## 2025-07-18 ENCOUNTER — HOSPITAL ENCOUNTER (OUTPATIENT)
Facility: HOSPITAL | Age: 70
Discharge: HOME | End: 2025-07-18
Payer: MEDICARE

## 2025-07-18 VITALS
BODY MASS INDEX: 24.55 KG/M2 | DIASTOLIC BLOOD PRESSURE: 72 MMHG | WEIGHT: 125.7 LBS | HEART RATE: 76 BPM | SYSTOLIC BLOOD PRESSURE: 116 MMHG

## 2025-07-18 DIAGNOSIS — K50.80 CROHN'S DISEASE OF BOTH SMALL AND LARGE INTESTINE WITHOUT COMPLICATION: Primary | ICD-10-CM

## 2025-07-18 PROCEDURE — 96415 CHEMO IV INFUSION ADDL HR: CPT

## 2025-07-18 PROCEDURE — 96413 CHEMO IV INFUSION 1 HR: CPT

## 2025-07-18 PROCEDURE — 25810000003 SODIUM CHLORIDE 0.9 % SOLUTION 250 ML FLEX CONT: Performed by: INTERNAL MEDICINE

## 2025-07-18 PROCEDURE — 25010000002 INFLIXIMAB-ABDA 100 MG RECONSTITUTED SOLUTION 1 EACH VIAL: Performed by: INTERNAL MEDICINE

## 2025-07-18 RX ORDER — ACETAMINOPHEN 325 MG/1
650 TABLET ORAL ONCE
OUTPATIENT
Start: 2025-08-29

## 2025-07-18 RX ORDER — ACETAMINOPHEN 325 MG/1
650 TABLET ORAL ONCE
Status: COMPLETED | OUTPATIENT
Start: 2025-07-18 | End: 2025-07-18

## 2025-07-18 RX ORDER — SODIUM CHLORIDE 9 MG/ML
20 INJECTION, SOLUTION INTRAVENOUS ONCE
Status: DISCONTINUED | OUTPATIENT
Start: 2025-07-18 | End: 2025-07-19 | Stop reason: HOSPADM

## 2025-07-18 RX ORDER — SODIUM CHLORIDE 9 MG/ML
20 INJECTION, SOLUTION INTRAVENOUS ONCE
OUTPATIENT
Start: 2025-08-29

## 2025-07-18 RX ADMIN — INFLIXIMAB 290 MG: 100 INJECTION, POWDER, LYOPHILIZED, FOR SOLUTION INTRAVENOUS at 10:29

## 2025-07-18 RX ADMIN — ACETAMINOPHEN 650 MG: 325 TABLET ORAL at 09:54

## 2025-07-21 DIAGNOSIS — T40.2X5A OPIOID-INDUCED CONSTIPATION: ICD-10-CM

## 2025-07-21 DIAGNOSIS — K59.03 OPIOID-INDUCED CONSTIPATION: ICD-10-CM

## 2025-07-21 RX ORDER — LUBIPROSTONE 24 UG/1
24 CAPSULE ORAL 2 TIMES DAILY WITH MEALS
Qty: 180 CAPSULE | Refills: 3 | Status: SHIPPED | OUTPATIENT
Start: 2025-07-21

## 2025-07-21 RX ORDER — AZATHIOPRINE 50 MG/1
100 TABLET ORAL DAILY
Qty: 180 TABLET | Refills: 3 | Status: SHIPPED | OUTPATIENT
Start: 2025-07-21

## 2025-07-23 ENCOUNTER — OFFICE VISIT (OUTPATIENT)
Dept: PSYCHIATRY | Facility: CLINIC | Age: 70
End: 2025-07-23
Payer: MEDICARE

## 2025-07-23 DIAGNOSIS — F41.1 GAD (GENERALIZED ANXIETY DISORDER): Primary | ICD-10-CM

## 2025-07-23 DIAGNOSIS — F33.1 MODERATE EPISODE OF RECURRENT MAJOR DEPRESSIVE DISORDER: ICD-10-CM

## 2025-07-23 DIAGNOSIS — F43.10 POST TRAUMATIC STRESS DISORDER (PTSD): ICD-10-CM

## 2025-07-23 NOTE — PROGRESS NOTES
"   Follow Up Adult Note   Date:2025   Client Name: Stephanie Jean  : 1955   MRN: [id number]   Time IN: 2:35 pm    Time OUT: 3:14 pm     Referring Provider: Nhung Shepard DO    Chief Complaint:      ICD-10-CM ICD-9-CM   1. JAIMIE (generalized anxiety disorder)  F41.1 300.02   2. Moderate episode of recurrent major depressive disorder  F33.1 296.32   3. Post traumatic stress disorder (PTSD)  F43.10 309.81      History of Present Illness:   Stephanie Jean is a 69 y.o. female who is being seen today for follow up individual psychotherapy counseling. Stephanie shared \"I thought I was doing pretty good\"; symptoms include feeling overwhelmed, anxious, low motivation, not as depressed, denied SI, starting but not finishing tasks, forgetful. CBT was used to assist Stephanie with processing thoughts/feelings and with building effective coping techniques (including planning ahead, prioritizing, using lists).     Objective   Mental Status Exam:   MENTAL STATUS EXAM   General Appearance:  Cleanly groomed and dressed  Eye Contact:  Good eye contact  Attitude:  Cooperative  Motor Activity:  Normal gait, posture  Muscle Strength:  Normal  Speech:  Normal rate, tone, volume  Language:  Spontaneous  Mood and affect:  Appropriate, mood congruent and anxious  Hopelessness:  Denies  Loneliness: Denies  Thought Process:  Logical  Associations/ Thought Content:  No delusions  Hallucinations:  None  Suicidal Ideations:  Not present  Homicidal Ideation:  Not present  Sensorium:  Alert  Orientation:  Person, place, time and situation  Immediate Recall, Recent, and Remote Memory:  Intact  Attention Span/ Concentration:  Good  Fund of Knowledge:  Appropriate for age and educational level  Intellectual Functioning:  Average range  Insight:  Good  Judgement:  Good  Reliability:  Good  Impulse Control:  Good     Subjective      Client's Support Network Includes:  daughters,   Functional Status: Moderate impairment "   Progress toward goal: At goal  Prognosis: Good with Ongoing Treatment     Assessment / Plan / Progress   Visit Diagnosis/Orders Placed This Visit:    ICD-10-CM ICD-9-CM   1. JAIMIE (generalized anxiety disorder)  F41.1 300.02   2. Moderate episode of recurrent major depressive disorder  F33.1 296.32   3. Post traumatic stress disorder (PTSD)  F43.10 309.81      PLAN:  Safety: No acute safety concerns  Risk Assessment: Risk of self-harm acutely is low. Risk of self-harm chronically is also low, but could be further elevated in the event of treatment noncompliance and/or AODA.    Treatment Plan/Goals & Progress: Continue supportive psychotherapy efforts and medications as indicated. Treatment options discussed during today's visit. Client acknowledged and verbally consented to continue with current treatment plan and was educated on the importance of compliance with treatment and follow-up appointments. Client seems reasonably able to adhere to treatment plan.      Assisted client in processing above session content; acknowledged and normalized client’s thoughts, feelings, and concerns.     Allowed client to freely discuss issues without interruption or judgement with unconditional positive regard, active listening skills, and empathy. Clinician provided a safe, confidential environment to facilitate the development of a positive therapeutic relationship and encouraged open, honest communication. Assisted client in identifying risk factors which would indicate the need for higher level of care including thoughts to harm self or others and/or self-harming behavior and encouraged client to contact this office, call 911, or present to the nearest emergency room should any of these events occur. Discussed crisis intervention services and means to access. Client adamantly and convincingly denies current suicidal or homicidal ideation or perceptual disturbance. Assisted client in processing session content; acknowledged and  normalized client’s thoughts, feelings, and concerns by utilizing a person-centered approach in efforts to build appropriate rapport and a positive therapeutic relationship with open and honest communication.      Follow Up:   Return in about 4 weeks (around 8/20/2025) for therapy session.    Concetta Flores LCSW  [facility]

## 2025-07-24 ENCOUNTER — OFFICE VISIT (OUTPATIENT)
Dept: PSYCHIATRY | Facility: CLINIC | Age: 70
End: 2025-07-24
Payer: MEDICARE

## 2025-07-24 VITALS
DIASTOLIC BLOOD PRESSURE: 88 MMHG | SYSTOLIC BLOOD PRESSURE: 142 MMHG | HEART RATE: 86 BPM | OXYGEN SATURATION: 97 % | BODY MASS INDEX: 23.95 KG/M2 | HEIGHT: 60 IN | WEIGHT: 122 LBS

## 2025-07-24 DIAGNOSIS — F40.298 SPECIFIC PHOBIA: ICD-10-CM

## 2025-07-24 DIAGNOSIS — F33.1 MODERATE EPISODE OF RECURRENT MAJOR DEPRESSIVE DISORDER: ICD-10-CM

## 2025-07-24 DIAGNOSIS — G47.09 OTHER INSOMNIA: ICD-10-CM

## 2025-07-24 DIAGNOSIS — Z15.89 MTHFR MUTATION: ICD-10-CM

## 2025-07-24 DIAGNOSIS — G31.84 MCI (MILD COGNITIVE IMPAIRMENT): ICD-10-CM

## 2025-07-24 DIAGNOSIS — F41.1 GAD (GENERALIZED ANXIETY DISORDER): Primary | ICD-10-CM

## 2025-07-24 RX ORDER — DIAZEPAM 5 MG/1
TABLET ORAL
Qty: 7 TABLET | Refills: 0 | Status: SHIPPED | OUTPATIENT
Start: 2025-07-24

## 2025-07-24 RX ORDER — MIRTAZAPINE 15 MG/1
15 TABLET, FILM COATED ORAL NIGHTLY
Qty: 30 TABLET | Refills: 3 | Status: SHIPPED | OUTPATIENT
Start: 2025-07-24

## 2025-07-24 RX ORDER — DULOXETIN HYDROCHLORIDE 60 MG/1
60 CAPSULE, DELAYED RELEASE ORAL 2 TIMES DAILY
Qty: 60 CAPSULE | Refills: 3 | Status: SHIPPED | OUTPATIENT
Start: 2025-07-24

## 2025-07-24 NOTE — PROGRESS NOTES
"     Follow Up Office Visit      Patient Name: Stephanie Jean  : 1955   MRN: [id number]     Referring Provider: Nhung Shepard DO    Chief Complaint:      ICD-10-CM ICD-9-CM   1. JAIMIE (generalized anxiety disorder)  F41.1 300.02   2. Moderate episode of recurrent major depressive disorder  F33.1 296.32   3. Other insomnia  G47.09 780.52   4. Specific phobia  F40.298 300.29   5. MTHFR mutation  Z15.89 V84.89   6. MCI (mild cognitive impairment)  G31.84 331.83        History of Present Illness:   Patient presents for evaluation of anxiety, depression, insomnia.  Patient was last seen 2025.  At that time BuSpar and Abilify were increased.  Other medications were continued.  Patient tolerated all medication changes with no adverse effects.  History of Present Illness  Stephanie states that \"I thought I was doing well.\"  She describes having some challenges earlier this week when her daughter came to help her organize a large closet.  At that time she discovered excessive amounts of things that were bought impulsively and unnecessarily.  She expressed feelings of remorse and guilt and stated that she \"does not know why about those I did not need them.\"  She states that her daughter told her it was \"a sign of mental illness.\"  She states that these practices may have caused some financial strain but nothing that she can recall significantly.  She also expresses maria l with the birth of her second great grandson that was born last week.  She states she has not been able to see him yet but the family is doing well.  She is going on a trip with her other daughter and grandchildren and 2 weeks back to [place] and is looking forward to that, she does have concerns about flying again.  She has significant anxiety around flying.  She is continuing with therapy and finds that helpful she states that she has been learning some coping skills during that time.  She describes her mood as stable, denies significant " "depression, rhonda or hypomania.  She endorses continued anxiety that she states some days she is not able to control.  She states that she has an episode of severe anxiety at least once daily.  During these episodes she describes difficulty with functioning, concentrating.  She states that it takes some time to get back to her baseline.  Sleep-stable, she continues to prioritize sleep as she recognizes poor sleep schedules has had significant impact on her mood and anxiety levels.  He continues to benefit from her current medication regimen.  She denies any significant insomnia or hypersomnia.  Appetite-no changes or concerns at this time  She denies any SI, HI, AVH    Interim history-no new doctors visits, she does have a visit at the memory clinic at the [facility] in November.  She describes her memory as somewhat improved recently.  She states that she has been consistent with her memantine, also uses her \"brain jackie\" daily and does crossword type puzzles and bring teasers daily.  She also has a scheduled follow up with gastroenterology next month.  She is looking forward to her trip to [place] and seeing her new great-grandson.    Subjective     Review of Systems:   Review of Systems    Screening Scores:   PHQ-9 Depression Screening  Little interest or pleasure in doing things? Several days   Feeling down, depressed, or hopeless? Not at all   PHQ-2 Total Score 1   Trouble falling or staying asleep, or sleeping too much? Not at all   Feeling tired or having little energy? Not at all   Poor appetite or overeating? Several days   Feeling bad about yourself - or that you are a failure or have let yourself or your family down? More than half the days   Trouble concentrating on things, such as reading the newspaper or watching television? Nearly every day   Moving or speaking so slowly that other people could have noticed? Or the opposite - being so fidgety or restless that you have been moving around a " lot more than usual? More than half the days     Thoughts that you would be better off dead, or of hurting yourself in some way? Not at all   PHQ-9 Total Score 9   If you checked off any problems, how difficult have these problems made it for you to do your work, take care of things at home, or get along with other people? Somewhat difficult     Previous PHQ-9, 7    JAIMIE-7 Anxiety Screening  Feeling nervous, anxious or on edge? Nearly every day   Not being able to stop or control worrying? Several days   Worrying too much about different things? Not at all   Trouble relaxing? Nearly every day   Being so restless that it is hard to sit still? More than half the days   Becoming easily annoyed or irritable? Several days   Feeling afraid as if something awful might happen? Not at all   JAIMIE-7 Total Score 10   If you checked any problems, how difficult have these problems made it for you to do your work, take care of things at home, or get along with other people? Very difficult     Previous JAIMIE-7, 9      Medications:     Current Outpatient Medications:     ARIPiprazole (ABILIFY) 5 MG tablet, Take 1 tablet by mouth Daily., Disp: 30 tablet, Rfl: 2    aspirin 81 MG EC tablet, Take 1 tablet by mouth Daily., Disp: , Rfl:     atorvastatin (LIPITOR) 20 MG tablet, , Disp: , Rfl:     azaTHIOprine (IMURAN) 50 MG tablet, TAKE 2 TABLETS BY MOUTH EVERY DAY, Disp: 180 tablet, Rfl: 3    busPIRone (BUSPAR) 30 MG tablet, Take 1 tablet by mouth 2 (Two) Times a Day. for anxiety, Disp: 60 tablet, Rfl: 2    cyclobenzaprine (FLEXERIL) 10 MG tablet, TAKE 1 TABLET BY MOUTH THREE TIMES DAILY, Disp: 270 tablet, Rfl: 3    Diclofenac Sodium (VOLTAREN) 1 % gel gel, diclofenac 1 % topical gel  APPLY 1 GRAM TOPICALLY TO THE AFFECTED AREA TWICE DAILY AS NEEDED FOR PAIN, Disp: , Rfl:     docusate sodium (COLACE) 100 MG capsule, TAKE 1 CAPSULE BY MOUTH TWICE DAILY, Disp: 180 capsule, Rfl: 2    DULoxetine (CYMBALTA) 60 MG capsule, Take 1 capsule by mouth 2  (Two) Times a Day., Disp: 60 capsule, Rfl: 3    ferrous gluconate (FERGON) 324 MG tablet, TAKE 1 TABLET BY MOUTH DAILY WITH BREAKFAST, Disp: 30 tablet, Rfl: 2    fluticasone (FLONASE) 50 MCG/ACT nasal spray, SHAKE LIQUID AND USE 1 TO 2 SPRAYS IN EACH NOSTRIL EVERY DAY AS NEEDED, Disp: , Rfl:     furosemide (LASIX) 20 MG tablet, TAKE 1 TABLET BY MOUTH DAILY AS NEEDED FOR SWELLING, Disp: 30 tablet, Rfl: 1    gabapentin (NEURONTIN) 800 MG tablet, Take 1 tablet by mouth 3 (Three) Times a Day., Disp: , Rfl:     hydroxychloroquine (PLAQUENIL) 200 MG tablet, Take 1.5 tablets by mouth Daily., Disp: , Rfl:     hydrOXYzine pamoate (VISTARIL) 25 MG capsule, TAKE 1 CAPSULE BY MOUTH UP TO THREE TIMES DAILY AS DIRECTED, Disp: 90 capsule, Rfl: 1    inFLIXimab (REMICADE IV), Infuse  into a venous catheter Every 6 (Six) Weeks., Disp: , Rfl:     l-methylfolate 15 MG tablet tablet, Take 1 tablet by mouth Daily., Disp: 30 tablet, Rfl: 3    lactulose (CHRONULAC) 10 GM/15ML solution, Take 15 mL by mouth Daily. (Patient taking differently: Take 15 mL by mouth Daily. PRN), Disp: 450 mL, Rfl: 2    lisinopril (PRINIVIL,ZESTRIL) 10 MG tablet, TAKE 1 TABLET BY MOUTH EVERY NIGHT, Disp: 90 tablet, Rfl: 3    loratadine (CLARITIN) 10 MG tablet, Take 1 tablet by mouth As Needed., Disp: , Rfl:     lubiprostone (AMITIZA) 24 MCG capsule, TAKE 1 CAPSULE BY MOUTH TWICE DAILY WITH MEALS, Disp: 180 capsule, Rfl: 3    memantine (Namenda) 10 MG tablet, Take 1 tablet by mouth Daily., Disp: 90 tablet, Rfl: 1    mirtazapine (REMERON) 15 MG tablet, Take 1 tablet by mouth Every Night., Disp: 30 tablet, Rfl: 3    ondansetron ODT (ZOFRAN-ODT) 4 MG disintegrating tablet, Place 1 tablet on the tongue Every 8 (Eight) Hours As Needed for Nausea or Vomiting., Disp: 10 tablet, Rfl: 0    oxybutynin XL (DITROPAN-XL) 10 MG 24 hr tablet, Take 1 tablet by mouth Daily., Disp: 90 tablet, Rfl: 3    oxyCODONE-acetaminophen (PERCOCET)  MG per tablet, Take 1 tablet by mouth  "2 (Two) Times a Day., Disp: , Rfl:     pantoprazole (PROTONIX) 20 MG EC tablet, Take 1 tablet by mouth Daily. Indications: Gastroesophageal Reflux Disease, Disp: 90 tablet, Rfl: 3    promethazine (PHENERGAN) 25 MG tablet, TAKE 1/2 TABLET BY MOUTH EVERY 8 HOURS AS NEEDED FOR NAUSEA OR VOMITING, Disp: 30 tablet, Rfl: 2    Restasis 0.05 % ophthalmic emulsion, Administer 1 drop to both eyes 2 (Two) Times a Day., Disp: , Rfl:     rOPINIRole (REQUIP) 2 MG tablet, TAKE 1 TABLET BY MOUTH EVERY DAY 1 TO 3 HOURS BEFORE BEDTIME (Patient taking differently: Take  by mouth Every Night. PRN), Disp: 90 tablet, Rfl: 1    vitamin B-12 (CYANOCOBALAMIN) 1000 MCG tablet, Take 1 tablet by mouth Daily., Disp: 90 tablet, Rfl: 3    vitamin D (ERGOCALCIFEROL) 1.25 MG (46108 UT) capsule capsule, TAKE ONE CAPSULE BY MOUTH EVERY WEEK, Disp: 15 capsule, Rfl: 3    diazePAM (VALIUM) 5 MG tablet, Take 1 oral tablet as need for anxiety/travel, Disp: 7 tablet, Rfl: 0    Medication Considerations:  WATSON reviewed and appropriate.      Allergies:   Allergies   Allergen Reactions    Penicillins Rash         Objective     Physical Exam:  Vital Signs:   Vitals:    07/24/25 1240   BP: 142/88   Pulse: 86   SpO2: 97%   Weight: 55.3 kg (122 lb)   Height: 152.4 cm (60\")     Body mass index is 23.83 kg/m².     RISK ASSESSMENT:  Patient denies any high risk factors today.      Mental Status Exam:   MENTAL STATUS EXAM   General Appearance:  Cleanly groomed and dressed  Eye Contact:  Good eye contact  Attitude:  Cooperative and polite  Motor Activity:  Normal gait, posture and fidgety  Muscle Strength:  Normal  Speech:  Normal rate, tone, volume  Language:  Spontaneous  Mood and affect:  Normal, pleasant and anxious  Hopelessness:  Denies  Loneliness: Denies  Thought Process:  Logical and goal-directed  Associations/ Thought Content:  No delusions  Hallucinations:  None  Suicidal Ideations:  Not present  Homicidal Ideation:  Not present  Sensorium:  Alert and " clear  Orientation:  Person, place, time and situation  Immediate Recall, Recent, and Remote Memory:  Deficit noted  Attention Span/ Concentration:  Poor and easily distracted  Fund of Knowledge:  Appropriate for age and educational level  Intellectual Functioning:  Average range  Insight:  Good  Judgement:  Fair  Reliability:  Good  Impulse Control:  Fair         Assessment / Plan      Visit Diagnosis/Orders Placed This Visit:  Diagnoses and all orders for this visit:    1. JAIMIE (generalized anxiety disorder) (Primary)  -     DULoxetine (CYMBALTA) 60 MG capsule; Take 1 capsule by mouth 2 (Two) Times a Day.  Dispense: 60 capsule; Refill: 3  -     mirtazapine (REMERON) 15 MG tablet; Take 1 tablet by mouth Every Night.  Dispense: 30 tablet; Refill: 3    2. Moderate episode of recurrent major depressive disorder  -     DULoxetine (CYMBALTA) 60 MG capsule; Take 1 capsule by mouth 2 (Two) Times a Day.  Dispense: 60 capsule; Refill: 3  -     mirtazapine (REMERON) 15 MG tablet; Take 1 tablet by mouth Every Night.  Dispense: 30 tablet; Refill: 3    3. Other insomnia  -     mirtazapine (REMERON) 15 MG tablet; Take 1 tablet by mouth Every Night.  Dispense: 30 tablet; Refill: 3    4. Specific phobia  -     diazePAM (VALIUM) 5 MG tablet; Take 1 oral tablet as need for anxiety/travel  Dispense: 7 tablet; Refill: 0    5. MTHFR mutation    6. MCI (mild cognitive impairment)       Assessment & Plan  Patient tolerated medication changes of increase in buspirone and Abilify with no adverse effects.  She states that her mood is stable and depressive symptoms are not concerning.  However her anxiety does seem to persist.  She states that she has an episode daily of severe heightened anxiety.  During this time she gets upset, nervous and on edge, sometimes shaky and extremely worried.  She states they can be triggered by the multitude of things.  She does endorse difficulty returning back to baseline.  She states that she is finding  therapy helpful and utilizing some coping skills.  We did discuss a coping skill for distress intolerance known as IMPROVE, worksheet was given to patient.  Also discussed utilizing cold therapy with severe anxiety i.e. ice packs, cool water on the face etc.  Patient was able to process feelings related to realization of impulse buying.  She expressed feelings of remorse and guilt and had difficulty hearing her daughter speak negatively with her about it.  We discussed reframing her thoughts around the situation and worked on finding lessons to be learned from the situation and discussed strategies to avoid impulse pending in the future.  Patient is traveling soon and has a severe phobia with plane rides, will give low-dose diazepam for the trips.  She has tolerated this before.  We discussed the rationalization for not utilizing a benzo more frequently with her severe anxiety due to her memory issues, as we do not want to compound those with a more frequent benzo use.  She verbalized understanding.  She states that her memory is actually improving with daily mind exercises as well as her compliance of her memantine.  We will continue her medications at this time and follow up in 2 months per patient's request.  All risk, benefits, side effects of medications were discussed.  All questions were answered, patient verbalized understanding and agrees to plan of care    Functional Status: Moderate impairment     Prognosis: Fair with Ongoing Treatment     Impression/Formulation:  Patient appeared alert and oriented. Patient is receptive to assistance with maintaining a stable lifestyle.  Patient presents with history of     ICD-10-CM ICD-9-CM   1. JAIMIE (generalized anxiety disorder)  F41.1 300.02   2. Moderate episode of recurrent major depressive disorder  F33.1 296.32   3. Other insomnia  G47.09 780.52   4. Specific phobia  F40.298 300.29   5. MTHFR mutation  Z15.89 V84.89   6. MCI (mild cognitive impairment)  G31.84  331.83   .     Care/ Treatment Plan:   -Continue aripiprazole 5 mg, 1 tablet daily for mood  -Continue BuSpar 30 mg, 1 tablet twice daily for anxiety  - Continue duloxetine 60 mg, 1 capsule twice daily for mood and anxiety  - Continue L-methyl folate 15 mg, 1 tablet daily for MTHFR gene mutation  - Continue mirtazapine 15 mg, 1 tablet nightly for mood/insomnia/anxiety  - Start diazepam 5 mg, 1 tablet daily as needed for anxiety/travel (#7/30 days)  - Encourage continued therapy      Patient will continue supportive psychotherapy efforts and medications as indicated. Clinic will obtain release of information for current treatment team for continuity of care as needed. Patient will contact this office, call 911 or present to the nearest emergency room should suicidal or homicidal ideations occur.  Discussed medication options and treatment plan of prescribed medication(s) as well as the risks, benefits, and potential side effects. Patient ackowledged and verbally consented to continue with current treatment plan and was educated on the importance of compliance with treatment and follow-up appointments.     Quality Measures:   Stephanie Jean  reports that she quit smoking about 7 years ago. Her smoking use included cigarettes. She started smoking about 22 years ago. She has a 15 pack-year smoking history. She has been exposed to tobacco smoke. She has never used smokeless tobacco. I have educated her on the risk of diseases from using tobacco products such as cancer, COPD, and heart disease.   Patient is a former tobacco user. No tobacco cessation education necessary.             ACP discussion was declined by the patient. Patient does not have an advance directive, declines further assistance.    Patient screened positive for depression based on a PHQ-9 score of 9 on 7/24/2025. Follow-up recommendations include: Prescribed antidepressant medication treatment and Continue with outpatient therapy.     Follow Up:    Return in about 2 months (around 9/24/2025).    Patient or patient representative verbalized consent for the use of Ambient Listening during the visit with  ANNEMARIE Monreal for chart documentation. 7/24/2025  13:54 EDT        ANNEMARIE Randall, PMHNP-BC [facility]   Answers submitted by the patient for this visit:  Chronic Condition Follow-up (Submitted on 7/23/2025)  Chief Complaint: PCP follow-up  Medication compliance: all of the time  Treatment barriers: no complaince problems  Exercise: every other day

## 2025-07-28 DIAGNOSIS — F40.298 SPECIFIC PHOBIA: ICD-10-CM

## 2025-07-28 NOTE — TELEPHONE ENCOUNTER
[facility] called in requesting script clarification on Patient Valium they need script resent with how many times a day Patient can take the medication.

## 2025-07-29 RX ORDER — DIAZEPAM 5 MG/1
TABLET ORAL
Qty: 7 TABLET | Refills: 0 | Status: SHIPPED | OUTPATIENT
Start: 2025-07-29

## 2025-08-04 RX ORDER — DOCUSATE SODIUM 100 MG/1
100 CAPSULE, LIQUID FILLED ORAL 2 TIMES DAILY
Qty: 180 CAPSULE | Refills: 2 | Status: SHIPPED | OUTPATIENT
Start: 2025-08-04

## 2025-08-18 RX ORDER — HYDROXYZINE PAMOATE 25 MG/1
CAPSULE ORAL
Qty: 90 CAPSULE | Refills: 1 | Status: SHIPPED | OUTPATIENT
Start: 2025-08-18

## 2025-08-22 ENCOUNTER — OFFICE VISIT (OUTPATIENT)
Age: 70
End: 2025-08-22
Payer: MEDICARE

## 2025-08-22 VITALS
BODY MASS INDEX: 22.82 KG/M2 | WEIGHT: 124 LBS | HEIGHT: 62 IN | DIASTOLIC BLOOD PRESSURE: 75 MMHG | TEMPERATURE: 96.7 F | OXYGEN SATURATION: 98 % | SYSTOLIC BLOOD PRESSURE: 111 MMHG | HEART RATE: 87 BPM

## 2025-08-22 DIAGNOSIS — L30.9 DERMATITIS: Primary | ICD-10-CM

## 2025-08-22 DIAGNOSIS — H10.13 ALLERGIC CONJUNCTIVITIS OF BOTH EYES: ICD-10-CM

## 2025-08-22 PROCEDURE — 1123F ACP DISCUSS/DSCN MKR DOCD: CPT | Performed by: INTERNAL MEDICINE

## 2025-08-22 PROCEDURE — G8399 PT W/DXA RESULTS DOCUMENT: HCPCS | Performed by: INTERNAL MEDICINE

## 2025-08-22 PROCEDURE — G8420 CALC BMI NORM PARAMETERS: HCPCS | Performed by: INTERNAL MEDICINE

## 2025-08-22 PROCEDURE — G8427 DOCREV CUR MEDS BY ELIG CLIN: HCPCS | Performed by: INTERNAL MEDICINE

## 2025-08-22 PROCEDURE — 99214 OFFICE O/P EST MOD 30 MIN: CPT | Performed by: INTERNAL MEDICINE

## 2025-08-22 PROCEDURE — 1090F PRES/ABSN URINE INCON ASSESS: CPT | Performed by: INTERNAL MEDICINE

## 2025-08-22 PROCEDURE — 1159F MED LIST DOCD IN RCRD: CPT | Performed by: INTERNAL MEDICINE

## 2025-08-22 PROCEDURE — 3017F COLORECTAL CA SCREEN DOC REV: CPT | Performed by: INTERNAL MEDICINE

## 2025-08-22 PROCEDURE — 4004F PT TOBACCO SCREEN RCVD TLK: CPT | Performed by: INTERNAL MEDICINE

## 2025-08-22 RX ORDER — CEPHALEXIN 500 MG/1
500 CAPSULE ORAL 3 TIMES DAILY
Qty: 21 CAPSULE | Refills: 0 | Status: SHIPPED | OUTPATIENT
Start: 2025-08-22 | End: 2025-08-29

## 2025-08-22 RX ORDER — AZATHIOPRINE 50 MG/1
100 TABLET ORAL DAILY
COMMUNITY

## 2025-08-22 RX ORDER — ASPIRIN 81 MG/1
81 TABLET ORAL DAILY
COMMUNITY

## 2025-08-22 RX ORDER — ARIPIPRAZOLE 5 MG/1
5 TABLET ORAL DAILY
COMMUNITY
Start: 2025-06-26

## 2025-08-22 RX ORDER — CETIRIZINE HYDROCHLORIDE 10 MG/1
10 TABLET ORAL DAILY
Qty: 30 TABLET | Refills: 0 | Status: SHIPPED | OUTPATIENT
Start: 2025-08-22

## 2025-08-22 RX ORDER — OLOPATADINE HYDROCHLORIDE 2 MG/ML
1 SOLUTION OPHTHALMIC DAILY
Qty: 0.5 ML | Refills: 0 | Status: SHIPPED | OUTPATIENT
Start: 2025-08-22 | End: 2025-09-01

## 2025-08-22 RX ORDER — BACITRACIN ZINC AND POLYMYXIN B SULFATE 500; 10000 [USP'U]/G; [USP'U]/G
1 OINTMENT OPHTHALMIC 2 TIMES DAILY
Qty: 3.5 EACH | Refills: 0 | Status: SHIPPED | OUTPATIENT
Start: 2025-08-22 | End: 2025-09-01

## 2025-08-22 RX ORDER — BACLOFEN 10 MG/1
10 TABLET ORAL 3 TIMES DAILY PRN
COMMUNITY

## 2025-08-22 SDOH — ECONOMIC STABILITY: FOOD INSECURITY: WITHIN THE PAST 12 MONTHS, YOU WORRIED THAT YOUR FOOD WOULD RUN OUT BEFORE YOU GOT MONEY TO BUY MORE.: NEVER TRUE

## 2025-08-22 SDOH — ECONOMIC STABILITY: FOOD INSECURITY: WITHIN THE PAST 12 MONTHS, THE FOOD YOU BOUGHT JUST DIDN'T LAST AND YOU DIDN'T HAVE MONEY TO GET MORE.: NEVER TRUE

## 2025-08-22 ASSESSMENT — PATIENT HEALTH QUESTIONNAIRE - PHQ9
SUM OF ALL RESPONSES TO PHQ QUESTIONS 1-9: 0
SUM OF ALL RESPONSES TO PHQ QUESTIONS 1-9: 0
1. LITTLE INTEREST OR PLEASURE IN DOING THINGS: NOT AT ALL
SUM OF ALL RESPONSES TO PHQ QUESTIONS 1-9: 0
2. FEELING DOWN, DEPRESSED OR HOPELESS: NOT AT ALL
SUM OF ALL RESPONSES TO PHQ QUESTIONS 1-9: 0

## 2025-08-22 ASSESSMENT — VISUAL ACUITY: OU: 1

## 2025-08-26 ENCOUNTER — APPOINTMENT (OUTPATIENT)
Dept: GENERAL RADIOLOGY | Facility: HOSPITAL | Age: 70
End: 2025-08-26
Payer: MEDICARE

## 2025-08-26 ENCOUNTER — APPOINTMENT (OUTPATIENT)
Dept: CT IMAGING | Facility: HOSPITAL | Age: 70
End: 2025-08-26
Payer: MEDICARE

## 2025-08-26 ENCOUNTER — APPOINTMENT (OUTPATIENT)
Dept: MRI IMAGING | Facility: HOSPITAL | Age: 70
End: 2025-08-26
Payer: MEDICARE

## 2025-08-26 ENCOUNTER — HOSPITAL ENCOUNTER (OUTPATIENT)
Facility: HOSPITAL | Age: 70
Setting detail: OBSERVATION
Discharge: HOME | End: 2025-08-27
Attending: EMERGENCY MEDICINE | Admitting: FAMILY MEDICINE
Payer: MEDICARE

## 2025-08-26 DIAGNOSIS — R27.0 ATAXIA: ICD-10-CM

## 2025-08-26 DIAGNOSIS — Z86.79 HISTORY OF HYPERTENSION: ICD-10-CM

## 2025-08-26 DIAGNOSIS — R42 VERTIGO: ICD-10-CM

## 2025-08-26 DIAGNOSIS — Z86.39 HISTORY OF HYPERLIPIDEMIA: ICD-10-CM

## 2025-08-26 DIAGNOSIS — R13.10 DYSPHAGIA, UNSPECIFIED TYPE: ICD-10-CM

## 2025-08-26 DIAGNOSIS — R41.82 ALTERED MENTAL STATUS, UNSPECIFIED ALTERED MENTAL STATUS TYPE: Primary | ICD-10-CM

## 2025-08-26 PROBLEM — R53.1 WEAKNESS: Status: ACTIVE | Noted: 2025-08-26

## 2025-08-26 LAB
ALBUMIN SERPL-MCNC: 4.2 G/DL (ref 3.5–5.2)
ALBUMIN/GLOB SERPL: 1.4 G/DL
ALP SERPL-CCNC: 77 U/L (ref 39–117)
ALT SERPL W P-5'-P-CCNC: 11 U/L (ref 1–33)
AMPHET+METHAMPHET UR QL: NEGATIVE
AMPHETAMINES UR QL: NEGATIVE
ANION GAP SERPL CALCULATED.3IONS-SCNC: 11.1 MMOL/L (ref 5–15)
AST SERPL-CCNC: 21 U/L (ref 1–32)
BACTERIA UR QL AUTO: NORMAL /HPF
BARBITURATES UR QL SCN: NEGATIVE
BASOPHILS # BLD AUTO: 0.03 10*3/MM3 (ref 0–0.2)
BASOPHILS NFR BLD AUTO: 0.5 % (ref 0–1.5)
BENZODIAZ UR QL SCN: POSITIVE
BILIRUB SERPL-MCNC: <0.2 MG/DL (ref 0–1.2)
BILIRUB UR QL STRIP: NEGATIVE
BUN SERPL-MCNC: 22 MG/DL (ref 8–23)
BUN/CREAT SERPL: 22 (ref 7–25)
BUPRENORPHINE SERPL-MCNC: NEGATIVE NG/ML
CALCIUM SPEC-SCNC: 9.4 MG/DL (ref 8.6–10.5)
CANNABINOIDS SERPL QL: POSITIVE
CHLORIDE SERPL-SCNC: 102 MMOL/L (ref 98–107)
CLARITY UR: CLEAR
CO2 SERPL-SCNC: 25.9 MMOL/L (ref 22–29)
COCAINE UR QL: NEGATIVE
COLOR UR: YELLOW
CREAT SERPL-MCNC: 1 MG/DL (ref 0.57–1)
DEPRECATED RDW RBC AUTO: 46.3 FL (ref 37–54)
EGFRCR SERPLBLD CKD-EPI 2021: 61.9 ML/MIN/1.73
EOSINOPHIL # BLD AUTO: 0.15 10*3/MM3 (ref 0–0.4)
EOSINOPHIL NFR BLD AUTO: 2.7 % (ref 0.3–6.2)
ERYTHROCYTE [DISTWIDTH] IN BLOOD BY AUTOMATED COUNT: 13.2 % (ref 12.3–15.4)
FENTANYL UR-MCNC: NEGATIVE NG/ML
GEN 5 1HR TROPONIN T REFLEX: <6 NG/L
GLOBULIN UR ELPH-MCNC: 3.1 GM/DL
GLUCOSE BLDC GLUCOMTR-MCNC: 109 MG/DL (ref 70–130)
GLUCOSE SERPL-MCNC: 80 MG/DL (ref 65–99)
GLUCOSE UR STRIP-MCNC: NEGATIVE MG/DL
HCT VFR BLD AUTO: 36.3 % (ref 34–46.6)
HGB BLD-MCNC: 12.1 G/DL (ref 12–15.9)
HGB UR QL STRIP.AUTO: NEGATIVE
HOLD SPECIMEN: NORMAL
HOLD SPECIMEN: NORMAL
HYALINE CASTS UR QL AUTO: NORMAL /LPF
IMM GRANULOCYTES # BLD AUTO: 0.01 10*3/MM3 (ref 0–0.05)
IMM GRANULOCYTES NFR BLD AUTO: 0.2 % (ref 0–0.5)
INR PPP: 0.91 (ref 0.9–1.1)
KETONES UR QL STRIP: NEGATIVE
LEUKOCYTE ESTERASE UR QL STRIP.AUTO: ABNORMAL
LYMPHOCYTES # BLD AUTO: 2.29 10*3/MM3 (ref 0.7–3.1)
LYMPHOCYTES NFR BLD AUTO: 41.2 % (ref 19.6–45.3)
MAGNESIUM SERPL-MCNC: 2 MG/DL (ref 1.6–2.4)
MCH RBC QN AUTO: 31.8 PG (ref 26.6–33)
MCHC RBC AUTO-ENTMCNC: 33.3 G/DL (ref 31.5–35.7)
MCV RBC AUTO: 95.5 FL (ref 79–97)
METHADONE UR QL SCN: NEGATIVE
MONOCYTES # BLD AUTO: 0.36 10*3/MM3 (ref 0.1–0.9)
MONOCYTES NFR BLD AUTO: 6.5 % (ref 5–12)
NEUTROPHILS NFR BLD AUTO: 2.72 10*3/MM3 (ref 1.7–7)
NEUTROPHILS NFR BLD AUTO: 48.9 % (ref 42.7–76)
NITRITE UR QL STRIP: NEGATIVE
NRBC BLD AUTO-RTO: 0 /100 WBC (ref 0–0.2)
OPIATES UR QL: NEGATIVE
OXYCODONE UR QL SCN: POSITIVE
PCP UR QL SCN: NEGATIVE
PH UR STRIP.AUTO: 5.5 [PH] (ref 5–8)
PLATELET # BLD AUTO: 216 10*3/MM3 (ref 140–450)
PMV BLD AUTO: 9.7 FL (ref 6–12)
POTASSIUM SERPL-SCNC: 3.7 MMOL/L (ref 3.5–5.2)
PROT SERPL-MCNC: 7.3 G/DL (ref 6–8.5)
PROT UR QL STRIP: NEGATIVE
PROTHROMBIN TIME: 12.9 SECONDS (ref 12.3–15.1)
RBC # BLD AUTO: 3.8 10*6/MM3 (ref 3.77–5.28)
RBC # UR STRIP: NORMAL /HPF
REF LAB TEST METHOD: NORMAL
SODIUM SERPL-SCNC: 139 MMOL/L (ref 136–145)
SP GR UR STRIP: >=1.03 (ref 1–1.03)
SQUAMOUS #/AREA URNS HPF: NORMAL /HPF
TRICYCLICS UR QL SCN: POSITIVE
TROPONIN T NUMERIC DELTA: NORMAL
TROPONIN T SERPL HS-MCNC: <6 NG/L
TSH SERPL DL<=0.05 MIU/L-ACNC: 0.84 UIU/ML (ref 0.27–4.2)
UROBILINOGEN UR QL STRIP: ABNORMAL
WBC # UR STRIP: NORMAL /HPF
WBC NRBC COR # BLD AUTO: 5.56 10*3/MM3 (ref 3.4–10.8)
WHOLE BLOOD HOLD COAG: NORMAL
WHOLE BLOOD HOLD SPECIMEN: NORMAL

## 2025-08-26 PROCEDURE — G0378 HOSPITAL OBSERVATION PER HR: HCPCS

## 2025-08-26 PROCEDURE — 99285 EMERGENCY DEPT VISIT HI MDM: CPT | Performed by: EMERGENCY MEDICINE

## 2025-08-26 PROCEDURE — 70450 CT HEAD/BRAIN W/O DYE: CPT

## 2025-08-26 PROCEDURE — 99223 1ST HOSP IP/OBS HIGH 75: CPT | Performed by: FAMILY MEDICINE

## 2025-08-26 PROCEDURE — 84484 ASSAY OF TROPONIN QUANT: CPT | Performed by: EMERGENCY MEDICINE

## 2025-08-26 PROCEDURE — 71045 X-RAY EXAM CHEST 1 VIEW: CPT

## 2025-08-26 PROCEDURE — 80307 DRUG TEST PRSMV CHEM ANLYZR: CPT | Performed by: FAMILY MEDICINE

## 2025-08-26 PROCEDURE — 70496 CT ANGIOGRAPHY HEAD: CPT

## 2025-08-26 PROCEDURE — 93005 ELECTROCARDIOGRAM TRACING: CPT | Performed by: EMERGENCY MEDICINE

## 2025-08-26 PROCEDURE — 81001 URINALYSIS AUTO W/SCOPE: CPT | Performed by: FAMILY MEDICINE

## 2025-08-26 PROCEDURE — 0042T HC CT CEREBRAL PERFUSION W/WO CONTRAST: CPT

## 2025-08-26 PROCEDURE — 36415 COLL VENOUS BLD VENIPUNCTURE: CPT

## 2025-08-26 PROCEDURE — 70498 CT ANGIOGRAPHY NECK: CPT

## 2025-08-26 PROCEDURE — 85025 COMPLETE CBC W/AUTO DIFF WBC: CPT | Performed by: EMERGENCY MEDICINE

## 2025-08-26 PROCEDURE — 82948 REAGENT STRIP/BLOOD GLUCOSE: CPT | Performed by: EMERGENCY MEDICINE

## 2025-08-26 PROCEDURE — 84443 ASSAY THYROID STIM HORMONE: CPT | Performed by: STUDENT IN AN ORGANIZED HEALTH CARE EDUCATION/TRAINING PROGRAM

## 2025-08-26 PROCEDURE — 83735 ASSAY OF MAGNESIUM: CPT | Performed by: EMERGENCY MEDICINE

## 2025-08-26 PROCEDURE — 70551 MRI BRAIN STEM W/O DYE: CPT

## 2025-08-26 PROCEDURE — 80053 COMPREHEN METABOLIC PANEL: CPT | Performed by: EMERGENCY MEDICINE

## 2025-08-26 PROCEDURE — 25510000001 IOPAMIDOL 61 % SOLUTION: Performed by: EMERGENCY MEDICINE

## 2025-08-26 PROCEDURE — 85610 PROTHROMBIN TIME: CPT | Performed by: EMERGENCY MEDICINE

## 2025-08-26 RX ORDER — ARIPIPRAZOLE 5 MG/1
5 TABLET ORAL DAILY
COMMUNITY
Start: 2025-06-26 | End: 2025-08-27 | Stop reason: HOSPADM

## 2025-08-26 RX ORDER — PANTOPRAZOLE SODIUM 40 MG/1
40 TABLET, DELAYED RELEASE ORAL
Status: DISCONTINUED | OUTPATIENT
Start: 2025-08-27 | End: 2025-08-27 | Stop reason: HOSPADM

## 2025-08-26 RX ORDER — SODIUM CHLORIDE 0.9 % (FLUSH) 0.9 %
10 SYRINGE (ML) INJECTION AS NEEDED
Status: DISCONTINUED | OUTPATIENT
Start: 2025-08-26 | End: 2025-08-27 | Stop reason: HOSPADM

## 2025-08-26 RX ORDER — GABAPENTIN 300 MG/1
300 CAPSULE ORAL EVERY 8 HOURS SCHEDULED
Status: DISCONTINUED | OUTPATIENT
Start: 2025-08-27 | End: 2025-08-27 | Stop reason: HOSPADM

## 2025-08-26 RX ORDER — BISACODYL 5 MG/1
5 TABLET, DELAYED RELEASE ORAL DAILY PRN
Status: DISCONTINUED | OUTPATIENT
Start: 2025-08-26 | End: 2025-08-27 | Stop reason: HOSPADM

## 2025-08-26 RX ORDER — CETIRIZINE HYDROCHLORIDE 10 MG/1
10 TABLET ORAL DAILY
COMMUNITY
Start: 2025-08-22

## 2025-08-26 RX ORDER — MEMANTINE HYDROCHLORIDE 5 MG/1
10 TABLET ORAL DAILY
Status: DISCONTINUED | OUTPATIENT
Start: 2025-08-27 | End: 2025-08-27 | Stop reason: HOSPADM

## 2025-08-26 RX ORDER — SODIUM CHLORIDE 9 MG/ML
40 INJECTION, SOLUTION INTRAVENOUS AS NEEDED
Status: DISCONTINUED | OUTPATIENT
Start: 2025-08-26 | End: 2025-08-27 | Stop reason: HOSPADM

## 2025-08-26 RX ORDER — ASPIRIN 325 MG
325 TABLET ORAL DAILY
Status: DISCONTINUED | OUTPATIENT
Start: 2025-08-27 | End: 2025-08-27

## 2025-08-26 RX ORDER — ASPIRIN 81 MG/1
81 TABLET ORAL DAILY
COMMUNITY
End: 2025-08-27 | Stop reason: HOSPADM

## 2025-08-26 RX ORDER — CEPHALEXIN 500 MG/1
500 CAPSULE ORAL 3 TIMES DAILY
COMMUNITY
Start: 2025-08-22 | End: 2025-08-29

## 2025-08-26 RX ORDER — BISACODYL 10 MG
10 SUPPOSITORY, RECTAL RECTAL DAILY PRN
Status: DISCONTINUED | OUTPATIENT
Start: 2025-08-26 | End: 2025-08-27 | Stop reason: HOSPADM

## 2025-08-26 RX ORDER — POLYETHYLENE GLYCOL 3350 17 G/17G
17 POWDER, FOR SOLUTION ORAL DAILY PRN
Status: DISCONTINUED | OUTPATIENT
Start: 2025-08-26 | End: 2025-08-27 | Stop reason: HOSPADM

## 2025-08-26 RX ORDER — MIRTAZAPINE 15 MG/1
15 TABLET, FILM COATED ORAL NIGHTLY PRN
Status: DISCONTINUED | OUTPATIENT
Start: 2025-08-26 | End: 2025-08-27 | Stop reason: HOSPADM

## 2025-08-26 RX ORDER — ARIPIPRAZOLE 5 MG/1
5 TABLET ORAL DAILY
Status: DISCONTINUED | OUTPATIENT
Start: 2025-08-27 | End: 2025-08-27 | Stop reason: HOSPADM

## 2025-08-26 RX ORDER — SODIUM CHLORIDE 0.9 % (FLUSH) 0.9 %
10 SYRINGE (ML) INJECTION EVERY 12 HOURS SCHEDULED
Status: DISCONTINUED | OUTPATIENT
Start: 2025-08-26 | End: 2025-08-27 | Stop reason: HOSPADM

## 2025-08-26 RX ORDER — ONDANSETRON 2 MG/ML
4 INJECTION INTRAMUSCULAR; INTRAVENOUS EVERY 6 HOURS PRN
Status: DISCONTINUED | OUTPATIENT
Start: 2025-08-26 | End: 2025-08-27 | Stop reason: HOSPADM

## 2025-08-26 RX ORDER — OLOPATADINE HYDROCHLORIDE 2 MG/ML
1 SOLUTION OPHTHALMIC DAILY
COMMUNITY
Start: 2025-08-22 | End: 2025-09-01

## 2025-08-26 RX ORDER — AZATHIOPRINE 50 MG/1
100 TABLET ORAL DAILY
COMMUNITY
End: 2025-08-27 | Stop reason: HOSPADM

## 2025-08-26 RX ORDER — ACETAMINOPHEN 160 MG/5ML
650 SOLUTION ORAL EVERY 4 HOURS PRN
Status: DISCONTINUED | OUTPATIENT
Start: 2025-08-26 | End: 2025-08-27 | Stop reason: HOSPADM

## 2025-08-26 RX ORDER — PREGABALIN 75 MG/1
75 CAPSULE ORAL 2 TIMES DAILY
COMMUNITY
End: 2025-08-27 | Stop reason: HOSPADM

## 2025-08-26 RX ORDER — ROPINIROLE 1 MG/1
1 TABLET, FILM COATED ORAL NIGHTLY PRN
Status: DISCONTINUED | OUTPATIENT
Start: 2025-08-26 | End: 2025-08-27 | Stop reason: HOSPADM

## 2025-08-26 RX ORDER — ATORVASTATIN CALCIUM 80 MG/1
80 TABLET, FILM COATED ORAL NIGHTLY
Status: DISCONTINUED | OUTPATIENT
Start: 2025-08-26 | End: 2025-08-27 | Stop reason: HOSPADM

## 2025-08-26 RX ORDER — BACLOFEN 10 MG/1
10 TABLET ORAL 3 TIMES DAILY PRN
COMMUNITY

## 2025-08-26 RX ORDER — OXYCODONE AND ACETAMINOPHEN 10; 325 MG/1; MG/1
1 TABLET ORAL EVERY 12 HOURS PRN
Status: DISCONTINUED | OUTPATIENT
Start: 2025-08-26 | End: 2025-08-27 | Stop reason: HOSPADM

## 2025-08-26 RX ORDER — ACETAMINOPHEN 650 MG/1
650 SUPPOSITORY RECTAL EVERY 4 HOURS PRN
Status: DISCONTINUED | OUTPATIENT
Start: 2025-08-26 | End: 2025-08-27 | Stop reason: HOSPADM

## 2025-08-26 RX ORDER — DIAZEPAM 5 MG/1
5 TABLET ORAL DAILY PRN
COMMUNITY

## 2025-08-26 RX ORDER — IOPAMIDOL 612 MG/ML
100 INJECTION, SOLUTION INTRAVASCULAR
Status: COMPLETED | OUTPATIENT
Start: 2025-08-26 | End: 2025-08-26

## 2025-08-26 RX ORDER — AZATHIOPRINE 50 MG/1
100 TABLET ORAL DAILY
Status: DISCONTINUED | OUTPATIENT
Start: 2025-08-27 | End: 2025-08-27 | Stop reason: HOSPADM

## 2025-08-26 RX ORDER — MUPIROCIN 2 %
1 OINTMENT (GRAM) TOPICAL 3 TIMES DAILY
COMMUNITY

## 2025-08-26 RX ORDER — NITROGLYCERIN 0.4 MG/1
0.4 TABLET SUBLINGUAL
Status: DISCONTINUED | OUTPATIENT
Start: 2025-08-26 | End: 2025-08-27 | Stop reason: HOSPADM

## 2025-08-26 RX ORDER — IOPAMIDOL 612 MG/ML
50 INJECTION, SOLUTION INTRAVASCULAR
Status: COMPLETED | OUTPATIENT
Start: 2025-08-26 | End: 2025-08-26

## 2025-08-26 RX ORDER — ONDANSETRON 4 MG/1
4 TABLET, ORALLY DISINTEGRATING ORAL EVERY 6 HOURS PRN
Status: DISCONTINUED | OUTPATIENT
Start: 2025-08-26 | End: 2025-08-27 | Stop reason: HOSPADM

## 2025-08-26 RX ORDER — DICYCLOMINE HCL 20 MG
20 TABLET ORAL EVERY 6 HOURS
COMMUNITY

## 2025-08-26 RX ORDER — HYDROCODONE BITARTRATE AND ACETAMINOPHEN 10; 325 MG/1; MG/1
1 TABLET ORAL 2 TIMES DAILY
COMMUNITY

## 2025-08-26 RX ORDER — DULOXETIN HYDROCHLORIDE 30 MG/1
60 CAPSULE, DELAYED RELEASE ORAL 2 TIMES DAILY
Status: DISCONTINUED | OUTPATIENT
Start: 2025-08-27 | End: 2025-08-27 | Stop reason: HOSPADM

## 2025-08-26 RX ORDER — SULFAMETHOXAZOLE AND TRIMETHOPRIM 800; 160 MG/1; MG/1
1 TABLET ORAL 2 TIMES DAILY
COMMUNITY

## 2025-08-26 RX ORDER — BACITRACIN ZINC AND POLYMYXIN B SULFATE 500; 10000 [USP'U]/G; [USP'U]/G
1 OINTMENT OPHTHALMIC 2 TIMES DAILY
COMMUNITY
Start: 2025-08-22 | End: 2025-09-01

## 2025-08-26 RX ORDER — ACETAMINOPHEN 325 MG/1
650 TABLET ORAL EVERY 4 HOURS PRN
Status: DISCONTINUED | OUTPATIENT
Start: 2025-08-26 | End: 2025-08-27 | Stop reason: HOSPADM

## 2025-08-26 RX ORDER — ASPIRIN 300 MG/1
300 SUPPOSITORY RECTAL DAILY
Status: DISCONTINUED | OUTPATIENT
Start: 2025-08-27 | End: 2025-08-27

## 2025-08-26 RX ORDER — AMOXICILLIN 250 MG
2 CAPSULE ORAL 2 TIMES DAILY PRN
Status: DISCONTINUED | OUTPATIENT
Start: 2025-08-26 | End: 2025-08-27 | Stop reason: HOSPADM

## 2025-08-26 RX ADMIN — Medication 10 ML: at 21:46

## 2025-08-26 RX ADMIN — ATORVASTATIN CALCIUM 80 MG: 40 TABLET, FILM COATED ORAL at 21:45

## 2025-08-26 RX ADMIN — Medication 10 ML: at 22:40

## 2025-08-26 RX ADMIN — IOPAMIDOL 100 ML: 612 INJECTION, SOLUTION INTRAVENOUS at 20:12

## 2025-08-26 RX ADMIN — IOPAMIDOL 50 ML: 612 INJECTION, SOLUTION INTRAVENOUS at 20:11

## 2025-08-27 ENCOUNTER — READMISSION MANAGEMENT (OUTPATIENT)
Dept: CALL CENTER | Facility: HOSPITAL | Age: 70
End: 2025-08-27
Payer: MEDICARE

## 2025-08-27 ENCOUNTER — APPOINTMENT (OUTPATIENT)
Dept: CARDIOLOGY | Facility: HOSPITAL | Age: 70
End: 2025-08-27
Payer: MEDICARE

## 2025-08-27 ENCOUNTER — TELEPHONE (OUTPATIENT)
Dept: NEUROLOGY | Facility: CLINIC | Age: 70
End: 2025-08-27
Payer: MEDICARE

## 2025-08-27 VITALS
OXYGEN SATURATION: 95 % | BODY MASS INDEX: 24.66 KG/M2 | DIASTOLIC BLOOD PRESSURE: 80 MMHG | RESPIRATION RATE: 18 BRPM | TEMPERATURE: 98.5 F | WEIGHT: 134 LBS | HEART RATE: 64 BPM | SYSTOLIC BLOOD PRESSURE: 118 MMHG | HEIGHT: 62 IN

## 2025-08-27 DIAGNOSIS — K50.80 CROHN'S DISEASE OF BOTH SMALL AND LARGE INTESTINE WITHOUT COMPLICATION: Primary | ICD-10-CM

## 2025-08-27 LAB
AORTIC DIMENSIONLESS INDEX: 0.78 (DI)
AV MEAN PRESS GRAD SYS DOP V1V2: 3 MMHG
AV VMAX SYS DOP: 124 CM/SEC
BH CV ECHO MEAS - AO MAX PG: 6.2 MMHG
BH CV ECHO MEAS - AO ROOT DIAM: 2.47 CM
BH CV ECHO MEAS - AO V2 VTI: 27.4 CM
BH CV ECHO MEAS - AVA(I,D): 2.36 CM2
BH CV ECHO MEAS - EDV(CUBED): 84 ML
BH CV ECHO MEAS - EDV(MOD-SP2): 83.5 ML
BH CV ECHO MEAS - EDV(MOD-SP4): 83.7 ML
BH CV ECHO MEAS - EF(MOD-SP2): 63.2 %
BH CV ECHO MEAS - EF(MOD-SP4): 56.5 %
BH CV ECHO MEAS - ESV(CUBED): 28.4 ML
BH CV ECHO MEAS - ESV(MOD-SP2): 30.7 ML
BH CV ECHO MEAS - ESV(MOD-SP4): 36.4 ML
BH CV ECHO MEAS - FS: 30.4 %
BH CV ECHO MEAS - IVS/LVPW: 0.97 CM
BH CV ECHO MEAS - IVSD: 0.73 CM
BH CV ECHO MEAS - LA DIMENSION: 2.8 CM
BH CV ECHO MEAS - LAT PEAK E' VEL: 10 CM/SEC
BH CV ECHO MEAS - LV DIASTOLIC VOL/BSA (35-75): 51.9 CM2
BH CV ECHO MEAS - LV MASS(C)D: 98.1 GRAMS
BH CV ECHO MEAS - LV MAX PG: 3.9 MMHG
BH CV ECHO MEAS - LV MEAN PG: 2 MMHG
BH CV ECHO MEAS - LV SYSTOLIC VOL/BSA (12-30): 22.6 CM2
BH CV ECHO MEAS - LV V1 MAX: 98.5 CM/SEC
BH CV ECHO MEAS - LV V1 VTI: 21.4 CM
BH CV ECHO MEAS - LVIDD: 4.4 CM
BH CV ECHO MEAS - LVIDS: 3.1 CM
BH CV ECHO MEAS - LVOT AREA: 3 CM2
BH CV ECHO MEAS - LVOT DIAM: 1.96 CM
BH CV ECHO MEAS - LVPWD: 0.75 CM
BH CV ECHO MEAS - MED PEAK E' VEL: 7.6 CM/SEC
BH CV ECHO MEAS - MV A MAX VEL: 113 CM/SEC
BH CV ECHO MEAS - MV DEC SLOPE: 389 CM/SEC2
BH CV ECHO MEAS - MV DEC TIME: 0.17 SEC
BH CV ECHO MEAS - MV E MAX VEL: 99 CM/SEC
BH CV ECHO MEAS - MV E/A: 0.88
BH CV ECHO MEAS - MV MAX PG: 5.5 MMHG
BH CV ECHO MEAS - MV MEAN PG: 2 MMHG
BH CV ECHO MEAS - MV P1/2T: 80.6 MSEC
BH CV ECHO MEAS - MV V2 VTI: 40.5 CM
BH CV ECHO MEAS - MVA(P1/2T): 2.7 CM2
BH CV ECHO MEAS - MVA(VTI): 1.59 CM2
BH CV ECHO MEAS - PA ACC TIME: 0.12 SEC
BH CV ECHO MEAS - PA V2 MAX: 89.8 CM/SEC
BH CV ECHO MEAS - RV MAX PG: 1.98 MMHG
BH CV ECHO MEAS - RV V1 MAX: 70.3 CM/SEC
BH CV ECHO MEAS - RV V1 VTI: 17.4 CM
BH CV ECHO MEAS - SV(LVOT): 64.6 ML
BH CV ECHO MEAS - SV(MOD-SP2): 52.8 ML
BH CV ECHO MEAS - SV(MOD-SP4): 47.3 ML
BH CV ECHO MEAS - SVI(LVOT): 40 ML/M2
BH CV ECHO MEAS - SVI(MOD-SP2): 32.7 ML/M2
BH CV ECHO MEAS - SVI(MOD-SP4): 29.3 ML/M2
BH CV ECHO MEAS - TAPSE (>1.6): 1.78 CM
BH CV ECHO MEASUREMENTS AVERAGE E/E' RATIO: 11.25
BH CV ECHO SHUNT ASSESSMENT PERFORMED (HIDDEN SCRIPTING): 1
BH CV XLRA - RV BASE: 1.82 CM
BH CV XLRA - RV MID: 1.42 CM
BH CV XLRA - TDI S': 12.6 CM/SEC
CHOLEST SERPL-MCNC: 200 MG/DL (ref 0–200)
GLUCOSE BLDC GLUCOMTR-MCNC: 97 MG/DL (ref 70–130)
HBA1C MFR BLD: 5 % (ref 4.8–5.6)
HDLC SERPL-MCNC: 61 MG/DL (ref 40–60)
LDLC SERPL CALC-MCNC: 112 MG/DL (ref 0–100)
LDLC/HDLC SERPL: 1.77 {RATIO}
LEFT ATRIUM VOLUME INDEX: 29.8 ML/M2
LV EF BIPLANE MOD: 60.5 %
TRIGL SERPL-MCNC: 155 MG/DL (ref 0–150)
VLDLC SERPL-MCNC: 27 MG/DL (ref 5–40)

## 2025-08-27 PROCEDURE — 93306 TTE W/DOPPLER COMPLETE: CPT

## 2025-08-27 PROCEDURE — 99239 HOSP IP/OBS DSCHRG MGMT >30: CPT | Performed by: INTERNAL MEDICINE

## 2025-08-27 PROCEDURE — 97161 PT EVAL LOW COMPLEX 20 MIN: CPT

## 2025-08-27 PROCEDURE — G0378 HOSPITAL OBSERVATION PER HR: HCPCS

## 2025-08-27 PROCEDURE — 83036 HEMOGLOBIN GLYCOSYLATED A1C: CPT | Performed by: FAMILY MEDICINE

## 2025-08-27 PROCEDURE — 63710000001 AZATHIOPRINE PER 50 MG: Performed by: FAMILY MEDICINE

## 2025-08-27 PROCEDURE — 82948 REAGENT STRIP/BLOOD GLUCOSE: CPT

## 2025-08-27 PROCEDURE — 97165 OT EVAL LOW COMPLEX 30 MIN: CPT

## 2025-08-27 PROCEDURE — 80061 LIPID PANEL: CPT | Performed by: FAMILY MEDICINE

## 2025-08-27 RX ORDER — ACETAMINOPHEN 325 MG/1
650 TABLET ORAL ONCE
Status: CANCELLED | OUTPATIENT
Start: 2025-08-27

## 2025-08-27 RX ORDER — LACTULOSE 10 G/15ML
10 SOLUTION ORAL DAILY
Start: 2025-08-27

## 2025-08-27 RX ORDER — SODIUM CHLORIDE 9 MG/ML
20 INJECTION, SOLUTION INTRAVENOUS ONCE
Status: CANCELLED | OUTPATIENT
Start: 2025-08-27

## 2025-08-27 RX ORDER — ASPIRIN 81 MG/1
81 TABLET ORAL DAILY
Status: DISCONTINUED | OUTPATIENT
Start: 2025-08-28 | End: 2025-08-27 | Stop reason: HOSPADM

## 2025-08-27 RX ORDER — DIPHENHYDRAMINE HCL 25 MG
25 CAPSULE ORAL ONCE
Status: CANCELLED | OUTPATIENT
Start: 2025-08-27

## 2025-08-27 RX ADMIN — PANTOPRAZOLE SODIUM 40 MG: 40 TABLET, DELAYED RELEASE ORAL at 06:34

## 2025-08-27 RX ADMIN — GABAPENTIN 300 MG: 300 CAPSULE ORAL at 06:34

## 2025-08-27 RX ADMIN — OXYCODONE HYDROCHLORIDE AND ACETAMINOPHEN 1 TABLET: 10; 325 TABLET ORAL at 09:34

## 2025-08-27 RX ADMIN — MEMANTINE HYDROCHLORIDE 10 MG: 5 TABLET, FILM COATED ORAL at 09:11

## 2025-08-27 RX ADMIN — ASPIRIN 325 MG: 325 TABLET ORAL at 09:11

## 2025-08-27 RX ADMIN — CEPHALEXIN 500 MG: 250 CAPSULE ORAL at 09:35

## 2025-08-27 RX ADMIN — ARIPIPRAZOLE 5 MG: 5 TABLET ORAL at 13:18

## 2025-08-27 RX ADMIN — DULOXETINE 60 MG: 30 CAPSULE, DELAYED RELEASE ORAL at 09:11

## 2025-08-27 RX ADMIN — CEPHALEXIN 500 MG: 250 CAPSULE ORAL at 01:14

## 2025-08-27 RX ADMIN — Medication 10 ML: at 09:35

## 2025-08-27 RX ADMIN — GABAPENTIN 300 MG: 300 CAPSULE ORAL at 13:19

## 2025-08-27 RX ADMIN — BACITRACIN ZINC AND POLYMYXIN B SULFATE 1 APPLICATION: 500; 10000 OINTMENT OPHTHALMIC at 01:15

## 2025-08-27 RX ADMIN — AZATHIOPRINE 100 MG: 50 TABLET ORAL at 13:19

## 2025-08-27 RX ADMIN — Medication 10 ML: at 09:37

## 2025-08-27 RX ADMIN — BACITRACIN ZINC AND POLYMYXIN B SULFATE 1 APPLICATION: 500; 10000 OINTMENT OPHTHALMIC at 09:37

## 2025-08-28 ENCOUNTER — TRANSITIONAL CARE MANAGEMENT TELEPHONE ENCOUNTER (OUTPATIENT)
Dept: CALL CENTER | Facility: HOSPITAL | Age: 70
End: 2025-08-28
Payer: MEDICARE

## 2025-08-29 ENCOUNTER — HOSPITAL ENCOUNTER (OUTPATIENT)
Facility: HOSPITAL | Age: 70
Discharge: HOME | End: 2025-08-29
Payer: MEDICARE

## 2025-08-29 VITALS
RESPIRATION RATE: 18 BRPM | HEART RATE: 62 BPM | OXYGEN SATURATION: 98 % | DIASTOLIC BLOOD PRESSURE: 67 MMHG | SYSTOLIC BLOOD PRESSURE: 108 MMHG

## 2025-08-29 DIAGNOSIS — K50.80 CROHN'S DISEASE OF BOTH SMALL AND LARGE INTESTINE WITHOUT COMPLICATION: Primary | ICD-10-CM

## 2025-08-29 LAB
ALBUMIN SERPL-MCNC: 4.1 G/DL (ref 3.5–5.2)
ALBUMIN/GLOB SERPL: 1.4 G/DL
ALP SERPL-CCNC: 77 U/L (ref 39–117)
ALT SERPL W P-5'-P-CCNC: 10 U/L (ref 1–33)
ANION GAP SERPL CALCULATED.3IONS-SCNC: 9.4 MMOL/L (ref 5–15)
AST SERPL-CCNC: 21 U/L (ref 1–32)
BILIRUB SERPL-MCNC: 0.2 MG/DL (ref 0–1.2)
BUN SERPL-MCNC: 25 MG/DL (ref 8–23)
BUN/CREAT SERPL: 31.3 (ref 7–25)
CALCIUM SPEC-SCNC: 9.7 MG/DL (ref 8.6–10.5)
CHLORIDE SERPL-SCNC: 102 MMOL/L (ref 98–107)
CO2 SERPL-SCNC: 25.6 MMOL/L (ref 22–29)
CREAT SERPL-MCNC: 0.8 MG/DL (ref 0.57–1)
EGFRCR SERPLBLD CKD-EPI 2021: 80.9 ML/MIN/1.73
GLOBULIN UR ELPH-MCNC: 3 GM/DL
GLUCOSE SERPL-MCNC: 94 MG/DL (ref 65–99)
POTASSIUM SERPL-SCNC: 4 MMOL/L (ref 3.5–5.2)
PROT SERPL-MCNC: 7.1 G/DL (ref 6–8.5)
SODIUM SERPL-SCNC: 137 MMOL/L (ref 136–145)

## 2025-08-29 PROCEDURE — 25810000003 SODIUM CHLORIDE 0.9 % SOLUTION 250 ML FLEX CONT: Performed by: PHYSICIAN ASSISTANT

## 2025-08-29 PROCEDURE — 96415 CHEMO IV INFUSION ADDL HR: CPT

## 2025-08-29 PROCEDURE — 96413 CHEMO IV INFUSION 1 HR: CPT

## 2025-08-29 PROCEDURE — 80053 COMPREHEN METABOLIC PANEL: CPT | Performed by: PHYSICIAN ASSISTANT

## 2025-08-29 PROCEDURE — 25010000002 INFLIXIMAB-ABDA 100 MG RECONSTITUTED SOLUTION 1 EACH VIAL: Performed by: PHYSICIAN ASSISTANT

## 2025-08-29 RX ORDER — ACETAMINOPHEN 325 MG/1
650 TABLET ORAL ONCE
Status: COMPLETED | OUTPATIENT
Start: 2025-08-29 | End: 2025-08-29

## 2025-08-29 RX ORDER — SODIUM CHLORIDE 9 MG/ML
20 INJECTION, SOLUTION INTRAVENOUS ONCE
Status: CANCELLED | OUTPATIENT
Start: 2025-08-29

## 2025-08-29 RX ORDER — SODIUM CHLORIDE 9 MG/ML
20 INJECTION, SOLUTION INTRAVENOUS ONCE
OUTPATIENT
Start: 2025-10-10

## 2025-08-29 RX ORDER — ACETAMINOPHEN 325 MG/1
650 TABLET ORAL ONCE
OUTPATIENT
Start: 2025-10-10

## 2025-08-29 RX ORDER — ACETAMINOPHEN 325 MG/1
650 TABLET ORAL ONCE
Status: CANCELLED | OUTPATIENT
Start: 2025-08-29

## 2025-08-29 RX ORDER — SODIUM CHLORIDE 9 MG/ML
20 INJECTION, SOLUTION INTRAVENOUS ONCE
Status: DISCONTINUED | OUTPATIENT
Start: 2025-08-29 | End: 2025-08-30 | Stop reason: HOSPADM

## 2025-08-29 RX ADMIN — ACETAMINOPHEN 650 MG: 325 TABLET ORAL at 09:18

## 2025-08-29 RX ADMIN — INFLIXIMAB 300 MG: 100 INJECTION, POWDER, LYOPHILIZED, FOR SOLUTION INTRAVENOUS at 10:03

## (undated) DEVICE — Device

## (undated) DEVICE — SNAR POLYP CAPTIVATOR/COLD STFF RND 10MM 240CM

## (undated) DEVICE — Device: Brand: MALYUGIN RING SYSTEM 6.25MM

## (undated) DEVICE — GLV SURG NEOPRN SENSICARE SZ8

## (undated) DEVICE — SOL IRRIG H2O 1000ML STRL

## (undated) DEVICE — HYBRID TUBING/CAP SET FOR OLYMPUS® SCOPES: Brand: ERBE

## (undated) DEVICE — VLV SXN AIR/H2O ORCAPOD3 1P/U STRL

## (undated) DEVICE — QUICK CATCH IN-LINE SUCTION POLYP TRAP IS USED FOR SUCTION RETRIEVAL OF ENDOSCOPICALLY REMOVED POLYPS.

## (undated) DEVICE — ENDOSCOPY PORT CONNECTOR FOR OLYMPUS® SCOPES: Brand: ERBE

## (undated) DEVICE — 15 DEG. MICROKNIFE - 3MM: Brand: SHARPOINT

## (undated) DEVICE — FRCP BX RADJAW4 NDL 2.8 240 STD OG

## (undated) DEVICE — CANN IRR/INJ AIR ANT CHAMBER 6MM BEND 27G

## (undated) DEVICE — PAD GRND REM POLYHESIVE A/ DISP

## (undated) DEVICE — HYBRID CO2 TUBING/CAP SET FOR OLYMPUS® SCOPES & CO2 SOURCE: Brand: ERBE

## (undated) DEVICE — SYR LUERLOK 5CC

## (undated) DEVICE — SUCTION CANISTER, 1500CC, RIGID: Brand: DEROYAL

## (undated) DEVICE — HP CONCL INTREPID COAX I/A CRV .3MM

## (undated) DEVICE — SOL IRRIG NACL 9PCT 1000ML BTL

## (undated) DEVICE — CLEARCUT® HP2 SLIT KNIFE INTREPID MICRO-COAXIAL SYSTEM 2.4 DB: Brand: CLEARCUT®; INTREPID

## (undated) DEVICE — LUBE JELLY PK/2.75GM STRL BX/144

## (undated) DEVICE — CONMED SCOPE SAVER BITE BLOCK, 20X27 MM: Brand: SCOPE SAVER

## (undated) DEVICE — FRCP BIOP COLD ENDOJAW ALLGTR W/NDL 2.8X2300MM BLU

## (undated) DEVICE — SNAR POLYP SENSATION STDOVL 27 240 BX40

## (undated) DEVICE — EYE CARE POST OP KIT: Brand: MEDLINE INDUSTRIES, INC.

## (undated) DEVICE — SINGLE-USE POLYPECTOMY SNARE: Brand: CAPTIVATOR II